# Patient Record
Sex: FEMALE | Race: WHITE | NOT HISPANIC OR LATINO | Employment: OTHER | URBAN - METROPOLITAN AREA
[De-identification: names, ages, dates, MRNs, and addresses within clinical notes are randomized per-mention and may not be internally consistent; named-entity substitution may affect disease eponyms.]

---

## 2017-01-20 ENCOUNTER — THERAPY VISIT (OUTPATIENT)
Dept: PHYSICAL THERAPY | Facility: CLINIC | Age: 77
End: 2017-01-20
Payer: COMMERCIAL

## 2017-01-20 DIAGNOSIS — M79.661 PAIN OF RIGHT LOWER LEG: Primary | ICD-10-CM

## 2017-01-20 DIAGNOSIS — M48.061 SPINAL STENOSIS OF LUMBAR REGION WITHOUT NEUROGENIC CLAUDICATION: ICD-10-CM

## 2017-01-20 PROCEDURE — 97112 NEUROMUSCULAR REEDUCATION: CPT | Mod: GP | Performed by: PHYSICAL THERAPIST

## 2017-01-20 PROCEDURE — 97110 THERAPEUTIC EXERCISES: CPT | Mod: GP | Performed by: PHYSICAL THERAPIST

## 2017-02-06 ENCOUNTER — THERAPY VISIT (OUTPATIENT)
Dept: PHYSICAL THERAPY | Facility: CLINIC | Age: 77
End: 2017-02-06
Payer: COMMERCIAL

## 2017-02-06 DIAGNOSIS — M48.061 SPINAL STENOSIS OF LUMBAR REGION WITHOUT NEUROGENIC CLAUDICATION: ICD-10-CM

## 2017-02-06 DIAGNOSIS — M79.661 PAIN OF RIGHT LOWER LEG: Primary | ICD-10-CM

## 2017-02-06 PROCEDURE — 97110 THERAPEUTIC EXERCISES: CPT | Mod: GP | Performed by: PHYSICAL THERAPIST

## 2017-02-06 PROCEDURE — 97112 NEUROMUSCULAR REEDUCATION: CPT | Mod: GP | Performed by: PHYSICAL THERAPIST

## 2017-02-06 PROCEDURE — 97140 MANUAL THERAPY 1/> REGIONS: CPT | Mod: GP | Performed by: PHYSICAL THERAPIST

## 2017-02-06 NOTE — PROGRESS NOTES
Subjective:    HPI                    Objective:    System    Physical Exam    General     ROS    Assessment/Plan:      PROGRESS  REPORT    Progress reporting period is from 12/19/16 to 2/6/17.       SUBJECTIVE  Subjective changes noted by patient:  .  Subjective: Pt states that back pain has done well since last seen.  Has found ex helpful.      Current pain level is  Current Pain level: 0/10.     Previous pain level was   Initial Pain level: 0/10.   Changes in function:  Yes (See Goal flowsheet attached for changes in current functional level)  Adverse reaction to treatment or activity: None    OBJECTIVE  Changes noted in objective findings:    Objective: Reviewed ex.  Good improvement in strength and NMC of LB/pelvis.  Poor ext of right hip noted-good response to manual therapy.  Pt to cont with HEP.  If questions develop. to check back with PT.  Other wise will DC with HEP.     ASSESSMENT/PLAN  Updated problem list and treatment plan: Diagnosis 1:  Intermittent leg pain associated with clinically diagnosed lumbar stenosis    Decreased ROM/flexibility - manual therapy and therapeutic exercise  Decreased joint mobility - manual therapy and therapeutic exercise  Decreased strength - therapeutic exercise and therapeutic activities  Decreased function - therapeutic activities  Impaired posture - neuro re-education  STG/LTGs have been met or progress has been made towards goals:  Yes (See Goal flow sheet completed today.)  Assessment of Progress: Patient is meeting short term goals and is progressing towards long term goals.  Self Management Plans:  Patient is independent in a home treatment program.  Patient is independent in self management of symptoms.    Pavithra continues to require the following intervention to meet STG and LTG's:  PT    Recommendations:  This patient would benefit from continued therapy.     Frequency:  1 X week, once daily  Duration:  for 2 weeks        Please refer to the daily flowsheet for  treatment today, total treatment time and time spent performing 1:1 timed codes.

## 2017-02-06 NOTE — Clinical Note
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       SUBJECTIVE  Subjective changes noted by patient:  .  Subjective: Pt states that back pain has done well since last seen.  Has found ex helpful.      Current pain level is  Current Pain level: 0/10.     Previous pain level was   Initial Pain level: 0/10.   Changes in function:  Yes (See Goal flowsheet attached for changes in current functional level)  Adverse reaction to treatment or activity: None    OBJECTIVE  Changes noted in objective findings:    Objective: Reviewed ex.  Good improvement in strength and NMC of LB/pelvis.  Poor ext of right hip noted-good response to manual therapy.  Pt to cont with HEP.  If questions develop. to check back with PT.  Other wise will DC with HEP.     ASSESSMENT/PLAN  Updated problem list and treatment plan: Diagnosis 1:  Intermittent leg pain associated with clinically diagnosed lumbar stenosis    Decreased ROM/flexibility - manual therapy and therapeutic exercise  Decreased joint mobility - manual therapy and therapeutic exercise  Decreased strength - therapeutic exercise and therapeutic activities  Decreased function - therapeutic activities  Impaired posture - neuro re-education  STG/LTGs have been met or progress has been made towards goals:  Yes (See Goal flow sheet completed today.)  Assessment of Progress: Patient is meeting short term goals and is progressing towards long term goals.  Self Management Plans:   Patient is independent in a home treatment program.  Patient is independent in self management of symptoms.    Re: Pavithra Laurent   :   1940      Pavithra continues to require the following intervention to meet STG and LTG's:  PT    Recommendations:  This patient would benefit from continued therapy.     Frequency:  1 X week, once daily  Duration:  for 2 weeks        Thank you for your referral.    INQUIRIES  Therapist: Ct Martinez, PT, ScD, [facility]  [facility] [place] [facility]  [address] [place] [address] [id number] [id number]  Phone: [phone]  Fax: [phone]

## 2017-03-03 ENCOUNTER — THERAPY VISIT (OUTPATIENT)
Dept: PHYSICAL THERAPY | Facility: CLINIC | Age: 77
End: 2017-03-03
Payer: COMMERCIAL

## 2017-03-03 DIAGNOSIS — M70.61 GREATER TROCHANTERIC BURSITIS OF RIGHT HIP: Primary | ICD-10-CM

## 2017-03-03 DIAGNOSIS — M79.18 GLUTEAL PAIN: ICD-10-CM

## 2017-03-03 PROCEDURE — 97110 THERAPEUTIC EXERCISES: CPT | Mod: GP | Performed by: PHYSICAL THERAPIST

## 2017-03-03 PROCEDURE — 97161 PT EVAL LOW COMPLEX 20 MIN: CPT | Mod: GP | Performed by: PHYSICAL THERAPIST

## 2017-03-03 ASSESSMENT — ACTIVITIES OF DAILY LIVING (ADL)
HOS_ADL_HIGHEST_POTENTIAL_SCORE: 40
GOING_DOWN_1_FLIGHT_OF_STAIRS: SLIGHT DIFFICULTY
HOS_ADL_ITEM_SCORE_TOTAL: 31
GETTING_INTO_AND_OUT_OF_AN_AVERAGE_CAR: NO DIFFICULTY AT ALL
STEPPING_UP_AND_DOWN_CURBS: NO DIFFICULTY AT ALL
TWISTING/PIVOTING_ON_INVOLVED_LEG: MODERATE DIFFICULTY
GOING_UP_1_FLIGHT_OF_STAIRS: SLIGHT DIFFICULTY
WALKING_INITIALLY: NO DIFFICULTY AT ALL
GETTING_INTO_AND_OUT_OF_A_BATHTUB: SLIGHT DIFFICULTY
PUTTING_ON_SOCKS_AND_SHOES: MODERATE DIFFICULTY
WALKING_15_MINUTES_OR_GREATER: MODERATE DIFFICULTY
WALKING_APPROXIMATELY_10_MINUTES: SLIGHT DIFFICULTY
HOS_ADL_COUNT: 10
STANDING_FOR_15_MINUTES: SLIGHT DIFFICULTY
SITTING_FOR_15_MINUTES: MODERATE DIFFICULTY
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
## 2017-03-06 ENCOUNTER — THERAPY VISIT (OUTPATIENT)
Dept: PHYSICAL THERAPY | Facility: CLINIC | Age: 77
End: 2017-03-06
Payer: COMMERCIAL

## 2017-03-06 DIAGNOSIS — M79.18 GLUTEAL PAIN: ICD-10-CM

## 2017-03-06 DIAGNOSIS — M70.61 GREATER TROCHANTERIC BURSITIS OF RIGHT HIP: ICD-10-CM

## 2017-03-06 PROCEDURE — 97140 MANUAL THERAPY 1/> REGIONS: CPT | Mod: GP | Performed by: PHYSICAL THERAPIST

## 2017-03-06 PROCEDURE — 97112 NEUROMUSCULAR REEDUCATION: CPT | Mod: GP | Performed by: PHYSICAL THERAPIST

## 2017-03-06 PROCEDURE — 97110 THERAPEUTIC EXERCISES: CPT | Mod: GP | Performed by: PHYSICAL THERAPIST

## 2017-03-13 ENCOUNTER — THERAPY VISIT (OUTPATIENT)
Dept: PHYSICAL THERAPY | Facility: CLINIC | Age: 77
End: 2017-03-13
Payer: COMMERCIAL

## 2017-03-13 DIAGNOSIS — M79.18 GLUTEAL PAIN: ICD-10-CM

## 2017-03-13 DIAGNOSIS — M70.61 GREATER TROCHANTERIC BURSITIS OF RIGHT HIP: ICD-10-CM

## 2017-03-13 PROCEDURE — 97110 THERAPEUTIC EXERCISES: CPT | Mod: GP | Performed by: PHYSICAL THERAPIST

## 2017-03-13 PROCEDURE — 97112 NEUROMUSCULAR REEDUCATION: CPT | Mod: GP | Performed by: PHYSICAL THERAPIST

## 2017-03-30 ENCOUNTER — DOCUMENTATION ONLY (OUTPATIENT)
Dept: FAMILY MEDICINE | Facility: CLINIC | Age: 77
End: 2017-03-30

## 2017-03-30 ENCOUNTER — TRANSFERRED RECORDS (OUTPATIENT)
Dept: HEALTH INFORMATION MANAGEMENT | Facility: CLINIC | Age: 77
End: 2017-03-30

## 2017-03-30 ENCOUNTER — OFFICE VISIT (OUTPATIENT)
Dept: FAMILY MEDICINE | Facility: CLINIC | Age: 77
End: 2017-03-30
Payer: COMMERCIAL

## 2017-03-30 VITALS
HEIGHT: 63 IN | WEIGHT: 177.5 LBS | SYSTOLIC BLOOD PRESSURE: 130 MMHG | TEMPERATURE: 98.1 F | HEART RATE: 83 BPM | BODY MASS INDEX: 31.45 KG/M2 | DIASTOLIC BLOOD PRESSURE: 62 MMHG | OXYGEN SATURATION: 97 % | RESPIRATION RATE: 16 BRPM

## 2017-03-30 DIAGNOSIS — I82.402 DEEP VEIN THROMBOSIS (DVT) OF LEFT LOWER EXTREMITY, UNSPECIFIED CHRONICITY, UNSPECIFIED VEIN (H): Primary | ICD-10-CM

## 2017-03-30 DIAGNOSIS — I80.9 PHLEBITIS: ICD-10-CM

## 2017-03-30 DIAGNOSIS — M79.89 SWELLING OF LEFT LOWER EXTREMITY: ICD-10-CM

## 2017-03-30 DIAGNOSIS — I82.812 SUPERFICIAL THROMBOSIS OF LEFT LOWER EXTREMITY: ICD-10-CM

## 2017-03-30 PROBLEM — I82.432 DEEP VEIN THROMBOSIS (DVT) OF POPLITEAL VEIN OF LEFT LOWER EXTREMITY, UNSPECIFIED CHRONICITY (H): Status: ACTIVE | Noted: 2017-03-30

## 2017-03-30 PROCEDURE — 36415 COLL VENOUS BLD VENIPUNCTURE: CPT | Performed by: PHYSICIAN ASSISTANT

## 2017-03-30 PROCEDURE — 85610 PROTHROMBIN TIME: CPT | Performed by: PHYSICIAN ASSISTANT

## 2017-03-30 PROCEDURE — 99214 OFFICE O/P EST MOD 30 MIN: CPT | Performed by: PHYSICIAN ASSISTANT

## 2017-03-30 RX ORDER — CEPHALEXIN 500 MG/1
500 CAPSULE ORAL 4 TIMES DAILY
Qty: 40 CAPSULE | Refills: 0 | Status: SHIPPED | OUTPATIENT
Start: 2017-03-30 | End: 2017-03-30

## 2017-03-30 RX ORDER — WARFARIN SODIUM 5 MG/1
10 TABLET ORAL DAILY
Qty: 30 TABLET | Refills: 0 | Status: SHIPPED | OUTPATIENT
Start: 2017-03-30 | End: 2017-04-07

## 2017-03-30 NOTE — PATIENT INSTRUCTIONS
Take enoxaparin and warfarin as instructed starting today.   Follow up with our INR nurse on Monday or Tuesday  Elevate the leg as much as possible, wear compression stalkings and avoid unnecessary movement, rest the leg as much as possible.   INR nurses will determine when you need to follow up with me.   Anticipate treatment with blood thinners for approximately 6 months, more or less depending on clinical progression.     Understanding Deep Vein Thrombosis  Deep vein thrombosis (DVT) is a condition with a blood clot or thrombus in a deep vein. A blood clot is most common in the leg, but can develop in a large vein deep inside the leg, arm, or other part of the body. Part of the clot called an embolus can separate from the vein. It may travel to the lungs and form a pulmonary embolus (PE). This can cut off the flow of blood. A PE is a medical emergency and may cause death. Health care providers use the term venous thromboembolism (VTE) to describe the 2 conditions, DVT and PE. They use the term VTE because the 2 conditions are very closely related. And, because their prevention and treatment are closely related.  Over time, a blood clot can also permanently damage veins. To protect your health, a blood clot must be treated right away.  How DVT develops  The deep veins of the legs are located in the muscles. These help carry blood from the legs to the heart. When leg muscles contract and relax, blood is squeezed through the veins back to the heart. One-way valves inside the veins help keep the blood moving in the right direction. When blood moves too slowly or not at all, it can pool in the veins. This makes a clot more likely to form.    Risk factors  Anyone can develop a blood clot. The following risk factors make a blood more likely to happen:    Being inactive for a long period, such as when you re in the hospital, or traveling by plane or car    Injury to a vein from an accident, a broken bone, or  surgery    Having blood clots in the past    Personal or family history of a blood-clotting disorder    Recent surgery    Cancer and certain cancer treatments    Smoking  Other factors can also put you at higher risk for a blood clot. They include:    Age over 60 years    Pregnancy    Taking birth control or hormone replacement    Having other vein problems    Being overweight  Common symptoms  A blood clot does not always cause obvious symptoms. If you do have symptoms, they usually occur suddenly. They may include:    Pain, especially deep in the muscle    Swelling    Aching or tenderness    Red or warm skin  Call your health care provider if you have these symptoms.  Symptoms of pulmonary embolism may include:    Trouble breathing    Fast heartbeat    Chest pain    Sweating    Coughing (may cough up blood)    Fainting  Call 911 if you have these symptoms.   If you take medicine to help prevent blood clots, you have an increased risk of bleeding. Call 911 if you have heavy or uncontrolled bleeding. Call your health care provider if you have other signs or symptoms of bleeding like blood in the urine, bleeding with bowel movements, or bleeding from the nose, gums, a cut, or vagina.  Diagnosing DVT  Diagnosis begins with thorough questions about your symptoms and medical history along with a physical exam.  Diagnostic tests include:    An imaging test called a duplex ultrasound. This test uses sound waves to create pictures of veins and blood flow.    Blood tests to check for clotting and other problems.  If your health care provider thinks you may have pulmonary embolism, additional testing will be done.  Treating DVT  Treating a blood clot may include:    Medicine to thin the blood and prevent pulmonary embolism and other complications.    Staying in the hospital. This may or may not be necessary.    Surgery for some people, like those who cannot take blood-thinning medicines.  Preventing DVT  Many people who are  in the hospital are at an increased risk of developing blood clots. So, preventing blood clots is an important part of in-hospital care. The care may include getting out of bed regularly, taking medicine, or using special therapies or devices. Other factors and conditions may increase the risk of blood clots. Review your risk with your health care provider.    0085-9039 [employer]. [address] [place] [address], [place] [address]. All rights reserved. This information is not intended as a substitute for professional medical care. Always follow your healthcare professional's instructions.         Using Blood Thinners (Anticoagulants)  Blood thinners or anticoagulants are medicines that help prevent blood clots from forming. They include warfarin, heparin, dabigatran, rivaroxaban, apixaban,and edoxaban. Your health care provider will help you decide which medicine is best for you.    Taking an anticoagulant safely  When you are taking a blood thinner, you will need to take certain steps to stay safe. Too much blood thinner puts you at risk for bleeding. Too little puts you at risk for stroke. Follow these guidelines. Also follow any others that your health care provider gives you.    You may be told you need regular lab testing while taking these medicines. Warfarin requires routine testing to blood-thinning level testing while the other medicines do not.    Tell your doctor about all medicines you take. This includes over the counter medicines, supplements, or herbal remedies. Do not take any medicines (including ones you buy over the counter) that your doctor doesn t know about. Some medicines can interact with blood thinners and cause serious problems.    Tell any health care provider that you see for care (such as doctors, dentists, chiropractors, home health nurses) that you take a blood thinner.    Carry a medical ID card or wear a medical-alert bracelet that says you take an anticoagulant.    Before  taking aspirin, check with your doctor. Aspirin can significantly increase your risk of bleeding.    This medicine makes bleeding harder to stop. To protect yourself:    Avoid activities that may cause injury. If you fall or are injured, contact your health care provider right away. Blood thinners prevent clotting, so you could be bleeding inside without realizing it.    Use a soft-bristle toothbrush and waxed dental floss. Shave with an electric razor rather than a blade.    Don t go barefoot. Don t trim corns or calluses yourself.  Warfarin: Other important information  Several precautions are especially important when you are taking warfarin. Always keep these points in mind:    Be sure to follow your health care provider's instructions for taking warfarin.    Take this medicine at the same time each day. Take it with a full glass of water, with or without food. If you miss a dose, contact your doctor to find out how much to take. Avoid takinga double dose.    Warfarin is an effective drug, but it can be dangerous if not taken properly. It makes your blood less likely to form clots. If you take too much, it can cause serious internal or external bleeding.    You will need to have regular monitoring while you are taking warfarin. This includes blood tests to check your international normalized ratio (INR) and prothrombin time (PT). These tests show how quickly your blood clots. You will also have a complete blood count (CBC) periodically. This looks at your blood and platelet levels. Both of these need to be followed while you're on warfarin. Talk with your health care provider about whether you need to visit the clinic every week, or if services are available for monitoring in your home.    Certain medicines can affect your INR and PT levels. Tell your health care provider if there are any changes in your medicines. This includes any over-the-counter medicines, supplements like vitamin K, or herbal  remedies.    Your diet can also affect your INR and PT levels. Because of this, it's important to eat a consistent diet. It is especially important to eat a consistent amount of foods that are high in vitamin K. Be sure to talk with your health care provider before making any big changes in your diet.    Remember that warfarin increases your risk of bleeding. Be careful not to injure yourself. If you have a significant injury, contact your health care provider right away. It's important to alert your doctor if you've fallen or hurt yourself, even if you don't break your skin. You could be bleeding inside your body without realizing it.     Warfarin: Watch your INR/PT blood levels  Two tests are used to find out how your blood is clotting. One is protime (PT), the other is the international normalized ratio (INR).    Go for your blood tests (INR/PT) as often as directed. Your diet and the other medicines you take can affect your INR/PT levels.    Your INR was between ___ and ___.    Ask your doctor what your goal INR is. My goal INR is between ___ and ___.    My next INR/PT blood draw is due on _____________ (date) at ___________ (time) by ___________ (name of doctor or clinic).    The name of the doctor who is monitoring my anticoagulation therapy is _____________________ and the phone number is _________________.    Follow up with your doctor or as advised by his or her staff. It usually takes a few hours for your doctor to get the results of your clotting tests. Call to get your lab results to find out if your doctor needs to make further changes to your warfarin dose.    If your blood is drawn for these tests at a location other than your doctor's office, tell your doctor as soon as you get your lab results.   Warfarin: Watch what you eat  Vitamin K helps your blood clot. So you have to watch how much you eat of foods that contain vitamin K. These foods can affect the way warfarin works. They do not affect the  other non-warfarin blood thinners.Here are some specific tips:    Try to keep your diet about the same each day. If you change your diet for any reason, such as for illness or to lose weight, be sure to tell your doctor.    Each day, eat the same amount of foods that are high in vitamin K. These include asparagus, avocado, broccoli, cabbage, kale, spinach, and some other leafy green vegetables. Oils, such as soybean, canola, and olive oils, are also high in vitamin K.    Limit fats to 2 to 4 tablespoons a day.    Ask your health care provider if you should avoid alcohol while you are taking a blood thinner.    Avoid teas that contain sweet clover, sweet brendan, or tonka beans. These can affect how your medicine works.    Talk with your health care provider and pharmacist about specific foods or special diets that can affect anticoagulant levels. These include grapefruit juice, cranberries and cranberry juice, fish oil supplements, garlic, mery, licorice, turmeric, and herbal teas and supplements.  Talk with your health care provider if you have concerns about these or other food products and their effects on warfarin.     When to call your doctor if you re taking an anticoagulant  Call your doctor right away if you have any of these:    Bleeding that doesn t stop in 10 minutes    A heavier-than-normal menstrual period or bleeding between periods    Coughing or throwing up blood    Bloody diarrhea or bleeding hemorrhoids     Dark-colored urine or black stools    Red or black-and-blue marks on the skin that get larger    Dizziness or fatigue    Chest pain or trouble breathing  Allergic reactions:    Rash    Itching    Swelling    Trouble swallowing or breathing   Medical conditions and anticoagulants  Before starting a blood thinner, be sure your doctor knows if you have any of these conditions:    Stomach ulcer now or in the past    Vomited blood or had bloody stools (black or red color)    Aneurysm,  pericarditis, or pericardial effusion    Blood disorder    Recent surgery, stroke, mini-stroke, or spinal puncture    Kidney or liver disease, uncontrolled high blood pressure, diabetes, vasculitis, heart failure, lupus, or other collagen-vascular disease, or high cholesterol    Pregnancy or breastfeeding    Younger than 18 years old    Recent or planned dental procedure  Drug interactions and anticoagulants  Many medicines interfere with the effect of blood thinners. Before starting these medicines, be sure your doctor knows about any prescription, over-the-counter, or herbal supplements you take. In particular, tell your provider about:    Antibiotics    Heart medicines    Cimetidine    Aspirin or other anti-inflammatory drugs such as ibuprofen, naproxen, ketoprofen, or other arthritis medicines    Drugs for depression, cancer HIV (protease inhibitors), diabetes, seizures, gout, high cholesterol, or thyroid replacement    Vitamins containing vitamin K or herbal products such as ginkgo, Co-Q10, garlic, or Three Points's wort     Note: This information topic may not include all directions, precautions, medical conditions, drug/food interactions, and warnings for these medicines. Check with your doctor, nurse, or pharmacist for any questions you have.      [id number] [employer]. [address] [place] [id number]. All rights reserved. This information is not intended as a substitute for professional medical care. Always follow your healthcare professional's instructions.

## 2017-03-30 NOTE — MR AVS SNAPSHOT
After Visit Summary   3/30/2017    Pavithra Laurent    MRN: [id number]           Patient Information     Date Of Birth          1940        Visit Information        Provider Department      3/30/2017 11:30 AM Siegler, Nicole Joy, PA-C [facility]        Today's Diagnoses     Deep vein thrombosis (DVT) of left lower extremity, unspecified chronicity, unspecified vein (H)    -  1    Superficial thrombosis of left lower extremity        Swelling of left lower extremity        Phlebitis          Care Instructions      Take enoxaparin and warfarin as instructed starting today.   Follow up with our INR nurse on Monday or Tuesday  Elevate the leg as much as possible, wear compression stalkings and avoid unnecessary movement, rest the leg as much as possible.   INR nurses will determine when you need to follow up with me.   Anticipate treatment with blood thinners for approximately 6 months, more or less depending on clinical progression.     Understanding Deep Vein Thrombosis  Deep vein thrombosis (DVT) is a condition with a blood clot or thrombus in a deep vein. A blood clot is most common in the leg, but can develop in a large vein deep inside the leg, arm, or other part of the body. Part of the clot called an embolus can separate from the vein. It may travel to the lungs and form a pulmonary embolus (PE). This can cut off the flow of blood. A PE is a medical emergency and may cause death. Health care providers use the term venous thromboembolism (VTE) to describe the 2 conditions, DVT and PE. They use the term VTE because the 2 conditions are very closely related. And, because their prevention and treatment are closely related.  Over time, a blood clot can also permanently damage veins. To protect your health, a blood clot must be treated right away.  How DVT develops  The deep veins of the legs are located in the muscles. These help carry blood from the legs to the heart.  When leg muscles contract and relax, blood is squeezed through the veins back to the heart. One-way valves inside the veins help keep the blood moving in the right direction. When blood moves too slowly or not at all, it can pool in the veins. This makes a clot more likely to form.    Risk factors  Anyone can develop a blood clot. The following risk factors make a blood more likely to happen:    Being inactive for a long period, such as when you re in the hospital, or traveling by plane or car    Injury to a vein from an accident, a broken bone, or surgery    Having blood clots in the past    Personal or family history of a blood-clotting disorder    Recent surgery    Cancer and certain cancer treatments    Smoking  Other factors can also put you at higher risk for a blood clot. They include:    Age over 60 years    Pregnancy    Taking birth control or hormone replacement    Having other vein problems    Being overweight  Common symptoms  A blood clot does not always cause obvious symptoms. If you do have symptoms, they usually occur suddenly. They may include:    Pain, especially deep in the muscle    Swelling    Aching or tenderness    Red or warm skin  Call your health care provider if you have these symptoms.  Symptoms of pulmonary embolism may include:    Trouble breathing    Fast heartbeat    Chest pain    Sweating    Coughing (may cough up blood)    Fainting  Call 911 if you have these symptoms.   If you take medicine to help prevent blood clots, you have an increased risk of bleeding. Call 911 if you have heavy or uncontrolled bleeding. Call your health care provider if you have other signs or symptoms of bleeding like blood in the urine, bleeding with bowel movements, or bleeding from the nose, gums, a cut, or vagina.  Diagnosing DVT  Diagnosis begins with thorough questions about your symptoms and medical history along with a physical exam.  Diagnostic tests include:    An imaging test called a duplex  ultrasound. This test uses sound waves to create pictures of veins and blood flow.    Blood tests to check for clotting and other problems.  If your health care provider thinks you may have pulmonary embolism, additional testing will be done.  Treating DVT  Treating a blood clot may include:    Medicine to thin the blood and prevent pulmonary embolism and other complications.    Staying in the hospital. This may or may not be necessary.    Surgery for some people, like those who cannot take blood-thinning medicines.  Preventing DVT  Many people who are in the hospital are at an increased risk of developing blood clots. So, preventing blood clots is an important part of in-hospital care. The care may include getting out of bed regularly, taking medicine, or using special therapies or devices. Other factors and conditions may increase the risk of blood clots. Review your risk with your health care provider.    3222-7470 [employer] [address] [place], [place] [address]. All rights reserved. This information is not intended as a substitute for professional medical care. Always follow your healthcare professional's instructions.         Using Blood Thinners (Anticoagulants)  Blood thinners or anticoagulants are medicines that help prevent blood clots from forming. They include warfarin, heparin, dabigatran, rivaroxaban, apixaban,and edoxaban. Your health care provider will help you decide which medicine is best for you.    Taking an anticoagulant safely  When you are taking a blood thinner, you will need to take certain steps to stay safe. Too much blood thinner puts you at risk for bleeding. Too little puts you at risk for stroke. Follow these guidelines. Also follow any others that your health care provider gives you.    You may be told you need regular lab testing while taking these medicines. Warfarin requires routine testing to blood-thinning level testing while the other medicines do  not.    Tell your doctor about all medicines you take. This includes over the counter medicines, supplements, or herbal remedies. Do not take any medicines (including ones you buy over the counter) that your doctor doesn t know about. Some medicines can interact with blood thinners and cause serious problems.    Tell any health care provider that you see for care (such as doctors, dentists, chiropractors, home health nurses) that you take a blood thinner.    Carry a medical ID card or wear a medical-alert bracelet that says you take an anticoagulant.    Before taking aspirin, check with your doctor. Aspirin can significantly increase your risk of bleeding.    This medicine makes bleeding harder to stop. To protect yourself:    Avoid activities that may cause injury. If you fall or are injured, contact your health care provider right away. Blood thinners prevent clotting, so you could be bleeding inside without realizing it.    Use a soft-bristle toothbrush and waxed dental floss. Shave with an electric razor rather than a blade.    Don t go barefoot. Don t trim corns or calluses yourself.  Warfarin: Other important information  Several precautions are especially important when you are taking warfarin. Always keep these points in mind:    Be sure to follow your health care provider's instructions for taking warfarin.    Take this medicine at the same time each day. Take it with a full glass of water, with or without food. If you miss a dose, contact your doctor to find out how much to take. Avoid takinga double dose.    Warfarin is an effective drug, but it can be dangerous if not taken properly. It makes your blood less likely to form clots. If you take too much, it can cause serious internal or external bleeding.    You will need to have regular monitoring while you are taking warfarin. This includes blood tests to check your international normalized ratio (INR) and prothrombin time (PT). These tests show how  quickly your blood clots. You will also have a complete blood count (CBC) periodically. This looks at your blood and platelet levels. Both of these need to be followed while you're on warfarin. Talk with your health care provider about whether you need to visit the clinic every week, or if services are available for monitoring in your home.    Certain medicines can affect your INR and PT levels. Tell your health care provider if there are any changes in your medicines. This includes any over-the-counter medicines, supplements like vitamin K, or herbal remedies.    Your diet can also affect your INR and PT levels. Because of this, it's important to eat a consistent diet. It is especially important to eat a consistent amount of foods that are high in vitamin K. Be sure to talk with your health care provider before making any big changes in your diet.    Remember that warfarin increases your risk of bleeding. Be careful not to injure yourself. If you have a significant injury, contact your health care provider right away. It's important to alert your doctor if you've fallen or hurt yourself, even if you don't break your skin. You could be bleeding inside your body without realizing it.     Warfarin: Watch your INR/PT blood levels  Two tests are used to find out how your blood is clotting. One is protime (PT), the other is the international normalized ratio (INR).    Go for your blood tests (INR/PT) as often as directed. Your diet and the other medicines you take can affect your INR/PT levels.    Your INR was between ___ and ___.    Ask your doctor what your goal INR is. My goal INR is between ___ and ___.    My next INR/PT blood draw is due on _____________ (date) at ___________ (time) by ___________ (name of doctor or clinic).    The name of the doctor who is monitoring my anticoagulation therapy is _____________________ and the phone number is _________________.    Follow up with your doctor or as advised by his or  her staff. It usually takes a few hours for your doctor to get the results of your clotting tests. Call to get your lab results to find out if your doctor needs to make further changes to your warfarin dose.    If your blood is drawn for these tests at a location other than your doctor's office, tell your doctor as soon as you get your lab results.   Warfarin: Watch what you eat  Vitamin K helps your blood clot. So you have to watch how much you eat of foods that contain vitamin K. These foods can affect the way warfarin works. They do not affect the other non-warfarin blood thinners.Here are some specific tips:    Try to keep your diet about the same each day. If you change your diet for any reason, such as for illness or to lose weight, be sure to tell your doctor.    Each day, eat the same amount of foods that are high in vitamin K. These include asparagus, avocado, broccoli, cabbage, kale, spinach, and some other leafy green vegetables. Oils, such as soybean, canola, and olive oils, are also high in vitamin K.    Limit fats to 2 to 4 tablespoons a day.    Ask your health care provider if you should avoid alcohol while you are taking a blood thinner.    Avoid teas that contain sweet clover, sweet brendan, or tonka beans. These can affect how your medicine works.    Talk with your health care provider and pharmacist about specific foods or special diets that can affect anticoagulant levels. These include grapefruit juice, cranberries and cranberry juice, fish oil supplements, garlic, mery, licorice, turmeric, and herbal teas and supplements.  Talk with your health care provider if you have concerns about these or other food products and their effects on warfarin.     When to call your doctor if you re taking an anticoagulant  Call your doctor right away if you have any of these:    Bleeding that doesn t stop in 10 minutes    A heavier-than-normal menstrual period or bleeding between periods    Coughing or  throwing up blood    Bloody diarrhea or bleeding hemorrhoids     Dark-colored urine or black stools    Red or black-and-blue marks on the skin that get larger    Dizziness or fatigue    Chest pain or trouble breathing  Allergic reactions:    Rash    Itching    Swelling    Trouble swallowing or breathing   Medical conditions and anticoagulants  Before starting a blood thinner, be sure your doctor knows if you have any of these conditions:    Stomach ulcer now or in the past    Vomited blood or had bloody stools (black or red color)    Aneurysm, pericarditis, or pericardial effusion    Blood disorder    Recent surgery, stroke, mini-stroke, or spinal puncture    Kidney or liver disease, uncontrolled high blood pressure, diabetes, vasculitis, heart failure, lupus, or other collagen-vascular disease, or high cholesterol    Pregnancy or breastfeeding    Younger than 18 years old    Recent or planned dental procedure  Drug interactions and anticoagulants  Many medicines interfere with the effect of blood thinners. Before starting these medicines, be sure your doctor knows about any prescription, over-the-counter, or herbal supplements you take. In particular, tell your provider about:    Antibiotics    Heart medicines    Cimetidine    Aspirin or other anti-inflammatory drugs such as ibuprofen, naproxen, ketoprofen, or other arthritis medicines    Drugs for depression, cancer HIV (protease inhibitors), diabetes, seizures, gout, high cholesterol, or thyroid replacement    Vitamins containing vitamin K or herbal products such as ginkgo, Co-Q10, garlic, or Ricardo's wort     Note: This information topic may not include all directions, precautions, medical conditions, drug/food interactions, and warnings for these medicines. Check with your doctor, nurse, or pharmacist for any questions you have.      3381-5231 [employer]. [address] [place] [address], [place] [address]. All rights reserved. This information is not  intended as a substitute for professional medical care. Always follow your healthcare professional's instructions.              Follow-ups after your visit        Additional Services     INR CLINIC REFERRAL       Your provider has referred you to INR Services.    Please be aware that coverage of these services is subject to the terms and limitations of your health insurance plan.  Call member services at your health plan with any benefit or coverage questions.    Indication for Anticoagulation: DVT (first time)  If nonstandard INR is desired, indicate goal range and explanation: goal is INR 2-3  Expected Duration of Therapy: 6 Months                  Your next 10 appointments already scheduled     Apr 06, 2017 10:40 AM CDT   [facility]AN[facility] Extremity with Ct Martinez PT   [facility] ([facility]DA [place]  )    [address] [place] [address] [id number]  [place] [id number] [phone]            Apr 11, 2017 11:20 AM CDT   AMANDA Extremity with Ct Martinez PT   [facility] ([facility]DA [place]  )    [address] [place] [address] [id number]  [place] [id number] [phone]              Future tests that were ordered for you today     Open Future Orders        Priority Expected Expires Ordered    US Lower Extremity Venous Duplex Left Routine  3/30/2018 3/30/2017            Who to contact     If you have questions or need follow up information about today's clinic visit or your schedule please contact [facility] directly at [phone].  Normal or non-critical lab and imaging results will be communicated to you by MyChart, letter or phone within 4 business days after the clinic has received the results. If you do not hear from us within 7 days, please contact the clinic through MyChart or phone. If you have a critical or abnormal lab result, we will notify you by phone as soon as possible.  Submit refill requests through MyChart or call your  "pharmacy and they will forward the refill request to us. Please allow 3 business days for your refill to be completed.          Additional Information About Your Visit        CodeNgohart Information     Streamline lets you send messages to your doctor, view your test results, renew your prescriptions, schedule appointments and more. To sign up, go to [email or web address] . Click on \"Log in\" on the left side of the screen, which will take you to the Welcome page. Then click on \"Sign up Now\" on the right side of the page.     You will be asked to enter the access code listed below, as well as some personal information. Please follow the directions to create your username and password.     Your access code is: [email or web address]  Expires: 2017 12:58 PM     Your access code will  in 90 days. If you need help or a new code, please call your [facility] clinic or [phone].        Care EveryWhere ID     This is your Care EveryWhere ID. This could be used by other organizations to access your [facility] medical records  [id number]        Your Vitals Were     Pulse Temperature Respirations Height Pulse Oximetry BMI (Body Mass Index)    83 98.1  F (36.7  C) (Tympanic) 16 5' 2.5\" (1.588 m) 97% 31.95 kg/m2       Blood Pressure from Last 3 Encounters:   17 130/62   16 124/66   16 102/56    Weight from Last 3 Encounters:   17 177 lb 8 oz (80.5 kg)   16 174 lb 8 oz (79.2 kg)   16 177 lb (80.3 kg)              We Performed the Following     INR CLINIC REFERRAL     INR          Today's Medication Changes          These changes are accurate as of: 3/30/17  1:40 PM.  If you have any questions, ask your nurse or doctor.               Start taking these medicines.        Dose/Directions    cephALEXin 500 MG capsule   Commonly known as:  KEFLEX   Started by:  Siegler, Nicole Joy, PA-C        Dose:  500 mg   Take 1 capsule (500 mg) by mouth 4 times daily   Quantity:  40 capsule   Refills:  0       " enoxaparin 80 MG/0.8ML injection   Commonly known as:  LOVENOX   Used for:  Deep vein thrombosis (DVT) of left lower extremity, unspecified chronicity, unspecified vein (H)   Started by:  Siegler, Nicole Joy, PA-C        Dose:  1 mg/kg   Inject 0.81 mLs (81 mg) Subcutaneous 2 times daily for 14 days   Quantity:  22.68 mL   Refills:  0       warfarin 5 MG tablet   Commonly known as:  COUMADIN   Used for:  Deep vein thrombosis (DVT) of left lower extremity, unspecified chronicity, unspecified vein (H)   Started by:  Siegler, Nicole Joy, PA-C        Dose:  10 mg   Take 2 tablets (10 mg) by mouth daily 10mg for 2 days then 5mg daily until further instruction from INR nurse   Quantity:  30 tablet   Refills:  0         These medicines have changed or have updated prescriptions.        Dose/Directions    benzonatate 100 MG capsule   Commonly known as:  TESSALON   This may have changed:  reasons to take this   Used for:  Cough        Dose:  100 mg   Take 1 capsule (100 mg) by mouth 3 times daily as needed for cough   Quantity:  30 capsule   Refills:  0            Where to get your medicines      These medications were sent to [id number] - [address] [address]     Phone:  [phone]     cephALEXin 500 MG capsule    enoxaparin 80 MG/0.8ML injection    warfarin 5 MG tablet                Primary Care Provider Office Phone # Fax #    Nicole Joy Siegler, PA-C [phone]       [facility] [address]  [facility] [address]        Thank you!     Thank you for choosing [facility]  for your care. Our goal is always to provide you with excellent care. Hearing back from our patients is one way we can continue to improve our services. Please take a few minutes to complete the written survey that you may receive in the mail after your visit with us. Thank you!             Your Updated Medication List -  Protect others around you: Learn how to safely use, store and throw away your medicines at www.disposemymeds.org.          This list is accurate as of: 3/30/17  1:40 PM.  Always use your most recent med list.                   Brand Name Dispense Instructions for use    acetaminophen 500 MG tablet    TYLENOL     Take 500-1,000 mg by mouth every 6 hours as needed for mild pain       amoxicillin 500 MG capsule    AMOXIL     Take 2,000 mg by mouth See Admin Instructions As needed for dental care       azelastine 0.1 % spray    ASTELIN     Spray 2 sprays into both nostrils 2 times daily as needed       benzonatate 100 MG capsule    TESSALON    30 capsule    Take 1 capsule (100 mg) by mouth 3 times daily as needed for cough       CALCIUM SOFT CHEWS 500-500-40 MG-UNT-MCG Chew   Generic drug:  calcium-vitamin D-vitamin K      Take 2 tablets by mouth 2 times daily       cephALEXin 500 MG capsule    KEFLEX    40 capsule    Take 1 capsule (500 mg) by mouth 4 times daily       cycloSPORINE 0.05 % ophthalmic emulsion    RESTASIS     Place 1 drop into both eyes every 12 hours.       DERMOTIC 0.01 % Oil   Generic drug:  fluocinolone acetonide      Place 1 drop in ear(s) daily as needed.       desonide 0.05 % cream    DESOWEN     Apply topically 2 times daily as needed       Dextran 70 0.1%-Hypromellose 0.3% 0.1-0.3 % Soln ophthalmic solution      Place 1 drop into both eyes as needed for dry eyes       diclofenac 1 % Gel topical gel    VOLTAREN     Place 2 g onto the skin 4 times daily       enoxaparin 80 MG/0.8ML injection    LOVENOX    22.68 mL    Inject 0.81 mLs (81 mg) Subcutaneous 2 times daily for 14 days       epinastine HCl 0.05 % Soln    ELESTAT     1 drop 2 times daily as needed       erythromycin ophthalmic ointment    ROMYCIN     1 Application nightly as needed       fluticasone 50 MCG/ACT spray    FLONASE     Spray 1 spray into both nostrils daily       gabapentin 300 MG capsule    NEURONTIN    270 capsule    TAKE 1  CAPSULE BY MOUTH 3TIMES A DAY       IBUPROFEN PO      Take 400 mg by mouth every 4 hours as needed for moderate pain       ketoconazole-hydrocortisone 2 & 1 % (CREAM) Kit      Externally apply 1 Application topically 2 times daily       levothyroxine 88 MCG tablet    SYNTHROID/LEVOTHROID    90 tablet    TAKE 1 TABLET BY MOUTH EVERY DAY       melatonin 3 MG tablet      Take 3 mg by mouth nightly as needed for sleep       metroNIDAZOLE 1 % gel    METROGEL     Apply 0.5 inches topically daily.       montelukast 10 MG tablet    SINGULAIR     Take 10 mg by mouth daily       PRILOSEC OTC PO      Take 20 mg by mouth every other day       warfarin 5 MG tablet    COUMADIN    30 tablet    Take 2 tablets (10 mg) by mouth daily 10mg for 2 days then 5mg daily until further instruction from INR nurse

## 2017-03-30 NOTE — NURSING NOTE
"Chief Complaint   Patient presents with     Cellulitis     left ankle, has been treated X4 for cellulitis       Initial /62  Pulse 83  Temp 98.1  F (36.7  C) (Tympanic)  Resp 16  Ht 5' 2.5\" (1.588 m)  Wt 177 lb 8 oz (80.5 kg)  SpO2 97%  BMI 31.95 kg/m2 Estimated body mass index is 31.95 kg/(m^2) as calculated from the following:    Height as of this encounter: 5' 2.5\" (1.588 m).    Weight as of this encounter: 177 lb 8 oz (80.5 kg).  Medication Reconciliation: complete     Vanesa Koch LPN      Patient was schedule for venous duples @ [facility] today @ 1:00. Directed to call with results. Patient was given information and directions.  "

## 2017-03-30 NOTE — PROGRESS NOTES
Went over lovenox teaching with the patient and gave her a lovenox kit, Also went over the dietary restrictions when on coumadin.  Mili Davila RN  03/30/17  4:57 PM

## 2017-03-30 NOTE — LETTER
[facility]  [address] [address]  [phone]                                                                                                           Pavithra Laurent  [address] [facility] [address]    March 31, 2017      Dear Pavithra,    Your current INR is normal. Regarding your recent diagnosis of DVT (deep vein thrombosis) you will be working with the INR Nurse at the clinic to raise this lab result value to between 2 and 3 for best disintegration of the blood clot.     Continue with our current plan for follow up Monday with the INR Nurse.     Results for orders placed or performed in visit on 03/30/17   INR   Result Value Ref Range    INR 0.94 0.86 - 1.14     Thank you for choosing [facility].  We appreciate the opportunity to serve you and look forward to supporting your healthcare needs in the future.    If you have any questions or concerns, please call me or my staff at [phone].      Sincerely,        Nicole Joy Siegler, PA-C

## 2017-03-30 NOTE — PROGRESS NOTES
SUBJECTIVE:                                                    Pavithra Laurent is a 76 year old female who presents to clinic today for the following health issues:    Rash      Duration: 2 weeks    Description  Location: left ankle  Itching: no    Intensity:  moderate    Accompanying signs and symptoms: tender, red, swelling, lump's     History (similar episodes/previous evaluation): cellulitis    Precipitating or alleviating factors:  New exposures:  None  Recent travel: no      Therapies tried and outcome: organic healing balm     She had been treated about 2 weeks ago for cellulitis with cephalexin and had improvement. She has been seen by her ortho provider and MRI of the ankle/leg was negative for bone or tendon issue or deep infection. She continues with PT for plantar fasciitis. She is concerned about blood clot as many of her family members have clotting disorder but she is not certain whether she does or not. She has no history of blood clot and has several surgical procedures without complication in her life.     Her current rash is painful on the anterior aspect of the left lower leg, redness is circular around her left lower calf. She has some swelling that she has noted compared to the other side. No discharge or bleeding. She has not had an injury she is aware of in the area or an open wound or sore.     Problem list and histories reviewed & adjusted, as indicated.  Additional history: as documented    Patient Active Problem List   Diagnosis     Nasal congestion     Environmental allergies     Acute bronchitis with coexisting condition, need prophylactic therapy     GERD (gastroesophageal reflux disease)     Hypothyroidism     Arm pain     Rosacea     Dry eye syndrome     Elbow dislocation     Lesion of ulnar nerve     CARDIOVASCULAR SCREENING; LDL GOAL LESS THAN 130     Pain of right lower leg     Spinal stenosis of lumbar region without neurogenic claudication     Greater trochanteric bursitis of  right hip     Gluteal pain     Deep vein thrombosis (DVT) of left lower extremity, unspecified chronicity, unspecified vein (H)     Past Surgical History:   Procedure Laterality Date     ARTHROTOMY ELBOW Right 1/7/2015    Procedure: ARTHROTOMY ELBOW;  Surgeon: Branden Meyer MD;  Location: [facility] RAD RESEC TONSIL/PILLARS  1948    tonsillectomy     CLOSED REDUCTION UPPER EXTREMITY  12/26/2014    Procedure: CLOSED REDUCTION UPPER EXTREMITY;  Surgeon: Louis Daniel MD;  Location:  OR     GYN SURGERY  1984    tubal ligation     IRRIGATION AND DEBRIDEMENT HAND, COMBINED  12/26/2014    Procedure: COMBINED IRRIGATION AND DEBRIDEMENT HAND;  Surgeon: Louis Daniel MD;  Location:  OR     KNEE SURGERY  2001    arthroscopic right     LAPAROSCOPIC CHOLECYSTECTOMY  8/16/2012    Procedure: LAPAROSCOPIC CHOLECYSTECTOMY;  LAPAROSCOPIC CHOLECYSTECTOMY ;  Surgeon: Preston Walters MD;  Location: [facility]     OPEN REDUCTION INTERNAL FIXATION FOREARM Right 12/26/2014    Procedure: OPEN REDUCTION INTERNAL FIXATION FOREARM;  Surgeon: Louis Daniel MD;  Location:  OR     ORTHOPEDIC SURGERY  2007,2008    bunyanectomy, hammer toe, torn meniscus, toe and knee replacement     TKA  2009    right knee     WISDOM TEETH EXTRACTION  1958       Social History   Substance Use Topics     Smoking status: Never Smoker     Smokeless tobacco: Never Used     Alcohol use 0.0 oz/week     0 Standard drinks or equivalent per week      Comment: rare- 2/month     Family History   Problem Relation Age of Onset     HEART DISEASE Mother      MI at 72     CEREBROVASCULAR DISEASE Mother      CANCER Mother      lung     Alcohol/Drug Father      Depression Father      Cardiovascular Father      HEART DISEASE Paternal Grandmother      HEART DISEASE Paternal Grandfather      CANCER Maternal Grandmother      stomach     Cancer - colorectal Other      Neurologic Disorder Son      brain injury     Clotting Disorder (Unknown) Son      Unknown/Adopted Maternal  Grandfather          Current Outpatient Prescriptions   Medication Sig Dispense Refill     enoxaparin (LOVENOX) 80 MG/0.8ML injection Inject 0.81 mLs (81 mg) Subcutaneous 2 times daily for 14 days 22.68 mL 0     warfarin (COUMADIN) 5 MG tablet Take 2 tablets (10 mg) by mouth daily 10mg for 2 days then 5mg daily until further instruction from INR nurse 30 tablet 0     levothyroxine (SYNTHROID, LEVOTHROID) 88 MCG tablet TAKE 1 TABLET BY MOUTH EVERY DAY 90 tablet 2     gabapentin (NEURONTIN) 300 MG capsule TAKE 1 CAPSULE BY MOUTH 3TIMES A  capsule 3     benzonatate (TESSALON) 100 MG capsule Take 1 capsule (100 mg) by mouth 3 times daily as needed for cough (Patient taking differently: Take 100 mg by mouth 3 times daily as needed for cough (Using this medication PRN) ) 30 capsule 0     diclofenac (VOLTAREN) 1 % GEL Place 2 g onto the skin 4 times daily       calcium-vitamin D-vitamin K (CALCIUM SOFT CHEWS) 500-500-40 MG-UNT-MCG CHEW Take 2 tablets by mouth 2 times daily       epinastine HCl (ELESTAT) 0.05 % SOLN 1 drop 2 times daily as needed       desonide (DESOWEN) 0.05 % cream Apply topically 2 times daily as needed       melatonin 3 MG tablet Take 3 mg by mouth nightly as needed for sleep       acetaminophen (TYLENOL) 500 MG tablet Take 500-1,000 mg by mouth every 6 hours as needed for mild pain       Dextran 70 0.1%-Hypromellose 0.3% (BION TEARS) 0.1-0.3 % SOLN ophthalmic solution Place 1 drop into both eyes as needed for dry eyes       ketoconazole-hydrocortisone 2 & 1 % (CREAM) KIT Externally apply 1 Application topically 2 times daily       azelastine (ASTELIN) 0.1 % nasal spray Spray 2 sprays into both nostrils 2 times daily as needed        montelukast (SINGULAIR) 10 MG tablet Take 10 mg by mouth daily        IBUPROFEN PO Take 400 mg by mouth every 4 hours as needed for moderate pain        erythromycin (ROMYCIN) ophthalmic ointment 1 Application nightly as needed        fluticasone (FLONASE) 50 MCG/ACT  "nasal spray Spray 1 spray into both nostrils daily        amoxicillin (AMOXIL) 500 MG capsule Take 2,000 mg by mouth See Admin Instructions As needed for dental care       fluocinolone acetonide (DERMOTIC) 0.01 % OIL Place 1 drop in ear(s) daily as needed.       cycloSPORINE (RESTASIS) 0.05 % ophthalmic emulsion Place 1 drop into both eyes every 12 hours.         metroNIDAZOLE (METROGEL) 1 % gel Apply 0.5 inches topically daily.         Omeprazole Magnesium (PRILOSEC OTC PO) Take 20 mg by mouth every other day          Reviewed and updated as needed this visit by clinical staff  Tobacco  Allergies  Meds  Problems  Med Hx  Surg Hx  Fam Hx  Soc Hx        Reviewed and updated as needed this visit by Provider  Allergies  Meds  Problems         ROS:  Patient denies fever, chills, nausea, vomiting, diarrhea, abdominal pain, chest pain, shortness of breath, headache, dizziness, lightheadedness. No numbness or tingling of the lower extremity    OBJECTIVE:                                                    /62  Pulse 83  Temp 98.1  F (36.7  C) (Tympanic)  Resp 16  Ht 5' 2.5\" (1.588 m)  Wt 177 lb 8 oz (80.5 kg)  SpO2 97%  BMI 31.95 kg/m2  Body mass index is 31.95 kg/(m^2).  GENERAL: healthy, alert and no distress  RESP: non labored breathing  MS: left lower extremity is slightly edematous compared to right side below the midtibia thru the foot; no pitting edema. Ankle and foot are grossly normal with normal ROM and no tenderness to palpation. She has tenderness to touch over the anterior tibial area, specifically over a few varicose veins suggestive of some phlebitis.    SKIN: redness without induration of the anterior lower leg from mid tibia and encircling the posterior lower calf and into the ankle and foot. The redness is brightest in the anterior calf over the varicosities.   NEURO: SILT grossly in the left lower extremity  PSYCH: mentation appears normal, affect normal/bright    Diagnostic Test " Results:  Ultrasound pending     ASSESSMENT/PLAN:                                                        ICD-10-CM    1. Deep vein thrombosis (DVT) of left lower extremity, unspecified chronicity, unspecified vein (H) I82.402 INR CLINIC REFERRAL     enoxaparin (LOVENOX) 80 MG/0.8ML injection     warfarin (COUMADIN) 5 MG tablet     INR   2. Superficial thrombosis of left lower extremity I82.812    3. Swelling of left lower extremity M79.89 US Lower Extremity Venous Duplex Left   4. Phlebitis I80.9      This appears like phlebitis vs cellulitis vs dvt. Suspicion for dvt is low but patient and I agree to do the ultrasound to verify. If that is negative she will treat for phlebitis and cellulitis and return if not improving as anticipated. She agrees.     ___ 1:20pm [facility] with results of ultrasound:   1) superficial thrombosis of greater saphenous vein near the ankle  2) DVT mid calf peroneal and soleal veins    Patient Instructions       Take enoxaparin and warfarin as instructed starting today.   Follow up with our INR nurse on Monday or Tuesday  Elevate the leg as much as possible, wear compression stalkings and avoid unnecessary movement, rest the leg as much as possible.   INR nurses will determine when you need to follow up with me.   Anticipate treatment with blood thinners for approximately 6 months, more or less depending on clinical progression.     Understanding Deep Vein Thrombosis  Deep vein thrombosis (DVT) is a condition with a blood clot or thrombus in a deep vein. A blood clot is most common in the leg, but can develop in a large vein deep inside the leg, arm, or other part of the body. Part of the clot called an embolus can separate from the vein. It may travel to the lungs and form a pulmonary embolus (PE). This can cut off the flow of blood. A PE is a medical emergency and may cause death. Health care providers use the term venous thromboembolism (VTE) to describe the 2 conditions,  DVT and PE. They use the term VTE because the 2 conditions are very closely related. And, because their prevention and treatment are closely related.  Over time, a blood clot can also permanently damage veins. To protect your health, a blood clot must be treated right away.  How DVT develops  The deep veins of the legs are located in the muscles. These help carry blood from the legs to the heart. When leg muscles contract and relax, blood is squeezed through the veins back to the heart. One-way valves inside the veins help keep the blood moving in the right direction. When blood moves too slowly or not at all, it can pool in the veins. This makes a clot more likely to form.    Risk factors  Anyone can develop a blood clot. The following risk factors make a blood more likely to happen:    Being inactive for a long period, such as when you re in the hospital, or traveling by plane or car    Injury to a vein from an accident, a broken bone, or surgery    Having blood clots in the past    Personal or family history of a blood-clotting disorder    Recent surgery    Cancer and certain cancer treatments    Smoking  Other factors can also put you at higher risk for a blood clot. They include:    Age over 60 years    Pregnancy    Taking birth control or hormone replacement    Having other vein problems    Being overweight  Common symptoms  A blood clot does not always cause obvious symptoms. If you do have symptoms, they usually occur suddenly. They may include:    Pain, especially deep in the muscle    Swelling    Aching or tenderness    Red or warm skin  Call your health care provider if you have these symptoms.  Symptoms of pulmonary embolism may include:    Trouble breathing    Fast heartbeat    Chest pain    Sweating    Coughing (may cough up blood)    Fainting  Call 911 if you have these symptoms.   If you take medicine to help prevent blood clots, you have an increased risk of bleeding. Call 911 if you have heavy or  uncontrolled bleeding. Call your health care provider if you have other signs or symptoms of bleeding like blood in the urine, bleeding with bowel movements, or bleeding from the nose, gums, a cut, or vagina.  Diagnosing DVT  Diagnosis begins with thorough questions about your symptoms and medical history along with a physical exam.  Diagnostic tests include:    An imaging test called a duplex ultrasound. This test uses sound waves to create pictures of veins and blood flow.    Blood tests to check for clotting and other problems.  If your health care provider thinks you may have pulmonary embolism, additional testing will be done.  Treating DVT  Treating a blood clot may include:    Medicine to thin the blood and prevent pulmonary embolism and other complications.    Staying in the hospital. This may or may not be necessary.    Surgery for some people, like those who cannot take blood-thinning medicines.  Preventing DVT  Many people who are in the hospital are at an increased risk of developing blood clots. So, preventing blood clots is an important part of in-hospital care. The care may include getting out of bed regularly, taking medicine, or using special therapies or devices. Other factors and conditions may increase the risk of blood clots. Review your risk with your health care provider.    [id number] [employer]. [address] [place], [place] [id number]. All rights reserved. This information is not intended as a substitute for professional medical care. Always follow your healthcare professional's instructions.         Using Blood Thinners (Anticoagulants)  Blood thinners or anticoagulants are medicines that help prevent blood clots from forming. They include warfarin, heparin, dabigatran, rivaroxaban, apixaban,and edoxaban. Your health care provider will help you decide which medicine is best for you.    Taking an anticoagulant safely  When you are taking a blood thinner, you will need to take  certain steps to stay safe. Too much blood thinner puts you at risk for bleeding. Too little puts you at risk for stroke. Follow these guidelines. Also follow any others that your health care provider gives you.    You may be told you need regular lab testing while taking these medicines. Warfarin requires routine testing to blood-thinning level testing while the other medicines do not.    Tell your doctor about all medicines you take. This includes over the counter medicines, supplements, or herbal remedies. Do not take any medicines (including ones you buy over the counter) that your doctor doesn t know about. Some medicines can interact with blood thinners and cause serious problems.    Tell any health care provider that you see for care (such as doctors, dentists, chiropractors, home health nurses) that you take a blood thinner.    Carry a medical ID card or wear a medical-alert bracelet that says you take an anticoagulant.    Before taking aspirin, check with your doctor. Aspirin can significantly increase your risk of bleeding.    This medicine makes bleeding harder to stop. To protect yourself:    Avoid activities that may cause injury. If you fall or are injured, contact your health care provider right away. Blood thinners prevent clotting, so you could be bleeding inside without realizing it.    Use a soft-bristle toothbrush and waxed dental floss. Shave with an electric razor rather than a blade.    Don t go barefoot. Don t trim corns or calluses yourself.  Warfarin: Other important information  Several precautions are especially important when you are taking warfarin. Always keep these points in mind:    Be sure to follow your health care provider's instructions for taking warfarin.    Take this medicine at the same time each day. Take it with a full glass of water, with or without food. If you miss a dose, contact your doctor to find out how much to take. Avoid takinga double dose.    Warfarin is an  effective drug, but it can be dangerous if not taken properly. It makes your blood less likely to form clots. If you take too much, it can cause serious internal or external bleeding.    You will need to have regular monitoring while you are taking warfarin. This includes blood tests to check your international normalized ratio (INR) and prothrombin time (PT). These tests show how quickly your blood clots. You will also have a complete blood count (CBC) periodically. This looks at your blood and platelet levels. Both of these need to be followed while you're on warfarin. Talk with your health care provider about whether you need to visit the clinic every week, or if services are available for monitoring in your home.    Certain medicines can affect your INR and PT levels. Tell your health care provider if there are any changes in your medicines. This includes any over-the-counter medicines, supplements like vitamin K, or herbal remedies.    Your diet can also affect your INR and PT levels. Because of this, it's important to eat a consistent diet. It is especially important to eat a consistent amount of foods that are high in vitamin K. Be sure to talk with your health care provider before making any big changes in your diet.    Remember that warfarin increases your risk of bleeding. Be careful not to injure yourself. If you have a significant injury, contact your health care provider right away. It's important to alert your doctor if you've fallen or hurt yourself, even if you don't break your skin. You could be bleeding inside your body without realizing it.     Warfarin: Watch your INR/PT blood levels  Two tests are used to find out how your blood is clotting. One is protime (PT), the other is the international normalized ratio (INR).    Go for your blood tests (INR/PT) as often as directed. Your diet and the other medicines you take can affect your INR/PT levels.    Your INR was between ___ and ___.    Ask your  doctor what your goal INR is. My goal INR is between ___ and ___.    My next INR/PT blood draw is due on _____________ (date) at ___________ (time) by ___________ (name of doctor or clinic).    The name of the doctor who is monitoring my anticoagulation therapy is _____________________ and the phone number is _________________.    Follow up with your doctor or as advised by his or her staff. It usually takes a few hours for your doctor to get the results of your clotting tests. Call to get your lab results to find out if your doctor needs to make further changes to your warfarin dose.    If your blood is drawn for these tests at a location other than your doctor's office, tell your doctor as soon as you get your lab results.   Warfarin: Watch what you eat  Vitamin K helps your blood clot. So you have to watch how much you eat of foods that contain vitamin K. These foods can affect the way warfarin works. They do not affect the other non-warfarin blood thinners.Here are some specific tips:    Try to keep your diet about the same each day. If you change your diet for any reason, such as for illness or to lose weight, be sure to tell your doctor.    Each day, eat the same amount of foods that are high in vitamin K. These include asparagus, avocado, broccoli, cabbage, kale, spinach, and some other leafy green vegetables. Oils, such as soybean, canola, and olive oils, are also high in vitamin K.    Limit fats to 2 to 4 tablespoons a day.    Ask your health care provider if you should avoid alcohol while you are taking a blood thinner.    Avoid teas that contain sweet clover, sweet brendan, or tonka beans. These can affect how your medicine works.    Talk with your health care provider and pharmacist about specific foods or special diets that can affect anticoagulant levels. These include grapefruit juice, cranberries and cranberry juice, fish oil supplements, garlic, mery, licorice, turmeric, and herbal teas  and supplements.  Talk with your health care provider if you have concerns about these or other food products and their effects on warfarin.     When to call your doctor if you re taking an anticoagulant  Call your doctor right away if you have any of these:    Bleeding that doesn t stop in 10 minutes    A heavier-than-normal menstrual period or bleeding between periods    Coughing or throwing up blood    Bloody diarrhea or bleeding hemorrhoids     Dark-colored urine or black stools    Red or black-and-blue marks on the skin that get larger    Dizziness or fatigue    Chest pain or trouble breathing  Allergic reactions:    Rash    Itching    Swelling    Trouble swallowing or breathing   Medical conditions and anticoagulants  Before starting a blood thinner, be sure your doctor knows if you have any of these conditions:    Stomach ulcer now or in the past    Vomited blood or had bloody stools (black or red color)    Aneurysm, pericarditis, or pericardial effusion    Blood disorder    Recent surgery, stroke, mini-stroke, or spinal puncture    Kidney or liver disease, uncontrolled high blood pressure, diabetes, vasculitis, heart failure, lupus, or other collagen-vascular disease, or high cholesterol    Pregnancy or breastfeeding    Younger than 18 years old    Recent or planned dental procedure  Drug interactions and anticoagulants  Many medicines interfere with the effect of blood thinners. Before starting these medicines, be sure your doctor knows about any prescription, over-the-counter, or herbal supplements you take. In particular, tell your provider about:    Antibiotics    Heart medicines    Cimetidine    Aspirin or other anti-inflammatory drugs such as ibuprofen, naproxen, ketoprofen, or other arthritis medicines    Drugs for depression, cancer HIV (protease inhibitors), diabetes, seizures, gout, high cholesterol, or thyroid replacement    Vitamins containing vitamin K or herbal products such as ginkgo, Co-Q10,  garlic, or [place]'s wort     Note: This information topic may not include all directions, precautions, medical conditions, drug/food interactions, and warnings for these medicines. Check with your doctor, nurse, or pharmacist for any questions you have.      [id number] [employer]. [address] [place] [address]. All rights reserved. This information is not intended as a substitute for professional medical care. Always follow your healthcare professional's instructions.              Nicole Joy Siegler, PA-C  [facility]

## 2017-03-31 LAB — INR PPP: 0.94 (ref 0.86–1.14)

## 2017-04-01 ENCOUNTER — TELEPHONE (OUTPATIENT)
Dept: FAMILY MEDICINE | Facility: CLINIC | Age: 77
End: 2017-04-01

## 2017-04-01 NOTE — TELEPHONE ENCOUNTER
Reason for call:  Patient reporting a symptom    Symptom or request: Blood clot left leg.  Pt getting bad cramping when she  wears the stockings.  She is on Coumadin and lovenox.     Duration (how long have symptoms been present): ongoing    Have you been treated for this before? Yes    Additional comments: Please call pt with further instructions.    Phone Number patient can be reached at:  Home number on file [phone] (home)    Best Time:  any    Can we leave a detailed message on this number:  YES    Call taken on 4/1/2017 at 10:16 AM by RITU SURESH

## 2017-04-03 ENCOUNTER — ANTICOAGULATION THERAPY VISIT (OUTPATIENT)
Dept: NURSING | Facility: CLINIC | Age: 77
End: 2017-04-03
Payer: COMMERCIAL

## 2017-04-03 DIAGNOSIS — I82.402 DEEP VEIN THROMBOSIS (DVT) OF LEFT LOWER EXTREMITY, UNSPECIFIED CHRONICITY, UNSPECIFIED VEIN (H): ICD-10-CM

## 2017-04-03 DIAGNOSIS — Z79.01 LONG-TERM (CURRENT) USE OF ANTICOAGULANTS: ICD-10-CM

## 2017-04-03 LAB — INR POINT OF CARE: 1.6 (ref 0.86–1.14)

## 2017-04-03 PROCEDURE — 36416 COLLJ CAPILLARY BLOOD SPEC: CPT

## 2017-04-03 PROCEDURE — 99207 ZZC NO CHARGE NURSE ONLY: CPT

## 2017-04-03 PROCEDURE — 85610 PROTHROMBIN TIME: CPT | Mod: QW

## 2017-04-03 NOTE — PROGRESS NOTES
ANTICOAGULATION INITIAL CLINIC VISIT    Patient Name:  Pavithra Laurent  Date:  4/3/2017  Referred by: Nicole Siegler  Contact Type:  Face to Face    SUBJECTIVE:  Coumadin education was completed today.  Topics covered include:  -Introduction to coumadin  -Proper Administration  -INR Testing  -Sign/Symptoms of Bleeding  -Signs/Symptoms of Clot Formation or Stroke  -Dietary Intake of Vitamin K  -Drug Interactions  -Anticoagulation Identification (bracelet, necklace or wallet card)  -Future Surgery  -Effects of Alcohol, Tobacco, and Exercise on Coumadin    Coumadin Education Booklet and Coumadin Identification Wallet Card were given to the patient.       Patient Findings     Positives Initiation of therapy          OBJECTIVE    INR Protime   Date Value Ref Range Status   04/03/2017 1.6 (A) 0.86 - 1.14 Final       ASSESSMENT / PLAN  INR assessment SUB    Recheck INR In: 4 DAYS    INR Location Clinic      Anticoagulation Summary as of 4/3/2017     INR goal 2.0-3.0   Today's INR 1.6!   Maintenance plan No maintenance plan   Full instructions 4/3: 10 mg; 4/4: 10 mg; 4/5: 10 mg; 4/6: 5 mg; Otherwise No maintenance plan   Next INR check 4/7/2017   Target end date     Indications   Deep vein thrombosis (DVT) of left lower extremity  unspecified chronicity  unspecified vein (H) [I82.402]  Long-term (current) use of anticoagulants [Z79.01] [Z79.01]         Anticoagulation Episode Summary     INR check location     Preferred lab     Send INR reminders to [facility] INR/PROTIME    Comments       Anticoagulation Care Providers     Provider Role Specialty Phone number    siegler Responsible              See the Encounter Report to view Anticoagulation Flowsheet and Dosing Calendar (Go to Encounters tab in chart review, and find the Anticoagulation Therapy Visit)        Teresita Christian RN

## 2017-04-03 NOTE — MR AVS SNAPSHOT
Pavithra CARLY Laurent   4/3/2017 2:30 PM   Anticoagulation Therapy Visit    Description:  76 year old female   Provider:   ANTICOAGULATION CLINIC   Department:  Bx Nurse           INR as of 4/3/2017     Today's INR 1.6!      Anticoagulation Summary as of 4/3/2017     INR goal 2.0-3.0    Today's INR 1.6!    Full instructions 4/3: 10 mg; 4/4: 10 mg; 4/5: 10 mg; 4/6: 5 mg; Otherwise No maintenance plan    Next INR check 4/7/2017      Your next Anticoagulation Clinic appointment(s)     Apr 07, 2017 10:00 AM CDT   Anticoagulation Visit with  ANTICOAGULATION CLINIC   [facility]    [address] [place] [id number] [phone]              Contact Numbers     [facility]  Please call  [phone] to cancel and/or reschedule your appointment   The direct line to the anticoagulant nurse is [phone] on Monday, Wednesday, and Friday. On Thursday, the anticoagulant nurse can be reached directly at [phone].         April 2017 Details    Sun Mon Tue Wed Thu Fri Sat           1                 2               3      10 mg   See details      4      10 mg         5      10 mg         6      5 mg         7            8                 9               10               11               12               13               14               15                 16               17               18               19               20               21               22                 23               24               25               26               27               28               29                 30                      Date Details   04/03 This INR check       Date of next INR:  4/7/2017         How to take your warfarin dose     To take:  5 mg Take 5 of the 1 mg tablets.    To take:  10 mg Take 10 of the 1 mg tablets.

## 2017-04-06 ENCOUNTER — THERAPY VISIT (OUTPATIENT)
Dept: PHYSICAL THERAPY | Facility: CLINIC | Age: 77
End: 2017-04-06

## 2017-04-06 DIAGNOSIS — M70.61 GREATER TROCHANTERIC BURSITIS OF RIGHT HIP: ICD-10-CM

## 2017-04-06 DIAGNOSIS — M79.18 GLUTEAL PAIN: ICD-10-CM

## 2017-04-07 ENCOUNTER — ANTICOAGULATION THERAPY VISIT (OUTPATIENT)
Dept: NURSING | Facility: CLINIC | Age: 77
End: 2017-04-07
Payer: COMMERCIAL

## 2017-04-07 DIAGNOSIS — I82.402 DEEP VEIN THROMBOSIS (DVT) OF LEFT LOWER EXTREMITY, UNSPECIFIED CHRONICITY, UNSPECIFIED VEIN (H): ICD-10-CM

## 2017-04-07 LAB — INR POINT OF CARE: 2.7 (ref 0.86–1.14)

## 2017-04-07 PROCEDURE — 36416 COLLJ CAPILLARY BLOOD SPEC: CPT

## 2017-04-07 PROCEDURE — 99207 ZZC NO CHARGE NURSE ONLY: CPT

## 2017-04-07 PROCEDURE — 85610 PROTHROMBIN TIME: CPT | Mod: QW

## 2017-04-07 RX ORDER — WARFARIN SODIUM 5 MG/1
TABLET ORAL
Qty: 90 TABLET | Refills: 2 | Status: SHIPPED | OUTPATIENT
Start: 2017-04-07 | End: 2017-10-24

## 2017-04-07 NOTE — TELEPHONE ENCOUNTER
WARFARIN 5 mg  Last Written Prescription Date: 3/30/2017  Last Fill Qty: 30, # refills: 0  Last Office Visit with [facility], [facility] or [facility] prescribing provider: 3/30/2017       Date and Result of Last PT/INR:   Lab Results   Component Value Date    INR 2.7 04/07/2017    INR 1.6 04/03/2017    INR 0.94 03/30/2017    INR 0.92 12/26/2014      Prescription approved per [facility] Refill Protocol.

## 2017-04-07 NOTE — MR AVS SNAPSHOT
Pavithra CARROLL Mamaribell   4/7/2017 10:00 AM   Anticoagulation Therapy Visit    Description:  76 year old female   Provider:  [facility]   Department:  Bx Nurse           INR as of 4/7/2017     Today's INR 2.7      Anticoagulation Summary as of 4/7/2017     INR goal 2.0-3.0   Today's INR 2.7   Full instructions 10 mg on Mon; 7.5 mg all other days   Next INR check 4/12/2017    Indications   Deep vein thrombosis (DVT) of left lower extremity  unspecified chronicity  unspecified vein (H) [I82.402]  Long-term (current) use of anticoagulants [Z79.01] [Z79.01]         Your next Anticoagulation Clinic appointment(s)     Apr 12, 2017 10:45 AM CDT   Anticoagulation [facility] with  [facility]ATION CLINIC   [facility]    [address] [place] [id number] [phone]              Contact Numbers     [facility]  Please call  [phone] to cancel and/or reschedule your appointment   The direct line to the anticoagulant nurse is [phone] on Monday, Wednesday, and Friday. On Thursday, the anticoagulant nurse can be reached directly at [phone].         April 2017 Details    Sun Mon Tue Wed Thu Fri Sat           1                 2               3               4               5               6               7      7.5 mg   See details      8      7.5 mg           9      7.5 mg         10      10 mg         11      7.5 mg         12            13               14               15                 16               17               18               19               20               21               22                 23               24               25               26               27               28               29                 30                      Date Details   04/07 This INR check       Date of next INR:  4/12/2017         How to take your warfarin dose     To take:  7.5 mg Take 1.5 of the 5  mg tablets.    To take:  10 mg Take 2 of the 5 mg tablets.

## 2017-04-07 NOTE — PROGRESS NOTES
ANTICOAGULATION FOLLOW-UP CLINIC VISIT    Patient Name:  Pavithra Laurent  Date:  4/7/2017  Contact Type:  Face to Face    SUBJECTIVE:     Patient Findings     Positives No Problem Findings           OBJECTIVE    INR Protime   Date Value Ref Range Status   04/07/2017 2.7 (A) 0.86 - 1.14 Final       ASSESSMENT / PLAN  INR assessment SUPRA    Recheck INR In: 5 DAYS    INR Location Clinic      Anticoagulation Summary as of 4/7/2017     INR goal 2.0-3.0   Today's INR 2.7   Maintenance plan 10 mg (5 mg x 2) on Mon; 7.5 mg (5 mg x 1.5) all other days   Full instructions 10 mg on Mon; 7.5 mg all other days   Weekly total 55 mg   Plan last modified Teresita Christian RN (4/7/2017)   Next INR check 4/12/2017   Target end date     Indications   Deep vein thrombosis (DVT) of left lower extremity  unspecified chronicity  unspecified vein (H) [I82.402]  Long-term (current) use of anticoagulants [Z79.01] [Z79.01]         Anticoagulation Episode Summary     INR check location     Preferred lab     Send INR reminders to [facility] INR/PROTIME    Comments       Anticoagulation Care Providers     Provider Role Specialty Phone number    siegler Responsible              See the Encounter Report to view Anticoagulation Flowsheet and Dosing Calendar (Go to Encounters tab in chart review, and find the Anticoagulation Therapy Visit)        Teresita Christian, RN

## 2017-04-11 ENCOUNTER — OFFICE VISIT (OUTPATIENT)
Dept: FAMILY MEDICINE | Facility: CLINIC | Age: 77
End: 2017-04-11
Payer: COMMERCIAL

## 2017-04-11 ENCOUNTER — THERAPY VISIT (OUTPATIENT)
Dept: PHYSICAL THERAPY | Facility: CLINIC | Age: 77
End: 2017-04-11
Payer: COMMERCIAL

## 2017-04-11 VITALS
RESPIRATION RATE: 14 BRPM | DIASTOLIC BLOOD PRESSURE: 70 MMHG | OXYGEN SATURATION: 97 % | HEIGHT: 63 IN | HEART RATE: 77 BPM | BODY MASS INDEX: 30.21 KG/M2 | TEMPERATURE: 97.8 F | WEIGHT: 170.5 LBS | SYSTOLIC BLOOD PRESSURE: 120 MMHG

## 2017-04-11 DIAGNOSIS — M70.61 GREATER TROCHANTERIC BURSITIS OF RIGHT HIP: ICD-10-CM

## 2017-04-11 DIAGNOSIS — I82.402 DEEP VEIN THROMBOSIS (DVT) OF LEFT LOWER EXTREMITY, UNSPECIFIED CHRONICITY, UNSPECIFIED VEIN (H): Primary | ICD-10-CM

## 2017-04-11 DIAGNOSIS — M79.18 GLUTEAL PAIN: ICD-10-CM

## 2017-04-11 PROCEDURE — 97112 NEUROMUSCULAR REEDUCATION: CPT | Mod: GP | Performed by: PHYSICAL THERAPIST

## 2017-04-11 PROCEDURE — 97110 THERAPEUTIC EXERCISES: CPT | Mod: GP | Performed by: PHYSICAL THERAPIST

## 2017-04-11 PROCEDURE — 99213 OFFICE O/P EST LOW 20 MIN: CPT | Performed by: PHYSICIAN ASSISTANT

## 2017-04-11 NOTE — PROGRESS NOTES
SUBJECTIVE:                                                    Pavithra Laurent is a 76 year old female who presents to clinic today for the following health issues:    Recheck DVT      Duration: Ongoing    Description (location/character/radiation): Left Leg    Intensity:  mild    Accompanying signs and symptoms: Improved    History (similar episodes/previous evaluation): None    Precipitating or alleviating factors: None    Therapies tried and outcome: On coumadin     Pavithra is doing well since therapy began for her DVT. She has had a few nosebleeds but no significant bruising associated with coumadin use. She has not been using compression stockings because the ones she has at home have been rolling down, she has not had significant swelling and the skin area that was red due to her superficial clot has become less painful and red. Her most recent INR on 4/7/2017 was 2.7.     She continues to be in physical therapy for her right trochanteric bursitis and needs to know if she can progress back to therapy or should continue to reduce activity and elevate.     Problem list and histories reviewed & adjusted, as indicated.  Additional history: as documented    Patient Active Problem List   Diagnosis     Nasal congestion     Environmental allergies     Acute bronchitis with coexisting condition, need prophylactic therapy     GERD (gastroesophageal reflux disease)     Hypothyroidism     Arm pain     Rosacea     Dry eye syndrome     Elbow dislocation     Lesion of ulnar nerve     CARDIOVASCULAR SCREENING; LDL GOAL LESS THAN 130     Pain of right lower leg     Spinal stenosis of lumbar region without neurogenic claudication     Greater trochanteric bursitis of right hip     Gluteal pain     Deep vein thrombosis (DVT) of left lower extremity, unspecified chronicity, unspecified vein (H)     Long-term (current) use of anticoagulants [Z79.01]     Past Surgical History:   Procedure Laterality Date     ARTHROTOMY ELBOW Right  1/7/2015    Procedure: ARTHROTOMY ELBOW;  Surgeon: Branden Meyer MD;  Location:  OR     C RAD RESEC TONSIL/PILLARS  1948    tonsillectomy     CLOSED REDUCTION UPPER EXTREMITY  12/26/2014    Procedure: CLOSED REDUCTION UPPER EXTREMITY;  Surgeon: Louis Daniel MD;  Location:  OR     GYN SURGERY  1984    tubal ligation     IRRIGATION AND DEBRIDEMENT HAND, COMBINED  12/26/2014    Procedure: COMBINED IRRIGATION AND DEBRIDEMENT HAND;  Surgeon: Louis Daniel MD;  Location:  OR     KNEE SURGERY  2001    arthroscopic right     LAPAROSCOPIC CHOLECYSTECTOMY  8/16/2012    Procedure: LAPAROSCOPIC CHOLECYSTECTOMY;  LAPAROSCOPIC CHOLECYSTECTOMY ;  Surgeon: Preston Walters MD;  Location: [facility]     OPEN REDUCTION INTERNAL FIXATION FOREARM Right 12/26/2014    Procedure: OPEN REDUCTION INTERNAL FIXATION FOREARM;  Surgeon: Louis Daniel MD;  Location:  OR     ORTHOPEDIC SURGERY  2007,2008    bunyanectomy, hammer toe, torn meniscus, toe and knee replacement     TKA  2009    right knee     WISDOM TEETH EXTRACTION  1958       Social History   Substance Use Topics     Smoking status: Never Smoker     Smokeless tobacco: Never Used     Alcohol use 0.0 oz/week     0 Standard drinks or equivalent per week      Comment: rare- 2/month     Family History   Problem Relation Age of Onset     HEART DISEASE Mother      MI at 72     CEREBROVASCULAR DISEASE Mother      CANCER Mother      lung     Alcohol/Drug Father      Depression Father      Cardiovascular Father      HEART DISEASE Paternal Grandmother      HEART DISEASE Paternal Grandfather      CANCER Maternal Grandmother      stomach     Cancer - colorectal Other      Neurologic Disorder Son      brain injury     Clotting Disorder (Unknown) Son      Unknown/Adopted Maternal Grandfather          Current Outpatient Prescriptions   Medication Sig Dispense Refill     order for DME Equipment being ordered: compression stockings 25-35mmHG 1 each 0     warfarin (COUMADIN) 5 MG tablet 10 mg mon  & 7.5 rest of week  Just starting coumadin 90 tablet 2     enoxaparin (LOVENOX) 80 MG/0.8ML injection Inject 0.81 mLs (81 mg) Subcutaneous 2 times daily for 14 days 22.68 mL 0     levothyroxine (SYNTHROID, LEVOTHROID) 88 MCG tablet TAKE 1 TABLET BY MOUTH EVERY DAY 90 tablet 2     gabapentin (NEURONTIN) 300 MG capsule TAKE 1 CAPSULE BY MOUTH 3TIMES A  capsule 3     benzonatate (TESSALON) 100 MG capsule Take 1 capsule (100 mg) by mouth 3 times daily as needed for cough (Patient taking differently: Take 100 mg by mouth 3 times daily as needed for cough (Using this medication PRN) ) 30 capsule 0     diclofenac (VOLTAREN) 1 % GEL Place 2 g onto the skin 4 times daily       calcium-vitamin D-vitamin K (CALCIUM SOFT CHEWS) 500-500-40 MG-UNT-MCG CHEW Take 2 tablets by mouth 2 times daily       epinastine HCl (ELESTAT) 0.05 % SOLN 1 drop 2 times daily as needed       desonide (DESOWEN) 0.05 % cream Apply topically 2 times daily as needed       melatonin 3 MG tablet Take 3 mg by mouth nightly as needed for sleep       acetaminophen (TYLENOL) 500 MG tablet Take 500-1,000 mg by mouth every 6 hours as needed for mild pain       Dextran 70 0.1%-Hypromellose 0.3% (BION TEARS) 0.1-0.3 % SOLN ophthalmic solution Place 1 drop into both eyes as needed for dry eyes       ketoconazole-hydrocortisone 2 & 1 % (CREAM) KIT Externally apply 1 Application topically 2 times daily       azelastine (ASTELIN) 0.1 % nasal spray Spray 2 sprays into both nostrils 2 times daily as needed        montelukast (SINGULAIR) 10 MG tablet Take 10 mg by mouth daily        IBUPROFEN PO Take 400 mg by mouth every 4 hours as needed for moderate pain        erythromycin (ROMYCIN) ophthalmic ointment 1 Application nightly as needed        fluticasone (FLONASE) 50 MCG/ACT nasal spray Spray 1 spray into both nostrils daily        amoxicillin (AMOXIL) 500 MG capsule Take 2,000 mg by mouth See Admin Instructions As needed for dental care       fluocinolone  "acetonide (DERMOTIC) 0.01 % OIL Place 1 drop in ear(s) daily as needed.       cycloSPORINE (RESTASIS) 0.05 % ophthalmic emulsion Place 1 drop into both eyes every 12 hours.         metroNIDAZOLE (METROGEL) 1 % gel Apply 0.5 inches topically daily.         Omeprazole Magnesium (PRILOSEC OTC PO) Take 20 mg by mouth every other day          Reviewed and updated as needed this visit by clinical staff  Tobacco  Allergies  Med Hx  Surg Hx  Fam Hx  Soc Hx      Reviewed and updated as needed this visit by Provider         ROS:  Constitutional, HEENT, cardiovascular, pulmonary, gi and gu systems are negative, except as otherwise noted.    OBJECTIVE:                                                    /70 (BP Location: Right arm, Patient Position: Chair, Cuff Size: Adult Regular)  Pulse 77  Temp 97.8  F (36.6  C) (Tympanic)  Resp 14  Ht 5' 2.5\" (1.588 m)  Wt 170 lb 8 oz (77.3 kg)  SpO2 97%  Breastfeeding? No  BMI 30.69 kg/m2  Body mass index is 30.69 kg/(m^2).  GENERAL: healthy, alert and no distress  RESP: lungs clear to auscultation - no rales, rhonchi or wheezes  CV: regular rate and rhythm, normal S1 S2, no S3 or S4, no murmur, click or rub, minimal (non pitting) peripheral edema of the LLE and peripheral pulses strong  SKIN: no redness, swelling or irritation of the left lower extremity.     Diagnostic Test Results:  none      ASSESSMENT/PLAN:                                                        ICD-10-CM    1. Deep vein thrombosis (DVT) of left lower extremity, unspecified chronicity, unspecified vein (H) I82.402 order for DME     DISCONTINUED: Elastic Bandages & Supports (MEDICAL COMPRESSION STOCKINGS) MISC     Slow return to activity including PT for her trochanteric bursitis is acceptable at this stage in her treatment.    Provided DME order for compression stockings.   Continue with INR nurse visits as directed      Nicole Joy Siegler, PA-C  [facility]  "

## 2017-04-11 NOTE — MR AVS SNAPSHOT
After Visit Summary   4/11/2017    Pavithra Laurent    MRN: [id number]           Patient Information     Date Of Birth          1940        Visit Information        Provider Department      4/11/2017 1:50 PM Siegler, Nicole Joy, PA-C [facility]         Follow-ups after your visit        Your next 10 appointments already scheduled     Apr 12, 2017 10:45 AM CDT   Anticoagulation Visit with [facility]   [facility]    [address]  [address]  [place] [address]   [phone]            Apr 24, 2017  1:50 PM CDT   AMANDA Extremity with Ct Martinez PT   [facility] ([facility]  )    [address]  [place] [id number] [phone]            May 08, 2017  1:50 PM CDT   AMANDA Extremity with Ct Martinez PT   [facility] ([facility]  )    [address]  [place] [id number] [phone]              Who to contact     If you have questions or need follow up information about today's clinic visit or your schedule please contact [facility] directly at [phone].  Normal or non-critical lab and imaging results will be communicated to you by [employer], letter or phone within 4 business days after the clinic has received the results. If you do not hear from us within 7 days, please contact the clinic through G[employer] or phone. If you have a critical or abnormal lab result, we will notify you by phone as soon as possible.  Submit refill requests through [employer] or call your pharmacy and they will forward the refill request to us. Please allow 3 business days for your refill to be completed.          Additional Information About Your Visit        [employer] Information     [employer] lets you send messages to your doctor, view your test  "results, renew your prescriptions, schedule appointments and more. To sign up, go to [email or web address] . Click on \"Log in\" on the left side of the screen, which will take you to the Welcome page. Then click on \"Sign up Now\" on the right side of the page.     You will be asked to enter the access code listed below, as well as some personal information. Please follow the directions to create your username and password.     Your access code is: [email or web address]  Expires: 2017 12:58 PM     Your access code will  in 90 days. If you need help or a new code, please call your [facility] clinic or [phone].        Care EveryWhere ID     This is your Care EveryWhere ID. This could be used by other organizations to access your [facility] medical records  [id number]        Your Vitals Were     Pulse Temperature Respirations Height Pulse Oximetry Breastfeeding?    77 97.8  F (36.6  C) (Tympanic) 14 5' 2.5\" (1.588 m) 97% No    BMI (Body Mass Index)                   30.69 kg/m2            Blood Pressure from Last 3 Encounters:   17 120/70   17 130/62   16 124/66    Weight from Last 3 Encounters:   17 170 lb 8 oz (77.3 kg)   17 177 lb 8 oz (80.5 kg)   16 174 lb 8 oz (79.2 kg)              Today, you had the following     No orders found for display         Today's Medication Changes          These changes are accurate as of: 17  3:28 PM.  If you have any questions, ask your nurse or doctor.               These medicines have changed or have updated prescriptions.        Dose/Directions    benzonatate 100 MG capsule   Commonly known as:  TESSALON   This may have changed:  reasons to take this   Used for:  Cough        Dose:  100 mg   Take 1 capsule (100 mg) by mouth 3 times daily as needed for cough   Quantity:  30 capsule   Refills:  0                Primary Care Provider Office Phone # Fax #    Nicole Joy Siegler, PA-C [phone]       [facility] [address]  [facility] [address]        Thank you!     Thank you for choosing [facility]  for your care. Our goal is always to provide you with excellent care. Hearing back from our patients is one way we can continue to improve our services. Please take a few minutes to complete the written survey that you may receive in the mail after your visit with us. Thank you!             Your Updated Medication List - Protect others around you: Learn how to safely use, store and throw away your medicines at www.disposemymeds.org.          This list is accurate as of: 4/11/17  3:28 PM.  Always use your most recent med list.                   Brand Name Dispense Instructions for use    acetaminophen 500 MG tablet    TYLENOL     Take 500-1,000 mg by mouth every 6 hours as needed for mild pain       amoxicillin 500 MG capsule    AMOXIL     Take 2,000 mg by mouth See Admin Instructions As needed for dental care       azelastine 0.1 % spray    ASTELIN     Spray 2 sprays into both nostrils 2 times daily as needed       benzonatate 100 MG capsule    TESSALON    30 capsule    Take 1 capsule (100 mg) by mouth 3 times daily as needed for cough       CALCIUM SOFT CHEWS 500-500-40 MG-UNT-MCG Chew   Generic drug:  calcium-vitamin D-vitamin K      Take 2 tablets by mouth 2 times daily       cycloSPORINE 0.05 % ophthalmic emulsion    RESTASIS     Place 1 drop into both eyes every 12 hours.       DERMOTIC 0.01 % Oil   Generic drug:  fluocinolone acetonide      Place 1 drop in ear(s) daily as needed.       desonide 0.05 % cream    DESOWEN     Apply topically 2 times daily as needed       Dextran 70 0.1%-Hypromellose 0.3% 0.1-0.3 % Soln ophthalmic solution      Place 1 drop into both eyes as needed for dry eyes       diclofenac 1 % Gel topical gel    VOLTAREN     Place 2 g onto the skin 4 times daily       enoxaparin 80 MG/0.8ML injection    LOVENOX    22.68 mL    Inject 0.81 mLs (81 mg) Subcutaneous 2  times daily for 14 days       epinastine HCl 0.05 % Soln    ELESTAT     1 drop 2 times daily as needed       erythromycin ophthalmic ointment    ROMYCIN     1 Application nightly as needed       fluticasone 50 MCG/ACT spray    FLONASE     Spray 1 spray into both nostrils daily       gabapentin 300 MG capsule    NEURONTIN    270 capsule    TAKE 1 CAPSULE BY MOUTH 3TIMES A DAY       IBUPROFEN PO      Take 400 mg by mouth every 4 hours as needed for moderate pain       ketoconazole-hydrocortisone 2 & 1 % (CREAM) Kit      Externally apply 1 Application topically 2 times daily       levothyroxine 88 MCG tablet    SYNTHROID/LEVOTHROID    90 tablet    TAKE 1 TABLET BY MOUTH EVERY DAY       melatonin 3 MG tablet      Take 3 mg by mouth nightly as needed for sleep       metroNIDAZOLE 1 % gel    METROGEL     Apply 0.5 inches topically daily.       montelukast 10 MG tablet    SINGULAIR     Take 10 mg by mouth daily       PRILOSEC OTC PO      Take 20 mg by mouth every other day       warfarin 5 MG tablet    COUMADIN    90 tablet    10 mg mon & 7.5 rest of week  Just starting coumadin

## 2017-04-11 NOTE — NURSING NOTE
"Chief Complaint   Patient presents with     RECHECK     Left leg       Initial /70 (BP Location: Right arm, Patient Position: Chair, Cuff Size: Adult Regular)  Pulse 77  Temp 97.8  F (36.6  C) (Tympanic)  Resp 14  Ht 5' 2.5\" (1.588 m)  Wt 170 lb 8 oz (77.3 kg)  SpO2 97%  Breastfeeding? No  BMI 30.69 kg/m2 Estimated body mass index is 30.69 kg/(m^2) as calculated from the following:    Height as of this encounter: 5' 2.5\" (1.588 m).    Weight as of this encounter: 170 lb 8 oz (77.3 kg).  Medication Reconciliation: complete     Vanesa Koch LPN  "

## 2017-04-12 ENCOUNTER — ANTICOAGULATION THERAPY VISIT (OUTPATIENT)
Dept: NURSING | Facility: CLINIC | Age: 77
End: 2017-04-12
Payer: COMMERCIAL

## 2017-04-12 DIAGNOSIS — I82.402 DEEP VEIN THROMBOSIS (DVT) OF LEFT LOWER EXTREMITY, UNSPECIFIED CHRONICITY, UNSPECIFIED VEIN (H): ICD-10-CM

## 2017-04-12 DIAGNOSIS — Z79.01 LONG-TERM (CURRENT) USE OF ANTICOAGULANTS: ICD-10-CM

## 2017-04-12 LAB — INR POINT OF CARE: 4.5 (ref 2–3)

## 2017-04-12 PROCEDURE — 85610 PROTHROMBIN TIME: CPT | Mod: QW

## 2017-04-12 PROCEDURE — 36416 COLLJ CAPILLARY BLOOD SPEC: CPT

## 2017-04-12 PROCEDURE — 99207 ZZC NO CHARGE NURSE ONLY: CPT

## 2017-04-12 NOTE — MR AVS SNAPSHOT
Pavithra Laurent   4/12/2017 10:45 AM   Anticoagulation Therapy Visit    Description:  76 year old female   Provider:  [facility]   Department:  Bx Nurse           INR as of 4/12/2017     Today's INR 4.5!      Anticoagulation Summary as of 4/12/2017     INR goal 2.0-3.0   Today's INR 4.5!   Full instructions 4/13: Hold; 4/14: Hold; Otherwise 10 mg on Mon; 7.5 mg all other days   Next INR check 4/14/2017    Indications   Deep vein thrombosis (DVT) of left lower extremity  unspecified chronicity  unspecified vein (H) [I82.402]  Long-term (current) use of anticoagulants [Z79.01] [Z79.01]         Description     Took her warfarin already today.      Your next Anticoagulation Clinic appointment(s)     Apr 14, 2017 11:45 AM CDT   Anticoagulation Visit with  ANTICOAGULATION CLINIC   [facility] ([facility])    [address] [place] [id number] [phone]              Contact Numbers     [facility]  Please call  [phone] to cancel and/or reschedule your appointment   The direct line to the anticoagulant nurse is [phone] on Monday, Wednesday, and Friday. On Thursday, the anticoagulant nurse can be reached directly at [phone].         April 2017 Details    Sun Mon Tue Wed Thu Fri Sat           1                 2               3               4               5               6               7               8                 9               10               11               12      7.5 mg   See details      13      Hold         14            15                 16               17               18               19               20               21               22                 23               24               25               26               27               28               29                 30                      Date Details   04/12 This INR check       Date of next INR:  4/14/2017          How to take your warfarin dose     To take:  7.5 mg Take 1.5 of the 5 mg tablets.    Hold Do not take your warfarin dose. See the Details table to the right for additional instructions.

## 2017-04-12 NOTE — PROGRESS NOTES
ANTICOAGULATION FOLLOW-UP CLINIC VISIT    Patient Name:  Pavithra Laurent  Date:  4/12/2017  Contact Type:  Face to Face    SUBJECTIVE:     Patient Findings     Positives Initiation of therapy           OBJECTIVE    INR Protime   Date Value Ref Range Status   04/12/2017 4.5 (A) 2.0 - 3.0 Final       ASSESSMENT / PLAN  INR assessment SUPRA    Recheck INR In: 2 DAYS    INR Location Clinic      Anticoagulation Summary as of 4/12/2017     INR goal 2.0-3.0   Today's INR 4.5!   Maintenance plan 10 mg (5 mg x 2) on Mon; 7.5 mg (5 mg x 1.5) all other days   Full instructions 4/13: Hold; 4/14: Hold; Otherwise 10 mg on Mon; 7.5 mg all other days   Weekly total 55 mg   Plan last modified Teresita Christian RN (4/7/2017)   Next INR check 4/14/2017   Target end date     Indications   Deep vein thrombosis (DVT) of left lower extremity  unspecified chronicity  unspecified vein (H) [I82.402]  Long-term (current) use of anticoagulants [Z79.01] [Z79.01]         Anticoagulation Episode Summary     INR check location     Preferred lab     Send INR reminders to [facility] INR/PROTIME    Comments       Anticoagulation Care Providers     Provider Role Specialty Phone number    siegler Responsible              See the Encounter Report to view Anticoagulation Flowsheet and Dosing Calendar (Go to Encounters tab in chart review, and find the Anticoagulation Therapy Visit)        Judith Drake RN

## 2017-04-14 ENCOUNTER — ANTICOAGULATION THERAPY VISIT (OUTPATIENT)
Dept: NURSING | Facility: CLINIC | Age: 77
End: 2017-04-14
Payer: COMMERCIAL

## 2017-04-14 DIAGNOSIS — I82.402 DEEP VEIN THROMBOSIS (DVT) OF LEFT LOWER EXTREMITY, UNSPECIFIED CHRONICITY, UNSPECIFIED VEIN (H): ICD-10-CM

## 2017-04-14 DIAGNOSIS — Z79.01 LONG-TERM (CURRENT) USE OF ANTICOAGULANTS: ICD-10-CM

## 2017-04-14 LAB — INR POINT OF CARE: 2.2 (ref 0.86–1.14)

## 2017-04-14 PROCEDURE — 85610 PROTHROMBIN TIME: CPT | Mod: QW

## 2017-04-14 PROCEDURE — 36416 COLLJ CAPILLARY BLOOD SPEC: CPT

## 2017-04-14 PROCEDURE — 99207 ZZC NO CHARGE NURSE ONLY: CPT

## 2017-04-14 NOTE — PROGRESS NOTES
ANTICOAGULATION FOLLOW-UP CLINIC VISIT    Patient Name:  Pavithra Laurent  Date:  4/14/2017  Contact Type:  Face to Face    SUBJECTIVE:     Patient Findings     Positives No Problem Findings           OBJECTIVE    INR Protime   Date Value Ref Range Status   04/14/2017 2.2 (A) 0.86 - 1.14 Final       ASSESSMENT / PLAN  INR assessment THER    Recheck INR In: 1 WEEK    INR Location Clinic      Anticoagulation Summary as of 4/14/2017     INR goal 2.0-3.0   Today's INR 2.2   Maintenance plan 10 mg (5 mg x 2) on Mon; 7.5 mg (5 mg x 1.5) all other days   Full instructions 4/16: 5 mg; 4/17: 7.5 mg; 4/19: 5 mg; 4/20: 5 mg; Otherwise 10 mg on Mon; 7.5 mg all other days   Weekly total 55 mg   Plan last modified Teresita Christian RN (4/7/2017)   Next INR check 4/21/2017   Priority INR   Target end date     Indications   Deep vein thrombosis (DVT) of left lower extremity  unspecified chronicity  unspecified vein (H) [I82.402]  Long-term (current) use of anticoagulants [Z79.01] [Z79.01]         Anticoagulation Episode Summary     INR check location     Preferred lab     Send INR reminders to [facility] INR/PROTIME    Comments       Anticoagulation Care Providers     Provider Role Specialty Phone number    siegler Responsible              See the Encounter Report to view Anticoagulation Flowsheet and Dosing Calendar (Go to Encounters tab in chart review, and find the Anticoagulation Therapy Visit)        Teresita Christian, RN

## 2017-04-14 NOTE — MR AVS SNAPSHOT
Pavithra CARROLL Mamaribell   4/14/2017 11:45 AM   Anticoagulation Therapy Visit    Description:  76 year old female   Provider:  [facility]   Department:  Bx Nurse           INR as of 4/14/2017     Today's INR 2.2      Anticoagulation Summary as of 4/14/2017     INR goal 2.0-3.0   Today's INR 2.2   Full instructions 4/16: 5 mg; 4/17: 7.5 mg; 4/19: 5 mg; 4/20: 5 mg; Otherwise 10 mg on Mon; 7.5 mg all other days   Next INR check 4/21/2017    Indications   Deep vein thrombosis (DVT) of left lower extremity  unspecified chronicity  unspecified vein (H) [I82.402]  Long-term (current) use of anticoagulants [Z79.01] [Z79.01]         Your next Anticoagulation Clinic appointment(s)     Apr 21, 2017  2:15 PM CDT   Anticoagulation Visit with  ANTICOAGULATION CLINIC   [facility] ([facility])    [address] [place] [id number] [phone]              Contact Numbers     [facility]  Please call  [phone] to cancel and/or reschedule your appointment   The direct line to the anticoagulant nurse is [phone] on Monday, Wednesday, and Friday. On Thursday, the anticoagulant nurse can be reached directly at [phone].         April 2017 Details    Sun Mon Tue Wed Thu Fri Sat           1                 2               3               4               5               6               7               8                 9               10               11               12               13               14      7.5 mg   See details      15      7.5 mg           16      5 mg         17      7.5 mg         18      7.5 mg         19      5 mg         20      5 mg         21            22                 23               24               25               26               27               28               29                 30                      Date Details   04/14 This INR check       Date of next INR:  4/21/2017          How to take your warfarin dose     To take:  5 mg Take 1 of the 5 mg tablets.    To take:  7.5 mg Take 1.5 of the 5 mg tablets.

## 2017-04-21 ENCOUNTER — ANTICOAGULATION THERAPY VISIT (OUTPATIENT)
Dept: NURSING | Facility: CLINIC | Age: 77
End: 2017-04-21
Payer: COMMERCIAL

## 2017-04-21 DIAGNOSIS — Z79.01 LONG-TERM (CURRENT) USE OF ANTICOAGULANTS: ICD-10-CM

## 2017-04-21 DIAGNOSIS — I82.402 DEEP VEIN THROMBOSIS (DVT) OF LEFT LOWER EXTREMITY, UNSPECIFIED CHRONICITY, UNSPECIFIED VEIN (H): ICD-10-CM

## 2017-04-21 LAB — INR POINT OF CARE: 2.3 (ref 0.86–1.14)

## 2017-04-21 PROCEDURE — 99207 ZZC NO CHARGE NURSE ONLY: CPT

## 2017-04-21 PROCEDURE — 36416 COLLJ CAPILLARY BLOOD SPEC: CPT

## 2017-04-21 PROCEDURE — 85610 PROTHROMBIN TIME: CPT | Mod: QW

## 2017-04-21 NOTE — PROGRESS NOTES
ANTICOAGULATION FOLLOW-UP CLINIC VISIT    Patient Name:  Pavithra Laurent  Date:  4/21/2017  Contact Type:  Face to Face    SUBJECTIVE:        OBJECTIVE    INR Protime   Date Value Ref Range Status   04/21/2017 2.3 (A) 0.86 - 1.14 Final       ASSESSMENT / PLAN  INR assessment THER    Recheck INR In: 1 WEEK    INR Location Clinic      Anticoagulation Summary as of 4/21/2017     INR goal 2.0-3.0   Today's INR 2.3   Maintenance plan 5 mg (5 mg x 1) on Mon, Wed, Fri; 7.5 mg (5 mg x 1.5) all other days   Full instructions 5 mg on Mon, Wed, Fri; 7.5 mg all other days   Weekly total 45 mg   Plan last modified Teresita Christian RN (4/21/2017)   Next INR check 4/28/2017   Priority INR   Target end date     Indications   Deep vein thrombosis (DVT) of left lower extremity  unspecified chronicity  unspecified vein (H) [I82.402]  Long-term (current) use of anticoagulants [Z79.01] [Z79.01]         Anticoagulation Episode Summary     INR check location     Preferred lab     Send INR reminders to [facility] INR/PROTIME    Comments       Anticoagulation Care Providers     Provider Role Specialty Phone number    siegler Responsible              See the Encounter Report to view Anticoagulation Flowsheet and Dosing Calendar (Go to Encounters tab in chart review, and find the Anticoagulation Therapy Visit)        Teresita Christian, RN

## 2017-04-21 NOTE — MR AVS SNAPSHOT
Pavithra CARROLL Mehran   4/21/2017 3:45 PM   Anticoagulation Therapy Visit    Description:  76 year old female   Provider:  [facility]   Department:  Bx Nurse           INR as of 4/21/2017     Today's INR 2.3      Anticoagulation Summary as of 4/21/2017     INR goal 2.0-3.0   Today's INR 2.3   Full instructions 5 mg on Mon, Wed, Fri; 7.5 mg all other days   Next INR check 4/28/2017    Indications   Deep vein thrombosis (DVT) of left lower extremity  unspecified chronicity  unspecified vein (H) [I82.402]  Long-term (current) use of anticoagulants [Z79.01] [Z79.01]         Your next Anticoagulation Clinic appointment(s)     Apr 28, 2017 11:30 AM CDT   Anticoagulation Visit with  ANTICOAGULATION CLINIC   [facility] ([facility])    [address] [place] [id number] [phone]              Contact Numbers     [facility]  Please call  [phone] to cancel and/or reschedule your appointment   The direct line to the anticoagulant nurse is [phone] on Monday, Wednesday, and Friday. On Thursday, the anticoagulant nurse can be reached directly at [phone].         April 2017 Details    Sun Mon Tue Wed Thu Fri Sat           1                 2               3               4               5               6               7               8                 9               10               11               12               13               14               15                 16               17               18               19               20               21      5 mg   See details      22      7.5 mg           23      7.5 mg         24      5 mg         25      7.5 mg         26      5 mg         27      7.5 mg         28            29                 30                      Date Details   04/21 This INR check       Date of next INR:  4/28/2017         How to take your warfarin dose     To take:  5 mg  Take 1 of the 5 mg tablets.    To take:  7.5 mg Take 1.5 of the 5 mg tablets.

## 2017-04-27 ENCOUNTER — OFFICE VISIT (OUTPATIENT)
Dept: FAMILY MEDICINE | Facility: CLINIC | Age: 77
End: 2017-04-27
Payer: COMMERCIAL

## 2017-04-27 VITALS
BODY MASS INDEX: 31.71 KG/M2 | SYSTOLIC BLOOD PRESSURE: 118 MMHG | OXYGEN SATURATION: 99 % | HEIGHT: 63 IN | HEART RATE: 82 BPM | DIASTOLIC BLOOD PRESSURE: 64 MMHG | WEIGHT: 179 LBS | RESPIRATION RATE: 16 BRPM | TEMPERATURE: 97.9 F

## 2017-04-27 DIAGNOSIS — Z79.01 LONG-TERM (CURRENT) USE OF ANTICOAGULANTS: ICD-10-CM

## 2017-04-27 DIAGNOSIS — K21.9 GASTROESOPHAGEAL REFLUX DISEASE WITHOUT ESOPHAGITIS: Primary | Chronic | ICD-10-CM

## 2017-04-27 PROCEDURE — 99214 OFFICE O/P EST MOD 30 MIN: CPT | Performed by: PHYSICIAN ASSISTANT

## 2017-04-27 NOTE — PROGRESS NOTES
SUBJECTIVE:                                                    Pavithra Laurent is a 76 year old female who presents to clinic today for the following health issues:    Substernal chest pain      Duration: Started after starting coumadin    Description (location/character/radiation):      Intensity:  mild    Accompanying signs and symptoms: Feels like she can't swallow food    History (similar episodes/previous evaluation): None    Precipitating or alleviating factors: None    Therapies tried and outcome: None     A few weeks of epigastric pain that radiates into her lower anterior chest and sometimes feels a strange sensation when swallowing. She is able to swallow without regurgitation. The symptoms occur after meals but also sometimes in the AM when she wakes up and while laying down, before she even eats anything. Symptoms are not accompanied by nausea, vomiting, jaw or arm pain or numbness, sweating, lightheadedness, fatigue or shortness of breath.     She denies diarrhea, constipation, bloody stools, black or tarry stools. She has normal BM daily without change.     Known hx of gerd, has been taking omeprazole. She has also been taking tums which improve the symptoms she has recently been having.     She is not a smoker and uses alcohol rarely. She does not closely adhere to a GERD friendly diet.   New medication has been coumadin, no other recent med changes.     Problem list and histories reviewed & adjusted, as indicated.  Additional history: as documented    Patient Active Problem List   Diagnosis     Nasal congestion     Environmental allergies     Acute bronchitis with coexisting condition, need prophylactic therapy     GERD (gastroesophageal reflux disease)     Hypothyroidism     Arm pain     Rosacea     Dry eye syndrome     Elbow dislocation     Lesion of ulnar nerve     CARDIOVASCULAR SCREENING; LDL GOAL LESS THAN 130     Pain of right lower leg     Spinal stenosis of lumbar region without neurogenic  claudication     Greater trochanteric bursitis of right hip     Gluteal pain     Deep vein thrombosis (DVT) of left lower extremity, unspecified chronicity, unspecified vein (H)     Long-term (current) use of anticoagulants [Z79.01]     Past Surgical History:   Procedure Laterality Date     ARTHROTOMY ELBOW Right 1/7/2015    Procedure: ARTHROTOMY ELBOW;  Surgeon: Branden Meyer MD;  Location: [facility] RESEC TONSIL/PILLARS  1948    tonsillectomy     CLOSED REDUCTION UPPER EXTREMITY  12/26/2014    Procedure: CLOSED REDUCTION UPPER EXTREMITY;  Surgeon: Louis Daniel MD;  Location:  OR     GYN SURGERY  1984    tubal ligation     IRRIGATION AND DEBRIDEMENT HAND, COMBINED  12/26/2014    Procedure: COMBINED IRRIGATION AND DEBRIDEMENT HAND;  Surgeon: Louis Daniel MD;  Location:  OR     KNEE SURGERY  2001    arthroscopic right     LAPAROSCOPIC CHOLECYSTECTOMY  8/16/2012    Procedure: LAPAROSCOPIC CHOLECYSTECTOMY;  LAPAROSCOPIC CHOLECYSTECTOMY ;  Surgeon: Preston Walters MD;  Location: [facility]     OPEN REDUCTION INTERNAL FIXATION FOREARM Right 12/26/2014    Procedure: OPEN REDUCTION INTERNAL FIXATION FOREARM;  Surgeon: Louis Daniel MD;  Location:  OR     ORTHOPEDIC SURGERY  2007,2008    bunyanectomy, hammer toe, torn meniscus, toe and knee replacement     TKA  2009    right knee     WISDOM TEETH EXTRACTION  1958       Social History   Substance Use Topics     Smoking status: Never Smoker     Smokeless tobacco: Never Used     Alcohol use 0.0 oz/week     0 Standard drinks or equivalent per week      Comment: rare- 2/month     Family History   Problem Relation Age of Onset     HEART DISEASE Mother      MI at 72     CEREBROVASCULAR DISEASE Mother      CANCER Mother      lung     Alcohol/Drug Father      Depression Father      Cardiovascular Father      HEART DISEASE Paternal Grandmother      HEART DISEASE Paternal Grandfather      CANCER Maternal Grandmother      stomach     Cancer - colorectal Other      Neurologic  Disorder Son      brain injury     Clotting Disorder (Unknown) Son      Unknown/Adopted Maternal Grandfather          Current Outpatient Prescriptions   Medication Sig Dispense Refill     esomeprazole (NEXIUM) 20 MG CR capsule Take 2 capsules (40 mg) by mouth every morning (before breakfast) Take 30-60 minutes before eating. 60 capsule 0     order for DME Equipment being ordered: compression stockings 25-35mmHG 1 each 0     warfarin (COUMADIN) 5 MG tablet 10 mg mon & 7.5 rest of week  Just starting coumadin 90 tablet 2     levothyroxine (SYNTHROID, LEVOTHROID) 88 MCG tablet TAKE 1 TABLET BY MOUTH EVERY DAY 90 tablet 2     gabapentin (NEURONTIN) 300 MG capsule TAKE 1 CAPSULE BY MOUTH 3TIMES A  capsule 3     benzonatate (TESSALON) 100 MG capsule Take 1 capsule (100 mg) by mouth 3 times daily as needed for cough (Patient taking differently: Take 100 mg by mouth 3 times daily as needed for cough (Using this medication PRN) ) 30 capsule 0     diclofenac (VOLTAREN) 1 % GEL Place 2 g onto the skin 4 times daily       calcium-vitamin D-vitamin K (CALCIUM SOFT CHEWS) 500-500-40 MG-UNT-MCG CHEW Take 2 tablets by mouth 2 times daily       epinastine HCl (ELESTAT) 0.05 % SOLN 1 drop 2 times daily as needed       desonide (DESOWEN) 0.05 % cream Apply topically 2 times daily as needed       melatonin 3 MG tablet Take 3 mg by mouth nightly as needed for sleep       acetaminophen (TYLENOL) 500 MG tablet Take 500-1,000 mg by mouth every 6 hours as needed for mild pain       Dextran 70 0.1%-Hypromellose 0.3% (BION TEARS) 0.1-0.3 % SOLN ophthalmic solution Place 1 drop into both eyes as needed for dry eyes       ketoconazole-hydrocortisone 2 & 1 % (CREAM) KIT Externally apply 1 Application topically 2 times daily       azelastine (ASTELIN) 0.1 % nasal spray Spray 2 sprays into both nostrils 2 times daily as needed        montelukast (SINGULAIR) 10 MG tablet Take 10 mg by mouth daily        IBUPROFEN PO Take 400 mg by mouth  "every 4 hours as needed for moderate pain        erythromycin (ROMYCIN) ophthalmic ointment 1 Application nightly as needed        fluticasone (FLONASE) 50 MCG/ACT nasal spray Spray 1 spray into both nostrils daily        amoxicillin (AMOXIL) 500 MG capsule Take 2,000 mg by mouth See Admin Instructions As needed for dental care       fluocinolone acetonide (DERMOTIC) 0.01 % OIL Place 1 drop in ear(s) daily as needed.       cycloSPORINE (RESTASIS) 0.05 % ophthalmic emulsion Place 1 drop into both eyes every 12 hours.         metroNIDAZOLE (METROGEL) 1 % gel Apply 0.5 inches topically daily.         Omeprazole Magnesium (PRILOSEC OTC PO) Take 20 mg by mouth every other day          Reviewed and updated as needed this visit by clinical staff  Tobacco  Allergies  Meds  Med Hx  Surg Hx  Fam Hx  Soc Hx      Reviewed and updated as needed this visit by Provider     ROS:  Patient denies fever, chills, nausea, vomiting, diarrhea, cough, shortness of breath, headache, dizziness, lightheadedness. Denies urinary symptoms including hematuria or dysuria. No back or pelvic pain.     OBJECTIVE:                                                    /64 (BP Location: Right arm, Patient Position: Chair, Cuff Size: Adult Regular)  Pulse 82  Temp 97.9  F (36.6  C) (Tympanic)  Resp 16  Ht 5' 2.5\" (1.588 m)  Wt 179 lb (81.2 kg)  SpO2 99%  Breastfeeding? No  BMI 32.22 kg/m2  Body mass index is 32.22 kg/(m^2).  GENERAL: healthy, alert and no distress  NECK: no adenopathy, no asymmetry, masses, or scars and thyroid normal to palpation  RESP: lungs clear to auscultation - no rales, rhonchi or wheezes  CV: regular rate and rhythm, normal S1 S2, no S3 or S4, no murmur, click or rub, no peripheral edema and peripheral pulses strong  ABDOMEN: soft, nontender, no hepatosplenomegaly, no masses and bowel sounds normal  SKIN: no suspicious lesions or rashes  PSYCH: mentation appears normal, affect normal/bright    Diagnostic Test " Results:  none      ASSESSMENT/PLAN:                                                        ICD-10-CM    1. Gastroesophageal reflux disease without esophagitis K21.9 esomeprazole (NEXIUM) 20 MG CR capsule     Not likely a cause/ side effect of coumadin. We reviewed common side effects together.     Limited concern for cardiac etiology as she is not of high CV risk and symptoms are suggestive of gastroesophageal origin with known GERD and current treatment.     We will change her current daily PPI to nexium and she will trial once tab daily then possibly two daily. If she is still not improved, we will send her for endoscopy for further evaluation.     Should her symptoms become more frequent or cause new symptoms of shortness of breath, worsening chest pain, radiation into the arm or jaw, diaphoresis, fatigue etc she will be seen in the ED for immediate evaluation.     She agrees to this plan.     Nicole Joy Siegler, PA-C  [facility]

## 2017-04-27 NOTE — MR AVS SNAPSHOT
After Visit Summary   4/27/2017    Pavithra Laurent    MRN: [id number]           Patient Information     Date Of Birth          1940        Visit Information        Provider Department      4/27/2017 11:30 AM Siegler, Nicole Joy, PA-C [facility]        Today's Diagnoses     Gastroesophageal reflux disease without esophagitis    -  1       Follow-ups after your visit        Your next 10 appointments already scheduled     Apr 28, 2017 11:30 AM CDT   Anticoagulation Visit with [facility]   [facility]    [address]  [facility] [address]   [phone]            May 01, 2017  1:50 PM CDT   AMANDA Extremity with Ct Martinez PT   [facility] (AMANDA Kelin  )    [address] [place] [address] [address]  [facility] [id number] [phone]            May 08, 2017  1:50 PM CDT   AMANDA Extremity with Ct Martinez PT   [facility] (AMANDA Kelin  )    [address] [place] [address] [address]  [facility] [id number] [phone]              Who to contact     If you have questions or need follow up information about today's clinic visit or your schedule please contact [facility] directly at [phone].  Normal or non-critical lab and imaging results will be communicated to you by MyChart, letter or phone within 4 business days after the clinic has received the results. If you do not hear from us within 7 days, please contact the clinic through MyChart or phone. If you have a critical or abnormal lab result, we will notify you by phone as soon as possible.  Submit refill requests through [employer] or call your pharmacy and they will forward the refill request to us. Please allow 3 business days for your refill to be completed.          Additional Information About Your Visit       "  MyChart Information     [employer] lets you send messages to your doctor, view your test results, renew your prescriptions, schedule appointments and more. To sign up, go to [email or web address] . Click on \"Log in\" on the left side of the screen, which will take you to the Welcome page. Then click on \"Sign up Now\" on the right side of the page.     You will be asked to enter the access code listed below, as well as some personal information. Please follow the directions to create your username and password.     Your access code is: [email or web address]  Expires: 2017 12:58 PM     Your access code will  in 90 days. If you need help or a new code, please call your [facility] clinic or [phone].        Care EveryWhere ID     This is your Care EveryWhere ID. This could be used by other organizations to access your [facility] medical records  [id number]        Your Vitals Were     Pulse Temperature Respirations Height Pulse Oximetry Breastfeeding?    82 97.9  F (36.6  C) (Tympanic) 16 5' 2.5\" (1.588 m) 99% No    BMI (Body Mass Index)                   32.22 kg/m2            Blood Pressure from Last 3 Encounters:   17 118/64   17 120/70   17 130/62    Weight from Last 3 Encounters:   17 179 lb (81.2 kg)   17 170 lb 8 oz (77.3 kg)   17 177 lb 8 oz (80.5 kg)              Today, you had the following     No orders found for display         Today's Medication Changes          These changes are accurate as of: 17 12:11 PM.  If you have any questions, ask your nurse or doctor.               Start taking these medicines.        Dose/Directions    esomeprazole 20 MG CR capsule   Commonly known as:  nexIUM   Used for:  Gastroesophageal reflux disease without esophagitis   Started by:  Siegler, Nicole Joy, PA-C        Dose:  40 mg   Take 2 capsules (40 mg) by mouth every morning (before breakfast) Take 30-60 minutes before eating.   Quantity:  60 capsule   Refills:  0         These " medicines have changed or have updated prescriptions.        Dose/Directions    benzonatate 100 MG capsule   Commonly known as:  TESSALON   This may have changed:  reasons to take this   Used for:  Cough        Dose:  100 mg   Take 1 capsule (100 mg) by mouth 3 times daily as needed for cough   Quantity:  30 capsule   Refills:  0            Where to get your medicines      These medications were sent to [id number] - [place], [place] - [address] [facility]  [address] [facility] [address]     Phone:  [phone]     esomeprazole 20 MG CR capsule                Primary Care Provider Office Phone # Fax #    Nicole Joy Siegler, PA-C [phone]       [facility] [address] [facility] [facility] [address]  [facility] [address]        Thank you!     Thank you for choosing [facility]  for your care. Our goal is always to provide you with excellent care. Hearing back from our patients is one way we can continue to improve our services. Please take a few minutes to complete the written survey that you may receive in the mail after your visit with us. Thank you!             Your Updated Medication List - Protect others around you: Learn how to safely use, store and throw away your medicines at www.disposemymeds.org.          This list is accurate as of: 4/27/17 12:11 PM.  Always use your most recent med list.                   Brand Name Dispense Instructions for use    acetaminophen 500 MG tablet    TYLENOL     Take 500-1,000 mg by mouth every 6 hours as needed for mild pain       amoxicillin 500 MG capsule    AMOXIL     Take 2,000 mg by mouth See Admin Instructions As needed for dental care       azelastine 0.1 % spray    ASTELIN     Spray 2 sprays into both nostrils 2 times daily as needed       benzonatate 100 MG capsule    TESSALON    30 capsule    Take 1 capsule (100 mg) by mouth 3 times daily as needed for cough       CALCIUM SOFT CHEWS 500-500-40 MG-UNT-MCG Chew    Generic drug:  calcium-vitamin D-vitamin K      Take 2 tablets by mouth 2 times daily       cycloSPORINE 0.05 % ophthalmic emulsion    RESTASIS     Place 1 drop into both eyes every 12 hours.       DERMOTIC 0.01 % Oil   Generic drug:  fluocinolone acetonide      Place 1 drop in ear(s) daily as needed.       desonide 0.05 % cream    DESOWEN     Apply topically 2 times daily as needed       Dextran 70 0.1%-Hypromellose 0.3% 0.1-0.3 % Soln ophthalmic solution      Place 1 drop into both eyes as needed for dry eyes       diclofenac 1 % Gel topical gel    VOLTAREN     Place 2 g onto the skin 4 times daily       epinastine HCl 0.05 % Soln    ELESTAT     1 drop 2 times daily as needed       erythromycin ophthalmic ointment    ROMYCIN     1 Application nightly as needed       esomeprazole 20 MG CR capsule    nexIUM    60 capsule    Take 2 capsules (40 mg) by mouth every morning (before breakfast) Take 30-60 minutes before eating.       fluticasone 50 MCG/ACT spray    FLONASE     Spray 1 spray into both nostrils daily       gabapentin 300 MG capsule    NEURONTIN    270 capsule    TAKE 1 CAPSULE BY MOUTH 3TIMES A DAY       IBUPROFEN PO      Take 400 mg by mouth every 4 hours as needed for moderate pain       ketoconazole-hydrocortisone 2 & 1 % (CREAM) Kit      Externally apply 1 Application topically 2 times daily       levothyroxine 88 MCG tablet    SYNTHROID/LEVOTHROID    90 tablet    TAKE 1 TABLET BY MOUTH EVERY DAY       melatonin 3 MG tablet      Take 3 mg by mouth nightly as needed for sleep       metroNIDAZOLE 1 % gel    METROGEL     Apply 0.5 inches topically daily.       montelukast 10 MG tablet    SINGULAIR     Take 10 mg by mouth daily       order for DME     1 each    Equipment being ordered: compression stockings 25-35mmHG       PRILOSEC OTC PO      Take 20 mg by mouth every other day       warfarin 5 MG tablet    COUMADIN    90 tablet    10 mg mon & 7.5 rest of week  Just starting coumadin

## 2017-04-27 NOTE — NURSING NOTE
"Chief Complaint   Patient presents with     Chest Pain     Sternum, intermittent episodes of feeling like she has swallowed something that will not go down.       Initial /64 (BP Location: Right arm, Patient Position: Chair, Cuff Size: Adult Regular)  Pulse 82  Temp 97.9  F (36.6  C) (Tympanic)  Resp 16  Ht 5' 2.5\" (1.588 m)  Wt 179 lb (81.2 kg)  SpO2 99%  Breastfeeding? No  BMI 32.22 kg/m2 Estimated body mass index is 32.22 kg/(m^2) as calculated from the following:    Height as of this encounter: 5' 2.5\" (1.588 m).    Weight as of this encounter: 179 lb (81.2 kg).  Medication Reconciliation: complete     Vanesa Koch LPN  "

## 2017-04-28 ENCOUNTER — ANTICOAGULATION THERAPY VISIT (OUTPATIENT)
Dept: NURSING | Facility: CLINIC | Age: 77
End: 2017-04-28
Payer: COMMERCIAL

## 2017-04-28 DIAGNOSIS — Z79.01 LONG-TERM (CURRENT) USE OF ANTICOAGULANTS: ICD-10-CM

## 2017-04-28 DIAGNOSIS — I82.402 DEEP VEIN THROMBOSIS (DVT) OF LEFT LOWER EXTREMITY, UNSPECIFIED CHRONICITY, UNSPECIFIED VEIN (H): ICD-10-CM

## 2017-04-28 LAB — INR POINT OF CARE: 3.2 (ref 0.86–1.14)

## 2017-04-28 PROCEDURE — 99207 ZZC NO CHARGE NURSE ONLY: CPT

## 2017-04-28 PROCEDURE — 85610 PROTHROMBIN TIME: CPT | Mod: QW

## 2017-04-28 PROCEDURE — 36416 COLLJ CAPILLARY BLOOD SPEC: CPT

## 2017-04-28 NOTE — PROGRESS NOTES
ANTICOAGULATION FOLLOW-UP CLINIC VISIT    Patient Name:  Pavithra Laurent  Date:  4/28/2017  Contact Type:  Face to Face    SUBJECTIVE:     Patient Findings     Positives No Problem Findings           OBJECTIVE    INR Protime   Date Value Ref Range Status   04/28/2017 3.2 (A) 0.86 - 1.14 Final       ASSESSMENT / PLAN  INR assessment THER    Recheck INR In: 2 WEEKS    INR Location Clinic      Anticoagulation Summary as of 4/28/2017     INR goal 2.0-3.0   Today's INR 3.2!   Maintenance plan 7.5 mg (5 mg x 1.5) on Mon, Wed, Fri; 5 mg (5 mg x 1) all other days   Full instructions 7.5 mg on Mon, Wed, Fri; 5 mg all other days   Weekly total 42.5 mg   Plan last modified Teresita Christian RN (4/28/2017)   Next INR check 5/10/2017   Priority INR   Target end date     Indications   Deep vein thrombosis (DVT) of left lower extremity  unspecified chronicity  unspecified vein (H) [I82.402]  Long-term (current) use of anticoagulants [Z79.01] [Z79.01]         Anticoagulation Episode Summary     INR check location     Preferred lab     Send INR reminders to [facility] INR/PROTIME    Comments       Anticoagulation Care Providers     Provider Role Specialty Phone number    siegler Responsible              See the Encounter Report to view Anticoagulation Flowsheet and Dosing Calendar (Go to Encounters tab in chart review, and find the Anticoagulation Therapy Visit)        Teresita Christian, RN

## 2017-04-28 NOTE — MR AVS SNAPSHOT
Pavithra CARROLL Mamaribell   4/28/2017 11:30 AM   Anticoagulation Therapy Visit    Description:  76 year old female   Provider:  [facility]   Department:  Bx Nurse           INR as of 4/28/2017     Today's INR 3.2!      Anticoagulation Summary as of 4/28/2017     INR goal 2.0-3.0   Today's INR 3.2!   Full instructions 7.5 mg on Mon, Wed, Fri; 5 mg all other days   Next INR check 5/10/2017    Indications   Deep vein thrombosis (DVT) of left lower extremity  unspecified chronicity  unspecified vein (H) [I82.402]  Long-term (current) use of anticoagulants [Z79.01] [Z79.01]         Your next Anticoagulation Clinic appointment(s)     May 10, 2017 11:30 AM CDT   Anticoagulation [facility] with  [facility] CLINIC   [facility]    [address] [place] [id number] [phone]              Contact Numbers     [facility]  Please call  [phone] to cancel and/or reschedule your appointment   The direct line to the anticoagulant nurse is [phone] on Monday, Wednesday, and Friday. On Thursday, the anticoagulant nurse can be reached directly at [phone].         April 2017 Details    Sun Mon Tue Wed Thu Fri Sat           1                 2               3               4               5               6               7               8                 9               10               11               12               13               14               15                 16               17               18               19               20               21               22                 23               24               25               26               27               28      7.5 mg   See details      29      5 mg           30      5 mg                Date Details   04/28 This INR check               How to take your warfarin dose     To take:  5 mg Take 1 of the 5 mg tablets.    To take:  7.5 mg  Take 1.5 of the 5 mg tablets.           May 2017 Details    Sun Mon Tue Wed Thu Fri Sat      1      7.5 mg         2      5 mg         3      7.5 mg         4      5 mg         5      7.5 mg         6      5 mg           7      5 mg         8      7.5 mg         9      5 mg         10            11               12               13                 14               15               16               17               18               19               20                 21               22               23               24               25               26               27                 28               29               30               31                   Date Details   No additional details    Date of next INR:  5/10/2017         How to take your warfarin dose     To take:  5 mg Take 1 of the 5 mg tablets.    To take:  7.5 mg Take 1.5 of the 5 mg tablets.

## 2017-05-02 ENCOUNTER — TELEPHONE (OUTPATIENT)
Dept: FAMILY MEDICINE | Facility: CLINIC | Age: 77
End: 2017-05-02

## 2017-05-02 NOTE — TELEPHONE ENCOUNTER
Reason for Call:  Other call back    Detailed comments: Patient has concerns regarding her Nexium prescription. Her prescription says to take two 20 mg tables daily for a total of 40 mg but she thinks she is taking too much. I was confused on how she was taking it and what Mansi told her. Please call her.    Phone Number Patient can be reached at: Home number on file [phone] (home)    Best Time: Before 11:00 am or after 1:30 pm.     Can we leave a detailed message on this number? YES    Call taken on 5/2/2017 at 9:29 AM by Ruchi Huerta

## 2017-05-02 NOTE — TELEPHONE ENCOUNTER
Prescription was written as: Take 2 capsules (40 mg) by mouth every morning (before breakfast) Take 30-60 minutes before eating.   Please notify her that is how she is to be taking them. That is an appropriate prescription dose of the medication. If she would like to take 1 capsule (20mg) per day she is welcome to try that. That dose would be considered an over the counter (non prescription) dose of medication. If she is more comfortable starting with that lower dose, she is welcome to but if she does not have benefit from it after a 1-2 week trial I would increase her dose to 40mg.   Nicole Joy Siegler, PA-C

## 2017-05-02 NOTE — TELEPHONE ENCOUNTER
Patient is going to buy the otc 20 mg capsules of nexium and see how that works for her.Advised that it was okay to try that for a week or two. If no benefit would increase to the 40 mg dose.

## 2017-05-03 ENCOUNTER — THERAPY VISIT (OUTPATIENT)
Dept: PHYSICAL THERAPY | Facility: CLINIC | Age: 77
End: 2017-05-03
Payer: COMMERCIAL

## 2017-05-03 DIAGNOSIS — M79.652 LEFT THIGH PAIN: ICD-10-CM

## 2017-05-03 DIAGNOSIS — M79.661 PAIN OF RIGHT LOWER LEG: ICD-10-CM

## 2017-05-03 DIAGNOSIS — M70.61 GREATER TROCHANTERIC BURSITIS OF RIGHT HIP: ICD-10-CM

## 2017-05-03 DIAGNOSIS — M48.061 SPINAL STENOSIS OF LUMBAR REGION WITHOUT NEUROGENIC CLAUDICATION: ICD-10-CM

## 2017-05-03 DIAGNOSIS — M79.18 GLUTEAL PAIN: ICD-10-CM

## 2017-05-03 PROCEDURE — 97530 THERAPEUTIC ACTIVITIES: CPT | Mod: GP | Performed by: PHYSICAL THERAPIST

## 2017-05-03 PROCEDURE — 97110 THERAPEUTIC EXERCISES: CPT | Mod: GP | Performed by: PHYSICAL THERAPIST

## 2017-05-03 ASSESSMENT — ACTIVITIES OF DAILY LIVING (ADL)
HEAVY_WORK: MODERATE DIFFICULTY
WALKING_UP_STEEP_HILLS: MODERATE DIFFICULTY
DEEP_SQUATTING: MODERATE DIFFICULTY
HOS_ADL_SCORE(%): 66.18
WALKING_DOWN_STEEP_HILLS: MODERATE DIFFICULTY
WALKING_INITIALLY: SLIGHT DIFFICULTY
RECREATIONAL_ACTIVITIES: SLIGHT DIFFICULTY
LIGHT_TO_MODERATE_WORK: NO DIFFICULTY AT ALL
STEPPING_UP_AND_DOWN_CURBS: SLIGHT DIFFICULTY
STANDING_FOR_15_MINUTES: MODERATE DIFFICULTY
TWISTING/PIVOTING_ON_INVOLVED_LEG: SLIGHT DIFFICULTY
HOS_ADL_COUNT: 17
PUTTING_ON_SOCKS_AND_SHOES: NO DIFFICULTY AT ALL
HOW_WOULD_YOU_RATE_YOUR_CURRENT_LEVEL_OF_FUNCTION_DURING_YOUR_USUAL_ACTIVITIES_OF_DAILY_LIVING_FROM_0_TO_100_WITH_100_BEING_YOUR_LEVEL_OF_FUNCTION_PRIOR_TO_YOUR_HIP_PROBLEM_AND_0_BEING_THE_INABILITY_TO_PERFORM_ANY_OF_YOUR_USUAL_DAILY_ACTIVITIES?: 70
ROLLING_OVER_IN_BED: NO DIFFICULTY AT ALL
GOING_UP_1_FLIGHT_OF_STAIRS: MODERATE DIFFICULTY
SITTING_FOR_15_MINUTES: MODERATE DIFFICULTY
GOING_DOWN_1_FLIGHT_OF_STAIRS: MODERATE DIFFICULTY
WALKING_15_MINUTES_OR_GREATER: MODERATE DIFFICULTY
WALKING_APPROXIMATELY_10_MINUTES: SLIGHT DIFFICULTY
HOS_ADL_ITEM_SCORE_TOTAL: 45
GETTING_INTO_AND_OUT_OF_AN_AVERAGE_CAR: SLIGHT DIFFICULTY
GETTING_INTO_AND_OUT_OF_A_BATHTUB: SLIGHT DIFFICULTY
HOS_ADL_HIGHEST_POTENTIAL_SCORE: 68

## 2017-05-03 NOTE — LETTER
[facility] PHYSICAL [facility]  [address]  [facility] [address]  [phone]    May 3, 2017    Re: Pavithra Laurent   :   1940  MRN:  [id number]   REFERRING PHYSICIAN:   Olesya Mcclendon MD    [facility] PH[facility]ICAL [facility]  Date of Initial Evaluation:  2017  Visits: 5  Rxs Used: 5  Reason for Referral:     Greater trochanteric bursitis of right hip  Gluteal pain  Pain of right lower leg  Spinal stenosis of lumbar region without neurogenic claudication  DISCHARGE REPORT  Progress reporting period is from 3/3/17 to 17.     SUBJECTIVE  Subjective changes noted by patient:  .  Subjective: Pt states that she has no ain in her right leg, good resolution of left pain post DVT.  Feels she is getting stronger.  Has questions re:  HEP and progression to pool program.    Current pain level is  Current Pain level: 0/10.     Previous pain level was   Initial Pain level: 2/10.   Changes in function:  Yes (See Goal flowsheet attached for changes in current functional level)  Adverse reaction to treatment or activity: None  OBJECTIVE  Changes noted in objective findings:    Objective: Gains in core stringth.  SLR (-).  Better awareness of posture and able to corect.  Discussed return to pool prgram.  Modified ex.  Pt appears to be ready for DC to HEP.    ASSESSMENT/PLAN  Updated problem list and treatment plan: Diagnosis 1:  Right hip trochanteric bursitis/pelvic dysf/core weakness/glteal pain   Decreased ROM/flexibility - manual therapy and therapeutic exercise  Decreased joint mobility - manual therapy and therapeutic exercise  Decreased strength - therapeutic exercise and therapeutic activities  Decreased function - therapeutic activities  Impaired posture - neuro re-education  STG/LTGs have been met or progress has been made towards goals:  Yes (See Goal flow sheet completed today.)  Assessment of Progress: The patient's condition is  improving.  Self Management Plans:  Patient is independent in a home treatment program.  Patient is independent in self management of symptoms.  Pavithra continues to require the following intervention to meet STG and LTG's:  PT      Re: Pavithra Laurent   :   1940    Recommendations:  This patient is ready to be discharged from therapy and continue their home treatment program.    Thank you for your referral.    INQUIRIES  Therapist: Ct Martinez PT, ScD, [facility]  [address] [place], [place] [address] [address]  Phone: [phone]  Fax: [phone]

## 2017-05-03 NOTE — PROGRESS NOTES
Subjective:    HPI                    Objective:    System    Physical Exam    General     ROS    Assessment/Plan:      DISCHARGE REPORT    Progress reporting period is from 3/3/17 to 5/2/17.       SUBJECTIVE  Subjective changes noted by patient:  .  Subjective: Pt states that she has no ain in her right leg, good resolution of left pain post DVT.  Feels she is getting stronger.  Has questions re:  HEP and progression to pool program.    Current pain level is  Current Pain level: 0/10.     Previous pain level was   Initial Pain level: 2/10.   Changes in function:  Yes (See Goal flowsheet attached for changes in current functional level)  Adverse reaction to treatment or activity: None    OBJECTIVE  Changes noted in objective findings:    Objective: Gains in core stringth.  SLR (-).  Better awareness of posture and able to corect.  Discussed return to pool prgram.  Modified ex.  Pt appears to be ready for DC to HEP.      ASSESSMENT/PLAN  Updated problem list and treatment plan: Diagnosis 1:  Right hip trochanteric bursitis/pelvic dysf/core weakness/glteal pain   Decreased ROM/flexibility - manual therapy and therapeutic exercise  Decreased joint mobility - manual therapy and therapeutic exercise  Decreased strength - therapeutic exercise and therapeutic activities  Decreased function - therapeutic activities  Impaired posture - neuro re-education  STG/LTGs have been met or progress has been made towards goals:  Yes (See Goal flow sheet completed today.)  Assessment of Progress: The patient's condition is improving.  Self Management Plans:  Patient is independent in a home treatment program.  Patient is independent in self management of symptoms.    Pavithra continues to require the following intervention to meet STG and LTG's:  PT    Recommendations:  This patient is ready to be discharged from therapy and continue their home treatment program.    Please refer to the daily flowsheet for treatment today, total treatment  time and time spent performing 1:1 timed codes.

## 2017-05-04 NOTE — TELEPHONE ENCOUNTER
Gabapentin 300 mg      Last Written Prescription Date:  8/23/16  Last Fill Quantity: 270,   # refills: 3  Last Office Visit with [facility] prescribing provider: 4/27/17  Future Office visit:       Routing refill request to provider for review/approval because:  Drug not on the [facility] refill protocol or controlled substance

## 2017-05-05 RX ORDER — GABAPENTIN 300 MG/1
CAPSULE ORAL
Qty: 270 CAPSULE | Refills: 1 | Status: SHIPPED | OUTPATIENT
Start: 2017-05-05 | End: 2017-07-09

## 2017-05-05 NOTE — TELEPHONE ENCOUNTER
Controlled Substance Refill Request for gabapentin (NEURONTIN) 300 MG capsule  Problem List Complete:  No     PROVIDER TO CONSIDER COMPLETION OF PROBLEM LIST AND OVERVIEW/CONTROLLED SUBSTANCE AGREEMENT        Controlled substance agreement on file: No.     Processing:  Fax Rx to [facility] pharmacy   checked in past 6 months?  Yes 05/05/2017   [place] reviewed:  reviewed- no concerns    [place]  [email or web address]

## 2017-05-12 ENCOUNTER — ANTICOAGULATION THERAPY VISIT (OUTPATIENT)
Dept: NURSING | Facility: CLINIC | Age: 77
End: 2017-05-12
Payer: COMMERCIAL

## 2017-05-12 DIAGNOSIS — I82.402 DEEP VEIN THROMBOSIS (DVT) OF LEFT LOWER EXTREMITY, UNSPECIFIED CHRONICITY, UNSPECIFIED VEIN (H): ICD-10-CM

## 2017-05-12 DIAGNOSIS — Z79.01 LONG-TERM (CURRENT) USE OF ANTICOAGULANTS: ICD-10-CM

## 2017-05-12 LAB — INR POINT OF CARE: 2.3 (ref 0.86–1.14)

## 2017-05-12 PROCEDURE — 36416 COLLJ CAPILLARY BLOOD SPEC: CPT

## 2017-05-12 PROCEDURE — 99207 ZZC NO CHARGE NURSE ONLY: CPT

## 2017-05-12 PROCEDURE — 85610 PROTHROMBIN TIME: CPT | Mod: QW

## 2017-05-12 NOTE — MR AVS SNAPSHOT
Pavithra CARROLL Mamaribell   5/12/2017 11:45 AM   Anticoagulation Therapy Visit    Description:  76 year old female   Provider:  [facility]   Department:  Bx Nurse           INR as of 5/12/2017     Today's INR 2.3      Anticoagulation Summary as of 5/12/2017     INR goal 2.0-3.0   Today's INR 2.3   Full instructions 7.5 mg on Mon, Wed, Fri; 5 mg all other days   Next INR check 6/2/2017    Indications   Deep vein thrombosis (DVT) of left lower extremity  unspecified chronicity  unspecified vein (H) [I82.402]  Long-term (current) use of anticoagulants [Z79.01] [Z79.01]         Your next Anticoagulation Clinic appointment(s)     Jun 02, 2017 11:45 AM CDT   Anticoagulation Visit with  ANTICOAGULATION CLINIC   [facility]    [address] [place] [id number] [phone]              Contact Numbers     [facility]  Please call  [phone] to cancel and/or reschedule your appointment   The direct line to the anticoagulant nurse is [phone] on Monday, Wednesday, and Friday. On Thursday, the anticoagulant nurse can be reached directly at [phone].         May 2017 Details    Sun Mon Tue Wed Thu Fri Sat      1               2               3               4               5               6                 7               8               9               10               11               12      7.5 mg   See details      13      5 mg           14      5 mg         15      7.5 mg         16      5 mg         17      7.5 mg         18      5 mg         19      7.5 mg         20      5 mg           21      5 mg         22      7.5 mg         23      5 mg         24      7.5 mg         25      5 mg         26      7.5 mg         27      5 mg           28      5 mg         29      7.5 mg         30      5 mg         31      7.5 mg             Date Details   05/12 This INR check               How to take  your warfarin dose     To take:  5 mg Take 1 of the 5 mg tablets.    To take:  7.5 mg Take 1.5 of the 5 mg tablets.           June 2017 Details    Sun Mon Tue Wed Thu Fri Sat         1      5 mg         2            3                 4               5               6               7               8               9               10                 11               12               13               14               15               16               17                 18               19               20               21               22               23               24                 25               26               27               28               29               30                 Date Details   No additional details    Date of next INR:  6/2/2017         How to take your warfarin dose     To take:  5 mg Take 1 of the 5 mg tablets.    To take:  7.5 mg Take 1.5 of the 5 mg tablets.

## 2017-05-12 NOTE — PROGRESS NOTES
ANTICOAGULATION FOLLOW-UP CLINIC VISIT    Patient Name:  Pavithra Laurent  Date:  5/12/2017  Contact Type:  Face to Face    SUBJECTIVE:     Patient Findings     Positives No Problem Findings           OBJECTIVE    INR Protime   Date Value Ref Range Status   05/12/2017 2.3 (A) 0.86 - 1.14 Final       ASSESSMENT / PLAN  INR assessment THER    Recheck INR In: 3 WEEKS    INR Location Clinic      Anticoagulation Summary as of 5/12/2017     INR goal 2.0-3.0   Today's INR 2.3   Maintenance plan 7.5 mg (5 mg x 1.5) on Mon, Wed, Fri; 5 mg (5 mg x 1) all other days   Full instructions 7.5 mg on Mon, Wed, Fri; 5 mg all other days   Weekly total 42.5 mg   Plan last modified Teresita Christian RN (4/28/2017)   Next INR check    Priority INR   Target end date     Indications   Deep vein thrombosis (DVT) of left lower extremity  unspecified chronicity  unspecified vein (H) [I82.402]  Long-term (current) use of anticoagulants [Z79.01] [Z79.01]         Anticoagulation Episode Summary     INR check location     Preferred lab     Send INR reminders to [facility] INR/PROTIME    Comments       Anticoagulation Care Providers     Provider Role Specialty Phone number    siegler Responsible              See the Encounter Report to view Anticoagulation Flowsheet and Dosing Calendar (Go to Encounters tab in chart review, and find the Anticoagulation Therapy Visit)        Teresita Christian, RN

## 2017-05-15 ENCOUNTER — TELEPHONE (OUTPATIENT)
Dept: FAMILY MEDICINE | Facility: CLINIC | Age: 77
End: 2017-05-15

## 2017-05-15 NOTE — TELEPHONE ENCOUNTER
According to [employer] the following is true:  Concurrent use of ESOMEPRAZOLE and WARFARIN may result in elevations in INR values and potentiation of anticoagulation effects.  Forwarding to provider for review and advice  Mili Davila RN  05/15/17  3:48 PM

## 2017-05-15 NOTE — TELEPHONE ENCOUNTER
Reason for Call:  Medication or medication refill:    Do you use a [facility]?  Name of the pharmacy and phone number for the current request:      Name of the medication requested: question re Nexium    Other request: call pt has questions if she should keep taking med what dose and if ok to take with coumadin    Can we leave a detailed message on this number? YES    Phone number patient can be reached at: Home number on file [phone] (home)    Best Time:     Call taken on 5/15/2017 at 3:39 PM by LEN PANDEY

## 2017-05-16 NOTE — TELEPHONE ENCOUNTER
Yes there is a moderate (not high) risk with any Proton Pump Inhibitor such as omeprazole or esomeprazole that the INR will be altered. She may change her medication to use to H2 antagonists such as ranitidine (zantac) or cimetidine, famotidine which is also moderate risk. Moderate risk is generally considered acceptable for most clinical situations where the patient would have symptoms if she were not using the medication. Those are the two recommended classes of therapy for gastroesophageal reflux/ gastritis. If she is able to reduce her use of inciting substances (coffee, alcohol, spicy foods, greasy foods) she may try going without use of any medication. She is certainly encouraged to trial a medication free time and if her symptoms return can determine if she would like to restart or do further testing. It may be a good idea to do an endoscopy if she requires restart of medications. Please notify her. Thanks! Nicole Joy Siegler, PA-C

## 2017-05-16 NOTE — TELEPHONE ENCOUNTER
Pavithra called.  She is going to keep taking the nexium but try doing 20 mg.  Eris gave her the 40 mg so she will call and ask for the 20 mg.  KARMENI to Nicole

## 2017-06-02 ENCOUNTER — ANTICOAGULATION THERAPY VISIT (OUTPATIENT)
Dept: NURSING | Facility: CLINIC | Age: 77
End: 2017-06-02
Payer: COMMERCIAL

## 2017-06-02 DIAGNOSIS — I82.402 DEEP VEIN THROMBOSIS (DVT) OF LEFT LOWER EXTREMITY, UNSPECIFIED CHRONICITY, UNSPECIFIED VEIN (H): ICD-10-CM

## 2017-06-02 DIAGNOSIS — Z79.01 LONG-TERM (CURRENT) USE OF ANTICOAGULANTS: ICD-10-CM

## 2017-06-02 LAB — INR POINT OF CARE: 1.5 (ref 0.86–1.14)

## 2017-06-02 PROCEDURE — 99207 ZZC NO CHARGE NURSE ONLY: CPT

## 2017-06-02 PROCEDURE — 36416 COLLJ CAPILLARY BLOOD SPEC: CPT

## 2017-06-02 PROCEDURE — 85610 PROTHROMBIN TIME: CPT | Mod: QW

## 2017-06-02 NOTE — MR AVS SNAPSHOT
Pavithra Laurent   6/2/2017 11:45 AM   Anticoagulation Therapy Visit    Description:  77 year old female   Provider:  [facility]   Department:  Bx Nurse           INR as of 6/2/2017     Today's INR 1.5!      Anticoagulation Summary as of 6/2/2017     INR goal 2.0-3.0   Today's INR 1.5!   Full instructions 6/2: 7.5 mg; Otherwise 5 mg on Mon, Wed, Fri; 7.5 mg all other days   Next INR check 6/16/2017    Indications   Deep vein thrombosis (DVT) of left lower extremity  unspecified chronicity  unspecified vein (H) [I82.402]  Long-term (current) use of anticoagulants [Z79.01] [Z79.01]         Your next Anticoagulation Clinic appointment(s)     Jun 16, 2017 11:30 AM CDT   An[facility] with  [facility] CLINIC   [facility]    [address] [place] [id number] [phone]              Contact Numbers     [facility]  Please call  [phone] to cancel and/or reschedule your appointment   The direct line to the anticoagulant nurse is [phone] on Monday, Wednesday, and Friday. On Thursday, the anticoagulant nurse can be reached directly at [phone].         June 2017 Details    Sun Mon Tue Wed Thu Fri Sat         1               2      7.5 mg   See details      3      7.5 mg           4      7.5 mg         5      5 mg         6      7.5 mg         7      5 mg         8      7.5 mg         9      5 mg         10      7.5 mg           11      7.5 mg         12      5 mg         13      7.5 mg         14      5 mg         15      7.5 mg         16            17                 18               19               20               21               22               23               24                 25               26               27               28               29               30                 Date Details   06/02 This INR check       Date of next INR:  6/16/2017          How to take your warfarin dose     To take:  5 mg Take 1 of the 5 mg tablets.    To take:  7.5 mg Take 1.5 of the 5 mg tablets.

## 2017-06-02 NOTE — PROGRESS NOTES
ANTICOAGULATION FOLLOW-UP CLINIC VISIT    Patient Name:  Pavithra Laurent  Date:  6/2/2017  Contact Type:  Face to Face    SUBJECTIVE:     Patient Findings     Positives No Problem Findings           OBJECTIVE    INR Protime   Date Value Ref Range Status   06/02/2017 1.5 (A) 0.86 - 1.14 Final       ASSESSMENT / PLAN  INR assessment THER    Recheck INR In: 2 WEEKS    INR Location Clinic      Anticoagulation Summary as of 6/2/2017     INR goal 2.0-3.0   Today's INR 1.5!   Maintenance plan 5 mg (5 mg x 1) on Mon, Wed, Fri; 7.5 mg (5 mg x 1.5) all other days   Full instructions 6/2: 7.5 mg; Otherwise 5 mg on Mon, Wed, Fri; 7.5 mg all other days   Weekly total 45 mg   Plan last modified Teresita Christian RN (6/2/2017)   Next INR check 6/16/2017   Priority INR   Target end date     Indications   Deep vein thrombosis (DVT) of left lower extremity  unspecified chronicity  unspecified vein (H) [I82.402]  Long-term (current) use of anticoagulants [Z79.01] [Z79.01]         Anticoagulation Episode Summary     INR check location     Preferred lab     Send INR reminders to [facility] INR/PROTIME    Comments       Anticoagulation Care Providers     Provider Role Specialty Phone number    siegler Responsible              See the Encounter Report to view Anticoagulation Flowsheet and Dosing Calendar (Go to Encounters tab in chart review, and find the Anticoagulation Therapy Visit)        Teresita Christian RN

## 2017-06-16 ENCOUNTER — ANTICOAGULATION THERAPY VISIT (OUTPATIENT)
Dept: NURSING | Facility: CLINIC | Age: 77
End: 2017-06-16
Payer: COMMERCIAL

## 2017-06-16 DIAGNOSIS — I82.402 DEEP VEIN THROMBOSIS (DVT) OF LEFT LOWER EXTREMITY, UNSPECIFIED CHRONICITY, UNSPECIFIED VEIN (H): ICD-10-CM

## 2017-06-16 DIAGNOSIS — Z79.01 LONG-TERM (CURRENT) USE OF ANTICOAGULANTS: ICD-10-CM

## 2017-06-16 LAB — INR POINT OF CARE: 2.2 (ref 0.86–1.14)

## 2017-06-16 PROCEDURE — 85610 PROTHROMBIN TIME: CPT | Mod: QW

## 2017-06-16 PROCEDURE — 36416 COLLJ CAPILLARY BLOOD SPEC: CPT

## 2017-06-16 PROCEDURE — 99207 ZZC NO CHARGE NURSE ONLY: CPT

## 2017-06-16 NOTE — MR AVS SNAPSHOT
Pavithra Laurent   6/16/2017 11:30 AM   Anticoagulation Therapy Visit    Description:  77 year old female   Provider:  [facility]   Department:  Bx Nurse           INR as of 6/16/2017     Today's INR 2.2      Anticoagulation Summary as of 6/16/2017     INR goal 2.0-3.0   Today's INR 2.2   Full instructions 5 mg on Mon, Wed, Fri; 7.5 mg all other days   Next INR check 7/7/2017    Indications   Deep vein thrombosis (DVT) of left lower extremity  unspecified chronicity  unspecified vein (H) [I82.402]  Long-term (current) use of anticoagulants [Z79.01] [Z79.01]         Your next Anticoagulation Clinic appointment(s)     Jul 07, 2017 11:45 AM CDT   Anticoagulation Visit with  ANTICOAGULATION CLINIC   [facility] ([facility]    [address] [place] [address] [id number] [phone]              Contact Numbers     [facility]  Please call  [phone] to cancel and/or reschedule your appointment   The direct line to the anticoagulant nurse is [phone] on Monday, Wednesday, and Friday. On Thursday, the anticoagulant nurse can be reached directly at [phone].         June 2017 Details    Sun Mon Tue Wed Thu Fri Sat         1               2               3                 4               5               6               7               8               9               10                 11               12               13               14               15               16      5 mg   See details      17      7.5 mg           18      7.5 mg         19      5 mg         20      7.5 mg         21      5 mg         22      7.5 mg         23      5 mg         24      7.5 mg           25      7.5 mg         26      5 mg         27      7.5 mg         28      5 mg         29      7.5 mg         30      5 mg           Date Details   06/16 This INR check               How to take your warfarin dose     To take:  5  mg Take 1 of the 5 mg tablets.    To take:  7.5 mg Take 1.5 of the 5 mg tablets.           July 2017 Details    Sun Mon Tue Wed Thu Fri Sat           1      7.5 mg           2      7.5 mg         3      5 mg         4      7.5 mg         5      5 mg         6      7.5 mg         7            8                 9               10               11               12               13               14               15                 16               17               18               19               20               21               22                 23               24               25               26               27               28               29                 30               31                     Date Details   No additional details    Date of next INR:  7/7/2017         How to take your warfarin dose     To take:  5 mg Take 1 of the 5 mg tablets.    To take:  7.5 mg Take 1.5 of the 5 mg tablets.

## 2017-06-16 NOTE — PROGRESS NOTES
ANTICOAGULATION FOLLOW-UP CLINIC VISIT    Patient Name:  Pavithra Laurent  Date:  6/16/2017  Contact Type:  Face to Face    SUBJECTIVE:     Patient Findings     Positives No Problem Findings           OBJECTIVE    INR Protime   Date Value Ref Range Status   06/16/2017 2.2 (A) 0.86 - 1.14 Final       ASSESSMENT / PLAN  INR assessment THER    Recheck INR In: 3 WEEKS    INR Location Clinic      Anticoagulation Summary as of 6/16/2017     INR goal 2.0-3.0   Today's INR 2.2   Maintenance plan 5 mg (5 mg x 1) on Mon, Wed, Fri; 7.5 mg (5 mg x 1.5) all other days   Full instructions 5 mg on Mon, Wed, Fri; 7.5 mg all other days   Weekly total 45 mg   Plan last modified Teresita Christian RN (6/2/2017)   Next INR check 7/7/2017   Priority INR   Target end date     Indications   Deep vein thrombosis (DVT) of left lower extremity  unspecified chronicity  unspecified vein (H) [I82.402]  Long-term (current) use of anticoagulants [Z79.01] [Z79.01]         Anticoagulation Episode Summary     INR check location     Preferred lab     Send INR reminders to [facility] INR/PROTIME    Comments       Anticoagulation Care Providers     Provider Role Specialty Phone number    siegler Responsible              See the Encounter Report to view Anticoagulation Flowsheet and Dosing Calendar (Go to Encounters tab in chart review, and find the Anticoagulation Therapy Visit)        Teresita Christian, RN

## 2017-07-07 ENCOUNTER — ANTICOAGULATION THERAPY VISIT (OUTPATIENT)
Dept: NURSING | Facility: CLINIC | Age: 77
End: 2017-07-07
Payer: COMMERCIAL

## 2017-07-07 DIAGNOSIS — Z79.01 LONG-TERM (CURRENT) USE OF ANTICOAGULANTS: ICD-10-CM

## 2017-07-07 DIAGNOSIS — I82.402 DEEP VEIN THROMBOSIS (DVT) OF LEFT LOWER EXTREMITY, UNSPECIFIED CHRONICITY, UNSPECIFIED VEIN (H): ICD-10-CM

## 2017-07-07 LAB — INR POINT OF CARE: 2.3 (ref 0.86–1.14)

## 2017-07-07 PROCEDURE — 85610 PROTHROMBIN TIME: CPT | Mod: QW

## 2017-07-07 PROCEDURE — 36416 COLLJ CAPILLARY BLOOD SPEC: CPT

## 2017-07-07 PROCEDURE — 99207 ZZC NO CHARGE NURSE ONLY: CPT

## 2017-07-07 NOTE — MR AVS SNAPSHOT
Pavithra Laurent   7/7/2017 11:45 AM   Anticoagulation Therapy Visit    Description:  77 year old female   Provider:  [facility]   Department:  Bx Nurse           INR as of 7/7/2017     Today's INR 2.3      Anticoagulation Summary as of 7/7/2017     INR goal 2.0-3.0   Today's INR 2.3   Full instructions 5 mg on Mon, Wed, Fri; 7.5 mg all other days   Next INR check 8/4/2017    Indications   Deep vein thrombosis (DVT) of left lower extremity  unspecified chronicity  unspecified vein (H) [I82.402]  Long-term (current) use of anticoagulants [Z79.01] [Z79.01]         Your next Anticoagulation Clinic appointment(s)     Aug 04, 2017 11:45 AM CDT   Anticoagulation Visit with  ANTICOAGULATION CLINIC   [facility] ([facility])    [address] [place] [address] [phone]              Contact Numbers     [facility]  Please call  [phone] to cancel and/or reschedule your appointment   The direct line to the anticoagulant nurse is [phone] on Monday, Wednesday, and Friday. On Thursday, the anticoagulant nurse can be reached directly at [phone].         July 2017 Details    Sun Mon Tue Wed Thu Fri Sat           1                 2               3               4               5               6               7      5 mg   See details      8      7.5 mg           9      7.5 mg         10      5 mg         11      7.5 mg         12      5 mg         13      7.5 mg         14      5 mg         15      7.5 mg           16      7.5 mg         17      5 mg         18      7.5 mg         19      5 mg         20      7.5 mg         21      5 mg         22      7.5 mg           23      7.5 mg         24      5 mg         25      7.5 mg         26      5 mg         27      7.5 mg         28      5 mg         29      7.5 mg           30      7.5 mg         31      5 mg               Date Details   07/07  This INR check               How to take your warfarin dose     To take:  5 mg Take 1 of the 5 mg tablets.    To take:  7.5 mg Take 1.5 of the 5 mg tablets.           August 2017 Details    Sun Mon Tue Wed Thu Fri Sat       1      7.5 mg         2      5 mg         3      7.5 mg         4            5                 6               7               8               9               10               11               12                 13               14               15               16               17               18               19                 20               21               22               23               24               25               26                 27               28               29               30               31                  Date Details   No additional details    Date of next INR:  8/4/2017         How to take your warfarin dose     To take:  5 mg Take 1 of the 5 mg tablets.    To take:  7.5 mg Take 1.5 of the 5 mg tablets.

## 2017-07-07 NOTE — PROGRESS NOTES
ANTICOAGULATION FOLLOW-UP CLINIC VISIT    Patient Name:  Pavithra Laurent  Date:  7/7/2017  Contact Type:  Face to Face    SUBJECTIVE:     Patient Findings     Positives No Problem Findings           OBJECTIVE    INR Protime   Date Value Ref Range Status   07/07/2017 2.3 (A) 0.86 - 1.14 Final       ASSESSMENT / PLAN  INR assessment THER    Recheck INR In: 4 WEEKS    INR Location Clinic      Anticoagulation Summary as of 7/7/2017     INR goal 2.0-3.0   Today's INR 2.3   Maintenance plan 5 mg (5 mg x 1) on Mon, Wed, Fri; 7.5 mg (5 mg x 1.5) all other days   Full instructions 5 mg on Mon, Wed, Fri; 7.5 mg all other days   Weekly total 45 mg   Plan last modified Teresita Christian RN (6/2/2017)   Next INR check 8/4/2017   Priority INR   Target end date     Indications   Deep vein thrombosis (DVT) of left lower extremity  unspecified chronicity  unspecified vein (H) [I82.402]  Long-term (current) use of anticoagulants [Z79.01] [Z79.01]         Anticoagulation Episode Summary     INR check location     Preferred lab     Send INR reminders to [facility] INR/PROTIME    Comments       Anticoagulation Care Providers     Provider Role Specialty Phone number    siegler Responsible              See the Encounter Report to view Anticoagulation Flowsheet and Dosing Calendar (Go to Encounters tab in chart review, and find the Anticoagulation Therapy Visit)        Teresita Christian, RN

## 2017-07-09 ENCOUNTER — OFFICE VISIT (OUTPATIENT)
Dept: URGENT CARE | Facility: URGENT CARE | Age: 77
End: 2017-07-09
Payer: COMMERCIAL

## 2017-07-09 VITALS
TEMPERATURE: 97.1 F | DIASTOLIC BLOOD PRESSURE: 75 MMHG | BODY MASS INDEX: 31.68 KG/M2 | WEIGHT: 176 LBS | HEART RATE: 72 BPM | SYSTOLIC BLOOD PRESSURE: 134 MMHG

## 2017-07-09 DIAGNOSIS — I83.893 VARICOSE VEINS OF LEG WITH SWELLING, BILATERAL: Primary | ICD-10-CM

## 2017-07-09 PROCEDURE — 99214 OFFICE O/P EST MOD 30 MIN: CPT | Performed by: FAMILY MEDICINE

## 2017-07-09 NOTE — PATIENT INSTRUCTIONS
Self-Care for Spider and Varicose Veins  Your healthcare provider may suggest that you try self-care. Exercising and maintaining a healthy weight may keep problem veins from getting worse. Wearing elastic stockings and elevating your legs can help improve blood flow. Taking breaks when you sit or stand helps, too.     The top of the elastic stocking should be below the bend in your knee for a proper fit.   Exercising  Exercising is good for your veins because it improves blood flow. Walking, cycling, or swimming are great exercises for vein health. But be sure to check with your healthcare provider before starting any exercise program. Also, keep these hints in mind:    When exercising, start out slowly and try to build up to 30 minutes on most days.    Elevate your legs above heart level after exercise to keep blood from pooling in veins.  Maintaining a healthy weight  Being overweight puts extra pressure on your veins. To maintain a healthy weight, try these tips:    Choose lean meats, fish, and skinless chicken.    Use low-fat dairy products.    Eat foods high in fiber, such as whole grains, fruits, and vegetables.    Cut down on sugar, salt, and saturated and trans fats.    Exercise regularly.  Wearing elastic stockings  Elastic stockings gently squeeze veins so blood flows upward. If you need elastic stockings, your healthcare provider can prescribe them for you. Follow your healthcare provider s advice about how and when to wear them. Elastic stockings come in several different levels of pressure. Ask your healthcare provider which level of pressure would benefit you the most.   Elevating your legs  Raising your legs above heart level will help relieve swelling and keep blood from pooling in veins. Try to elevate your legs for 15 to 20 minutes at the end of the day, and whenever you re relaxing. To make sure your legs are raised above heart level, prop them up on cushions or large pillows.  When sitting and  standing  To keep blood moving when you have to sit or stand for long periods, try these tips:    At work, take walking breaks instead of coffee breaks. Walk during your lunch hour. Or try flexing your feet up and down 10 times each hour.    When standing, raise yourself up and down on your toes, or rock back and forth on your heels.  Date Last Reviewed: 5/1/2016 2000-2017 [employer]. [address] [place] [address], [place] [id number]. All rights reserved. This information is not intended as a substitute for professional medical care. Always follow your healthcare professional's instructions.

## 2017-07-09 NOTE — PROGRESS NOTES
SUBJECTIVE:  Chief Complaint   Patient presents with     Leg Swelling     left leg/foot swelling with pain for two days - reports hx of blood clot in leg leg - currently taking Warfrin.     Pavithra Laurent is a 77 year old female presents with a chief complaint of left leg pain and swelling for the past 2 days. She history of DVT in lower leg in April 2017. She is currently on warfarin and last IN one week ago was 2.2  She called the nursing line and was told to go urgent care right away.  Past Medical History:   Diagnosis Date     Allergic rhinitis      Calculus of gallbladder without mention of cholecystitis or obstruction      Cataract of both eyes      Dry eye syndrome      Environmental allergies      Gastro-oesophageal reflux disease      Hypothyroid      Pain in joint, shoulder region      Rosacea      Thoracic outlet syndrome      Current Outpatient Prescriptions   Medication Sig Dispense Refill     warfarin (COUMADIN) 5 MG tablet 10 mg mon & 7.5 rest of week  Just starting coumadin (Patient taking differently: 5 mg mon, wed, fri & 7.5 rest of week) 90 tablet 2     levothyroxine (SYNTHROID, LEVOTHROID) 88 MCG tablet TAKE 1 TABLET BY MOUTH EVERY DAY 90 tablet 2     gabapentin (NEURONTIN) 300 MG capsule TAKE 1 CAPSULE BY MOUTH 3TIMES A  capsule 3     diclofenac (VOLTAREN) 1 % GEL Place 2 g onto the skin 4 times daily       calcium-vitamin D-vitamin K (CALCIUM SOFT CHEWS) 500-500-40 MG-UNT-MCG CHEW Take 2 tablets by mouth 2 times daily       desonide (DESOWEN) 0.05 % cream Apply topically 2 times daily as needed       melatonin 3 MG tablet Take 3 mg by mouth nightly as needed for sleep       acetaminophen (TYLENOL) 500 MG tablet Take 500-1,000 mg by mouth every 6 hours as needed for mild pain       Dextran 70 0.1%-Hypromellose 0.3% (BION TEARS) 0.1-0.3 % SOLN ophthalmic solution Place 1 drop into both eyes as needed for dry eyes       ketoconazole-hydrocortisone 2 & 1 % (CREAM) KIT Externally apply 1  Application topically 2 times daily       montelukast (SINGULAIR) 10 MG tablet Take 10 mg by mouth daily        fluticasone (FLONASE) 50 MCG/ACT nasal spray Spray 1 spray into both nostrils daily        cycloSPORINE (RESTASIS) 0.05 % ophthalmic emulsion Place 1 drop into both eyes every 12 hours.         metroNIDAZOLE (METROGEL) 1 % gel Apply 0.5 inches topically daily.         [DISCONTINUED] gabapentin (NEURONTIN) 300 MG capsule TAKE 1 CAPSULE BY MOUTH 3TIMES A DAY (Patient not taking: Reported on 7/9/2017) 270 capsule 1     esomeprazole (NEXIUM) 20 MG CR capsule Take 2 capsules (40 mg) by mouth every morning (before breakfast) Take 30-60 minutes before eating. (Patient not taking: Reported on 7/9/2017) 60 capsule 0     order for DME Equipment being ordered: compression stockings 25-35mmHG (Patient not taking: Reported on 7/9/2017) 1 each 0     benzonatate (TESSALON) 100 MG capsule Take 1 capsule (100 mg) by mouth 3 times daily as needed for cough (Patient not taking: Reported on 7/9/2017) 30 capsule 0     epinastine HCl (ELESTAT) 0.05 % SOLN 1 drop 2 times daily as needed       azelastine (ASTELIN) 0.1 % nasal spray Spray 2 sprays into both nostrils 2 times daily as needed        IBUPROFEN PO Take 400 mg by mouth every 4 hours as needed for moderate pain        erythromycin (ROMYCIN) ophthalmic ointment 1 Application nightly as needed        amoxicillin (AMOXIL) 500 MG capsule Take 2,000 mg by mouth See Admin Instructions As needed for dental care       fluocinolone acetonide (DERMOTIC) 0.01 % OIL Place 1 drop in ear(s) daily as needed.       Omeprazole Magnesium (PRILOSEC OTC PO) Take 20 mg by mouth every other day        Social History   Substance Use Topics     Smoking status: Never Smoker     Smokeless tobacco: Never Used     Alcohol use 0.0 oz/week     0 Standard drinks or equivalent per week      Comment: rare- 2/month       ROS:  CONSTITUTIONAL:NEGATIVE for fever, chills, change in weight  RESP:NEGATIVE for  significant cough or SOB  CV: NEGATIVE for chest pain, palpitations or peripheral edema  MUSCULOSKELETAL: POSITIVE  for left leg swelling    EXAM:   /75  Pulse 72  Temp 97.1  F (36.2  C) (Oral)  Wt 176 lb (79.8 kg)  BMI 31.68 kg/m2  Gen: healthy, alert, no distress and healthy,alert,no distress  CHEST: clear to auscultation  CV: regular rate and rhythm  EXTREMITIES: chronic stasis changes, +1 edema on lower extremity. peripheral pulses normal. Negative Coby's sign        ASSESSMENT:   1. Varicose veins of leg with swelling, bilateral: No low suspicious for DVT. She is on anticoagulation and last INR check was one week ago.   -Patient education and rssurance provided.   -Encouraged to elevate her leg up and wear compression stocking if possible.   -Follow up with primary care provider if not improving.       Jorge Lozano MD  [facility]

## 2017-07-09 NOTE — MR AVS SNAPSHOT
After Visit Summary   7/9/2017    Pavithra Laurent    MRN: [id number]           Patient Information     Date Of Birth          1940        Visit Information        Provider Department      7/9/2017 12:35 PM Jorge oLzano MD [facility]        Today's Diagnoses     Varicose veins of leg with swelling, bilateral    -  1      Care Instructions      Self-Care for Spider and Varicose Veins  Your healthcare provider may suggest that you try self-care. Exercising and maintaining a healthy weight may keep problem veins from getting worse. Wearing elastic stockings and elevating your legs can help improve blood flow. Taking breaks when you sit or stand helps, too.     The top of the elastic stocking should be below the bend in your knee for a proper fit.   Exercising  Exercising is good for your veins because it improves blood flow. Walking, cycling, or swimming are great exercises for vein health. But be sure to check with your healthcare provider before starting any exercise program. Also, keep these hints in mind:    When exercising, start out slowly and try to build up to 30 minutes on most days.    Elevate your legs above heart level after exercise to keep blood from pooling in veins.  Maintaining a healthy weight  Being overweight puts extra pressure on your veins. To maintain a healthy weight, try these tips:    Choose lean meats, fish, and skinless chicken.    Use low-fat dairy products.    Eat foods high in fiber, such as whole grains, fruits, and vegetables.    Cut down on sugar, salt, and saturated and trans fats.    Exercise regularly.  Wearing elastic stockings  Elastic stockings gently squeeze veins so blood flows upward. If you need elastic stockings, your healthcare provider can prescribe them for you. Follow your healthcare provider s advice about how and when to wear them. Elastic stockings come in several different levels of pressure. Ask your healthcare  provider which level of pressure would benefit you the most.   Elevating your legs  Raising your legs above heart level will help relieve swelling and keep blood from pooling in veins. Try to elevate your legs for 15 to 20 minutes at the end of the day, and whenever you re relaxing. To make sure your legs are raised above heart level, prop them up on cushions or large pillows.  When sitting and standing  To keep blood moving when you have to sit or stand for long periods, try these tips:    At work, take walking breaks instead of coffee breaks. Walk during your lunch hour. Or try flexing your feet up and down 10 times each hour.    When standing, raise yourself up and down on your toes, or rock back and forth on your heels.  Date Last Reviewed: 5/1/2016 2000-2017 [employer]. [address] [place] [address]. All rights reserved. This information is not intended as a substitute for professional medical care. Always follow your healthcare professional's instructions.                Follow-ups after your visit        Your next 10 appointments already scheduled     Aug 04, 2017 11:45 AM CDT   Anticoagulation Visit with [facility]   [facility]    [address] [place] [id number] [phone]              Who to contact     If you have questions or need follow up information about today's clinic visit or your schedule please contact [facility] directly at [phone].  Normal or non-critical lab and imaging results will be communicated to you by MyChart, letter or phone within 4 business days after the clinic has received the results. If you do not hear from us within 7 days, please contact the clinic through MyChart or phone. If you have a critical or abnormal lab result, we will notify you by phone as soon as possible.  Submit refill requests  "through [employer] or call your pharmacy and they will forward the refill request to us. Please allow 3 business days for your refill to be completed.          Additional Information About Your Visit        [employer] Information     [employer] lets you send messages to your doctor, view your test results, renew your prescriptions, schedule appointments and more. To sign up, go to [email or web address] . Click on \"Log in\" on the left side of the screen, which will take you to the Welcome page. Then click on \"Sign up Now\" on the right side of the page.     You will be asked to enter the access code listed below, as well as some personal information. Please follow the directions to create your username and password.     Your access code is: [email or web address]  Expires: 10/7/2017  1:35 PM     Your access code will  in 90 days. If you need help or a new code, please call your [facility] clinic or [phone].        Care EveryWhere ID     This is your Care EveryWhere ID. This could be used by other organizations to access your [facility] medical records  [id number]        Your Vitals Were     Pulse Temperature BMI (Body Mass Index)             72 97.1  F (36.2  C) (Oral) 31.68 kg/m2          Blood Pressure from Last 3 Encounters:   17 134/75   17 118/64   17 120/70    Weight from Last 3 Encounters:   17 176 lb (79.8 kg)   17 179 lb (81.2 kg)   17 170 lb 8 oz (77.3 kg)              Today, you had the following     No orders found for display         Today's Medication Changes          These changes are accurate as of: 17  1:35 PM.  If you have any questions, ask your nurse or doctor.               These medicines have changed or have updated prescriptions.        Dose/Directions    warfarin 5 MG tablet   Commonly known as:  COUMADIN   This may have changed:  additional instructions   Used for:  Deep vein thrombosis (DVT) of left lower extremity, unspecified chronicity, unspecified vein (H) "        10 mg mon & 7.5 rest of week  Just starting coumadin   Quantity:  90 tablet   Refills:  2                Primary Care Provider Office Phone # Fax #    Nicole Joy Siegler, PA-C [phone]       [facility] [address]  [facility] [address]        Equal Access to Services     BETTINA PRAKASH : Hadii aad ku hadasho Soomaali, waaxda luqadaha, qaybta kaalmada adeegyada, waxay idiin hayaan adeeg kharash la'aan ah. So [facility] [phone].    ATENCIÓN: Si habla español, tiene a nicole disposición servicios gratuitos de asistencia lingüística. Llame al [phone].    We comply with applicable federal civil rights laws and [place] laws. We do not discriminate on the basis of race, color, national origin, age, disability sex, sexual orientation or gender identity.            Thank you!     Thank you for choosing [facility]  for your care. Our goal is always to provide you with excellent care. Hearing back from our patients is one way we can continue to improve our services. Please take a few minutes to complete the written survey that you may receive in the mail after your visit with us. Thank you!             Your Updated Medication List - Protect others around you: Learn how to safely use, store and throw away your medicines at www.disposemymeds.org.          This list is accurate as of: 7/9/17  1:35 PM.  Always use your most recent med list.                   Brand Name Dispense Instructions for use Diagnosis    acetaminophen 500 MG tablet    TYLENOL     Take 500-1,000 mg by mouth every 6 hours as needed for mild pain        amoxicillin 500 MG capsule    AMOXIL     Take 2,000 mg by mouth See Admin Instructions As needed for dental care        azelastine 0.1 % spray    ASTELIN     Spray 2 sprays into both nostrils 2 times daily as needed        benzonatate 100 MG capsule    TESSALON    30 capsule    Take 1 capsule (100 mg) by mouth 3 times daily as needed for  cough    Cough       CALCIUM SOFT CHEWS 500-500-40 MG-UNT-MCG Chew   Generic drug:  calcium-vitamin D-vitamin K      Take 2 tablets by mouth 2 times daily        cycloSPORINE 0.05 % ophthalmic emulsion    RESTASIS     Place 1 drop into both eyes every 12 hours.        DERMOTIC 0.01 % Oil   Generic drug:  fluocinolone acetonide      Place 1 drop in ear(s) daily as needed.        desonide 0.05 % cream    DESOWEN     Apply topically 2 times daily as needed        Dextran 70 0.1%-Hypromellose 0.3% 0.1-0.3 % Soln ophthalmic solution      Place 1 drop into both eyes as needed for dry eyes        diclofenac 1 % Gel topical gel    VOLTAREN     Place 2 g onto the skin 4 times daily        epinastine HCl 0.05 % Soln    ELESTAT     1 drop 2 times daily as needed        erythromycin ophthalmic ointment    ROMYCIN     1 Application nightly as needed        esomeprazole 20 MG CR capsule    nexIUM    60 capsule    Take 2 capsules (40 mg) by mouth every morning (before breakfast) Take 30-60 minutes before eating.    Gastroesophageal reflux disease without esophagitis       fluticasone 50 MCG/ACT spray    FLONASE     Spray 1 spray into both nostrils daily        gabapentin 300 MG capsule    NEURONTIN    270 capsule    TAKE 1 CAPSULE BY MOUTH 3TIMES A DAY    Left thigh pain       IBUPROFEN PO      Take 400 mg by mouth every 4 hours as needed for moderate pain        ketoconazole-hydrocortisone 2 & 1 % (CREAM) Kit      Externally apply 1 Application topically 2 times daily        levothyroxine 88 MCG tablet    SYNTHROID/LEVOTHROID    90 tablet    TAKE 1 TABLET BY MOUTH EVERY DAY    Hypothyroidism due to acquired atrophy of thyroid       melatonin 3 MG tablet      Take 3 mg by mouth nightly as needed for sleep        metroNIDAZOLE 1 % gel    METROGEL     Apply 0.5 inches topically daily.        montelukast 10 MG tablet    SINGULAIR     Take 10 mg by mouth daily        order for DME     1 each    Equipment being ordered: compression  stockings 25-35mmHG    Deep vein thrombosis (DVT) of left lower extremity, unspecified chronicity, unspecified vein (H)       PRILOSEC OTC PO      Take 20 mg by mouth every other day        warfarin 5 MG tablet    COUMADIN    90 tablet    10 mg mon & 7.5 rest of week  Just starting coumadin    Deep vein thrombosis (DVT) of left lower extremity, unspecified chronicity, unspecified vein (H)

## 2017-07-10 ENCOUNTER — TELEPHONE (OUTPATIENT)
Dept: FAMILY MEDICINE | Facility: CLINIC | Age: 77
End: 2017-07-10

## 2017-07-10 NOTE — TELEPHONE ENCOUNTER
Patient has an appointment with Mansi in the morning, wondering if it is okay to go to swim class before seeing her.   Writer told her that if she woke up and her legs were swollen and painful to not go to class and while in class if it becomes painful for her or if her legs become swollen to stop the class and wait until she sees Mansi.  Mili Davila RN  07/10/17  2:13 PM

## 2017-07-11 ENCOUNTER — OFFICE VISIT (OUTPATIENT)
Dept: FAMILY MEDICINE | Facility: CLINIC | Age: 77
End: 2017-07-11
Payer: COMMERCIAL

## 2017-07-11 VITALS
WEIGHT: 177 LBS | OXYGEN SATURATION: 97 % | RESPIRATION RATE: 14 BRPM | TEMPERATURE: 98.2 F | DIASTOLIC BLOOD PRESSURE: 58 MMHG | HEIGHT: 63 IN | SYSTOLIC BLOOD PRESSURE: 112 MMHG | HEART RATE: 79 BPM | BODY MASS INDEX: 31.36 KG/M2

## 2017-07-11 DIAGNOSIS — R60.0 PERIPHERAL EDEMA: Primary | ICD-10-CM

## 2017-07-11 PROCEDURE — 99213 OFFICE O/P EST LOW 20 MIN: CPT | Performed by: PHYSICIAN ASSISTANT

## 2017-07-11 NOTE — NURSING NOTE
"Chief Complaint   Patient presents with     ER F/U     [facility] on 07/09/2016 for edema left lower leg.       Initial /58 (BP Location: Left arm, Patient Position: Sitting, Cuff Size: Adult Regular)  Pulse 79  Temp 98.2  F (36.8  C) (Tympanic)  Resp 14  Ht 5' 2.5\" (1.588 m)  Wt 177 lb (80.3 kg)  SpO2 97%  Breastfeeding? No  BMI 31.86 kg/m2 Estimated body mass index is 31.86 kg/(m^2) as calculated from the following:    Height as of this encounter: 5' 2.5\" (1.588 m).    Weight as of this encounter: 177 lb (80.3 kg).  Medication Reconciliation: complete     Vanesa Koch LPN  "

## 2017-07-11 NOTE — PROGRESS NOTES
"  SUBJECTIVE:                                                    Pavithra Laurent is a 77 year old female who presents to clinic today for the following health issues:    ED/UC Followup:    Facility:  [facility]  Date of visit: 07/09/2017  Reason for visit: Swollen left lower leg, denies pain  Current Status: Swelling decreased     Symptoms are improving.   Pavithra also has questions about whether she should make an appointment for a physical.  She also updates me on her current gerd symptoms- they have been improved for several weeks after 2 weeks of nexium therapy. She will continue to monitor her symptoms and notify us if she has persisting symptoms or requires continuous therapy.      Problem list and histories reviewed & adjusted, as indicated.  Additional history: as documented      Reviewed and updated as needed this visit by clinical staff  Tobacco  Allergies  Meds  Med Hx  Surg Hx  Fam Hx  Soc Hx      Reviewed and updated as needed this visit by Provider         ROS:  Patient denies fever, chills, nausea, vomiting, diarrhea, abdominal pain, chest pain, shortness of breath, headache, dizziness, lightheadedness.      OBJECTIVE:     /58 (BP Location: Left arm, Patient Position: Sitting, Cuff Size: Adult Regular)  Pulse 79  Temp 98.2  F (36.8  C) (Tympanic)  Resp 14  Ht 5' 2.5\" (1.588 m)  Wt 177 lb (80.3 kg)  SpO2 97%  Breastfeeding? No  BMI 31.86 kg/m2  Body mass index is 31.86 kg/(m^2).  GENERAL: healthy, alert and no distress  SKIN: left leg with varicosity and chronic stasis dermatitis without significant edema today    Diagnostic Test Results:  none     ASSESSMENT/PLAN:       ICD-10-CM    1. Peripheral edema R60.9      She will schedule a physical for Nov 2017 and we will manage her thyroid meds at that time as well. We will consider recheck of her u/s at that time also; she may be clinically ready to stop warfarin for her DVT?   Improving peripheral edema; continue to monitor, " elevate when necessary.     Nicole Joy Siegler, PA-C  [facility]

## 2017-07-11 NOTE — MR AVS SNAPSHOT
"              After Visit Summary   7/11/2017    Pavithra Laurent    MRN: [id number]           Patient Information     Date Of Birth          1940        Visit Information        Provider Department      7/11/2017 11:30 AM Siegler, Nicole Joy, PA-C [facility]         Follow-ups after your visit        Your next 10 appointments already scheduled     Aug 04, 2017 11:45 AM CDT   Anticoagulation Visit with [facility] ANTICOAGULATION CLINIC   [facility] ([facility])    [address] [place] [id number] [phone]              Who to contact     If you have questions or need follow up information about today's clinic visit or your schedule please contact [facility] directly at [phone].  Normal or non-critical lab and imaging results will be communicated to you by MyChart, letter or phone within 4 business days after the clinic has received the results. If you do not hear from us within 7 days, please contact the clinic through MyChart or phone. If you have a critical or abnormal lab result, we will notify you by phone as soon as possible.  Submit refill requests through [employer] or call your pharmacy and they will forward the refill request to us. Please allow 3 business days for your refill to be completed.          Additional Information About Your Visit        MyChart Information     [employer] lets you send messages to your doctor, view your test results, renew your prescriptions, schedule appointments and more. To sign up, go to [email or web address] . Click on \"Log in\" on the left side of the screen, which will take you to the Welcome page. Then click on \"Sign up Now\" on the right side of the page.     You will be asked to enter the access code listed below, as well as some personal information. Please follow the directions to create your username and password.   " "  Your access code is: [email or web address]  Expires: 10/7/2017  1:35 PM     Your access code will  in 90 days. If you need help or a new code, please call your [facility] clinic or [phone].        Care EveryWhere ID     This is your Care EveryWhere ID. This could be used by other organizations to access your [facility] medical records  [id number]        Your Vitals Were     Pulse Temperature Respirations Height Pulse Oximetry Breastfeeding?    79 98.2  F (36.8  C) (Tympanic) 14 5' 2.5\" (1.588 m) 97% No    BMI (Body Mass Index)                   31.86 kg/m2            Blood Pressure from Last 3 Encounters:   17 112/58   17 134/75   17 118/64    Weight from Last 3 Encounters:   17 177 lb (80.3 kg)   17 176 lb (79.8 kg)   17 179 lb (81.2 kg)              Today, you had the following     No orders found for display         Today's Medication Changes          These changes are accurate as of: 17 12:12 PM.  If you have any questions, ask your nurse or doctor.               These medicines have changed or have updated prescriptions.        Dose/Directions    warfarin 5 MG tablet   Commonly known as:  COUMADIN   This may have changed:  additional instructions   Used for:  Deep vein thrombosis (DVT) of left lower extremity, unspecified chronicity, unspecified vein (H)        10 mg mon & 7.5 rest of week  Just starting coumadin   Quantity:  90 tablet   Refills:  2                Primary Care Provider Office Phone # Fax #    Nicole Joy Siegler, PA-C [phone]       [facility] [address]  [facility] [address]        Equal Access to Services     JOHN PRAKASH : Hadmartha Hernandez, waaxda luqadaha, qaybta kaalmada chakayada, yves newby. So [facility] [phone].    ATENCIÓN: Si habla español, tiene a nicole disposición servicios gratuitos de asistencia lingüística. Llame al [phone].    We comply with applicable " federal civil rights laws and [place] laws. We do not discriminate on the basis of race, color, national origin, age, disability sex, sexual orientation or gender identity.            Thank you!     Thank you for choosing [facility]  for your care. Our goal is always to provide you with excellent care. Hearing back from our patients is one way we can continue to improve our services. Please take a few minutes to complete the written survey that you may receive in the mail after your visit with us. Thank you!             Your Updated Medication List - Protect others around you: Learn how to safely use, store and throw away your medicines at www.disposemymeds.org.          This list is accurate as of: 7/11/17 12:12 PM.  Always use your most recent med list.                   Brand Name Dispense Instructions for use Diagnosis    acetaminophen 500 MG tablet    TYLENOL     Take 500-1,000 mg by mouth every 6 hours as needed for mild pain        amoxicillin 500 MG capsule    AMOXIL     Take 2,000 mg by mouth See Admin Instructions As needed for dental care        azelastine 0.1 % spray    ASTELIN     Spray 2 sprays into both nostrils 2 times daily as needed        benzonatate 100 MG capsule    TESSALON    30 capsule    Take 1 capsule (100 mg) by mouth 3 times daily as needed for cough    Cough       CALCIUM SOFT CHEWS 500-500-40 MG-UNT-MCG Chew   Generic drug:  calcium-vitamin D-vitamin K      Take 2 tablets by mouth 2 times daily        cycloSPORINE 0.05 % ophthalmic emulsion    RESTASIS     Place 1 drop into both eyes every 12 hours.        DERMOTIC 0.01 % Oil   Generic drug:  fluocinolone acetonide      Place 1 drop in ear(s) daily as needed.        desonide 0.05 % cream    DESOWEN     Apply topically 2 times daily as needed        Dextran 70 0.1%-Hypromellose 0.3% 0.1-0.3 % Soln ophthalmic solution      Place 1 drop into both eyes as needed for dry eyes        diclofenac 1 % Gel topical  gel    VOLTAREN     Place 2 g onto the skin 4 times daily        epinastine HCl 0.05 % Soln    ELESTAT     1 drop 2 times daily as needed        erythromycin ophthalmic ointment    ROMYCIN     1 Application nightly as needed        esomeprazole 20 MG CR capsule    nexIUM    60 capsule    Take 2 capsules (40 mg) by mouth every morning (before breakfast) Take 30-60 minutes before eating.    Gastroesophageal reflux disease without esophagitis       fluticasone 50 MCG/ACT spray    FLONASE     Spray 1 spray into both nostrils daily        gabapentin 300 MG capsule    NEURONTIN    270 capsule    TAKE 1 CAPSULE BY MOUTH 3TIMES A DAY    Left thigh pain       IBUPROFEN PO      Take 400 mg by mouth every 4 hours as needed for moderate pain        ketoconazole-hydrocortisone 2 & 1 % (CREAM) Kit      Externally apply 1 Application topically 2 times daily        levothyroxine 88 MCG tablet    SYNTHROID/LEVOTHROID    90 tablet    TAKE 1 TABLET BY MOUTH EVERY DAY    Hypothyroidism due to acquired atrophy of thyroid       melatonin 3 MG tablet      Take 3 mg by mouth nightly as needed for sleep        metroNIDAZOLE 1 % gel    METROGEL     Apply 0.5 inches topically daily.        montelukast 10 MG tablet    SINGULAIR     Take 10 mg by mouth daily        order for DME     1 each    Equipment being ordered: compression stockings 25-35mmHG    Deep vein thrombosis (DVT) of left lower extremity, unspecified chronicity, unspecified vein (H)       PRILOSEC OTC PO      Take 20 mg by mouth every other day        warfarin 5 MG tablet    COUMADIN    90 tablet    10 mg mon & 7.5 rest of week  Just starting coumadin    Deep vein thrombosis (DVT) of left lower extremity, unspecified chronicity, unspecified vein (H)

## 2017-07-26 ENCOUNTER — ANTICOAGULATION THERAPY VISIT (OUTPATIENT)
Dept: NURSING | Facility: CLINIC | Age: 77
End: 2017-07-26
Payer: COMMERCIAL

## 2017-07-26 DIAGNOSIS — Z79.01 LONG-TERM (CURRENT) USE OF ANTICOAGULANTS: ICD-10-CM

## 2017-07-26 DIAGNOSIS — I82.402 DEEP VEIN THROMBOSIS (DVT) OF LEFT LOWER EXTREMITY, UNSPECIFIED CHRONICITY, UNSPECIFIED VEIN (H): ICD-10-CM

## 2017-07-26 LAB — INR POINT OF CARE: 3.5 (ref 2–3)

## 2017-07-26 PROCEDURE — 85610 PROTHROMBIN TIME: CPT | Mod: QW

## 2017-07-26 PROCEDURE — 99207 ZZC NO CHARGE NURSE ONLY: CPT

## 2017-07-26 PROCEDURE — 36416 COLLJ CAPILLARY BLOOD SPEC: CPT

## 2017-07-26 NOTE — PROGRESS NOTES
ANTICOAGULATION FOLLOW-UP CLINIC VISIT    Patient Name:  Pavithra Laurent  Date:  7/26/2017  Contact Type:  Face to Face    SUBJECTIVE:     Patient Findings     Positives No Problem Findings    Comments Having injection in left foot on 7/27/17           OBJECTIVE    INR Protime   Date Value Ref Range Status   07/26/2017 3.5 (A) 2.0 - 3.0 Final       ASSESSMENT / PLAN  INR assessment SUPRA    Recheck INR In: 2 WEEKS    INR Location Clinic      Anticoagulation Summary as of 7/26/2017     INR goal 2.0-3.0   Today's INR 3.5!   Maintenance plan 5 mg (5 mg x 1) on Mon, Wed, Fri; 7.5 mg (5 mg x 1.5) all other days   Full instructions 7/27: Hold; Otherwise 5 mg on Mon, Wed, Fri; 7.5 mg all other days   Weekly total 45 mg   Plan last modified Teresita Christian RN (6/2/2017)   Next INR check 8/4/2017   Priority INR   Target end date     Indications   Deep vein thrombosis (DVT) of left lower extremity  unspecified chronicity  unspecified vein (H) [I82.402]  Long-term (current) use of anticoagulants [Z79.01] [Z79.01]         Anticoagulation Episode Summary     INR check location     Preferred lab     Send INR reminders to [facility] INR/PROTIME    Comments       Anticoagulation Care Providers     Provider Role Specialty Phone number    siegler Responsible              See the Encounter Report to view Anticoagulation Flowsheet and Dosing Calendar (Go to Encounters tab in chart review, and find the Anticoagulation Therapy Visit)        Judith Drake RN

## 2017-07-26 NOTE — MR AVS SNAPSHOT
Pavithra Laurent   7/26/2017 3:00 PM   Anticoagulation Therapy Visit    Description:  77 year old female   Provider:   ANTICOAGULATION CLINIC   Department:  Bx Nurse           INR as of 7/26/2017     Today's INR 3.5!      Anticoagulation Summary as of 7/26/2017     INR goal 2.0-3.0   Today's INR 3.5!   Full instructions 7/27: Hold; Otherwise 5 mg on Mon, Wed, Fri; 7.5 mg all other days   Next INR check 8/4/2017    Indications   Deep vein thrombosis (DVT) of left lower extremity  unspecified chronicity  unspecified vein (H) [I82.402]  Long-term (current) use of anticoagulants [Z79.01] [Z79.01]         Your next Anticoagulation Clinic appointment(s)     Jul 26, 2017  3:00 PM CDT   Anticoagulation Visit with  [facility] CLINIC   [facility]    [address] [place] [id number]   [phone]            Aug 04, 2017 11:45 AM CDT   Anticoagulation Visit with  ANTICOAGULATION CLINIC   [facility]    [address] [place] [id number] [phone]              Contact Numbers     [facility]  Please call  [phone] to cancel and/or reschedule your appointment   The direct line to the anticoagulant nurse is [phone] on Monday, Wednesday, and Friday. On Thursday, the anticoagulant nurse can be reached directly at [phone].         July 2017 Details    Sun Mon Tue Wed Thu Fri Sat           1                 2               3               4               5               6               7               8                 9               10               11               12               13               14               15                 16               17               18               19               20               21               22                 23               24               25                26      5 mg   See details      27      Hold         28      5 mg         29      7.5 mg           30      7.5 mg         31      5 mg               Date Details   07/26 This INR check               How to take your warfarin dose     To take:  5 mg Take 1 of the 5 mg tablets.    To take:  7.5 mg Take 1.5 of the 5 mg tablets.    Hold Do not take your warfarin dose. See the Details table to the right for additional instructions.                August 2017 Details    Sun Mon Tue Wed Thu Fri Sat       1      7.5 mg         2      5 mg         3      7.5 mg         4            5                 6               7               8               9               10               11               12                 13               14               15               16               17               18               19                 20               21               22               23               24               25               26                 27               28               29               30               31                  Date Details   No additional details    Date of next INR:  8/4/2017         How to take your warfarin dose     To take:  5 mg Take 1 of the 5 mg tablets.    To take:  7.5 mg Take 1.5 of the 5 mg tablets.

## 2017-08-02 ENCOUNTER — ANTICOAGULATION THERAPY VISIT (OUTPATIENT)
Dept: NURSING | Facility: CLINIC | Age: 77
End: 2017-08-02
Payer: COMMERCIAL

## 2017-08-02 DIAGNOSIS — I82.402 DEEP VEIN THROMBOSIS (DVT) OF LEFT LOWER EXTREMITY, UNSPECIFIED CHRONICITY, UNSPECIFIED VEIN (H): ICD-10-CM

## 2017-08-02 DIAGNOSIS — Z79.01 LONG-TERM (CURRENT) USE OF ANTICOAGULANTS: ICD-10-CM

## 2017-08-02 LAB — INR POINT OF CARE: 2.8 (ref 2–3)

## 2017-08-02 PROCEDURE — 36416 COLLJ CAPILLARY BLOOD SPEC: CPT

## 2017-08-02 PROCEDURE — 99207 ZZC NO CHARGE NURSE ONLY: CPT

## 2017-08-02 PROCEDURE — 85610 PROTHROMBIN TIME: CPT | Mod: QW

## 2017-08-02 NOTE — PROGRESS NOTES
ANTICOAGULATION FOLLOW-UP CLINIC VISIT    Patient Name:  Pavithra Laurent  Date:  8/2/2017  Contact Type:  Face to Face    SUBJECTIVE:     Patient Findings     Positives No Problem Findings    Comments Having an injection in L foot for her arthritis on 8/3/4.           OBJECTIVE    INR Protime   Date Value Ref Range Status   08/02/2017 2.8 2.0 - 3.0 Final       ASSESSMENT / PLAN  INR assessment THER    Recheck INR In: 2 WEEKS    INR Location Clinic      Anticoagulation Summary as of 8/2/2017     INR goal 2.0-3.0   Today's INR 2.8   Maintenance plan 5 mg (5 mg x 1) on Mon, Wed, Fri; 7.5 mg (5 mg x 1.5) all other days   Full instructions 5 mg on Mon, Wed, Fri; 7.5 mg all other days   Weekly total 45 mg   No change documented Judith Drake RN   Plan last modified Teresita Christian RN (6/2/2017)   Next INR check 8/4/2017   Priority INR   Target end date     Indications   Deep vein thrombosis (DVT) of left lower extremity  unspecified chronicity  unspecified vein (H) [I82.402]  Long-term (current) use of anticoagulants [Z79.01] [Z79.01]         Anticoagulation Episode Summary     INR check location     Preferred lab     Send INR reminders to [facility] INR/PROTIME    Comments       Anticoagulation Care Providers     Provider Role Specialty Phone number    siegler Responsible              See the Encounter Report to view Anticoagulation Flowsheet and Dosing Calendar (Go to Encounters tab in chart review, and find the Anticoagulation Therapy Visit)        Judith Drake RN

## 2017-08-02 NOTE — MR AVS SNAPSHOT
Pavithra CARLY Laurent   8/2/2017 11:45 AM   Anticoagulation Therapy Visit    Description:  77 year old female   Provider:  [facility]   Department:  Bx Nurse           INR as of 8/2/2017     Today's INR 2.8      Anticoagulation Summary as of 8/2/2017     INR goal 2.0-3.0   Today's INR 2.8   Full instructions 5 mg on Mon, Wed, Fri; 7.5 mg all other days   Next INR check 8/4/2017    Indications   Deep vein thrombosis (DVT) of left lower extremity  unspecified chronicity  unspecified vein (H) [I82.402]  Long-term (current) use of anticoagulants [Z79.01] [Z79.01]         Your next Anticoagulation Clinic appointment(s)     Aug 18, 2017 11:45 AM CDT   Anticoagulation Visit with  ANTICOAGULATION CLINIC   [facility]    [address] [place] [id number] [phone]              Contact Numbers     [facility]  Please call  [phone] to cancel and/or reschedule your appointment   The direct line to the anticoagulant nurse is [phone] on Monday, Wednesday, and Friday. On Thursday, the anticoagulant nurse can be reached directly at [phone].         August 2017 Details    Sun Mon Tue Wed Thu Fri Sat       1               2      5 mg   See details      3      7.5 mg         4            5                 6               7               8               9               10               11               12                 13               14               15               16               17               18               19                 20               21               22               23               24               25               26                 27               28               29               30               31                  Date Details   08/02 This INR check       Date of next INR:  8/4/2017         How to take your warfarin dose     To take:  5 mg Take 1 of the 5 mg  tablets.    To take:  7.5 mg Take 1.5 of the 5 mg tablets.

## 2017-08-18 ENCOUNTER — ANTICOAGULATION THERAPY VISIT (OUTPATIENT)
Dept: NURSING | Facility: CLINIC | Age: 77
End: 2017-08-18
Payer: COMMERCIAL

## 2017-08-18 DIAGNOSIS — I82.402 DEEP VEIN THROMBOSIS (DVT) OF LEFT LOWER EXTREMITY, UNSPECIFIED CHRONICITY, UNSPECIFIED VEIN (H): ICD-10-CM

## 2017-08-18 DIAGNOSIS — Z79.01 LONG-TERM (CURRENT) USE OF ANTICOAGULANTS: ICD-10-CM

## 2017-08-18 LAB — INR POINT OF CARE: 3.5 (ref 0.86–1.14)

## 2017-08-18 PROCEDURE — 99207 ZZC NO CHARGE NURSE ONLY: CPT

## 2017-08-18 PROCEDURE — 85610 PROTHROMBIN TIME: CPT | Mod: QW

## 2017-08-18 PROCEDURE — 36416 COLLJ CAPILLARY BLOOD SPEC: CPT

## 2017-08-18 NOTE — MR AVS SNAPSHOT
Pavithra CARROLL Mamaribell   8/18/2017 11:45 AM   Anticoagulation Therapy Visit    Description:  77 year old female   Provider:  [facility]   Department:  Bx Nurse           INR as of 8/18/2017     Today's INR 3.5!      Anticoagulation Summary as of 8/18/2017     INR goal 2.0-3.0   Today's INR 3.5!   Full instructions 8/18: 2.5 mg; Otherwise 5 mg on Mon, Wed, Fri; 7.5 mg all other days   Next INR check 9/1/2017    Indications   Deep vein thrombosis (DVT) of left lower extremity  unspecified chronicity  unspecified vein (H) [I82.402]  Long-term (current) use of anticoagulants [Z79.01] [Z79.01]         Your next Anticoagulation Clinic appointment(s)     Sep 01, 2017 11:45 AM CDT   Anticoagulation Visit with  ANTICOAGULATION CLINIC   [facility]    [address] [place] [id number] [phone]              Contact Numbers     [facility]  Please call  [phone] to cancel and/or reschedule your appointment   The direct line to the anticoagulant nurse is [phone] on Monday, Wednesday, and Friday. On Thursday, the anticoagulant nurse can be reached directly at [phone].         August 2017 Details    Sun Mon Tue Wed Thu Fri Sat       1               2               3               4               5                 6               7               8               9               10               11               12                 13               14               15               16               17               18      2.5 mg   See details      19      7.5 mg           20      7.5 mg         21      5 mg         22      7.5 mg         23      5 mg         24      7.5 mg         25      5 mg         26      7.5 mg           27      7.5 mg         28      5 mg         29      7.5 mg         30      5 mg         31      7.5 mg            Date Details   08/18 This INR check               How to  take your warfarin dose     To take:  2.5 mg Take 0.5 of a 5 mg tablet.    To take:  5 mg Take 1 of the 5 mg tablets.    To take:  7.5 mg Take 1.5 of the 5 mg tablets.           September 2017 Details    Sun Mon Tue Wed Thu Fri Sat          1            2                 3               4               5               6               7               8               9                 10               11               12               13               14               15               16                 17               18               19               20               21               22               23                 24               25               26               27               28               29               30                Date Details   No additional details    Date of next INR:  9/1/2017         How to take your warfarin dose     To take:  5 mg Take 1 of the 5 mg tablets.

## 2017-08-18 NOTE — PROGRESS NOTES
ANTICOAGULATION FOLLOW-UP CLINIC VISIT    Patient Name:  Pavithra Laurent  Date:  8/18/2017  Contact Type:  Face to Face    SUBJECTIVE:     Patient Findings     Positives Other complaints    Comments Clot (clinic visit) - inflammation - brusitis             OBJECTIVE    INR Protime   Date Value Ref Range Status   08/18/2017 3.5 (A) 0.86 - 1.14 Final       ASSESSMENT / PLAN  INR assessment SUPRA    Recheck INR In: 2 WEEKS    INR Location Clinic      Anticoagulation Summary as of 8/18/2017     INR goal 2.0-3.0   Today's INR 3.5!   Maintenance plan 5 mg (5 mg x 1) on Mon, Wed, Fri; 7.5 mg (5 mg x 1.5) all other days   Full instructions 8/18: 2.5 mg; Otherwise 5 mg on Mon, Wed, Fri; 7.5 mg all other days   Weekly total 45 mg   Plan last modified Teresita Christian RN (6/2/2017)   Next INR check 9/1/2017   Priority INR   Target end date     Indications   Deep vein thrombosis (DVT) of left lower extremity  unspecified chronicity  unspecified vein (H) [I82.402]  Long-term (current) use of anticoagulants [Z79.01] [Z79.01]         Anticoagulation Episode Summary     INR check location     Preferred lab     Send INR reminders to [facility] INR/PROTIME    Comments       Anticoagulation Care Providers     Provider Role Specialty Phone number    siegler Responsible              See the Encounter Report to view Anticoagulation Flowsheet and Dosing Calendar (Go to Encounters tab in chart review, and find the Anticoagulation Therapy Visit)        Teresita Christian, RN

## 2017-09-01 ENCOUNTER — ANTICOAGULATION THERAPY VISIT (OUTPATIENT)
Dept: NURSING | Facility: CLINIC | Age: 77
End: 2017-09-01
Payer: COMMERCIAL

## 2017-09-01 DIAGNOSIS — I82.402 DEEP VEIN THROMBOSIS (DVT) OF LEFT LOWER EXTREMITY, UNSPECIFIED CHRONICITY, UNSPECIFIED VEIN (H): ICD-10-CM

## 2017-09-01 DIAGNOSIS — Z79.01 LONG-TERM (CURRENT) USE OF ANTICOAGULANTS: ICD-10-CM

## 2017-09-01 LAB — INR POINT OF CARE: 2.4 (ref 0.86–1.14)

## 2017-09-01 PROCEDURE — 99207 ZZC NO CHARGE NURSE ONLY: CPT

## 2017-09-01 PROCEDURE — 85610 PROTHROMBIN TIME: CPT | Mod: QW

## 2017-09-01 PROCEDURE — 36416 COLLJ CAPILLARY BLOOD SPEC: CPT

## 2017-09-01 NOTE — PROGRESS NOTES
ANTICOAGULATION FOLLOW-UP CLINIC VISIT    Patient Name:  Pavithra Laurent  Date:  9/1/2017  Contact Type:  Face to Face    SUBJECTIVE:     Patient Findings     Positives No Problem Findings           OBJECTIVE    INR Protime   Date Value Ref Range Status   09/01/2017 2.4 (A) 0.86 - 1.14 Final       ASSESSMENT / PLAN  INR assessment THER    Recheck INR In: 4 WEEKS    INR Location Clinic      Anticoagulation Summary as of 9/1/2017     INR goal 2.0-3.0   Today's INR 2.4   Maintenance plan 5 mg (5 mg x 1) on Mon, Wed, Fri; 7.5 mg (5 mg x 1.5) all other days   Full instructions 5 mg on Mon, Wed, Fri; 7.5 mg all other days   Weekly total 45 mg   Plan last modified Teresita Christian RN (6/2/2017)   Next INR check 9/29/2017   Priority INR   Target end date     Indications   Deep vein thrombosis (DVT) of left lower extremity  unspecified chronicity  unspecified vein (H) [I82.402]  Long-term (current) use of anticoagulants [Z79.01] [Z79.01]         Anticoagulation Episode Summary     INR check location     Preferred lab     Send INR reminders to [facility] INR/PROTIME    Comments       Anticoagulation Care Providers     Provider Role Specialty Phone number    siegler Responsible              See the Encounter Report to view Anticoagulation Flowsheet and Dosing Calendar (Go to Encounters tab in chart review, and find the Anticoagulation Therapy Visit)        Teresita Christian, RN

## 2017-09-01 NOTE — PROGRESS NOTES
ANTICOAGULATION FOLLOW-UP CLINIC VISIT    Patient Name:  Pavithra Laurent  Date:  9/1/2017  Contact Type:  Face to Face    SUBJECTIVE:     Patient Findings     Positives No Problem Findings           OBJECTIVE    INR Protime   Date Value Ref Range Status   09/01/2017 2.4 (A) 0.86 - 1.14 Final       ASSESSMENT / PLAN  INR assessment THER    Recheck INR In: 4 WEEKS    INR Location Clinic      Anticoagulation Summary as of 9/1/2017     INR goal 2.0-3.0   Today's INR 2.4   Maintenance plan 5 mg (5 mg x 1) on Mon, Wed, Fri; 7.5 mg (5 mg x 1.5) all other days   Full instructions 5 mg on Mon, Wed, Fri; 7.5 mg all other days   Weekly total 45 mg   No change documented Teresita Christian RN   Plan last modified Teresita Christian RN (6/2/2017)   Next INR check 9/29/2017   Priority INR   Target end date     Indications   Deep vein thrombosis (DVT) of left lower extremity  unspecified chronicity  unspecified vein (H) [I82.402]  Long-term (current) use of anticoagulants [Z79.01] [Z79.01]         Anticoagulation Episode Summary     INR check location     Preferred lab     Send INR reminders to [facility] INR/PROTIME    Comments       Anticoagulation Care Providers     Provider Role Specialty Phone number    siegler Responsible              See the Encounter Report to view Anticoagulation Flowsheet and Dosing Calendar (Go to Encounters tab in chart review, and find the Anticoagulation Therapy Visit)        Teresita Christian RN

## 2017-09-01 NOTE — MR AVS SNAPSHOT
Pavithra Laurent   9/1/2017 11:45 AM   Anticoagulation Therapy Visit    Description:  77 year old female   Provider:  [facility]   Department:  Bx Nurse           INR as of 9/1/2017     Today's INR 2.4      Anticoagulation Summary as of 9/1/2017     INR goal 2.0-3.0   Today's INR 2.4   Full instructions 5 mg on Mon, Wed, Fri; 7.5 mg all other days   Next INR check 9/29/2017    Indications   Deep vein thrombosis (DVT) of left lower extremity  unspecified chronicity  unspecified vein (H) [I82.402]  Long-term (current) use of anticoagulants [Z79.01] [Z79.01]         Contact Numbers     [facility]  Please call  [phone] to cancel and/or reschedule your appointment   The direct line to the anticoagulant nurse is [phone] on Monday, Wednesday, and Friday. On Thursday, the anticoagulant nurse can be reached directly at [phone].         September 2017 Details    Sun Mon Tue Wed Thu Fri Sat          1      5 mg   See details      2      7.5 mg           3      7.5 mg         4      5 mg         5      7.5 mg         6      5 mg         7      7.5 mg         8      5 mg         9      7.5 mg           10      7.5 mg         11      5 mg         12      7.5 mg         13      5 mg         14      7.5 mg         15      5 mg         16      7.5 mg           17      7.5 mg         18      5 mg         19      7.5 mg         20      5 mg         21      7.5 mg         22      5 mg         23      7.5 mg           24      7.5 mg         25      5 mg         26      7.5 mg         27      5 mg         28      7.5 mg         29            30                Date Details   09/01 This INR check       Date of next INR:  9/29/2017         How to take your warfarin dose     To take:  5 mg Take 1 of the 5 mg tablets.    To take:  7.5 mg Take 1.5 of the 5 mg tablets.

## 2017-09-05 ENCOUNTER — THERAPY VISIT (OUTPATIENT)
Dept: PHYSICAL THERAPY | Facility: CLINIC | Age: 77
End: 2017-09-05
Payer: COMMERCIAL

## 2017-09-05 DIAGNOSIS — M70.62 TROCHANTERIC BURSITIS OF LEFT HIP: Primary | ICD-10-CM

## 2017-09-05 DIAGNOSIS — G57.02 PIRIFORMIS SYNDROME OF LEFT SIDE: ICD-10-CM

## 2017-09-05 PROCEDURE — 97161 PT EVAL LOW COMPLEX 20 MIN: CPT | Mod: GP | Performed by: PHYSICAL THERAPIST

## 2017-09-05 PROCEDURE — G8981 BODY POS CURRENT STATUS: HCPCS | Mod: GP | Performed by: PHYSICAL THERAPIST

## 2017-09-05 PROCEDURE — G8982 BODY POS GOAL STATUS: HCPCS | Mod: GP | Performed by: PHYSICAL THERAPIST

## 2017-09-05 PROCEDURE — 97140 MANUAL THERAPY 1/> REGIONS: CPT | Mod: GP | Performed by: PHYSICAL THERAPIST
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
## 2017-09-05 NOTE — PROGRESS NOTES
Subjective:    Patient is a 77 year old female presenting with rehab left ankle/foot hpi.                                      Pertinent medical history includes:  Osteoarthritis, overweight and thyroid problems.  Medical allergies: yes (oral codeine, Erythromycin).  Other surgeries include:  Orthopedic surgery (TKA, wrist, elbow).  Current medications:  Thyroid medication and heparin/coumadin.  Current occupation is retired.    Primary job tasks include:  Lifting, repetitive tasks and other (computer work).                                Objective:    System    Physical Exam    General     ROS    Assessment/Plan:
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
## 2017-09-15 ENCOUNTER — THERAPY VISIT (OUTPATIENT)
Dept: PHYSICAL THERAPY | Facility: CLINIC | Age: 77
End: 2017-09-15
Payer: COMMERCIAL

## 2017-09-15 DIAGNOSIS — G57.02 PIRIFORMIS SYNDROME OF LEFT SIDE: ICD-10-CM

## 2017-09-15 DIAGNOSIS — M70.62 TROCHANTERIC BURSITIS OF LEFT HIP: ICD-10-CM

## 2017-09-15 PROCEDURE — 97112 NEUROMUSCULAR REEDUCATION: CPT | Mod: GP | Performed by: PHYSICAL THERAPIST

## 2017-09-15 PROCEDURE — 97110 THERAPEUTIC EXERCISES: CPT | Mod: GP | Performed by: PHYSICAL THERAPIST

## 2017-09-28 ENCOUNTER — OFFICE VISIT (OUTPATIENT)
Dept: FAMILY MEDICINE | Facility: CLINIC | Age: 77
End: 2017-09-28
Payer: COMMERCIAL

## 2017-09-28 ENCOUNTER — ANTICOAGULATION THERAPY VISIT (OUTPATIENT)
Dept: NURSING | Facility: CLINIC | Age: 77
End: 2017-09-28
Payer: COMMERCIAL

## 2017-09-28 VITALS
RESPIRATION RATE: 16 BRPM | TEMPERATURE: 99.4 F | BODY MASS INDEX: 31.5 KG/M2 | WEIGHT: 175 LBS | HEART RATE: 79 BPM | SYSTOLIC BLOOD PRESSURE: 104 MMHG | DIASTOLIC BLOOD PRESSURE: 76 MMHG | OXYGEN SATURATION: 95 %

## 2017-09-28 DIAGNOSIS — R07.9 ACUTE CHEST PAIN: Primary | ICD-10-CM

## 2017-09-28 DIAGNOSIS — I82.402 DEEP VEIN THROMBOSIS (DVT) OF LEFT LOWER EXTREMITY, UNSPECIFIED CHRONICITY, UNSPECIFIED VEIN (H): ICD-10-CM

## 2017-09-28 DIAGNOSIS — Z79.01 LONG-TERM (CURRENT) USE OF ANTICOAGULANTS: ICD-10-CM

## 2017-09-28 DIAGNOSIS — K21.9 GASTROESOPHAGEAL REFLUX DISEASE WITHOUT ESOPHAGITIS: Chronic | ICD-10-CM

## 2017-09-28 LAB — INR POINT OF CARE: 1.9 (ref 2–3)

## 2017-09-28 PROCEDURE — 36416 COLLJ CAPILLARY BLOOD SPEC: CPT

## 2017-09-28 PROCEDURE — 99214 OFFICE O/P EST MOD 30 MIN: CPT | Mod: 25 | Performed by: PHYSICIAN ASSISTANT

## 2017-09-28 PROCEDURE — 93000 ELECTROCARDIOGRAM COMPLETE: CPT | Performed by: PHYSICIAN ASSISTANT

## 2017-09-28 PROCEDURE — 85610 PROTHROMBIN TIME: CPT | Mod: QW

## 2017-09-28 PROCEDURE — 99207 ZZC NO CHARGE NURSE ONLY: CPT

## 2017-09-28 ASSESSMENT — ANXIETY QUESTIONNAIRES
3. WORRYING TOO MUCH ABOUT DIFFERENT THINGS: MORE THAN HALF THE DAYS
1. FEELING NERVOUS, ANXIOUS, OR ON EDGE: SEVERAL DAYS
IF YOU CHECKED OFF ANY PROBLEMS ON THIS QUESTIONNAIRE, HOW DIFFICULT HAVE THESE PROBLEMS MADE IT FOR YOU TO DO YOUR WORK, TAKE CARE OF THINGS AT HOME, OR GET ALONG WITH OTHER PEOPLE: SOMEWHAT DIFFICULT
5. BEING SO RESTLESS THAT IT IS HARD TO SIT STILL: NOT AT ALL
GAD7 TOTAL SCORE: 7
7. FEELING AFRAID AS IF SOMETHING AWFUL MIGHT HAPPEN: SEVERAL DAYS
2. NOT BEING ABLE TO STOP OR CONTROL WORRYING: SEVERAL DAYS
6. BECOMING EASILY ANNOYED OR IRRITABLE: SEVERAL DAYS

## 2017-09-28 ASSESSMENT — PATIENT HEALTH QUESTIONNAIRE - PHQ9: 5. POOR APPETITE OR OVEREATING: SEVERAL DAYS

## 2017-09-28 NOTE — NURSING NOTE
"Chief Complaint   Patient presents with     Gastrophageal Reflux     Ongoing     Chest Pain     Substernal chest pain       Initial /76 (BP Location: Right arm, Patient Position: Sitting, Cuff Size: Adult Regular)  Pulse 79  Temp 99.4  F (37.4  C) (Tympanic)  Resp 16  Wt 175 lb (79.4 kg)  SpO2 95%  Breastfeeding? No  BMI 31.5 kg/m2 Estimated body mass index is 31.5 kg/(m^2) as calculated from the following:    Height as of 7/11/17: 5' 2.5\" (1.588 m).    Weight as of this encounter: 175 lb (79.4 kg).  Medication Reconciliation: complete     Vanesa Koch LPN  "

## 2017-09-28 NOTE — MR AVS SNAPSHOT
After Visit Summary   9/28/2017    Pavithra Laurent    MRN: [id number]           Patient Information     Date Of Birth          1940        Visit Information        Provider Department      9/28/2017 2:50 PM Siegler, Nicole Joy, PA-C [facility]        Today's Diagnoses     Acute chest pain    -  1    Gastroesophageal reflux disease without esophagitis           Follow-ups after your visit        Your next 10 appointments already scheduled     Oct 03, 2017  2:30 PM CDT   AMANDA Extremity with Ct Martinez PT   [facility] (AMANDA Kelin  )    [address] [place] [address] [address]  [facility] [address]   [phone]            Oct 12, 2017  1:50 PM CDT   AMANDA Extremity with tC Martinez PT   [facility] ([facility]  )    [address] [place], [place] [id number]  [place] [address]   [phone]            Oct 20, 2017  2:30 PM CDT   AMANDA Extremity with Ct Martinez PT   [facility] (AMANDA Kelin  )    [address] [place] [address] [address]  [facility] [address]   [phone]              Who to contact     If you have questions or need follow up information about today's clinic visit or your schedule please contact [facility] directly at [phone].  Normal or non-critical lab and imaging results will be communicated to you by MyChart, letter or phone within 4 business days after the clinic has received the results. If you do not hear from us within 7 days, please contact the clinic through MyChart or phone. If you have a critical or abnormal lab result, we will notify you by phone as soon as possible.  Submit refill requests through [employer] or call your pharmacy and they will forward the refill request to us. Please allow 3 business days for your refill to be completed.          Additional Information About Your Visit        [facility]  "Information     [employer] lets you send messages to your doctor, view your test results, renew your prescriptions, schedule appointments and more. To sign up, go to [email or web address] . Click on \"Log in\" on the left side of the screen, which will take you to the Welcome page. Then click on \"Sign up Now\" on the right side of the page.     You will be asked to enter the access code listed below, as well as some personal information. Please follow the directions to create your username and password.     Your access code is: [email or web address]  Expires: 10/7/2017  1:35 PM     Your access code will  in 90 days. If you need help or a new code, please call your [place] clinic or [phone].        Care EveryWhere ID     This is your [facility] EveryWhere ID. This could be used by other organizations to access your [place] medical records  [id number]        Your Vitals Were     Pulse Temperature Respirations Pulse Oximetry Breastfeeding? BMI (Body Mass Index)    79 99.4  F (37.4  C) (Tympanic) 16 95% No 31.5 kg/m2       Blood Pressure from Last 3 Encounters:   17 104/76   17 112/58   17 134/75    Weight from Last 3 Encounters:   17 175 lb (79.4 kg)   17 177 lb (80.3 kg)   17 176 lb (79.8 kg)              We Performed the Following     EKG 12-lead complete w/read - Clinics          Today's Medication Changes          These changes are accurate as of: 17 11:59 PM.  If you have any questions, ask your nurse or doctor.               These medicines have changed or have updated prescriptions.        Dose/Directions    warfarin 5 MG tablet   Commonly known as:  COUMADIN   This may have changed:  additional instructions   Used for:  Deep vein thrombosis (DVT) of left lower extremity, unspecified chronicity, unspecified vein (H)        10 mg mon & 7.5 rest of week  Just starting coumadin   Quantity:  90 tablet   Refills:  2         Stop taking these medicines if you haven't already. " Please contact your care team if you have questions.     benzonatate 100 MG capsule   Commonly known as:  TESSALON   Stopped by:  Siegler, Nicole Joy, PA-C                Where to get your medicines      These medications were sent to [id number] - [place], [place] - [address] [facility]  [address] [facility] [address]     Phone:  [phone]     esomeprazole 20 MG CR capsule                Primary Care Provider Office Phone # Fax #    Nicole Joy Siegler, PA-C [phone]       [facility] [address]  [place] [address]        Equal Access to Services     BETTINA PRAKASH : Hadii eddie ku hadasho Soomaali, waaxda luqadaha, qaybta kaalmada adeegyada, waxmeghana beltran . So [facility] [phone].    ATENCIÓN: Si habla español, tiene a nicole disposición servicios gratuitos de asistencia lingüística. Llame al [phone].    We comply with applicable federal civil rights laws and [place] laws. We do not discriminate on the basis of race, color, national origin, age, disability sex, sexual orientation or gender identity.            Thank you!     Thank you for choosing [facility]  for your care. Our goal is always to provide you with excellent care. Hearing back from our patients is one way we can continue to improve our services. Please take a few minutes to complete the written survey that you may receive in the mail after your visit with us. Thank you!             Your Updated Medication List - Protect others around you: Learn how to safely use, store and throw away your medicines at www.disposemymeds.org.          This list is accurate as of: 9/28/17 11:59 PM.  Always use your most recent med list.                   Brand Name Dispense Instructions for use Diagnosis    acetaminophen 500 MG tablet    TYLENOL     Take 500-1,000 mg by mouth every 6 hours as needed for mild pain        azelastine 0.1 % spray    ASTELIN      Spray 2 sprays into both nostrils 2 times daily as needed        CALCIUM SOFT CHEWS 500-500-40 MG-UNT-MCG Chew   Generic drug:  calcium-vitamin D-vitamin K      Take 2 tablets by mouth 2 times daily        cycloSPORINE 0.05 % ophthalmic emulsion    RESTASIS     Place 1 drop into both eyes every 12 hours.        DERMOTIC 0.01 % Oil   Generic drug:  fluocinolone acetonide      Place 1 drop in ear(s) daily as needed.        desonide 0.05 % cream    DESOWEN     Apply topically 2 times daily as needed        Dextran 70 0.1%-Hypromellose 0.3% 0.1-0.3 % Soln ophthalmic solution      Place 1 drop into both eyes as needed for dry eyes        diclofenac 1 % Gel topical gel    VOLTAREN     Place 2 g onto the skin 4 times daily        epinastine HCl 0.05 % Soln    ELESTAT     1 drop 2 times daily as needed        erythromycin ophthalmic ointment    ROMYCIN     1 Application nightly as needed        esomeprazole 20 MG CR capsule    nexIUM    60 capsule    Take 2 capsules (40 mg) by mouth every morning (before breakfast) Take 30-60 minutes before eating.    Gastroesophageal reflux disease without esophagitis       fluticasone 50 MCG/ACT spray    FLONASE     Spray 1 spray into both nostrils daily        gabapentin 300 MG capsule    NEURONTIN    270 capsule    TAKE 1 CAPSULE BY MOUTH 3TIMES A DAY    Left thigh pain       IBUPROFEN PO      Take 400 mg by mouth every 4 hours as needed for moderate pain        ketoconazole-hydrocortisone 2 & 1 % (CREAM) Kit      Externally apply 1 Application topically 2 times daily        levothyroxine 88 MCG tablet    SYNTHROID/LEVOTHROID    90 tablet    Take 1 tablet (88 mcg) by mouth daily    Hypothyroidism due to acquired atrophy of thyroid       melatonin 3 MG tablet      Take 3 mg by mouth nightly as needed for sleep        metroNIDAZOLE 1 % gel    METROGEL     Apply 0.5 inches topically daily.        montelukast 10 MG tablet    SINGULAIR     Take 10 mg by mouth daily        order for DME     1  each    Equipment being ordered: compression stockings 25-35mmHG    Deep vein thrombosis (DVT) of left lower extremity, unspecified chronicity, unspecified vein (H)       PRILOSEC OTC PO      Take 20 mg by mouth every other day        warfarin 5 MG tablet    COUMADIN    90 tablet    10 mg mon & 7.5 rest of week  Just starting coumadin    Deep vein thrombosis (DVT) of left lower extremity, unspecified chronicity, unspecified vein (H)

## 2017-09-28 NOTE — PROGRESS NOTES
ANTICOAGULATION FOLLOW-UP CLINIC VISIT    Patient Name:  Pavithra Laurent  Date:  9/28/2017  Contact Type:  Face to Face    SUBJECTIVE:     Patient Findings     Positives No Problem Findings    Comments Patient is going off of coumadin today per provider.           OBJECTIVE    INR Protime   Date Value Ref Range Status   09/28/2017 1.9 (A) 2.0 - 3.0 Final       ASSESSMENT / PLAN  INR assessment THER    Recheck INR In: 1 DAY    INR Location Clinic      Anticoagulation Summary as of 9/28/2017     INR goal 2.0-3.0   Today's INR 1.9!   Maintenance plan 5 mg (5 mg x 1) on Mon, Wed, Fri; 7.5 mg (5 mg x 1.5) all other days   Full instructions 5 mg on Mon, Wed, Fri; 7.5 mg all other days   Weekly total 45 mg   Plan last modified Teresita Christian RN (6/2/2017)   Next INR check    Target end date     Indications   Deep vein thrombosis (DVT) of left lower extremity  unspecified chronicity  unspecified vein (H) [I82.402]  Long-term (current) use of anticoagulants [Z79.01] [Z79.01]         Anticoagulation Episode Summary     INR check location     Preferred lab     Resolved date 9/28/2017    Resolved reason Therapy  Complete    Send INR reminders to [facility] INR/PROTIME    Comments       Anticoagulation Care Providers     Provider Role Specialty Phone number    siegler Responsible              See the Encounter Report to view Anticoagulation Flowsheet and Dosing Calendar (Go to Encounters tab in chart review, and find the Anticoagulation Therapy Visit)        Judith Drake RN

## 2017-09-28 NOTE — MR AVS SNAPSHOT
Pavithra CARLY Laurent   9/28/2017 3:15 PM   Anticoagulation Therapy Visit    Description:  77 year old female   Provider:  [facility]   Department:  Bx Nurse           INR as of 9/28/2017     Today's INR 1.9!      Anticoagulation Summary as of 9/28/2017     INR goal 2.0-3.0   Today's INR 1.9!   Full instructions 5 mg on Mon, Wed, Fri; 7.5 mg all other days   Next INR check     Indications   Deep vein thrombosis (DVT) of left lower extremity  unspecified chronicity  unspecified vein (H) [I82.402]  Long-term (current) use of anticoagulants [Z79.01] [Z79.01]         Anticoagulation Episode Summary     Resolved date 9/28/2017    Resolved reason Therapy  Complete      Contact Numbers     [facility]  Please call  [phone] to cancel and/or reschedule your appointment   The direct line to the anticoagulant nurse is [phone] on Monday, Wednesday, and Friday. On Thursday, the anticoagulant nurse can be reached directly at [phone].

## 2017-09-28 NOTE — PROGRESS NOTES
"  SUBJECTIVE:   Pavithra Laurent is a 77 year old female who presents to clinic today for the following health issues:    GERD/Heartburn      Duration: Intermittent and ongoing    Description (location/character/radiation): Substernal chest pain    Intensity:  moderate    Accompanying signs and symptoms:  food getting stuck: YES  nausea/vomiting/blood: no   abdominal pain: no   black/tarry or bloody stools: no :    History (similar episodes/previous evaluation): None    Precipitating or alleviating factors:  worse with tomatoe soup, onions  current NSAID/Aspirin use: no     Therapies tried and outcome: Omeprazole (Prilosec) and Nexium    - complains of chest pain today that is tight, anterior central chest that a dull aching, sort of like a \"weight\" on her chest. She has had pain like this before a few days after she had her blood clot. At that time it went away with tums. Currently pain is at a 3-4/10. Pain has been ongoing since about 11am today and worse since noon. She denies pain in the jaw/face, upper extremities, nausea, fatigue, diaphoresis today. She did eat oatmeal with raisins and apples at 1030 AM. No food since then. She has had water to drink today, some coffee and orange juice this AM with her breakfast. She took tums today about noon.     - she has been using prilosec and nexium and is having symptoms for several months and has not been doing anything additional to treat her GERD due to taking care of her son.     - Son was in the hospital over labor day weekend and has a blood clotting disorder and hematoma of the lower leg, causing pain with walking. Created a contracture. IVC filer was placed and he spent 9 days in hospital, went back to group home and was seen in ED again and has blood clots in both legs. This is causing her a lot of stress and she is feeling very anxious about it. She does not have hx of panic attack but of some anxiety associated with care of her son.     Problem list and " histories reviewed & adjusted, as indicated.  Additional history: as documented    Patient Active Problem List   Diagnosis     Nasal congestion     Environmental allergies     Acute bronchitis with coexisting condition, need prophylactic therapy     GERD (gastroesophageal reflux disease)     Hypothyroidism     Arm pain     Rosacea     Dry eye syndrome     Elbow dislocation     Lesion of ulnar nerve     CARDIOVASCULAR SCREENING; LDL GOAL LESS THAN 130     Deep vein thrombosis (DVT) of left lower extremity, unspecified chronicity, unspecified vein (H)     Long-term (current) use of anticoagulants [Z79.01]     Trochanteric bursitis of left hip     Piriformis syndrome of left side     Past Surgical History:   Procedure Laterality Date     ARTHROTOMY ELBOW Right 1/7/2015    Procedure: ARTHROTOMY ELBOW;  Surgeon: Branden Meyer MD;  Location: [facility] TONSIL/PILLARS  1948    tonsillectomy     CLOSED REDUCTION UPPER EXTREMITY  12/26/2014    Procedure: CLOSED REDUCTION UPPER EXTREMITY;  Surgeon: Louis Daniel MD;  Location:  OR     GYN SURGERY  1984    tubal ligation     IRRIGATION AND DEBRIDEMENT HAND, COMBINED  12/26/2014    Procedure: COMBINED IRRIGATION AND DEBRIDEMENT HAND;  Surgeon: Louis Daniel MD;  Location:  OR     KNEE SURGERY  2001    arthroscopic right     LAPAROSCOPIC CHOLECYSTECTOMY  8/16/2012    Procedure: LAPAROSCOPIC CHOLECYSTECTOMY;  LAPAROSCOPIC CHOLECYSTECTOMY ;  Surgeon: Preston Walters MD;  Location: [facility]     OPEN REDUCTION INTERNAL FIXATION FOREARM Right 12/26/2014    Procedure: OPEN REDUCTION INTERNAL FIXATION FOREARM;  Surgeon: Louis Daniel MD;  Location:  OR     ORTHOPEDIC SURGERY  2007,2008    bunyanectomy, hammer toe, torn meniscus, toe and knee replacement     TKA  2009    right knee     WISDOM TEETH EXTRACTION  1958       Social History   Substance Use Topics     Smoking status: Never Smoker     Smokeless tobacco: Never Used     Alcohol use 0.0 oz/week     0 Standard  drinks or equivalent per week      Comment: rare- 2/month     Family History   Problem Relation Age of Onset     HEART DISEASE Mother      MI at 72     CEREBROVASCULAR DISEASE Mother      CANCER Mother      lung     Alcohol/Drug Father      Depression Father      Cardiovascular Father      HEART DISEASE Paternal Grandmother      HEART DISEASE Paternal Grandfather      CANCER Maternal Grandmother      stomach     Cancer - colorectal Other      Neurologic Disorder Son      brain injury     Clotting Disorder (Unknown) Son      Unknown/Adopted Maternal Grandfather          Current Outpatient Prescriptions   Medication Sig Dispense Refill     levothyroxine (SYNTHROID/LEVOTHROID) 88 MCG tablet Take 1 tablet (88 mcg) by mouth daily 90 tablet 2     order for DME Equipment being ordered: compression stockings 25-35mmHG 1 each 0     warfarin (COUMADIN) 5 MG tablet 10 mg mon & 7.5 rest of week  Just starting coumadin (Patient taking differently: 5 mg mon, wed, fri & 7.5 rest of week) 90 tablet 2     gabapentin (NEURONTIN) 300 MG capsule TAKE 1 CAPSULE BY MOUTH 3TIMES A  capsule 3     diclofenac (VOLTAREN) 1 % GEL Place 2 g onto the skin 4 times daily       calcium-vitamin D-vitamin K (CALCIUM SOFT CHEWS) 500-500-40 MG-UNT-MCG CHEW Take 2 tablets by mouth 2 times daily       epinastine HCl (ELESTAT) 0.05 % SOLN 1 drop 2 times daily as needed       desonide (DESOWEN) 0.05 % cream Apply topically 2 times daily as needed       melatonin 3 MG tablet Take 3 mg by mouth nightly as needed for sleep       acetaminophen (TYLENOL) 500 MG tablet Take 500-1,000 mg by mouth every 6 hours as needed for mild pain       Dextran 70 0.1%-Hypromellose 0.3% (BION TEARS) 0.1-0.3 % SOLN ophthalmic solution Place 1 drop into both eyes as needed for dry eyes       montelukast (SINGULAIR) 10 MG tablet Take 10 mg by mouth daily        IBUPROFEN PO Take 400 mg by mouth every 4 hours as needed for moderate pain        erythromycin (ROMYCIN)  ophthalmic ointment 1 Application nightly as needed        fluticasone (FLONASE) 50 MCG/ACT nasal spray Spray 1 spray into both nostrils daily        fluocinolone acetonide (DERMOTIC) 0.01 % OIL Place 1 drop in ear(s) daily as needed.       cycloSPORINE (RESTASIS) 0.05 % ophthalmic emulsion Place 1 drop into both eyes every 12 hours.         metroNIDAZOLE (METROGEL) 1 % gel Apply 0.5 inches topically daily.         esomeprazole (NEXIUM) 20 MG CR capsule Take 2 capsules (40 mg) by mouth every morning (before breakfast) Take 30-60 minutes before eating. (Patient not taking: Reported on 9/28/2017) 60 capsule 0     benzonatate (TESSALON) 100 MG capsule Take 1 capsule (100 mg) by mouth 3 times daily as needed for cough (Patient not taking: Reported on 9/28/2017) 30 capsule 0     ketoconazole-hydrocortisone 2 & 1 % (CREAM) KIT Externally apply 1 Application topically 2 times daily       azelastine (ASTELIN) 0.1 % nasal spray Spray 2 sprays into both nostrils 2 times daily as needed        Omeprazole Magnesium (PRILOSEC OTC PO) Take 20 mg by mouth every other day        Reviewed and updated as needed this visit by clinical staffTobacco  Allergies  Meds  Med Hx  Surg Hx  Fam Hx  Soc Hx      Reviewed and updated as needed this visit by Provider         ROS:  Constitutional, HEENT, cardiovascular, pulmonary, gi and gu systems are negative, except as otherwise noted.      OBJECTIVE:   /76 (BP Location: Right arm, Patient Position: Sitting, Cuff Size: Adult Regular)  Pulse 79  Temp 99.4  F (37.4  C) (Tympanic)  Resp 16  Wt 175 lb (79.4 kg)  SpO2 95%  Breastfeeding? No  BMI 31.5 kg/m2  Body mass index is 31.5 kg/(m^2).  GENERAL: healthy, alert and no distress  RESP: lungs clear to auscultation - no rales, rhonchi or wheezes  CV: regular rate and rhythm, normal S1 S2, no S3 or S4, no murmur, click or rub, no peripheral edema and peripheral pulses strong  ABDOMEN: soft, nontender, no hepatosplenomegaly, no  masses and bowel sounds normal  SKIN: no suspicious lesions or rashes  PSYCH: mentation appears normal, affect normal/bright    Diagnostic Test Results:  EKG - unremarkable    ASSESSMENT/PLAN:       ICD-10-CM    1. Acute chest pain R07.9      Pavithra and I had a long discussion about chest pain. By the end of the conversation she stated she was doing better. Her vitals were stable today and I agreed to allow her to go home and rest. If her symptoms were not improving she would call an ambulance for ED evaluation. As she has had these symptoms off and on, we discussed potential etiologies including cardiac, pulmonary, gastro, anxiety, musculoskeletal. I reiterated that although she has a normal EKG, I cannot definitely say she does not have a heart problem. She does have symptoms of gerd and anxiety as well. She is currently anticoagulated on warfarin so PE or other pulmonary cause of chest pain with a clear lung sound is unlikely. She will monitor symptoms as above.     We should also consider cardiology consultation vs stress echo for further evaluation if her symptoms continue to recur.      Nicole Joy Siegler, PA-C  [facility]

## 2017-09-29 ASSESSMENT — ANXIETY QUESTIONNAIRES: GAD7 TOTAL SCORE: 7

## 2017-10-02 ENCOUNTER — TELEPHONE (OUTPATIENT)
Dept: FAMILY MEDICINE | Facility: CLINIC | Age: 77
End: 2017-10-02

## 2017-10-02 NOTE — TELEPHONE ENCOUNTER
Prior Authorization Request    1. Prior Authorization for the medication esomeprazole (NEXIUM) 20 MG CR capsule        Requesting Provider: Siegler, Nicole Joy          Pt name: Pavithra Laurent        Pt : 1940        Pt MRN: [id number]        Last Office Visit: 2017           Insurance: Payor: MEDICARE / Plan: MEDICARE FOR HB SUPPLEMENT / Product Type: Medicare /         Insurance ID Number: [unfilled]         Prior Auth Contact Phone number:     BIN#:   PCN#:     CMM KEY: [id number]       To be completed by provider:     2.   Refuse or Start Prior Auth:  Please start Prior Auth.      If requesting prior auth initiation:       Diagnosis (with code): GERD (K21.9)      Patient has been on this medication since: this is a new trial of this medication.       Prior Medications, dates used, reason for failure:     Omeprazole 20mg and 40mg trials,   [id number],  Became Ineffective in the last few months      Reasons why other medications are not adequate substitutions:    See above

## 2017-10-03 ENCOUNTER — THERAPY VISIT (OUTPATIENT)
Dept: PHYSICAL THERAPY | Facility: CLINIC | Age: 77
End: 2017-10-03
Payer: COMMERCIAL

## 2017-10-03 DIAGNOSIS — M70.62 TROCHANTERIC BURSITIS OF LEFT HIP: ICD-10-CM

## 2017-10-03 DIAGNOSIS — G57.02 PIRIFORMIS SYNDROME OF LEFT SIDE: ICD-10-CM

## 2017-10-03 PROCEDURE — 97112 NEUROMUSCULAR REEDUCATION: CPT | Mod: GP | Performed by: PHYSICAL THERAPIST

## 2017-10-03 PROCEDURE — 97110 THERAPEUTIC EXERCISES: CPT | Mod: GP | Performed by: PHYSICAL THERAPIST

## 2017-10-12 ENCOUNTER — THERAPY VISIT (OUTPATIENT)
Dept: PHYSICAL THERAPY | Facility: CLINIC | Age: 77
End: 2017-10-12
Payer: COMMERCIAL

## 2017-10-12 DIAGNOSIS — M70.62 TROCHANTERIC BURSITIS OF LEFT HIP: ICD-10-CM

## 2017-10-12 DIAGNOSIS — G57.02 PIRIFORMIS SYNDROME OF LEFT SIDE: ICD-10-CM

## 2017-10-12 PROCEDURE — 97110 THERAPEUTIC EXERCISES: CPT | Mod: GP | Performed by: PHYSICAL THERAPIST

## 2017-10-12 PROCEDURE — 97112 NEUROMUSCULAR REEDUCATION: CPT | Mod: GP | Performed by: PHYSICAL THERAPIST

## 2017-10-20 ENCOUNTER — THERAPY VISIT (OUTPATIENT)
Dept: PHYSICAL THERAPY | Facility: CLINIC | Age: 77
End: 2017-10-20
Payer: COMMERCIAL

## 2017-10-20 DIAGNOSIS — G57.02 PIRIFORMIS SYNDROME OF LEFT SIDE: ICD-10-CM

## 2017-10-20 DIAGNOSIS — M70.62 TROCHANTERIC BURSITIS OF LEFT HIP: ICD-10-CM

## 2017-10-20 PROCEDURE — 97112 NEUROMUSCULAR REEDUCATION: CPT | Mod: GP | Performed by: PHYSICAL THERAPIST

## 2017-10-20 PROCEDURE — 97110 THERAPEUTIC EXERCISES: CPT | Mod: GP | Performed by: PHYSICAL THERAPIST

## 2017-10-20 PROCEDURE — 97530 THERAPEUTIC ACTIVITIES: CPT | Mod: GP | Performed by: PHYSICAL THERAPIST

## 2017-10-24 ENCOUNTER — TELEPHONE (OUTPATIENT)
Dept: FAMILY MEDICINE | Facility: CLINIC | Age: 77
End: 2017-10-24

## 2017-10-24 NOTE — TELEPHONE ENCOUNTER
She is no longer taking warfarin. That is the only medication for which there is a side effect. She does have GERD which can be worsened by taking aspirin, however, in this case I think prevention of blood clot outweighs the risk of potential worsening GERD for this short term. To avoid bleeding in the stomach she should take with food and try to find the ENTERIC COATED aspirin which can help reduce the acid in the stomach and negative side effects to the stomach while taking aspirin. Nicole Joy Siegler, PA-C

## 2017-10-24 NOTE — TELEPHONE ENCOUNTER
Reason for Call:  Other     Detailed comments: had a blood clot in March of 2017.  Was on coumadin for 6 months, going on a road trip and going to be in a car for 9 hours.  Does she need to worry.  Should she go back on coumadin.     Phone Number Patient can be reached at: Home number on file [phone] (home)    Best Time: any    Can we leave a detailed message on this number? YES    Call taken on 10/24/2017 at 9:37 AM by HAWK MCKENNA

## 2017-10-24 NOTE — TELEPHONE ENCOUNTER
Patient was called. She is glad for the advice but she has memory of being told to not take ASA with one of her meds.  Please advise if ok to take the ASA short term like this.    Call patient back

## 2017-10-24 NOTE — TELEPHONE ENCOUNTER
To prevent blood clot she should take aspirin in 81mg dose every 6-8 hours (start that a week before the trip and continue for a week after the trip), be sure to complete range of motion of her legs every 15-20 minutes, get up out of the vehicle and walk around with further ROM of the legs every 1-2 hours or as frequently as possible. She may also wear support hose to keep pressure on the areas and keep the blood flowing as well.   Please notify her. Nicole Joy Siegler, PA-C

## 2017-11-02 DIAGNOSIS — M79.652 LEFT THIGH PAIN: ICD-10-CM

## 2017-11-03 NOTE — TELEPHONE ENCOUNTER
Controlled Substance Refill Request for gabapentin (NEURONTIN) 300 MG capsule  Problem List Complete:  No     PROVIDER TO CONSIDER COMPLETION OF PROBLEM LIST AND OVERVIEW/CONTROLLED SUBSTANCE AGREEMENT    Last Written Prescription Date:  08/23/2016  Last Fill Quantity: 270,   # refills: 3    Last Office Visit with [facility] primary care provider: 09/28/2017    Future Office visit:     Controlled substance agreement on file: No.     Processing:  Fax Rx to [facility] pharmacy     checked in past 6 months?  Yes 11/03/2017- no concerns

## 2017-11-07 RX ORDER — GABAPENTIN 300 MG/1
CAPSULE ORAL
Qty: 270 CAPSULE | Refills: 3 | Status: SHIPPED | OUTPATIENT
Start: 2017-11-07 | End: 2018-08-04

## 2017-11-07 NOTE — TELEPHONE ENCOUNTER
Pt notified rx was sent to pharmacy.  Asked pt how her legs are doing after road trip and she says they are great.

## 2017-11-13 ENCOUNTER — THERAPY VISIT (OUTPATIENT)
Dept: PHYSICAL THERAPY | Facility: CLINIC | Age: 77
End: 2017-11-13
Payer: COMMERCIAL

## 2017-11-13 DIAGNOSIS — M70.62 TROCHANTERIC BURSITIS OF LEFT HIP: ICD-10-CM

## 2017-11-13 DIAGNOSIS — G57.02 PIRIFORMIS SYNDROME OF LEFT SIDE: ICD-10-CM

## 2017-11-13 PROCEDURE — 97110 THERAPEUTIC EXERCISES: CPT | Mod: GP | Performed by: PHYSICAL THERAPIST

## 2017-11-13 PROCEDURE — 97112 NEUROMUSCULAR REEDUCATION: CPT | Mod: GP | Performed by: PHYSICAL THERAPIST

## 2017-11-13 PROCEDURE — 97140 MANUAL THERAPY 1/> REGIONS: CPT | Mod: GP | Performed by: PHYSICAL THERAPIST

## 2017-12-01 ENCOUNTER — OFFICE VISIT (OUTPATIENT)
Dept: FAMILY MEDICINE | Facility: CLINIC | Age: 77
End: 2017-12-01
Payer: COMMERCIAL

## 2017-12-01 VITALS
SYSTOLIC BLOOD PRESSURE: 118 MMHG | TEMPERATURE: 98.4 F | OXYGEN SATURATION: 98 % | WEIGHT: 179 LBS | RESPIRATION RATE: 16 BRPM | HEART RATE: 93 BPM | BODY MASS INDEX: 31.71 KG/M2 | HEIGHT: 63 IN | DIASTOLIC BLOOD PRESSURE: 58 MMHG

## 2017-12-01 DIAGNOSIS — J98.8 RESPIRATORY INFECTION: Primary | ICD-10-CM

## 2017-12-01 PROCEDURE — 99213 OFFICE O/P EST LOW 20 MIN: CPT | Performed by: PHYSICIAN ASSISTANT

## 2017-12-01 RX ORDER — AZITHROMYCIN 250 MG/1
TABLET, FILM COATED ORAL
Qty: 6 TABLET | Refills: 0 | Status: SHIPPED | OUTPATIENT
Start: 2017-12-01 | End: 2017-12-15

## 2017-12-01 RX ORDER — BENZONATATE 100 MG/1
100 CAPSULE ORAL 3 TIMES DAILY PRN
Qty: 42 CAPSULE | Refills: 0 | Status: SHIPPED | OUTPATIENT
Start: 2017-12-01 | End: 2017-12-15

## 2017-12-01 RX ORDER — CODEINE PHOSPHATE AND GUAIFENESIN 10; 100 MG/5ML; MG/5ML
1 SOLUTION ORAL EVERY 4 HOURS PRN
Qty: 120 ML | Refills: 0 | Status: SHIPPED | OUTPATIENT
Start: 2017-12-01 | End: 2017-12-15

## 2017-12-01 NOTE — PROGRESS NOTES
SUBJECTIVE:   Pavithra Laurent is a 77 year old female who presents to clinic today for the following health issues:    RESPIRATORY SYMPTOMS      Duration: X5 days    Description  nasal congestion, cough, fatigue/malaise and chest discomfort    Severity: moderate    Accompanying signs and symptoms: None    History (predisposing factors):  none    Precipitating or alleviating factors: None    Therapies tried and outcome:  Nyquil         ROS:  C: POSITIVE for fever and chills.  Skin: Negative for worrisome rashes or lesions  ENT/MOUTH:POSITIVE for congestion and post-nasal drainage.  Negative for ear pain, sore throat and sinus pressure.  Resp: Positive for non-productive cough without wheezing   CV: Negative for chest pain or peripheral edema  GI: Negative for nausea, abdominal pain, heartburn, or change in bowel habits  MS: Negative for significant arthralgias or myalgias      PFSH: she is not a smoker, no hx asthma though has had bronchitis in the past  No known ill contacts     Patient Active Problem List   Diagnosis     Nasal congestion     Environmental allergies     Acute bronchitis with coexisting condition, need prophylactic therapy     GERD (gastroesophageal reflux disease)     Hypothyroidism     Arm pain     Rosacea     Dry eye syndrome     Elbow dislocation     Lesion of ulnar nerve     CARDIOVASCULAR SCREENING; LDL GOAL LESS THAN 130     Deep vein thrombosis (DVT) of left lower extremity, unspecified chronicity, unspecified vein (H)     Long-term (current) use of anticoagulants [Z79.01]     Trochanteric bursitis of left hip     Piriformis syndrome of left side     Current Outpatient Prescriptions   Medication     azithromycin (ZITHROMAX) 250 MG tablet     guaiFENesin-codeine (ROBITUSSIN AC) 100-10 MG/5ML SOLN solution     benzonatate (TESSALON) 100 MG capsule     gabapentin (NEURONTIN) 300 MG capsule     levothyroxine (SYNTHROID/LEVOTHROID) 88 MCG tablet     order for DME     diclofenac (VOLTAREN) 1 % GEL  "    calcium-vitamin D-vitamin K (CALCIUM SOFT CHEWS) 500-500-40 MG-UNT-MCG CHEW     epinastine HCl (ELESTAT) 0.05 % SOLN     desonide (DESOWEN) 0.05 % cream     melatonin 3 MG tablet     acetaminophen (TYLENOL) 500 MG tablet     Dextran 70 0.1%-Hypromellose 0.3% (BION TEARS) 0.1-0.3 % SOLN ophthalmic solution     ketoconazole-hydrocortisone 2 & 1 % (CREAM) KIT     azelastine (ASTELIN) 0.1 % nasal spray     montelukast (SINGULAIR) 10 MG tablet     IBUPROFEN PO     erythromycin (ROMYCIN) ophthalmic ointment     fluticasone (FLONASE) 50 MCG/ACT nasal spray     fluocinolone acetonide (DERMOTIC) 0.01 % OIL     cycloSPORINE (RESTASIS) 0.05 % ophthalmic emulsion     metroNIDAZOLE (METROGEL) 1 % gel     No current facility-administered medications for this visit.        OBJECTIVE:                                                    /58 (BP Location: Left arm, Patient Position: Sitting, Cuff Size: Adult Regular)  Pulse 93  Temp 98.4  F (36.9  C) (Tympanic)  Resp 16  Ht 5' 2.5\" (1.588 m)  Wt 179 lb (81.2 kg)  SpO2 98%  Breastfeeding? No  BMI 32.22 kg/m2  Body mass index is 32.22 kg/(m^2).  GENERAL: healthy, alert, in no acute distress  EYES: Grossly normal to inspection, EOMI, PERRL  HENT: Ear canals normal bilateral. TM pearly gray without effusion bilaterally.  Nasal mucosa mildly edematous with clear rhinorrhea.  Mouth- mucous membranes moist, no lesions or ulcerations. Pharynx pink. and No tonsillary hypertrophy. Uvula midline, clear post-nasal drainage.  Maxillary and frontal sinuses nontender to palpation bilateral  NECK: Non-tender, no adenopathy.  RESP: lungs clear to auscultation - no rales, no rhonchi, no wheezes  CV: regular rate and rhythm, normal S1 S2.  No peripheral edema.  SKIN: no suspicious lesions, no rashes  PSYCH: Alert and oriented times 3;  Able to articulate logical thoughts. Affect is normal.    Diagnostic test results: none      ASSESSMENT/PLAN:                                            "             ICD-10-CM    1. Respiratory infection J98.8 azithromycin (ZITHROMAX) 250 MG tablet     guaiFENesin-codeine (ROBITUSSIN AC) 100-10 MG/5ML SOLN solution     benzonatate (TESSALON) 100 MG capsule     Treatments for cough above; antibiotic provided on paper and she will hold that for another few days and fill if symptoms are not improving due to her hx of bronchitis in the past. Her exam is grossly normal today and I am not concerned with acute pulmonary issues at this time.     Upper Respiratory Infection:    Your infection is most likely a virus at this point. Viruses cannot be treated by an antibiotic. These can take up to ten days until you start feeling better.     Mucinex 1200 mg twice daily is great to loosen up secretions in the sinuses, nasal passages, and upper airways.    Tylenol or Ibuprofen can be used for aches and pains or mild fevers.    Try saline sinus washes using a neti pot (they come with packets to make a salt/baking soda/water solution) or saline nasal spray to help with nasal congestion. Long hot steamy showers, or head over steamy water can help with congestion. Decongestants such as Sudafed can also be used if you don't have a history of high blood pressure.     Drink at least 8 glasses of water daily - this helps keep mucus thin and keeps you hydrated to help with fever control.     Salt water gargles, chloraseptic spray, or lozenges (with menthol) help with a sore throat. Honey can also be soothing for the throat.    Maintain a healthy diet to help your immune system combat this. Get lots of rest to help your body get over the illness. If you continue to have symptoms longer than 7-10 days or if they become worse, please return to clinic for further evaluation.        Nicole Joy Siegler, PA-C  [facility]

## 2017-12-01 NOTE — MR AVS SNAPSHOT
After Visit Summary   12/1/2017    Pavithra Laurent    MRN: [id number]           Patient Information     Date Of Birth          1940        Visit Information        Provider Department      12/1/2017 11:10 AM Siegler, Nicole Joy, PA-C [facility]        Today's Diagnoses     Respiratory infection    -  1       Follow-ups after your visit        Your next 10 appointments already scheduled     Dec 05, 2017  1:10 PM CST   PHYSICAL with Nicole Joy Siegler, PA-C   [facility]    [address]  [facility] [address]   [phone]            Dec 12, 2017  1:10 PM CST   AMANDA Extremity with Ct Martinez PT   [facility] ([facility]  )    [address] [place] [address] [address]  [facility] [id number] [phone]            Dec 28, 2017  1:10 PM CST   AMANDA Extremity with Ct Martinez PT   [facility] ([facility]  )    [address] [place] [address] [address]  [facility] [id number] [phone]              Who to contact     If you have questions or need follow up information about today's clinic visit or your schedule please contact [facility] directly at [phone].  Normal or non-critical lab and imaging results will be communicated to you by MyChart, letter or phone within 4 business days after the clinic has received the results. If you do not hear from us within 7 days, please contact the clinic through MyChart or phone. If you have a critical or abnormal lab result, we will notify you by phone as soon as possible.  Submit refill requests through [employer] or call your pharmacy and they will forward the refill request to us. Please allow 3 business days for your refill to be completed.          Additional Information About Your Visit        MyChart Information     [employer] lets you  "send messages to your doctor, view your test results, renew your prescriptions, schedule appointments and more. To sign up, go to [email or web address] . Click on \"Log in\" on the left side of the screen, which will take you to the Welcome page. Then click on \"Sign up Now\" on the right side of the page.     You will be asked to enter the access code listed below, as well as some personal information. Please follow the directions to create your username and password.     Your access code is: [email or web address]  Expires: 3/1/2018  1:22 PM     Your access code will  in 90 days. If you need help or a new code, please call your [facility] clinic or [phone].        Care EveryWhere ID     This is your Care EveryWhere ID. This could be used by other organizations to access your [facility] medical records  [id number]        Your Vitals Were     Pulse Temperature Respirations Height Pulse Oximetry Breastfeeding?    93 98.4  F (36.9  C) (Tympanic) 16 5' 2.5\" (1.588 m) 98% No    BMI (Body Mass Index)                   32.22 kg/m2            Blood Pressure from Last 3 Encounters:   17 118/58   17 104/76   17 112/58    Weight from Last 3 Encounters:   17 179 lb (81.2 kg)   17 175 lb (79.4 kg)   17 177 lb (80.3 kg)              Today, you had the following     No orders found for display         Today's Medication Changes          These changes are accurate as of: 17  1:22 PM.  If you have any questions, ask your nurse or doctor.               Start taking these medicines.        Dose/Directions    azithromycin 250 MG tablet   Commonly known as:  ZITHROMAX   Used for:  Respiratory infection   Started by:  Siegler, Nicole Joy, PA-C        Two tablets first day, then one tablet daily for four days.   Quantity:  6 tablet   Refills:  0       benzonatate 100 MG capsule   Commonly known as:  TESSALON   Used for:  Respiratory infection   Started by:  Siegler, Nicole Joy, PA-C        Dose:  100 " mg   Take 1 capsule (100 mg) by mouth 3 times daily as needed   Quantity:  42 capsule   Refills:  0       guaiFENesin-codeine 100-10 MG/5ML Soln solution   Commonly known as:  ROBITUSSIN AC   Used for:  Respiratory infection   Started by:  Siegler, Nicole Joy, PA-C        Dose:  1 tsp.   Take 5 mLs by mouth every 4 hours as needed for cough   Quantity:  120 mL   Refills:  0            Where to get your medicines      These medications were sent to [id number] - [facility] [address] [facility] [[email or web address]]  [address] [facility] [address]     Phone:  [phone]     benzonatate 100 MG capsule         Some of these will need a paper prescription and others can be bought over the counter.  Ask your nurse if you have questions.     Bring a paper prescription for each of these medications     azithromycin 250 MG tablet    guaiFENesin-codeine 100-10 MG/5ML Soln solution                Primary Care Provider Office Phone # Fax #    Nicole Joy Siegler, PA-C [phone]       [facility] [address] [place] [address] [address]  [facility] [address]        Equal Access to Services     BETTINA PRAKASH AH: Hadii aad ku hadasho Soomaali, waaxda luqadaha, qaybta kaalmada adeegyada, waxay idiin hayaan adeeg kharash la'mitch ah. So [facility] [phone].    ATENCIÓN: Si habla español, tiene a nicole disposición servicios gratuitos de asistencia lingüística. Llame al [phone].    We comply with applicable federal civil rights laws and [place] laws. We do not discriminate on the basis of race, color, national origin, age, disability, sex, sexual orientation, or gender identity.            Thank you!     Thank you for choosing [facility]  for your care. Our goal is always to provide you with excellent care. Hearing back from our patients is one way we can continue to improve our services. Please take a few minutes to complete the written survey that you may receive in the mail after your visit with  us. Thank you!             Your Updated Medication List - Protect others around you: Learn how to safely use, store and throw away your medicines at www.disposemymeds.org.          This list is accurate as of: 12/1/17  1:22 PM.  Always use your most recent med list.                   Brand Name Dispense Instructions for use Diagnosis    acetaminophen 500 MG tablet    TYLENOL     Take 500-1,000 mg by mouth every 6 hours as needed for mild pain        azelastine 0.1 % spray    ASTELIN     Spray 2 sprays into both nostrils 2 times daily as needed        azithromycin 250 MG tablet    ZITHROMAX    6 tablet    Two tablets first day, then one tablet daily for four days.    Respiratory infection       benzonatate 100 MG capsule    TESSALON    42 capsule    Take 1 capsule (100 mg) by mouth 3 times daily as needed    Respiratory infection       CALCIUM SOFT CHEWS 500-500-40 MG-UNT-MCG Chew   Generic drug:  calcium-vitamin D-vitamin K      Take 2 tablets by mouth 2 times daily        cycloSPORINE 0.05 % ophthalmic emulsion    RESTASIS     Place 1 drop into both eyes every 12 hours.        DERMOTIC 0.01 % Oil   Generic drug:  fluocinolone acetonide      Place 1 drop in ear(s) daily as needed.        desonide 0.05 % cream    DESOWEN     Apply topically 2 times daily as needed        Dextran 70 0.1%-Hypromellose 0.3% 0.1-0.3 % Soln ophthalmic solution      Place 1 drop into both eyes as needed for dry eyes        diclofenac 1 % Gel topical gel    VOLTAREN     Place 2 g onto the skin 4 times daily        epinastine HCl 0.05 % Soln    ELESTAT     1 drop 2 times daily as needed        erythromycin ophthalmic ointment    ROMYCIN     1 Application nightly as needed        fluticasone 50 MCG/ACT spray    FLONASE     Spray 1 spray into both nostrils daily        gabapentin 300 MG capsule    NEURONTIN    270 capsule    TAKE 1 CAPSULE BY MOUTH 3TIMES A DAY    Left thigh pain       guaiFENesin-codeine 100-10 MG/5ML Soln solution     ROBITUSSIN AC    120 mL    Take 5 mLs by mouth every 4 hours as needed for cough    Respiratory infection       IBUPROFEN PO      Take 400 mg by mouth every 4 hours as needed for moderate pain        ketoconazole-hydrocortisone 2 & 1 % (CREAM) Kit      Externally apply 1 Application topically 2 times daily        levothyroxine 88 MCG tablet    SYNTHROID/LEVOTHROID    90 tablet    Take 1 tablet (88 mcg) by mouth daily    Hypothyroidism due to acquired atrophy of thyroid       melatonin 3 MG tablet      Take 3 mg by mouth nightly as needed for sleep        metroNIDAZOLE 1 % gel    METROGEL     Apply 0.5 inches topically daily.        montelukast 10 MG tablet    SINGULAIR     Take 10 mg by mouth daily        order for DME     1 each    Equipment being ordered: compression stockings 25-35mmHG    Deep vein thrombosis (DVT) of left lower extremity, unspecified chronicity, unspecified vein (H)

## 2017-12-01 NOTE — NURSING NOTE
"Chief Complaint   Patient presents with     URI     Productive green cough, chest discomfort, sinus congestion. X5 days.       Initial /58 (BP Location: Left arm, Patient Position: Sitting, Cuff Size: Adult Regular)  Pulse 93  Temp 98.4  F (36.9  C) (Tympanic)  Resp 16  Ht 5' 2.5\" (1.588 m)  Wt 179 lb (81.2 kg)  SpO2 98%  Breastfeeding? No  BMI 32.22 kg/m2 Estimated body mass index is 32.22 kg/(m^2) as calculated from the following:    Height as of this encounter: 5' 2.5\" (1.588 m).    Weight as of this encounter: 179 lb (81.2 kg).  Medication Reconciliation: complete     Vanesa Koch LPN  "

## 2017-12-15 ENCOUNTER — OFFICE VISIT (OUTPATIENT)
Dept: FAMILY MEDICINE | Facility: CLINIC | Age: 77
End: 2017-12-15
Payer: COMMERCIAL

## 2017-12-15 VITALS
BODY MASS INDEX: 32.2 KG/M2 | HEIGHT: 62 IN | WEIGHT: 175 LBS | HEART RATE: 77 BPM | DIASTOLIC BLOOD PRESSURE: 60 MMHG | SYSTOLIC BLOOD PRESSURE: 124 MMHG | RESPIRATION RATE: 16 BRPM | TEMPERATURE: 98 F

## 2017-12-15 DIAGNOSIS — Z00.00 ROUTINE GENERAL MEDICAL EXAMINATION AT A HEALTH CARE FACILITY: Primary | ICD-10-CM

## 2017-12-15 DIAGNOSIS — Z12.39 SCREENING FOR MALIGNANT NEOPLASM OF BREAST: ICD-10-CM

## 2017-12-15 DIAGNOSIS — Z23 NEED FOR PROPHYLACTIC VACCINATION AND INOCULATION AGAINST INFLUENZA: ICD-10-CM

## 2017-12-15 DIAGNOSIS — Z13.6 SCREENING FOR CARDIOVASCULAR CONDITION: ICD-10-CM

## 2017-12-15 DIAGNOSIS — E03.9 HYPOTHYROIDISM, UNSPECIFIED TYPE: Chronic | ICD-10-CM

## 2017-12-15 PROCEDURE — 90662 IIV NO PRSV INCREASED AG IM: CPT | Performed by: PHYSICIAN ASSISTANT

## 2017-12-15 PROCEDURE — 80061 LIPID PANEL: CPT | Performed by: PHYSICIAN ASSISTANT

## 2017-12-15 PROCEDURE — 36415 COLL VENOUS BLD VENIPUNCTURE: CPT | Performed by: PHYSICIAN ASSISTANT

## 2017-12-15 PROCEDURE — 84443 ASSAY THYROID STIM HORMONE: CPT | Performed by: PHYSICIAN ASSISTANT

## 2017-12-15 PROCEDURE — 80048 BASIC METABOLIC PNL TOTAL CA: CPT | Performed by: PHYSICIAN ASSISTANT

## 2017-12-15 PROCEDURE — 99397 PER PM REEVAL EST PAT 65+ YR: CPT | Mod: 25 | Performed by: PHYSICIAN ASSISTANT

## 2017-12-15 PROCEDURE — G0008 ADMIN INFLUENZA VIRUS VAC: HCPCS | Performed by: PHYSICIAN ASSISTANT

## 2017-12-15 NOTE — NURSING NOTE
"Chief Complaint   Patient presents with     Physical       Initial /60  Pulse 77  Temp 98  F (36.7  C) (Tympanic)  Resp 16  Ht 5' 2\" (1.575 m)  Wt 175 lb (79.4 kg)  BMI 32.01 kg/m2 Estimated body mass index is 32.01 kg/(m^2) as calculated from the following:    Height as of this encounter: 5' 2\" (1.575 m).    Weight as of this encounter: 175 lb (79.4 kg).  Medication Reconciliation: complete     Steffanie Nice CMA      "

## 2017-12-15 NOTE — MR AVS SNAPSHOT
After Visit Summary   12/15/2017    Pavithra Laurent    MRN: [id number]           Patient Information     Date Of Birth          1940        Visit Information        Provider Department      12/15/2017 1:30 PM Siegler, Nicole Joy, PA-C [facility]        Today's Diagnoses     Routine general medical examination at a health care facility    -  1    Hypothyroidism, unspecified type        Screening for malignant neoplasm of breast        Screening for cardiovascular condition          Care Instructions    [facility]:  [address]  [place] [address]  Call [phone] to schedule a mammogram or call the appointment line at [phone].  Walk in appointments for screening mammograms welcome.    [facility]  [address] [place] [address]  Call [phone] to schedule a mammogram or call the appointment line at [phone].  Walk in appointments for screening mammograms welcome.    Same day Mammogram:  [facility]  [address]  [place], [place] [address] [phone]    [facility]  Mammograms available:  2nd Friday AM of each month  3rd Saturday AM of each month  4th Tuesday PM of each month        Preventive Health Recommendations  Female Ages 65 +    Yearly exam:     See your health care provider every year in order to  o Review health changes.   o Discuss preventive care.    o Review your medicines if your doctor has prescribed any.      You no longer need a yearly Pap test unless you've had an abnormal Pap test in the past 10 years. If you have vaginal symptoms, such as bleeding or discharge, be sure to talk with your provider about a Pap test.      Every 1 to 2 years, have a mammogram.  If you are over 69, talk with your health care provider about whether or not you want to continue having screening mammograms.      Every 10 years, have a colonoscopy. Or,  have a yearly FIT test (stool test). These exams will check for colon cancer.       Have a cholesterol test every 5 years, or more often if your doctor advises it.       Have a diabetes test (fasting glucose) every three years. If you are at risk for diabetes, you should have this test more often.       At age 65, have a bone density scan (DEXA) to check for osteoporosis (brittle bone disease).    Shots:    Get a flu shot each year.    Get a tetanus shot every 10 years.    Talk to your doctor about your pneumonia vaccines. There are now two you should receive - Pneumovax (PPSV 23) and Prevnar (PCV 13).    Talk to your doctor about the shingles vaccine.    Talk to your doctor about the hepatitis B vaccine.    Nutrition:     Eat at least 5 servings of fruits and vegetables each day.      Eat whole-grain bread, whole-wheat pasta and brown rice instead of white grains and rice.      Talk to your provider about Calcium and Vitamin D.     Lifestyle    Exercise at least 150 minutes a week (30 minutes a day, 5 days a week). This will help you control your weight and prevent disease.      Limit alcohol to one drink per day.      No smoking.       Wear sunscreen to prevent skin cancer.       See your dentist twice a year for an exam and cleaning.      See your eye doctor every 1 to 2 years to screen for conditions such as glaucoma, macular degeneration, cataracts, etc   Preventive Health Recommendations  Female Ages 65 +    Yearly exam:   See your health care provider every year in order to  Review health changes.   Discuss preventive care.    Review your medicines if your doctor has prescribed any.    You no longer need a yearly Pap test unless you've had an abnormal Pap test in the past 10 years. If you have vaginal symptoms, such as bleeding or discharge, be sure to talk with your provider about a Pap test.    Every 1 to 2 years, have a mammogram.  If you are over 69, talk with your health care provider about whether or  not you want to continue having screening mammograms.    Every 10 years, have a colonoscopy. Or, have a yearly FIT test (stool test). These exams will check for colon cancer.     Have a cholesterol test every 5 years, or more often if your doctor advises it.     Have a diabetes test (fasting glucose) every three years. If you are at risk for diabetes, you should have this test more often.     At age 65, have a bone density scan (DEXA) to check for osteoporosis (brittle bone disease).    Shots:  Get a flu shot each year.  Get a tetanus shot every 10 years.  Talk to your doctor about your pneumonia vaccines. There are now two you should receive - Pneumovax (PPSV 23) and Prevnar (PCV 13).  Talk to your doctor about the shingles vaccine.  Talk to your doctor about the hepatitis B vaccine.    Nutrition:   Eat at least 5 servings of fruits and vegetables each day.    Eat whole-grain bread, whole-wheat pasta and brown rice instead of white grains and rice.    Talk to your provider about Calcium and Vitamin D.     Lifestyle  Exercise at least 150 minutes a week (30 minutes a day, 5 days a week). This will help you control your weight and prevent disease.    Limit alcohol to one drink per day.    No smoking.     Wear sunscreen to prevent skin cancer.     See your dentist twice a year for an exam and cleaning.    See your eye doctor every 1 to 2 years to screen for conditions such as glaucoma, macular degeneration, cataracts, etc           Follow-ups after your visit        Your next 10 appointments already scheduled     Dec 28, 2017  1:10 PM CST   AMANDA Extremity with Ct Martinez PT   [facility] ([facility] [place]  )    [address]  [facility] [address] [phone]              Future tests that were ordered for you today     Open Future Orders        Priority Expected Expires Ordered    MA Screening Digital Bilateral Routine  12/15/2018 12/15/2017            Who to  "contact     If you have questions or need follow up information about today's clinic visit or your schedule please contact [facility] directly at [phone].  Normal or non-critical lab and imaging results will be communicated to you by MyChart, letter or phone within 4 business days after the clinic has received the results. If you do not hear from us within 7 days, please contact the clinic through MyChart or phone. If you have a critical or abnormal lab result, we will notify you by phone as soon as possible.  Submit refill requests through [employer] or call your pharmacy and they will forward the refill request to us. Please allow 3 business days for your refill to be completed.          Additional Information About Your Visit        [facility] [facility] Information     [employer] lets you send messages to your doctor, view your test results, renew your prescriptions, schedule appointments and more. To sign up, go to [email or web address] . Click on \"Log in\" on the left side of the screen, which will take you to the Welcome page. Then click on \"Sign up Now\" on the right side of the page.     You will be asked to enter the access code listed below, as well as some personal information. Please follow the directions to create your username and password.     Your access code is: [email or web address]  Expires: 3/1/2018  1:22 PM     Your access code will  in 90 days. If you need help or a new code, please call your [facility] clinic or [phone].        Care EveryWhere ID     This is your Care EveryWhere ID. This could be used by other organizations to access your [facility] medical records  [id number]        Your Vitals Were     Pulse Temperature Respirations Height BMI (Body Mass Index)       77 98  F (36.7  C) (Tympanic) 16 5' 2\" (1.575 m) 32.01 kg/m2        Blood Pressure from Last 3 Encounters:   12/15/17 124/60   17 118/58   17 104/76    Weight from Last 3 Encounters:   12/15/17 175 lb " (79.4 kg)   12/01/17 179 lb (81.2 kg)   09/28/17 175 lb (79.4 kg)              We Performed the Following     Basic metabolic panel  (Ca, Cl, CO2, Creat, Gluc, K, Na, BUN)     Lipid panel reflex to direct LDL Non-fasting     TSH with free T4 reflex          Today's Medication Changes          These changes are accurate as of: 12/15/17  2:22 PM.  If you have any questions, ask your nurse or doctor.               Stop taking these medicines if you haven't already. Please contact your care team if you have questions.     benzonatate 100 MG capsule   Commonly known as:  TESSALON   Stopped by:  Siegler, Nicole Joy, PA-C           guaiFENesin-codeine 100-10 MG/5ML Soln solution   Commonly known as:  ROBITUSSIN AC   Stopped by:  Siegler, Nicole Joy, PA-C                    Primary Care Provider Office Phone # Fax #    Nicole Joy Siegler, PA-C [phone]       [facility] [address] [place] [address] [address]  [facility] [address]        Equal Access to Services     [facility] Hospital[facility]OC[facility]O : Hadii aad ku hadasho Soomaali, waaxda luqadaha, qaybta kaalmada adeegyada, waxay idiin hayaan chaka beltran . So [facility] [phone].    ATENCIÓN: Si habla español, tiene a nicole disposición servicios gratuitos de asistencia lingüística. Lester al [phone].    We comply with applicable federal civil rights laws and [place] laws. We do not discriminate on the basis of race, color, national origin, age, disability, sex, sexual orientation, or gender identity.            Thank you!     Thank you for choosing [facility]  for your care. Our goal is always to provide you with excellent care. Hearing back from our patients is one way we can continue to improve our services. Please take a few minutes to complete the written survey that you may receive in the mail after your visit with us. Thank you!             Your Updated Medication List - Protect others around you: Learn how to safely use, store and throw  away your medicines at www.disposemymeds.org.          This list is accurate as of: 12/15/17  2:22 PM.  Always use your most recent med list.                   Brand Name Dispense Instructions for use Diagnosis    acetaminophen 500 MG tablet    TYLENOL     Take 500-1,000 mg by mouth every 6 hours as needed for mild pain        azelastine 0.1 % spray    ASTELIN     Spray 2 sprays into both nostrils 2 times daily as needed        CALCIUM SOFT CHEWS 500-500-40 MG-UNT-MCG Chew   Generic drug:  calcium-vitamin D-vitamin K      Take 2 tablets by mouth 2 times daily        cycloSPORINE 0.05 % ophthalmic emulsion    RESTASIS     Place 1 drop into both eyes every 12 hours.        DERMOTIC 0.01 % Oil   Generic drug:  fluocinolone acetonide      Place 1 drop in ear(s) daily as needed.        desonide 0.05 % cream    DESOWEN     Apply topically 2 times daily as needed        Dextran 70 0.1%-Hypromellose 0.3% 0.1-0.3 % Soln ophthalmic solution      Place 1 drop into both eyes as needed for dry eyes        diclofenac 1 % Gel topical gel    VOLTAREN     Place 2 g onto the skin 4 times daily        epinastine HCl 0.05 % Soln    ELESTAT     1 drop 2 times daily as needed        erythromycin ophthalmic ointment    ROMYCIN     1 Application nightly as needed        fluticasone 50 MCG/ACT spray    FLONASE     Spray 1 spray into both nostrils daily        gabapentin 300 MG capsule    NEURONTIN    270 capsule    TAKE 1 CAPSULE BY MOUTH 3TIMES A DAY    Left thigh pain       IBUPROFEN PO      Take 400 mg by mouth every 4 hours as needed for moderate pain        ketoconazole-hydrocortisone 2 & 1 % (CREAM) Kit      Externally apply 1 Application topically 2 times daily        levothyroxine 88 MCG tablet    SYNTHROID/LEVOTHROID    90 tablet    Take 1 tablet (88 mcg) by mouth daily    Hypothyroidism due to acquired atrophy of thyroid       melatonin 3 MG tablet      Take 3 mg by mouth nightly as needed for sleep        metroNIDAZOLE 1 % gel     METROGEL     Apply 0.5 inches topically daily.        montelukast 10 MG tablet    SINGULAIR     Take 10 mg by mouth daily        order for DME     1 each    Equipment being ordered: compression stockings 25-35mmHG    Deep vein thrombosis (DVT) of left lower extremity, unspecified chronicity, unspecified vein (H)

## 2017-12-15 NOTE — PROGRESS NOTES

## 2017-12-15 NOTE — PATIENT INSTRUCTIONS
[facility]:  [address]  [place] [address]  Call [phone] to schedule a mammogram or call the appointment line at [phone].  Walk in appointments for screening mammograms welcome.    [facility]  [address] [place] [address]  Call [phone] to schedule a mammogram or call the appointment line at [phone].  Walk in appointments for screening mammograms welcome.    Same day Mammogram:  [facility]  [address] [place] [address]  [phone]    [facility]  Mammograms available:  2nd Friday AM of each month  3rd Saturday AM of each month  4th Tuesday PM of each month        Preventive Health Recommendations  Female Ages 65 +    Yearly exam:     See your health care provider every year in order to  o Review health changes.   o Discuss preventive care.    o Review your medicines if your doctor has prescribed any.      You no longer need a yearly Pap test unless you've had an abnormal Pap test in the past 10 years. If you have vaginal symptoms, such as bleeding or discharge, be sure to talk with your provider about a Pap test.      Every 1 to 2 years, have a mammogram.  If you are over 69, talk with your health care provider about whether or not you want to continue having screening mammograms.      Every 10 years, have a colonoscopy. Or, have a yearly FIT test (stool test). These exams will check for colon cancer.       Have a cholesterol test every 5 years, or more often if your doctor advises it.       Have a diabetes test (fasting glucose) every three years. If you are at risk for diabetes, you should have this test more often.       At age 65, have a bone density scan (DEXA) to check for osteoporosis (brittle bone disease).    Shots:    Get a flu shot each year.    Get a tetanus shot every 10 years.    Talk to your doctor about your pneumonia vaccines. There are now two you  should receive - Pneumovax (PPSV 23) and Prevnar (PCV 13).    Talk to your doctor about the shingles vaccine.    Talk to your doctor about the hepatitis B vaccine.    Nutrition:     Eat at least 5 servings of fruits and vegetables each day.      Eat whole-grain bread, whole-wheat pasta and brown rice instead of white grains and rice.      Talk to your provider about Calcium and Vitamin D.     Lifestyle    Exercise at least 150 minutes a week (30 minutes a day, 5 days a week). This will help you control your weight and prevent disease.      Limit alcohol to one drink per day.      No smoking.       Wear sunscreen to prevent skin cancer.       See your dentist twice a year for an exam and cleaning.      See your eye doctor every 1 to 2 years to screen for conditions such as glaucoma, macular degeneration, cataracts, etc   Preventive Health Recommendations  Female Ages 65 +    Yearly exam:   See your health care provider every year in order to  Review health changes.   Discuss preventive care.    Review your medicines if your doctor has prescribed any.    You no longer need a yearly Pap test unless you've had an abnormal Pap test in the past 10 years. If you have vaginal symptoms, such as bleeding or discharge, be sure to talk with your provider about a Pap test.    Every 1 to 2 years, have a mammogram.  If you are over 69, talk with your health care provider about whether or not you want to continue having screening mammograms.    Every 10 years, have a colonoscopy. Or, have a yearly FIT test (stool test). These exams will check for colon cancer.     Have a cholesterol test every 5 years, or more often if your doctor advises it.     Have a diabetes test (fasting glucose) every three years. If you are at risk for diabetes, you should have this test more often.     At age 65, have a bone density scan (DEXA) to check for osteoporosis (brittle bone disease).    Shots:  Get a flu shot each year.  Get a tetanus shot every  10 years.  Talk to your doctor about your pneumonia vaccines. There are now two you should receive - Pneumovax (PPSV 23) and Prevnar (PCV 13).  Talk to your doctor about the shingles vaccine.  Talk to your doctor about the hepatitis B vaccine.    Nutrition:   Eat at least 5 servings of fruits and vegetables each day.    Eat whole-grain bread, whole-wheat pasta and brown rice instead of white grains and rice.    Talk to your provider about Calcium and Vitamin D.     Lifestyle  Exercise at least 150 minutes a week (30 minutes a day, 5 days a week). This will help you control your weight and prevent disease.    Limit alcohol to one drink per day.    No smoking.     Wear sunscreen to prevent skin cancer.     See your dentist twice a year for an exam and cleaning.    See your eye doctor every 1 to 2 years to screen for conditions such as glaucoma, macular degeneration, cataracts, etc

## 2017-12-15 NOTE — PROGRESS NOTES
SUBJECTIVE:   Pavithra Laurent is a 77 year old female who presents for Preventive Visit.  Are you in the first 12 months of your Medicare Part B coverage?  No    Healthy Habits:    Do you get at least three servings of calcium containing foods daily (dairy, green leafy vegetables, etc.)? yes    Amount of exercise or daily activities, outside of work: 0-3 day(s) per week    Problems taking medications regularly No    Medication side effects: No    Have you had an eye exam in the past two years? yes    Do you see a dentist twice per year? yes    Do you have sleep apnea, excessive snoring or daytime drowsiness?no      Ability to successfully perform activities of daily living: Yes, no assistance needed    Home safety:  none identified     Hearing impairment: No    Fall risk:  Fallen 2 or more times in the past year?: No  Any fall with injury in the past year?: No    COGNITIVE SCREEN  1) Repeat 3 items (Banana, Sunrise, Chair)    2) Clock draw: NORMAL  3) 3 item recall: Recalls 3 objects  Results: 3 items recalled: COGNITIVE IMPAIRMENT LESS LIKELY    Mini-CogTM Copyright RI[employer] Linda. Licensed by the author for use in [facility]; reprinted with permission (cecy@[facility]). All rights reserved.      Reviewed and updated as needed this visit by clinical staffTobacco  Allergies  Meds  Med Hx  Surg Hx  Fam Hx  Soc Hx        Reviewed and updated as needed this visit by Provider        Social History   Substance Use Topics     Smoking status: Never Smoker     Smokeless tobacco: Never Used     Alcohol use 0.0 oz/week     0 Standard drinks or equivalent per week      Comment: rare- 2/month     If you drink alcohol do you typically have >3 drinks per day or >7 drinks per week? No                        Today's PHQ-2 Score:   PHQ-2 ( 1999 [employer]) 12/15/2017 9/28/2017   Q1: Little interest or pleasure in doing things 0 1   Q2: Feeling down, depressed or hopeless 0 1   PHQ-2 Score 0 2     Do you feel safe in your  "environment - Yes    Do you have a Health Care Directive?: Yes: Advance Directive has been received and scanned.    Current providers sharing in care for this patient include: Patient Care Team:  Siegler, Nicole Joy, PA-C as PCP - General (Physician Assistant)    The following health maintenance items are reviewed in Epic and correct as of today:  Health Maintenance   Topic Date Due     ADVANCE DIRECTIVE PLANNING Q5 YRS  05/22/1995     INFLUENZA VACCINE (SYSTEM ASSIGNED)  09/01/2017     FALL RISK ASSESSMENT  12/01/2018     LIPID SCREEN Q5 YR FEMALE (SYSTEM ASSIGNED)  09/15/2020     TETANUS IMMUNIZATION (SYSTEM ASSIGNED)  05/24/2023     DEXA SCAN SCREENING (SYSTEM ASSIGNED)  Completed     PNEUMOCOCCAL  Completed     Pneumonia Vaccine:complete  Mammogram Screening: Patient over age 75, has elected to continue with mammography screening.  History of abnormal Pap smear: NO - age 65 - see link Cervical Cytology Screening Guidelines    ROS:  C: NEGATIVE for fever, chills, change in weight  I: NEGATIVE for worrisome rashes, moles or lesions  E: NEGATIVE for vision changes or irritation  E/M: NEGATIVE for ear, mouth and throat problems  R: NEGATIVE for significant cough or SOB  B: NEGATIVE for masses, tenderness or discharge  CV: NEGATIVE for chest pain, palpitations or peripheral edema  GI: NEGATIVE for nausea, abdominal pain, heartburn, or change in bowel habits  : NEGATIVE for frequency, dysuria, or hematuria  M: NEGATIVE for significant arthralgias or myalgia  N: NEGATIVE for weakness, dizziness or paresthesias  E: NEGATIVE for temperature intolerance, skin/hair changes  H: NEGATIVE for bleeding problems  P: NEGATIVE for changes in mood or affect    OBJECTIVE:   /60  Pulse 77  Temp 98  F (36.7  C) (Tympanic)  Resp 16  Ht 5' 2\" (1.575 m)  Wt 175 lb (79.4 kg)  BMI 32.01 kg/m2 Estimated body mass index is 32.01 kg/(m^2) as calculated from the following:    Height as of this encounter: 5' 2\" (1.575 m).    " Weight as of this encounter: 175 lb (79.4 kg).  EXAM:   GENERAL: healthy, alert and no distress  EYES: Eyes grossly normal to inspection, PERRL and conjunctivae and sclerae normal  HENT: ear canals and TM's normal, nose and mouth without ulcers or lesions  NECK: no adenopathy, no asymmetry, masses, or scars and thyroid normal to palpation  RESP: lungs clear to auscultation - no rales, rhonchi or wheezes  BREAST: normal without masses, tenderness or nipple discharge and no palpable axillary masses or adenopathy  CV: regular rate and rhythm, normal S1 S2, no S3 or S4, no murmur, click or rub, no peripheral edema and peripheral pulses strong  ABDOMEN: soft, nontender, no hepatosplenomegaly, no masses and bowel sounds normal  MS: no gross musculoskeletal defects noted, no edema  SKIN: no suspicious lesions or rashes  NEURO: Normal strength and tone, mentation intact and speech normal  PSYCH: mentation appears normal, affect normal/bright    ASSESSMENT / PLAN:       ICD-10-CM    1. Routine general medical examination at a health care facility Z00.00 Basic metabolic panel  (Ca, Cl, CO2, Creat, Gluc, K, Na, BUN)   2. Hypothyroidism, unspecified type E03.9 TSH with free T4 reflex     Basic metabolic panel  (Ca, Cl, CO2, Creat, Gluc, K, Na, BUN)   3. Screening for malignant neoplasm of breast Z12.31 MA Screening Digital Bilateral   4. Screening for cardiovascular condition Z13.6 Lipid panel reflex to direct LDL Non-fasting     End of Life Planning:  Patient currently has an advanced directive: Yes.  Practitioner is supportive of decision.    COUNSELING:  Reviewed preventive health counseling, as reflected in patient instructions    BP Screening:   Last 3 BP Readings:    BP Readings from Last 3 Encounters:   12/15/17 124/60   12/01/17 118/58   09/28/17 104/76       The following was recommended to the patient:  Re-screen BP within a year and recommended lifestyle modifications    Estimated body mass index is 32.01 kg/(m^2) as  "calculated from the following:    Height as of this encounter: 5' 2\" (1.575 m).    Weight as of this encounter: 175 lb (79.4 kg).  Weight management plan: Discussed healthy diet and exercise guidelines and patient will follow up in 12 months in clinic to re-evaluate.     reports that she has never smoked. She has never used smokeless tobacco.      Appropriate preventive services were discussed with this patient, including applicable screening as appropriate for cardiovascular disease, diabetes, osteopenia/osteoporosis, and glaucoma.  As appropriate for age/gender, discussed screening for colorectal cancer, prostate cancer, breast cancer, and cervical cancer. Checklist reviewing preventive services available has been given to the patient.    Reviewed patients plan of care and provided an AVS. The Basic Care Plan (routine screening as documented in Health Maintenance) for Pavithra meets the Care Plan requirement. This Care Plan has been established and reviewed with the Patient.      Nicole Joy Siegler, PA-C  [facility]  "

## 2017-12-16 LAB
ANION GAP SERPL CALCULATED.3IONS-SCNC: 7 MMOL/L (ref 3–14)
BUN SERPL-MCNC: 10 MG/DL (ref 7–30)
CALCIUM SERPL-MCNC: 9.1 MG/DL (ref 8.5–10.1)
CHLORIDE SERPL-SCNC: 103 MMOL/L (ref 94–109)
CHOLEST SERPL-MCNC: 177 MG/DL
CO2 SERPL-SCNC: 28 MMOL/L (ref 20–32)
CREAT SERPL-MCNC: 0.62 MG/DL (ref 0.52–1.04)
GFR SERPL CREATININE-BSD FRML MDRD: >90 ML/MIN/1.7M2
GLUCOSE SERPL-MCNC: 89 MG/DL (ref 70–99)
HDLC SERPL-MCNC: 92 MG/DL
LDLC SERPL CALC-MCNC: 72 MG/DL
NONHDLC SERPL-MCNC: 85 MG/DL
POTASSIUM SERPL-SCNC: 3.9 MMOL/L (ref 3.4–5.3)
SODIUM SERPL-SCNC: 138 MMOL/L (ref 133–144)
TRIGL SERPL-MCNC: 63 MG/DL
TSH SERPL DL<=0.005 MIU/L-ACNC: 0.92 MU/L (ref 0.4–4)

## 2017-12-28 ENCOUNTER — THERAPY VISIT (OUTPATIENT)
Dept: PHYSICAL THERAPY | Facility: CLINIC | Age: 77
End: 2017-12-28
Payer: COMMERCIAL

## 2017-12-28 DIAGNOSIS — G57.02 PIRIFORMIS SYNDROME OF LEFT SIDE: ICD-10-CM

## 2017-12-28 DIAGNOSIS — M70.62 TROCHANTERIC BURSITIS OF LEFT HIP: ICD-10-CM

## 2017-12-28 PROCEDURE — G8981 BODY POS CURRENT STATUS: HCPCS | Mod: GP | Performed by: PHYSICAL THERAPIST

## 2017-12-28 PROCEDURE — 97140 MANUAL THERAPY 1/> REGIONS: CPT | Mod: GP | Performed by: PHYSICAL THERAPIST

## 2017-12-28 PROCEDURE — 97110 THERAPEUTIC EXERCISES: CPT | Mod: GP | Performed by: PHYSICAL THERAPIST

## 2017-12-28 PROCEDURE — G8982 BODY POS GOAL STATUS: HCPCS | Mod: GP | Performed by: PHYSICAL THERAPIST

## 2018-02-05 ENCOUNTER — THERAPY VISIT (OUTPATIENT)
Dept: PHYSICAL THERAPY | Facility: CLINIC | Age: 78
End: 2018-02-05
Payer: COMMERCIAL

## 2018-02-05 DIAGNOSIS — M70.62 TROCHANTERIC BURSITIS OF LEFT HIP: ICD-10-CM

## 2018-02-05 DIAGNOSIS — G57.02 PIRIFORMIS SYNDROME OF LEFT SIDE: ICD-10-CM

## 2018-02-05 PROCEDURE — 97110 THERAPEUTIC EXERCISES: CPT | Mod: GP | Performed by: PHYSICAL THERAPIST

## 2018-02-05 PROCEDURE — 97112 NEUROMUSCULAR REEDUCATION: CPT | Mod: GP | Performed by: PHYSICAL THERAPIST

## 2018-02-05 PROCEDURE — 97140 MANUAL THERAPY 1/> REGIONS: CPT | Mod: GP | Performed by: PHYSICAL THERAPIST

## 2018-02-05 NOTE — PROGRESS NOTES
"Subjective:  HPI                    Objective:  System    Physical Exam    General     ROS    Assessment/Plan:    PROGRESS  REPORT    Progress reporting period is to 12/28/17       SUBJECTIVE  Subjective changes noted by patient:  .  Subjective: Pt states that she has been ill with bronchitis and therefore hasn't been able to ex.  Has not been back in the pool for weeks.  Is noting some LBP and intermittent left lateral hip pain.  Some radiation down posterior thigh.  Is noting swelling in left ankle-plans to follow up with MD re: swelling next wk.    Current pain level is   .     Previous pain level was   Initial Pain level: 8/10.   Changes in function:  Yes (See Goal flowsheet attached for changes in current functional level)  Adverse reaction to treatment or activity: None    OBJECTIVE  Changes noted in objective findings:    Objective: Able to significantly lessen LBP with repeated flexion.  Tender at glut min tendons at GT.  SLR (-).  Established \"go to\" ex to help manage GT and LBP.  Pt to work on getting back in the pool.  Reviewed HEP.     ASSESSMENT/PLAN  Updated problem list and treatment plan: Diagnosis 1:  Left trochanteric bursitis/pirifromis    Pain -  manual therapy, self management, education and home program  Decreased ROM/flexibility - manual therapy and therapeutic exercise  Decreased joint mobility - manual therapy and therapeutic exercise  Decreased strength - therapeutic exercise and therapeutic activities  Impaired muscle performance - neuro re-education  Decreased function - therapeutic activities  Impaired posture - neuro re-education  STG/LTGs have been met or progress has been made towards goals:  Yes (See Goal flow sheet completed today.)  Assessment of Progress: The patient's condition is improving.  Self Management Plans:  Patient has been instructed in a home treatment program.  Patient  has been instructed in self management of symptoms.    Pavithra continues to require the following " intervention to meet STG and LTG's:  PT    Recommendations:  This patient would benefit from continued therapy.     Frequency:  1 X week, once daily  Duration:  for 4 weeks        Please refer to the daily flowsheet for treatment today, total treatment time and time spent performing 1:1 timed codes.

## 2018-02-16 ENCOUNTER — TELEPHONE (OUTPATIENT)
Dept: FAMILY MEDICINE | Facility: CLINIC | Age: 78
End: 2018-02-16

## 2018-02-16 NOTE — TELEPHONE ENCOUNTER
Patient called reporting left ankle swelling. She was seen at [facility] and advise to monitor BP readings due to possible HF.  BP readings are 145/80,146/70-80. States she typically has low BP. Denies any hypertensive symptoms, breathing problems or chest pain. Pt was scheduled with doctor Owens as requested. Advised she continue to monitor BP reading. She is elevating leg and using support stockings. Pt was advised to seek medical care before appt if symptoms worsened. She verbalized agreement with plan.

## 2018-02-20 ENCOUNTER — OFFICE VISIT (OUTPATIENT)
Dept: FAMILY MEDICINE | Facility: CLINIC | Age: 78
End: 2018-02-20
Payer: COMMERCIAL

## 2018-02-20 VITALS
HEART RATE: 80 BPM | HEIGHT: 62 IN | TEMPERATURE: 97.2 F | SYSTOLIC BLOOD PRESSURE: 142 MMHG | DIASTOLIC BLOOD PRESSURE: 80 MMHG | OXYGEN SATURATION: 98 % | WEIGHT: 176 LBS | BODY MASS INDEX: 32.39 KG/M2

## 2018-02-20 DIAGNOSIS — M79.662 PAIN OF LEFT LOWER LEG: Primary | ICD-10-CM

## 2018-02-20 PROCEDURE — 99213 OFFICE O/P EST LOW 20 MIN: CPT | Performed by: NURSE PRACTITIONER
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
## 2018-02-20 NOTE — MR AVS SNAPSHOT
"              After Visit Summary   2/20/2018    Pavithra Laurent    MRN: [id number]           Patient Information     Date Of Birth          1940        Visit Information        Provider Department      2/20/2018 10:00 AM Pranav Carrasquillo APRN CNP [facility]        Today's Diagnoses     Pain of left lower leg    -  1       Follow-ups after your visit        Your next 10 appointments already scheduled     Feb 28, 2018 11:40 AM CST   [facility]    [address] [place] [id number] [phone]           Bring a current list of meds and any records pertaining to this visit. For Physicals, please bring immunization records and any forms needing to be filled out. Please arrive 10 minutes early to complete paperwork.              Who to contact     If you have questions or need follow up information about today's clinic visit or your schedule please contact [facility] directly at [phone].  Normal or non-critical lab and imaging results will be communicated to you by Patient Engagement Systemshart, letter or phone within 4 business days after the clinic has received the results. If you do not hear from us within 7 days, please contact the clinic through TunePatrolt or phone. If you have a critical or abnormal lab result, we will notify you by phone as soon as possible.  Submit refill requests through uma information technology or call your pharmacy and they will forward the refill request to us. Please allow 3 business days for your refill to be completed.          Additional Information About Your Visit        Patient Engagement [facility] Information     uma information technology lets you send messages to your doctor, view your test results, renew your prescriptions, schedule appointments and more. To sign up, go to [email or web address] . Click on \"Log in\" on the left side of the screen, which will take you to the " "Welcome page. Then click on \"Sign up Now\" on the right side of the page.     You will be asked to enter the access code listed below, as well as some personal information. Please follow the directions to create your username and password.     Your access code is: [email or web address]  Expires: 3/1/2018  1:22 PM     Your access code will  in 90 days. If you need help or a new code, please call your [facility] clinic or [phone].        Care EveryWhere ID     This is your Care EveryWhere ID. This could be used by other organizations to access your [facility] medical records  [id number]        Your Vitals Were     Pulse Temperature Height Pulse Oximetry Breastfeeding? BMI (Body Mass Index)    80 97.2  F (36.2  C) (Oral) 5' 2\" (1.575 m) 98% No 32.19 kg/m2       Blood Pressure from Last 3 Encounters:   18 142/80   12/15/17 124/60   17 118/58    Weight from Last 3 Encounters:   18 176 lb (79.8 kg)   12/15/17 175 lb (79.4 kg)   17 179 lb (81.2 kg)              Today, you had the following     No orders found for display       Primary Care Provider Fax #    Physician No Ref-Primary [phone]       No address on file        Equal Access to Services     JOHN PRAKASH : Hadii eddie griffitho Soscott, waaxda luqadaha, qaybta kaalmada adesherita, yves beltran . So [facility] [phone].    ATENCIÓN: Si habla español, tiene a nicole disposición servicios gratuitos de asistencia lingüística. Llame al [phone].    We comply with applicable federal civil rights laws and [place] laws. We do not discriminate on the basis of race, color, national origin, age, disability, sex, sexual orientation, or gender identity.            Thank you!     Thank you for choosing [facility]  for your care. Our goal is always to provide you with excellent care. Hearing back from our patients is one way we can continue to improve our services. Please take a few minutes to complete the written " survey that you may receive in the mail after your visit with us. Thank you!             Your Updated Medication List - Protect others around you: Learn how to safely use, store and throw away your medicines at www.disposemymeds.org.          This list is accurate as of 2/20/18 12:47 PM.  Always use your most recent med list.                   Brand Name Dispense Instructions for use Diagnosis    acetaminophen 500 MG tablet    TYLENOL     Take 500-1,000 mg by mouth every 6 hours as needed for mild pain        azelastine 0.1 % spray    ASTELIN     Spray 2 sprays into both nostrils 2 times daily as needed        CALCIUM SOFT CHEWS 500-500-40 MG-UNT-MCG Chew   Generic drug:  calcium-vitamin D-vitamin K      Take 2 tablets by mouth 2 times daily        cycloSPORINE 0.05 % ophthalmic emulsion    RESTASIS     Place 1 drop into both eyes every 12 hours.        DERMOTIC 0.01 % Oil   Generic drug:  fluocinolone acetonide      Place 1 drop in ear(s) daily as needed.        desonide 0.05 % cream    DESOWEN     Apply topically 2 times daily as needed        Dextran 70 0.1%-Hypromellose 0.3% 0.1-0.3 % Soln ophthalmic solution      Place 1 drop into both eyes as needed for dry eyes        diclofenac 1 % Gel topical gel    VOLTAREN     Place 2 g onto the skin 4 times daily        epinastine HCl 0.05 % Soln    ELESTAT     1 drop 2 times daily as needed        erythromycin ophthalmic ointment    ROMYCIN     1 Application nightly as needed        fluticasone 50 MCG/ACT spray    FLONASE     Spray 1 spray into both nostrils daily        gabapentin 300 MG capsule    NEURONTIN    270 capsule    TAKE 1 CAPSULE BY MOUTH 3TIMES A DAY    Left thigh pain       IBUPROFEN PO      Take 400 mg by mouth every 4 hours as needed for moderate pain        ketoconazole-hydrocortisone 2 & 1 % (CREAM) Kit      Externally apply 1 Application topically 2 times daily        levothyroxine 88 MCG tablet    SYNTHROID/LEVOTHROID    90 tablet    Take 1 tablet  (88 mcg) by mouth daily    Hypothyroidism due to acquired atrophy of thyroid       melatonin 3 MG tablet      Take 3 mg by mouth nightly as needed for sleep        metroNIDAZOLE 1 % gel    METROGEL     Apply 0.5 inches topically daily.        montelukast 10 MG tablet    SINGULAIR     Take 10 mg by mouth daily        order for DME     1 each    Equipment being ordered: compression stockings 25-35mmHG    Deep vein thrombosis (DVT) of left lower extremity, unspecified chronicity, unspecified vein (H)

## 2018-02-20 NOTE — NURSING NOTE
"Chief Complaint   Patient presents with     Leg Pain     left lower leg  extremely sensative to touch when pulled compression socks on yesterday but no redness, no swelling. Lump today mid shin.        Initial /80 (BP Location: Right arm, Cuff Size: Adult Regular)  Pulse 80  Temp 97.2  F (36.2  C) (Oral)  Ht 5' 2\" (1.575 m)  Wt 176 lb (79.8 kg)  SpO2 98%  Breastfeeding? No  BMI 32.19 kg/m2 Estimated body mass index is 32.19 kg/(m^2) as calculated from the following:    Height as of this encounter: 5' 2\" (1.575 m).    Weight as of this encounter: 176 lb (79.8 kg).  Medication Reconciliation: complete    "

## 2018-02-21 ENCOUNTER — ALLIED HEALTH/NURSE VISIT (OUTPATIENT)
Dept: NURSING | Facility: CLINIC | Age: 78
End: 2018-02-21
Payer: COMMERCIAL

## 2018-02-21 VITALS — SYSTOLIC BLOOD PRESSURE: 136 MMHG | DIASTOLIC BLOOD PRESSURE: 88 MMHG

## 2018-02-21 DIAGNOSIS — Z01.30 BP CHECK: Primary | ICD-10-CM

## 2018-02-21 PROCEDURE — 99207 ZZC NO CHARGE NURSE ONLY: CPT

## 2018-02-21 NOTE — PROGRESS NOTES
In for blood pressure check.    Today's reading: /88     History   Smoking Status     Never Smoker   Smokeless Tobacco     Never Used       Current Outpatient Prescriptions   Medication Sig     gabapentin (NEURONTIN) 300 MG capsule TAKE 1 CAPSULE BY MOUTH 3TIMES A DAY     levothyroxine (SYNTHROID/LEVOTHROID) 88 MCG tablet Take 1 tablet (88 mcg) by mouth daily     order for DME Equipment being ordered: compression stockings 25-35mmHG     diclofenac (VOLTAREN) 1 % GEL Place 2 g onto the skin 4 times daily     calcium-vitamin D-vitamin K (CALCIUM SOFT CHEWS) 500-500-40 MG-UNT-MCG CHEW Take 2 tablets by mouth 2 times daily     epinastine HCl (ELESTAT) 0.05 % SOLN 1 drop 2 times daily as needed     desonide (DESOWEN) 0.05 % cream Apply topically 2 times daily as needed     melatonin 3 MG tablet Take 3 mg by mouth nightly as needed for sleep     acetaminophen (TYLENOL) 500 MG tablet Take 500-1,000 mg by mouth every 6 hours as needed for mild pain     Dextran 70 0.1%-Hypromellose 0.3% (BION TEARS) 0.1-0.3 % SOLN ophthalmic solution Place 1 drop into both eyes as needed for dry eyes     ketoconazole-hydrocortisone 2 & 1 % (CREAM) KIT Externally apply 1 Application topically 2 times daily     azelastine (ASTELIN) 0.1 % nasal spray Spray 2 sprays into both nostrils 2 times daily as needed      montelukast (SINGULAIR) 10 MG tablet Take 10 mg by mouth daily      IBUPROFEN PO Take 400 mg by mouth every 4 hours as needed for moderate pain      erythromycin (ROMYCIN) ophthalmic ointment 1 Application nightly as needed      fluticasone (FLONASE) 50 MCG/ACT nasal spray Spray 1 spray into both nostrils daily      fluocinolone acetonide (DERMOTIC) 0.01 % OIL Place 1 drop in ear(s) daily as needed.     cycloSPORINE (RESTASIS) 0.05 % ophthalmic emulsion Place 1 drop into both eyes every 12 hours.       metroNIDAZOLE (METROGEL) 1 % gel Apply 0.5 inches topically daily.       No current facility-administered medications for this  visit.         She is having her blood pressure monitored because it has been mildly elevated and has been on medication.    Pavithra has had the following symptoms: none

## 2018-02-21 NOTE — MR AVS SNAPSHOT
After Visit Summary   2/21/2018    Pavithra Laurent    MRN: [id number]           Patient Information     Date Of Birth          1940        Visit Information        Provider Department      2/21/2018 2:30 PM BM NURSE [facility]        Today's Diagnoses     BP check    -  1       Follow-ups after your visit        Your next 10 appointments already scheduled     Feb 21, 2018  2:30 PM CST   Nurse Only with BM NURSE   [facility]    [address]  [address]  [facility] [address] [phone]            Feb 28, 2018 11:40 AM CST   Office Visit with Kitty Owens MD   [facility]    [address]  [address]  [facility] [address] [phone]           Bring a current list of meds and any records pertaining to this visit. For Physicals, please bring immunization records and any forms needing to be filled out. Please arrive 10 minutes early to complete paperwork.              Who to contact     If you have questions or need follow up information about today's clinic visit or your schedule please contact [facility] directly at [phone].  Normal or non-critical lab and imaging results will be communicated to you by MyChart, letter or phone within 4 business days after the clinic has received the results. If you do not hear from us within 7 days, please contact the clinic through MyChart or phone. If you have a critical or abnormal lab result, we will notify you by phone as soon as possible.  Submit refill requests through DocDep or call your pharmacy and they will forward the refill request to us. Please allow 3 business days for your refill to be completed.          Additional Information About Your Visit        MyChart Information     Tag[employer] Solution[employer]  "lets you send messages to your doctor, view your test results, renew your prescriptions, schedule appointments and more. To sign up, go to [email or web address] . Click on \"Log in\" on the left side of the screen, which will take you to the Welcome page. Then click on \"Sign up Now\" on the right side of the page.     You will be asked to enter the access code listed below, as well as some personal information. Please follow the directions to create your username and password.     Your access code is: [email or web address]  Expires: 3/1/2018  1:22 PM     Your access code will  in 90 days. If you need help or a new code, please call your [facility] clinic or [phone].        Care EveryWhere ID     This is your Care EveryWhere ID. This could be used by other organizations to access your [facility] medical records  [id number]         Blood Pressure from Last 3 Encounters:   18 136/88   18 142/80   12/15/17 124/60    Weight from Last 3 Encounters:   18 176 lb (79.8 kg)   12/15/17 175 lb (79.4 kg)   17 179 lb (81.2 kg)              Today, you had the following     No orders found for display       Primary Care Provider Fax #    Physician No Ref-Primary [phone]       No address on file        Equal Access to Services     [facility] : Hadii eddie ku hadasho Sofarzanehali, waaxda luqadaha, qaybta kaalmada adeegyada, yves beltran . So [facility] [phone].    ATENCIÓN: Si habla español, tiene a nicole disposición servicios gratuitos de asistencia lingüística. Llame al [phone].    We comply with applicable federal civil rights laws and [place] laws. We do not discriminate on the basis of race, color, national origin, age, disability, sex, sexual orientation, or gender identity.            Thank you!     Thank you for choosing [facility]  for your care. Our goal is always to provide you with excellent care. Hearing back from our patients is " one way we can continue to improve our services. Please take a few minutes to complete the written survey that you may receive in the mail after your visit with us. Thank you!             Your Updated Medication List - Protect others around you: Learn how to safely use, store and throw away your medicines at www.disposemymeds.org.          This list is accurate as of 2/21/18  2:13 PM.  Always use your most recent med list.                   Brand Name Dispense Instructions for use Diagnosis    acetaminophen 500 MG tablet    TYLENOL     Take 500-1,000 mg by mouth every 6 hours as needed for mild pain        azelastine 0.1 % spray    ASTELIN     Spray 2 sprays into both nostrils 2 times daily as needed        CALCIUM SOFT CHEWS 500-500-40 MG-UNT-MCG Chew   Generic drug:  calcium-vitamin D-vitamin K      Take 2 tablets by mouth 2 times daily        cycloSPORINE 0.05 % ophthalmic emulsion    RESTASIS     Place 1 drop into both eyes every 12 hours.        DERMOTIC 0.01 % Oil   Generic drug:  fluocinolone acetonide      Place 1 drop in ear(s) daily as needed.        desonide 0.05 % cream    DESOWEN     Apply topically 2 times daily as needed        Dextran 70 0.1%-Hypromellose 0.3% 0.1-0.3 % Soln ophthalmic solution      Place 1 drop into both eyes as needed for dry eyes        diclofenac 1 % Gel topical gel    VOLTAREN     Place 2 g onto the skin 4 times daily        epinastine HCl 0.05 % Soln    ELESTAT     1 drop 2 times daily as needed        erythromycin ophthalmic ointment    ROMYCIN     1 Application nightly as needed        fluticasone 50 MCG/ACT spray    FLONASE     Spray 1 spray into both nostrils daily        gabapentin 300 MG capsule    NEURONTIN    270 capsule    TAKE 1 CAPSULE BY MOUTH 3TIMES A DAY    Left thigh pain       IBUPROFEN PO      Take 400 mg by mouth every 4 hours as needed for moderate pain        ketoconazole-hydrocortisone 2 & 1 % (CREAM) Kit      Externally apply 1 Application topically 2  times daily        levothyroxine 88 MCG tablet    SYNTHROID/LEVOTHROID    90 tablet    Take 1 tablet (88 mcg) by mouth daily    Hypothyroidism due to acquired atrophy of thyroid       melatonin 3 MG tablet      Take 3 mg by mouth nightly as needed for sleep        metroNIDAZOLE 1 % gel    METROGEL     Apply 0.5 inches topically daily.        montelukast 10 MG tablet    SINGULAIR     Take 10 mg by mouth daily        order for DME     1 each    Equipment being ordered: compression stockings 25-35mmHG    Deep vein thrombosis (DVT) of left lower extremity, unspecified chronicity, unspecified vein (H)

## 2018-02-28 ENCOUNTER — OFFICE VISIT (OUTPATIENT)
Dept: FAMILY MEDICINE | Facility: CLINIC | Age: 78
End: 2018-02-28
Payer: COMMERCIAL

## 2018-02-28 VITALS
HEART RATE: 76 BPM | WEIGHT: 176.5 LBS | OXYGEN SATURATION: 99 % | TEMPERATURE: 98.1 F | BODY MASS INDEX: 32.28 KG/M2 | SYSTOLIC BLOOD PRESSURE: 116 MMHG | DIASTOLIC BLOOD PRESSURE: 64 MMHG

## 2018-02-28 DIAGNOSIS — Z86.718 HX OF BLOOD CLOTS: Primary | ICD-10-CM

## 2018-02-28 DIAGNOSIS — G57.02 PIRIFORMIS SYNDROME OF LEFT SIDE: ICD-10-CM

## 2018-02-28 DIAGNOSIS — I82.402 DEEP VEIN THROMBOSIS (DVT) OF LEFT LOWER EXTREMITY, UNSPECIFIED CHRONICITY, UNSPECIFIED VEIN (H): ICD-10-CM

## 2018-02-28 PROCEDURE — 99214 OFFICE O/P EST MOD 30 MIN: CPT | Performed by: FAMILY MEDICINE

## 2018-02-28 NOTE — MR AVS SNAPSHOT
After Visit Summary   2/28/2018    Pavithra Laurent    MRN: [id number]           Patient Information     Date Of Birth          1940        Visit Information        Provider Department      2/28/2018 11:40 AM Kitty Owens MD [facility]        Today's Diagnoses     Hx of blood clots    -  1      Care Instructions    Your swelling is gone, and you just had a normal ultrasound so today you are good!    If you have UNILATERAL (one-sided) swelling or redness that lasts for more than a day or two, go ahead and get an ultrasound to rule out a blood clot!  I've ordered this, you can call to schedule.    To Schedule at [facility] Rad[facility] call [phone].    Your blood pressure looks great, this doesn't look like heart failure.                    Follow-ups after your visit        Future tests that were ordered for you today     Open Future Orders        Priority Expected Expires Ordered    US Lower Extremity Venous Duplex Bilateral Routine  2/28/2019 2/28/2018            Who to contact     If you have questions or need follow up information about today's clinic visit or your schedule please contact [facility] directly at [phone].  Normal or non-critical lab and imaging results will be communicated to you by MyChart, letter or phone within 4 business days after the clinic has received the results. If you do not hear from us within 7 days, please contact the clinic through [email or web address] or phone. If you have a critical or abnormal lab result, we will notify you by phone as soon as possible.  Submit refill requests through Ampulse or call your pharmacy and they will forward the refill request to us. Please allow 3 business days for your refill to be completed.          Additional Information About Your Visit        [email or web address] Information     Ampulse lets you send messages to your doctor, view your test results, renew your prescriptions,  "schedule appointments and more. To sign up, go to [email or web address] . Click on \"Log in\" on the left side of the screen, which will take you to the Welcome page. Then click on \"Sign up Now\" on the right side of the page.     You will be asked to enter the access code listed below, as well as some personal information. Please follow the directions to create your username and password.     Your access code is: [email or web address]  Expires: 3/1/2018  1:22 PM     Your access code will  in 90 days. If you need help or a new code, please call your [facility] clinic or [phone].        Care EveryWhere ID     This is your Care EveryWhere ID. This could be used by other organizations to access your [facility] medical records  [id number]        Your Vitals Were     Pulse Temperature Pulse Oximetry BMI (Body Mass Index)          76 98.1  F (36.7  C) (Tympanic) 99% 32.28 kg/m2         Blood Pressure from Last 3 Encounters:   18 116/64   18 136/88   18 142/80    Weight from Last 3 Encounters:   18 176 lb 8 oz (80.1 kg)   18 176 lb (79.8 kg)   12/15/17 175 lb (79.4 kg)               Primary Care Provider Fax #    Physician No Ref-Primary [phone]       No address on file        Equal Access to Services     BETTINA PRAKASH : Hadii eddie ku hadasho Sofarzanehali, waaxda luqadaha, qaybta kaalmada adeegyada, waxay jessica newby. So [facility] [phone].    ATENCIÓN: Si habla español, tiene a nicole disposición servicios gratuitos de asistencia lingüística. Llame al [phone].    We comply with applicable federal civil rights laws and [place] laws. We do not discriminate on the basis of race, color, national origin, age, disability, sex, sexual orientation, or gender identity.            Thank you!     Thank you for choosing [facility]  for your care. Our goal is always to provide you with excellent care. Hearing back from our patients is one way we " can continue to improve our services. Please take a few minutes to complete the written survey that you may receive in the mail after your visit with us. Thank you!             Your Updated Medication List - Protect others around you: Learn how to safely use, store and throw away your medicines at www.disposemymeds.org.          This list is accurate as of 2/28/18 12:31 PM.  Always use your most recent med list.                   Brand Name Dispense Instructions for use Diagnosis    acetaminophen 500 MG tablet    TYLENOL     Take 500-1,000 mg by mouth every 6 hours as needed for mild pain        azelastine 0.1 % spray    ASTELIN     Spray 2 sprays into both nostrils 2 times daily as needed        CALCIUM SOFT CHEWS 500-500-40 MG-UNT-MCG Chew   Generic drug:  calcium-vitamin D-vitamin K      Take 2 tablets by mouth 2 times daily        cycloSPORINE 0.05 % ophthalmic emulsion    RESTASIS     Place 1 drop into both eyes every 12 hours.        DERMOTIC 0.01 % Oil   Generic drug:  fluocinolone acetonide      Place 1 drop in ear(s) daily as needed.        desonide 0.05 % cream    DESOWEN     Apply topically 2 times daily as needed        Dextran 70 0.1%-Hypromellose 0.3% 0.1-0.3 % Soln ophthalmic solution      Place 1 drop into both eyes as needed for dry eyes        diclofenac 1 % Gel topical gel    VOLTAREN     Place 2 g onto the skin 4 times daily        epinastine HCl 0.05 % Soln    ELESTAT     1 drop 2 times daily as needed        erythromycin ophthalmic ointment    ROMYCIN     1 Application nightly as needed        fluticasone 50 MCG/ACT spray    FLONASE     Spray 1 spray into both nostrils daily        gabapentin 300 MG capsule    NEURONTIN    270 capsule    TAKE 1 CAPSULE BY MOUTH 3TIMES A DAY    Left thigh pain       IBUPROFEN PO      Take 400 mg by mouth every 4 hours as needed for moderate pain        ketoconazole-hydrocortisone 2 & 1 % (CREAM) Kit      Externally apply 1 Application topically 2 times daily         levothyroxine 88 MCG tablet    SYNTHROID/LEVOTHROID    90 tablet    Take 1 tablet (88 mcg) by mouth daily    Hypothyroidism due to acquired atrophy of thyroid       melatonin 3 MG tablet      Take 3 mg by mouth nightly as needed for sleep        metroNIDAZOLE 1 % gel    METROGEL     Apply 0.5 inches topically daily.        montelukast 10 MG tablet    SINGULAIR     Take 10 mg by mouth daily        order for DME     1 each    Equipment being ordered: compression stockings 25-35mmHG    Deep vein thrombosis (DVT) of left lower extremity, unspecified chronicity, unspecified vein (H)

## 2018-02-28 NOTE — PATIENT INSTRUCTIONS
Your swelling is gone, and you just had a normal ultrasound so today you are good!    If you have UNILATERAL (one-sided) swelling or redness that lasts for more than a day or two, go ahead and get an ultrasound to rule out a blood clot!  I've ordered this, you can call to schedule.    To Schedule at [facility] Radiology call [phone].    Your blood pressure looks great, this doesn't look like heart failure.
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
## 2018-02-28 NOTE — NURSING NOTE
"Chief Complaint   Patient presents with     Hypertension       Initial /64  Pulse 76  Temp 98.1  F (36.7  C) (Tympanic)  Wt 176 lb 8 oz (80.1 kg)  SpO2 99%  BMI 32.28 kg/m2 Estimated body mass index is 32.28 kg/(m^2) as calculated from the following:    Height as of 2/20/18: 5' 2\" (1.575 m).    Weight as of this encounter: 176 lb 8 oz (80.1 kg).  Medication Reconciliation: complete   .Mario VASQUEZ      "

## 2018-04-03 ENCOUNTER — THERAPY VISIT (OUTPATIENT)
Dept: PHYSICAL THERAPY | Facility: CLINIC | Age: 78
End: 2018-04-03
Payer: COMMERCIAL

## 2018-04-03 DIAGNOSIS — M54.2 NECK PAIN: Primary | ICD-10-CM

## 2018-04-03 PROCEDURE — G8981 BODY POS CURRENT STATUS: HCPCS | Mod: GP | Performed by: PHYSICAL THERAPIST

## 2018-04-03 PROCEDURE — 97140 MANUAL THERAPY 1/> REGIONS: CPT | Mod: GP | Performed by: PHYSICAL THERAPIST

## 2018-04-03 PROCEDURE — 97161 PT EVAL LOW COMPLEX 20 MIN: CPT | Mod: GP | Performed by: PHYSICAL THERAPIST

## 2018-04-03 PROCEDURE — G8982 BODY POS GOAL STATUS: HCPCS | Mod: GP | Performed by: PHYSICAL THERAPIST
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
## 2018-04-03 NOTE — PROGRESS NOTES
[place] [facility] Initial Evaluation  Subjective:  Patient is a 77 year old female presenting with rehab left ankle/foot hpi.                                      Pertinent medical history includes:  History of fractures and overweight.  Medical allergies: yes.  Other surgeries include:  Orthopedic surgery and other.  Current medications:  Thyroid medication and muscle relaxants.  Current occupation is Retired .        Barriers include:  None as reported by patient.    Red flags:  Calf pain, swelling, warmth.                        Objective:  System    Physical Exam    General     ROS    Assessment/Plan:
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
## 2018-04-06 ENCOUNTER — TRANSFERRED RECORDS (OUTPATIENT)
Dept: HEALTH INFORMATION MANAGEMENT | Facility: CLINIC | Age: 78
End: 2018-04-06

## 2018-04-10 ENCOUNTER — THERAPY VISIT (OUTPATIENT)
Dept: PHYSICAL THERAPY | Facility: CLINIC | Age: 78
End: 2018-04-10
Payer: COMMERCIAL

## 2018-04-10 DIAGNOSIS — M54.2 NECK PAIN: ICD-10-CM

## 2018-04-10 PROCEDURE — 97110 THERAPEUTIC EXERCISES: CPT | Mod: GP | Performed by: PHYSICAL THERAPIST

## 2018-04-10 PROCEDURE — 97140 MANUAL THERAPY 1/> REGIONS: CPT | Mod: GP | Performed by: PHYSICAL THERAPIST

## 2018-04-16 ENCOUNTER — APPOINTMENT (OUTPATIENT)
Dept: CT IMAGING | Facility: CLINIC | Age: 78
End: 2018-04-16
Attending: EMERGENCY MEDICINE
Payer: MEDICARE

## 2018-04-16 ENCOUNTER — HOSPITAL ENCOUNTER (EMERGENCY)
Facility: CLINIC | Age: 78
Discharge: HOME | End: 2018-04-16
Attending: EMERGENCY MEDICINE | Admitting: EMERGENCY MEDICINE
Payer: MEDICARE

## 2018-04-16 VITALS
WEIGHT: 176 LBS | SYSTOLIC BLOOD PRESSURE: 149 MMHG | RESPIRATION RATE: 16 BRPM | TEMPERATURE: 97.3 F | OXYGEN SATURATION: 99 % | DIASTOLIC BLOOD PRESSURE: 78 MMHG | HEIGHT: 62 IN | HEART RATE: 72 BPM | BODY MASS INDEX: 32.39 KG/M2

## 2018-04-16 DIAGNOSIS — G45.9 TRANSIENT CEREBRAL ISCHEMIA, UNSPECIFIED TYPE: ICD-10-CM

## 2018-04-16 LAB
ANION GAP SERPL CALCULATED.3IONS-SCNC: 7 MMOL/L (ref 3–14)
BUN SERPL-MCNC: 10 MG/DL (ref 7–30)
CALCIUM SERPL-MCNC: 8.9 MG/DL (ref 8.5–10.1)
CHLORIDE SERPL-SCNC: 105 MMOL/L (ref 94–109)
CO2 SERPL-SCNC: 29 MMOL/L (ref 20–32)
CREAT SERPL-MCNC: 0.68 MG/DL (ref 0.52–1.04)
ERYTHROCYTE [DISTWIDTH] IN BLOOD BY AUTOMATED COUNT: 13.7 % (ref 10–15)
GFR SERPL CREATININE-BSD FRML MDRD: 84 ML/MIN/1.7M2
GLUCOSE SERPL-MCNC: 107 MG/DL (ref 70–99)
HCT VFR BLD AUTO: 40.2 % (ref 35–47)
HGB BLD-MCNC: 13.1 G/DL (ref 11.7–15.7)
INTERPRETATION ECG - MUSE: NORMAL
MCH RBC QN AUTO: 29.4 PG (ref 26.5–33)
MCHC RBC AUTO-ENTMCNC: 32.6 G/DL (ref 31.5–36.5)
MCV RBC AUTO: 90 FL (ref 78–100)
PLATELET # BLD AUTO: 224 10E9/L (ref 150–450)
POTASSIUM SERPL-SCNC: 3.7 MMOL/L (ref 3.4–5.3)
RBC # BLD AUTO: 4.46 10E12/L (ref 3.8–5.2)
SODIUM SERPL-SCNC: 141 MMOL/L (ref 133–144)
WBC # BLD AUTO: 8.5 10E9/L (ref 4–11)

## 2018-04-16 PROCEDURE — 70450 CT HEAD/BRAIN W/O DYE: CPT

## 2018-04-16 PROCEDURE — 80048 BASIC METABOLIC PNL TOTAL CA: CPT | Performed by: EMERGENCY MEDICINE

## 2018-04-16 PROCEDURE — 85027 COMPLETE CBC AUTOMATED: CPT | Performed by: EMERGENCY MEDICINE

## 2018-04-16 PROCEDURE — 99285 EMERGENCY DEPT VISIT HI MDM: CPT | Mod: 25

## 2018-04-16 PROCEDURE — 93005 ELECTROCARDIOGRAM TRACING: CPT

## 2018-04-16 RX ORDER — IOPAMIDOL 755 MG/ML
70 INJECTION, SOLUTION INTRAVASCULAR ONCE
Status: DISCONTINUED | OUTPATIENT
Start: 2018-04-16 | End: 2018-04-16 | Stop reason: HOSPADM

## 2018-04-16 ASSESSMENT — ENCOUNTER SYMPTOMS: NUMBNESS: 1

## 2018-04-16 NOTE — ED AVS SNAPSHOT
Emergency Department    [address] [facility] [address]    Phone:  [phone]    Fax:  [phone]                                       Pavithra Laurent   MRN: [id number]    Department:   Emergency Department   Date of Visit:  4/16/2018           Patient Information     Date Of Birth          1940        Your diagnoses for this visit were:     Transient cerebral ischemia, unspecified type        You were seen by Guerda Matias MD.      Follow-up Information     Schedule an appointment as soon as possible for a visit with Neurology, [facility].    Contact information:    [address]  [address]  [facility] [id number] [phone]          Follow up with  Emergency Department.    Specialty:  EMERGENCY MEDICINE    Why:  If symptoms worsen    Contact information:    [address] [facility] [address] [phone]        Discharge Instructions       Follow up with neurology - call to make an appointment  Continue aspirin  What is a TIA?  A TIA (Transient Ischemic Attack) is an early warning that a stroke (also called a brain attack) is coming. A TIA is a temporary stroke. It causes no lasting damage. But the effects of a stroke, if it happens, can be very serious and lasting. If you think you are having symptoms of a TIA or stroke--even if they don t last--get medical help right away.     If you have any symptoms of a TIA or stroke, call 911 and your doctor as soon as possible.     Symptoms of TIA and stroke  Symptoms may come on suddenly and last for a few seconds or a few hours. You may have symptoms only once. Or they may come and go for days. If you notice any of the following symptoms, don t wait. Call 911 or emergency services right away.    Weakness, numbness, tingling, or loss of feeling in your face, arm, or leg.    Trouble seeing in one or both eyes; double vision.    Slurred speech, trouble talking, or problems understanding others when  they speak    Sudden, severe headache    Dizziness or a feeling of spinning    Loss of balance or falling    Blackouts  F.A.S.T. is an easy way to remember the signs of a stroke. When you see the signs, you will know what you need to call 911 fast.   F.A.S.T. stands for:     F is for face drooping. One side of the face is drooping or numb. When the person smiles, the smile is uneven.    A is for arm weakness. One arm is weak or numb. When the person lifts both arms and the same time, one arm may drift downward.    S is for speech difficulty. You may notice slurred speech or trouble speaking. The person can't repeat a simple sentence correctly when asked.    T is for time to call 911. If someone shows any of these symptoms, even if they go away, call 911 right away. Make note of the time the symptoms first appeared.  Date Last Reviewed: 7/1/2017 2000-2017 [employer]. [address] [place] [address]. All rights reserved. This information is not intended as a substitute for professional medical care. Always follow your healthcare professional's instructions.          Your next 10 appointments already scheduled     Apr 23, 2018 10:00 AM CDT   AMANDA Spine with Ct Martinez PT   [facility] ([facility]  )    [address] [place], [place] [address]  [facility] [address]   [phone]            Apr 27, 2018  1:10 PM CDT   AMANDA Spine with Ct Martinez PT   [facility] ([facility]  )    [address] [facility]  [facility] [address]   [phone]            May 01, 2018 12:30 PM CDT   AMANDA Spine with Ct Martinez PT   [facility] ([facility]  )    [address] [place], [place] [address]  [facility] [address]   [phone]            May 03, 2018  9:20 AM CDT   AMANDA Spine with Ct Martinez PT   [facility] ([facility]  )    [address] [place] [address]  [facility] [address]  [facility] [id number] [phone]              24 Hour Appointment Hotline       To make an appointment at any [facility], call [phone] ([phone]). If you don't have a family doctor or clinic, we will help you find one. [facility] are conveniently located to serve the needs of you and your family.             Review of your medicines      Our records show that you are taking the medicines listed below. If these are incorrect, please call your family doctor or clinic.        Dose / Directions Last dose taken    acetaminophen 500 MG tablet   Commonly known as:  TYLENOL   Dose:  500-1000 mg        Take 500-1,000 mg by mouth every 6 hours as needed for mild pain   Refills:  0        azelastine 0.1 % spray   Commonly known as:  ASTELIN   Dose:  2 spray        Spray 2 sprays into both nostrils 2 times daily as needed   Refills:  0        CALCIUM SOFT CHEWS 500-500-40 MG-UNT-MCG Chew   Dose:  2 tablet   Generic drug:  calcium-vitamin D-vitamin K        Take 2 tablets by mouth 2 times daily   Refills:  0        cycloSPORINE 0.05 % ophthalmic emulsion   Commonly known as:  RESTASIS   Dose:  1 drop        Place 1 drop into both eyes every 12 hours.   Refills:  0        DERMOTIC 0.01 % Oil   Dose:  1 drop   Generic drug:  fluocinolone acetonide        Place 1 drop in ear(s) daily as needed.   Refills:  0        desonide 0.05 % cream   Commonly known as:  DESOWEN        Apply topically 2 times daily as needed   Refills:  0        Dextran 70 0.1%-Hypromellose 0.3% 0.1-0.3 % Soln ophthalmic solution   Dose:  1 drop        Place 1 drop into both eyes as needed for dry eyes   Refills:  0        diclofenac 1 % Gel topical gel   Commonly known as:  VOLTAREN   Dose:  2 g        Place 2 g onto the skin 4 times daily   Refills:  0        epinastine HCl 0.05 % Soln   Commonly known as:  ELESTAT   Dose:  1 drop        1 drop 2 times daily as needed   Refills:  0        erythromycin ophthalmic ointment    Commonly known as:  ROMYCIN   Dose:  1 Application        1 Application nightly as needed   Refills:  0        fluticasone 50 MCG/ACT spray   Commonly known as:  FLONASE   Dose:  1 spray        Spray 1 spray into both nostrils daily   Refills:  0        gabapentin 300 MG capsule   Commonly known as:  NEURONTIN   Quantity:  270 capsule        TAKE 1 CAPSULE BY MOUTH 3TIMES A DAY   Refills:  3        IBUPROFEN PO   Dose:  400 mg        Take 400 mg by mouth every 4 hours as needed for moderate pain   Refills:  0        ketoconazole-hydrocortisone 2 & 1 % (Cream) Kit   Dose:  1 Application        Externally apply 1 Application topically 2 times daily   Refills:  0        levothyroxine 88 MCG tablet   Commonly known as:  SYNTHROID/LEVOTHROID   Dose:  88 mcg   Quantity:  90 tablet        Take 1 tablet (88 mcg) by mouth daily   Refills:  2        melatonin 3 MG tablet   Dose:  3 mg        Take 3 mg by mouth nightly as needed for sleep   Refills:  0        metroNIDAZOLE 1 % gel   Commonly known as:  METROGEL   Dose:  0.5 inch        Apply 0.5 inches topically daily.   Refills:  0        montelukast 10 MG tablet   Commonly known as:  SINGULAIR   Dose:  10 mg        Take 10 mg by mouth daily   Refills:  0        order for DME   Quantity:  1 each        Equipment being ordered: compression stockings 25-35mmHG   Refills:  0                Procedures and tests performed during your visit     Basic metabolic panel    CBC (+ platelets, no diff)    EKG 12 lead    Head CT w/o contrast      Orders Needing Specimen Collection     None      Pending Results     Date and Time Order Name Status Description    4/16/2018 1330 Head CT w/o contrast Preliminary             Pending Culture Results     No orders found from 4/14/2018 to 4/17/2018.            Pending Results Instructions     If you had any lab results that were not finalized at the time of your Discharge, you can call the ED Lab Result RN at [phone]. You will be contacted  by this team for any positive Lab results or changes in treatment. The nurses are available 7 days a week from 10A to 6:30P.  You can leave a message 24 hours per day and they will return your call.        Test Results From Your Hospital Stay        4/16/2018  2:51 PM      Component Results     Component Value Ref Range & Units Status    Sodium 141 133 - 144 mmol/L Final    Potassium 3.7 3.4 - 5.3 mmol/L Final    Chloride 105 94 - 109 mmol/L Final    Carbon Dioxide 29 20 - 32 mmol/L Final    Anion Gap 7 3 - 14 mmol/L Final    Glucose 107 (H) 70 - 99 mg/dL Final    Urea Nitrogen 10 7 - 30 mg/dL Final    Creatinine 0.68 0.52 - 1.04 mg/dL Final    GFR Estimate 84 >60 mL/min/1.7m2 Final    Non  GFR Calc    GFR Estimate If Black >90 >60 mL/min/1.7m2 Final    African American GFR Calc    Calcium 8.9 8.5 - 10.1 mg/dL Final         4/16/2018  2:32 PM      Component Results     Component Value Ref Range & Units Status    WBC 8.5 4.0 - 11.0 10e9/L Final    RBC Count 4.46 3.8 - 5.2 10e12/L Final    Hemoglobin 13.1 11.7 - 15.7 g/dL Final    Hematocrit 40.2 35.0 - 47.0 % Final    MCV 90 78 - 100 fl Final    MCH 29.4 26.5 - 33.0 pg Final    MCHC 32.6 31.5 - 36.5 g/dL Final    RDW 13.7 10.0 - 15.0 % Final    Platelet Count 224 150 - 450 10e9/L Final         4/16/2018  3:56 PM      Narrative     CT HEAD WITHOUT CONTRAST  4/16/2018 3:48 PM    HISTORY: Right-sided facial numbness, now resolved.    TECHNIQUE: Scans were obtained through the head without IV contrast.  Radiation dose for this scan was reduced using automated exposure  control, adjustment of the mA and/or kV according to patient size, or  iterative reconstruction technique.    COMPARISON: None.    FINDINGS: No hemorrhage, mass lesion, or focal area of acute  infarction identified. Paranasal sinuses are normal. No bony  abnormality.        Impression     IMPRESSION: Negative CT scan of the head.                      Clinical Quality Measure: Blood Pressure  Screening     Your blood pressure was checked while you were in the emergency department today. The last reading we obtained was  BP: 176/84 . Please read the guidelines below about what these numbers mean and what you should do about them.  If your systolic blood pressure (the top number) is less than 120 and your diastolic blood pressure (the bottom number) is less than 80, then your blood pressure is normal. There is nothing more that you need to do about it.  If your systolic blood pressure (the top number) is 120-139 or your diastolic blood pressure (the bottom number) is 80-89, your blood pressure may be higher than it should be. You should have your blood pressure rechecked within a year by a primary care provider.  If your systolic blood pressure (the top number) is 140 or greater or your diastolic blood pressure (the bottom number) is 90 or greater, you may have high blood pressure. High blood pressure is treatable, but if left untreated over time it can put you at risk for heart attack, stroke, or kidney failure. You should have your blood pressure rechecked by a primary care provider within the next 4 weeks.  If your provider in the emergency department today gave you specific instructions to follow-up with your doctor or provider even sooner than that, you should follow that instruction and not wait for up to 4 weeks for your follow-up visit.        Thank you for choosing [facility]       Thank you for choosing [facility] for your care. Our goal is always to provide you with excellent care. Hearing back from our patients is one way we can continue to improve our services. Please take a few minutes to complete the written survey that you may receive in the mail after you visit with us. Thank you!        [employer] Information     [employer] lets you send messages to your doctor, view your test results, renew your prescriptions, schedule appointments and more. To sign up, go to [email or web address] . Click on  "\"Log in\" on the left side of the screen, which will take you to the Welcome page. Then click on \"Sign up Now\" on the right side of the page.     You will be asked to enter the access code listed below, as well as some personal information. Please follow the directions to create your username and password.     Your access code is: [email or web address]  Expires: 7/15/2018  1:58 PM     Your access code will  in 90 days. If you need help or a new code, please call your [facility] clinic or [phone].        Care EveryWhere ID     This is your Care EveryWhere ID. This could be used by other organizations to access your [facility] medical records  [id number]        Equal Access to Services     BETTINA PRAKASH : Faizan Hernandez, wakvng durand, shaun pelaez, yves newby. So [facility] [place] [phone].    ATENCIÓN: Si habla español, tiene a nicole disposición servicios gratuitos de asistencia lingüística. Llame al [phone].    We comply with applicable federal civil rights laws and [place] laws. We do not discriminate on the basis of race, color, national origin, age, disability, sex, sexual orientation, or gender identity.            After Visit Summary       This is your record. Keep this with you and show to your community pharmacist(s) and doctor(s) at your next visit.                  "

## 2018-04-16 NOTE — DISCHARGE INSTRUCTIONS
Follow up with neurology - call to make an appointment  Continue aspirin  What is a TIA?  A TIA (Transient Ischemic Attack) is an early warning that a stroke (also called a brain attack) is coming. A TIA is a temporary stroke. It causes no lasting damage. But the effects of a stroke, if it happens, can be very serious and lasting. If you think you are having symptoms of a TIA or stroke--even if they don t last--get medical help right away.     If you have any symptoms of a TIA or stroke, call 911 and your doctor as soon as possible.     Symptoms of TIA and stroke  Symptoms may come on suddenly and last for a few seconds or a few hours. You may have symptoms only once. Or they may come and go for days. If you notice any of the following symptoms, don t wait. Call 911 or emergency services right away.    Weakness, numbness, tingling, or loss of feeling in your face, arm, or leg.    Trouble seeing in one or both eyes; double vision.    Slurred speech, trouble talking, or problems understanding others when they speak    Sudden, severe headache    Dizziness or a feeling of spinning    Loss of balance or falling    Blackouts  F.A.S.T. is an easy way to remember the signs of a stroke. When you see the signs, you will know what you need to call 911 fast.   F.A.S.T. stands for:     F is for face drooping. One side of the face is drooping or numb. When the person smiles, the smile is uneven.    A is for arm weakness. One arm is weak or numb. When the person lifts both arms and the same time, one arm may drift downward.    S is for speech difficulty. You may notice slurred speech or trouble speaking. The person can't repeat a simple sentence correctly when asked.    T is for time to call 911. If someone shows any of these symptoms, even if they go away, call 911 right away. Make note of the time the symptoms first appeared.  Date Last Reviewed: 7/1/2017 2000-2017 [employer]. [address] [facility]  [place] [address]. All rights reserved. This information is not intended as a substitute for professional medical care. Always follow your healthcare professional's instructions.

## 2018-04-16 NOTE — ED PROVIDER NOTES
History     Chief Complaint:  Numbness     HPI   Pavithra Laurent is a 77 year old female who presents with numbness.  The patient says that starting at 1100 today she had numbness on right side of lip and face.  She says that the episode of numbness last around 10 minutes.  Therefore, patient presents the ED for further evaluation.  Here in the ED, the patient says that she currently does not have numbness and she has not had anything like this happen to her before.  She also says that while she was at home having the episodes of numbness, she did not notice a face droop when looking in the mirror.  Denies weakness, slurred speech, unsteady gait. No headache.    CARDIAC RISK FACTORS:  Sex:    Female  Tobacco:   No  Hypertension:   No  Hyperlipidemia:  No  Diabetes:   No  Family History:  No    PE/DVT RISK FACTORS:  Sex:    Female  Hormones:   No  Tobacco:   No  Cancer:   No  Travel:   No  Surgery:   No  Other immobilization: No  Personal history:  No  Family history:  No    Allergies:  Birch Trees  Codeine Sulfate  Dust Mites  Erythromycin  Hydromorphone     Medications:    gabapentin (NEURONTIN)  levothyroxine (SYNTHROID/LEVOTHROID)   diclofenac (VOLTAREN)  epinastine HCl (ELESTAT)   desonide (DESOWEN)  acetaminophen (TYLENOL)   Dextran 70 0.1%-Hypromellose 0.3% (BION TEARS)   ketoconazole-hydrocortisone 2 & 1 % (CREAM)   azelastine (ASTELIN)   montelukast (SINGULAIR)  IBUPROFEN   erythromycin (ROMYCIN)   fluticasone (FLONASE)   fluocinolone acetonide (DERMOTIC)   cycloSPORINE (RESTASIS)   metroNIDAZOLE (METROGEL)    Past Medical History:    Allergic rhinitis   Calculus of gallbladder without mention of cholecystitis or obstruction   Cataract of both eyes   Dry eye syndrome   Environmental allergies   Gastro-oesophageal reflux disease   Hypothyroid   Pain in joint, shoulder region   Rosacea   Thoracic outlet syndrome  Hypothyroidism  GERD  Lesion of Ulnar nerve  DVT  Trochanteric bursitis of left hip    Past  "Surgical History:    Arthrotomy elbow  Closed reduction upper extremity  Gyn surgery  Irrigation and debridement hand  Knee surgery  TKA-right knee  [place] teeth surgery    Family History:    Heart disease  Cerebrovascular disease  Cancer  Alcohol/Drug  Clotting disorder    Social History:  Smoking Status: Never Smoker  Alcohol Use: No  Patient presents alone  Marital Status:   [5]       Review of Systems   Neurological: Positive for numbness.   All other systems reviewed and are negative.    Physical Exam     Patient Vitals for the past 24 hrs:   BP Temp Temp src Pulse Heart Rate Resp SpO2 Height Weight   04/16/18 1203 176/84 97.3  F (36.3  C) Temporal 88 88 16 99 % 1.575 m (5' 2\") 79.8 kg (176 lb)       Physical Exam  General: Resting comfortably on the gurney  Eyes:  The pupils are equal and round  ENT:    Moist mucous membranes  Neck:  Normal range of motion  CV:  Regular rate and rhythm    Skin warm and well perfused   Resp:  Lungs are clear    Non-labored    No rales    No wheezing   GI:  Abdomen is soft, there is no rigidity    No distension    No rebound tenderness     No abdominal tenderness  MS:  Normal muscular tone  Skin:  No rash or acute skin lesions noted  NEURO: Awake, alert    Speech is normal and fluent    No facial droop    Face is symmetric    Moves all extremities equally, no arm or leg drift    Normal finger-nose-finger     strength equal bilaterally    Equal sensation bilaterally on arms, legs and face    No arm or leg drift    Gait stable   Psych: Normal affect.  Appropriate interactions.    Emergency Department Course   ECG:  ECG (13:54 :33):  Rate 68 bpm. NM interval 206. QRS duration 82. QT/QTc 424/450. P-R-T axes 37 -26 44.  Normal sinus rhythm, inferior infarct, age undetermined, anteriolateral infarct, age undetermined, abnormal ECG.  Interpreted at 1359 by Guerda Matias MD.    Laboratory:  BMP: Glucose 107(H), o/w WNL (Creatinine: 0.68)  CBC: WBC: 8.5, HGB: 13.1, " PLT: 224    Emergency Department Course:  Nursing notes and vitals reviewed. 1327 I performed an exam of the patient as documented above.     IV inserted. Blood drawn. This was sent to the lab for further testing, results above.    EKG performed, results above.    340 pm: IV issue in CT so unable to do CT angio studies. Patient declining the studies now and does not want a new IV placed. Discussed with patient that can skip the angio studies but would still recommend CT head. She agrees with this    405 pm: Updated patient on her results    Findings and plan explained to the Patient. Patient discharged home with instructions regarding supportive care, medications, and reasons to return. The importance of close follow-up was reviewed.     I personally reviewed the laboratory results with the Patient and answered all related questions prior to discharge.     Impression & Plan      Medical Decision Making:  Patient is a 77-year-old female who presents the ED with numbness.  Patient's symptoms are resolved in the ED.  She is in sinus rhythm.  She has a normal neurologic exam.  I suspect TIA as cause of symptoms and given ABCD 2 score, she is low risk.  She declines MRI brain given her severe claustrophobia and says she cannot obtain this study. Given this, ordered CT head/angio studies. Patient had IV issue and they could not perform angio studies w/o new IV. Patient then declining all imaging studies. I recommended that CT head still be obtained but could avoid the angio studies. Patient agreed with this plan. CT head obtained and negative. Given low risk ABCD 2 score, will discharge her home with follow-up with neurology.  She will continue her aspirin. If can't get into neurology quickly, recommended f/u with PCP. Patient feels comfortable with discharge home and will return to ED if symptoms return.    Diagnosis:    ICD-10-CM    1. Transient cerebral ischemia, unspecified type G45.9 Basic metabolic panel        Disposition:  discharged to home  Usman Luis A  4/16/2018    EMERGENCY DEPARTMENT  I, Usman Gunn, am serving as a scribe at 1:27 PM on 4/16/2018 to document services personally performed by Guerda Matias MD based on my observations and the provider's statements to me.        Guerda Matias MD  04/16/18 2483

## 2018-04-16 NOTE — ED AVS SNAPSHOT
Emergency Department    [address] [facility] [address]    Phone:  [phone]    Fax:  [phone]                                       Pavithra Laurent   MRN: [id number]    Department:   Emergency Department   Date of Visit:  4/16/2018           After Visit Summary Signature Page     I have received my discharge instructions, and my questions have been answered. I have discussed any challenges I see with this plan with the nurse or doctor.    ..........................................................................................................................................  Patient/Patient Representative Signature      ..........................................................................................................................................  Patient Representative Print Name and Relationship to Patient    ..................................................               ................................................  Date                                            Time    ..........................................................................................................................................  Reviewed by Signature/Title    ...................................................              ..............................................  Date                                                            Time

## 2018-04-18 ENCOUNTER — TRANSFERRED RECORDS (OUTPATIENT)
Dept: HEALTH INFORMATION MANAGEMENT | Facility: CLINIC | Age: 78
End: 2018-04-18

## 2018-04-19 DIAGNOSIS — G45.9 TIA (TRANSIENT ISCHEMIC ATTACK): Primary | ICD-10-CM

## 2018-04-23 ENCOUNTER — THERAPY VISIT (OUTPATIENT)
Dept: PHYSICAL THERAPY | Facility: CLINIC | Age: 78
End: 2018-04-23
Payer: COMMERCIAL

## 2018-04-23 DIAGNOSIS — M54.2 NECK PAIN: ICD-10-CM

## 2018-04-23 PROCEDURE — 97110 THERAPEUTIC EXERCISES: CPT | Mod: GP | Performed by: PHYSICAL THERAPIST

## 2018-04-23 PROCEDURE — 97530 THERAPEUTIC ACTIVITIES: CPT | Mod: GP | Performed by: PHYSICAL THERAPIST

## 2018-04-23 PROCEDURE — 97140 MANUAL THERAPY 1/> REGIONS: CPT | Mod: GP | Performed by: PHYSICAL THERAPIST

## 2018-04-27 ENCOUNTER — HOSPITAL ENCOUNTER (OUTPATIENT)
Dept: CARDIOLOGY | Facility: CLINIC | Age: 78
Discharge: HOME | End: 2018-04-27
Attending: PSYCHIATRY & NEUROLOGY | Admitting: PSYCHIATRY & NEUROLOGY
Payer: MEDICARE

## 2018-04-27 ENCOUNTER — THERAPY VISIT (OUTPATIENT)
Dept: PHYSICAL THERAPY | Facility: CLINIC | Age: 78
End: 2018-04-27
Payer: COMMERCIAL

## 2018-04-27 DIAGNOSIS — R20.1 HYPOESTHESIA: ICD-10-CM

## 2018-04-27 DIAGNOSIS — R29.90 EPISODE OF TRANSIENT NEUROLOGIC SYMPTOMS: ICD-10-CM

## 2018-04-27 DIAGNOSIS — M54.2 NECK PAIN: ICD-10-CM

## 2018-04-27 PROCEDURE — 97110 THERAPEUTIC EXERCISES: CPT | Mod: GP | Performed by: PHYSICAL THERAPIST

## 2018-04-27 PROCEDURE — 97112 NEUROMUSCULAR REEDUCATION: CPT | Mod: GP | Performed by: PHYSICAL THERAPIST

## 2018-04-27 PROCEDURE — 40000264 ECHO COMPLETE BUBBLE STUDY WITH OPTISON

## 2018-04-27 PROCEDURE — 25500064 ZZH RX 255 OP 636: Performed by: PSYCHIATRY & NEUROLOGY

## 2018-04-27 PROCEDURE — 97140 MANUAL THERAPY 1/> REGIONS: CPT | Mod: GP | Performed by: PHYSICAL THERAPIST

## 2018-04-27 PROCEDURE — 93306 TTE W/DOPPLER COMPLETE: CPT | Mod: 26 | Performed by: INTERNAL MEDICINE

## 2018-04-27 RX ADMIN — HUMAN ALBUMIN MICROSPHERES AND PERFLUTREN 7 ML: 10; .22 INJECTION, SOLUTION INTRAVENOUS at 11:56

## 2018-05-03 ENCOUNTER — THERAPY VISIT (OUTPATIENT)
Dept: PHYSICAL THERAPY | Facility: CLINIC | Age: 78
End: 2018-05-03
Payer: COMMERCIAL

## 2018-05-03 DIAGNOSIS — M54.2 NECK PAIN: ICD-10-CM

## 2018-05-03 PROCEDURE — 97112 NEUROMUSCULAR REEDUCATION: CPT | Mod: GP | Performed by: PHYSICAL THERAPIST

## 2018-05-03 PROCEDURE — 97110 THERAPEUTIC EXERCISES: CPT | Mod: GP | Performed by: PHYSICAL THERAPIST

## 2018-05-11 ENCOUNTER — THERAPY VISIT (OUTPATIENT)
Dept: PHYSICAL THERAPY | Facility: CLINIC | Age: 78
End: 2018-05-11
Payer: COMMERCIAL

## 2018-05-11 DIAGNOSIS — M75.81 RIGHT ROTATOR CUFF TENDINITIS: Primary | ICD-10-CM

## 2018-05-11 PROCEDURE — G8982 BODY POS GOAL STATUS: HCPCS | Mod: GP | Performed by: PHYSICAL THERAPIST

## 2018-05-11 PROCEDURE — G8981 BODY POS CURRENT STATUS: HCPCS | Mod: GP | Performed by: PHYSICAL THERAPIST

## 2018-05-11 PROCEDURE — 97161 PT EVAL LOW COMPLEX 20 MIN: CPT | Mod: GP | Performed by: PHYSICAL THERAPIST

## 2018-05-11 PROCEDURE — 97110 THERAPEUTIC EXERCISES: CPT | Mod: GP | Performed by: PHYSICAL THERAPIST
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
## 2018-06-01 ENCOUNTER — TRANSFERRED RECORDS (OUTPATIENT)
Dept: HEALTH INFORMATION MANAGEMENT | Facility: CLINIC | Age: 78
End: 2018-06-01

## 2018-06-01 DIAGNOSIS — E03.4 HYPOTHYROIDISM DUE TO ACQUIRED ATROPHY OF THYROID: ICD-10-CM

## 2018-06-01 RX ORDER — LEVOTHYROXINE SODIUM 88 UG/1
TABLET ORAL
Qty: 90 TABLET | Refills: 1 | OUTPATIENT
Start: 2018-06-01

## 2018-06-01 RX ORDER — LEVOTHYROXINE SODIUM 88 UG/1
TABLET ORAL
Qty: 90 TABLET | Refills: 1 | Status: SHIPPED | OUTPATIENT
Start: 2018-06-01 | End: 2018-12-01

## 2018-06-01 NOTE — TELEPHONE ENCOUNTER
"Requested Prescriptions   Pending Prescriptions Disp Refills     levothyroxine (SYNTHROID/LEVOTHROID) 88 MCG tablet [Pharmacy Med Name: Levothyroxine Sodium Oral Tablet 88 MCG] 90 tablet 1      Last Written Prescription Date:  6/1/18  Last Fill Quantity: 90,  # refills: 1   Last office visit: 2/28/2018 with prescribing provider:     Future Office Visit:     Sig: TAKE 1 TABLET (88 MCG) BY MOUTH DAILY    Thyroid Protocol Passed    6/1/2018 10:24 AM       Passed - Patient is 12 years or older       Passed - Recent (12 mo) or future (30 days) visit within the authorizing provider's specialty    Patient had office visit in the last 12 months or has a visit in the next 30 days with authorizing provider or within the authorizing provider's specialty.  See \"Patient Info\" tab in inbasket, or \"Choose Columns\" in Meds & Orders section of the refill encounter.           Passed - Normal TSH on file in past 12 months    Recent Labs   Lab Test  12/15/17   1443   TSH  0.92             Passed - No active pregnancy on record    If patient is pregnant or has had a positive pregnancy test, please check TSH.         Passed - No positive pregnancy test in past 12 months    If patient is pregnant or has had a positive pregnancy test, please check TSH.            "

## 2018-06-01 NOTE — TELEPHONE ENCOUNTER
"Requested Prescriptions   Pending Prescriptions Disp Refills     levothyroxine (SYNTHROID/LEVOTHROID) 88 MCG tablet [Pharmacy Med Name: Levothyroxine Sodium Oral Tablet 88 MCG] 90 tablet 1     Sig: TAKE 1 TABLET (88 MCG) BY MOUTH DAILY    Thyroid Protocol Passed    6/1/2018  7:59 AM       Passed - Patient is 12 years or older       Passed - Recent (12 mo) or future (30 days) visit within the authorizing provider's specialty    Patient had office visit in the last 12 months or has a visit in the next 30 days with authorizing provider or within the authorizing provider's specialty.  See \"Patient Info\" tab in inbasket, or \"Choose Columns\" in Meds & Orders section of the refill encounter.           Passed - Normal TSH on file in past 12 months    Recent Labs   Lab Test  12/15/17   1443   TSH  0.92             Passed - No active pregnancy on record    If patient is pregnant or has had a positive pregnancy test, please check TSH.         Passed - No positive pregnancy test in past 12 months    If patient is pregnant or has had a positive pregnancy test, please check TSH.          Last OV 02/28/2018.  Prescription approved per [facility] Refill Protocol.    "

## 2018-06-12 ENCOUNTER — THERAPY VISIT (OUTPATIENT)
Dept: PHYSICAL THERAPY | Facility: CLINIC | Age: 78
End: 2018-06-12
Payer: COMMERCIAL

## 2018-06-12 DIAGNOSIS — M75.81 RIGHT ROTATOR CUFF TENDINITIS: ICD-10-CM

## 2018-06-12 PROCEDURE — 97110 THERAPEUTIC EXERCISES: CPT | Mod: GP | Performed by: PHYSICAL THERAPIST

## 2018-06-12 PROCEDURE — 97112 NEUROMUSCULAR REEDUCATION: CPT | Mod: GP | Performed by: PHYSICAL THERAPIST

## 2018-06-19 ENCOUNTER — THERAPY VISIT (OUTPATIENT)
Dept: PHYSICAL THERAPY | Facility: CLINIC | Age: 78
End: 2018-06-19
Payer: COMMERCIAL

## 2018-06-19 DIAGNOSIS — M75.81 RIGHT ROTATOR CUFF TENDINITIS: ICD-10-CM

## 2018-06-19 PROCEDURE — 97140 MANUAL THERAPY 1/> REGIONS: CPT | Mod: GP | Performed by: PHYSICAL THERAPIST

## 2018-06-19 PROCEDURE — 97112 NEUROMUSCULAR REEDUCATION: CPT | Mod: GP | Performed by: PHYSICAL THERAPIST

## 2018-06-28 ENCOUNTER — TELEPHONE (OUTPATIENT)
Dept: FAMILY MEDICINE | Facility: CLINIC | Age: 78
End: 2018-06-28

## 2018-06-28 NOTE — TELEPHONE ENCOUNTER
Called patient back and scheduled an appointment.    Patient called reporting rash under your breasts in groin    Rash  Onset: 6 weeks    Description:   Location: under breasts in in groin  Character: round, raised, red  Itching (Pruritis): YES    Progression of Symptoms:  waxing and waning    Accompanying Signs & Symptoms:  Fever: no   Body aches or joint pain: no   Sore throat symptoms: no   Recent cold symptoms: no     History:   Previous similar rash: YES, milder forms    Precipitating factors:   Exposure to similar rash: no   New exposures: None identified  Recent travel: no     Alleviating factors:  The creams seem to help in the groin, but not under the breasts on a continuous basis. She is asking for advice for help with the rash under her breasts and on her back.     Therapies Tried and outcome: ketoconazole & HC 2.5 % creams, tinactin powder ([facility] miconazole powder) to both groin and breasts.      Recent Medication changes: none    Home Care information given: wash and dry area under breasts thoroughly. Apply HC only for itching and allow to dry before placing breasts back against skin. Once the HC is dry, apply powder. Go without a bra, and if wanting to wear bra wear a clean one each time, use clean towels and washcloths each time.     Advised: Appointment scheduled    References used: Telephone Triage Protocols for Nurses fifth edition       Mili Davila RN  Triage RN  [facility]

## 2018-06-28 NOTE — TELEPHONE ENCOUNTER
"Reason for Call:  Other call back    Detailed comments: Patient called in regarding a \"rash/fungal\" issue under her breasts and top of legs by groin, ongoing for about 6 weeks. She is wondering if she could be seen by Dr. Owens first or if she is able to give her a referral to a dermatologist. Please call back to discuss with patient.     Phone Number Patient can be reached at: Home number on file [phone] (home)    Best Time: any    Can we leave a detailed message on this number? YES    Call taken on 6/28/2018 at 9:29 AM by Chiara Maynard    "

## 2018-07-03 ENCOUNTER — OFFICE VISIT (OUTPATIENT)
Dept: FAMILY MEDICINE | Facility: CLINIC | Age: 78
End: 2018-07-03
Payer: COMMERCIAL

## 2018-07-03 VITALS
WEIGHT: 175.5 LBS | BODY MASS INDEX: 32.1 KG/M2 | HEART RATE: 76 BPM | TEMPERATURE: 97.7 F | DIASTOLIC BLOOD PRESSURE: 62 MMHG | OXYGEN SATURATION: 97 % | SYSTOLIC BLOOD PRESSURE: 110 MMHG

## 2018-07-03 DIAGNOSIS — B35.4 TINEA CORPORIS: ICD-10-CM

## 2018-07-03 DIAGNOSIS — B35.6 TINEA CRURIS: Primary | ICD-10-CM

## 2018-07-03 PROCEDURE — 99213 OFFICE O/P EST LOW 20 MIN: CPT | Performed by: FAMILY MEDICINE

## 2018-07-03 RX ORDER — NYSTATIN 100000 [USP'U]/G
POWDER TOPICAL 3 TIMES DAILY PRN
Qty: 30 G | Refills: 1 | Status: SHIPPED | OUTPATIENT
Start: 2018-07-03 | End: 2018-10-12

## 2018-07-03 RX ORDER — ITRACONAZOLE 100 MG/1
200 CAPSULE ORAL DAILY
Qty: 14 CAPSULE | Refills: 0 | Status: SHIPPED | OUTPATIENT
Start: 2018-07-03 | End: 2018-07-10

## 2018-07-03 NOTE — PATIENT INSTRUCTIONS
1. Use itraconazole pill 200 mg once a day for 1 week.  2. Use topical powder three times a day as needed.  3. Stop hydrocortisone.    Understanding Tinea Cruris  Tinea cruris is a type of fungal infection. The fungus infects the skin in the groin. It is more commonly called jock itch. Men are more prone to it than women.  How to say it  TIN-ee-a CROO-ris   What causes tinea cruris?  Tinea cruris occurs when certain types of fungus grow in the groin area. The infection often spreads from the feet to the groin. The fungal infection on the foot is called athlete s foot (tinea pedis). The infection can also be passed from person to person. You can get it if you touch an infected surface, such as a towel or bench in a locker room. It is more common if you have unusual heat, sweating, or friction in the groin, or are overweight.  What are the symptoms of tinea cruris?  Symptoms include:    Red patches along the upper thigh, butt, and groin. The patches have a well-marked border, and sometimes a clear central area.    Itchiness    Scaling skin along the border of the rash    Red bumps (pustules and papules)    Burning or stinging sensation  How is tinea cruris treated?  With proper treatment, tinea cruris will go away in 2 to 3 weeks. Treatments include:    Oral or skin medicines. These may help reduce itching.    Over-the-counter or prescription antifungal medicines for the skin. Thesedestroy the fungus and ease symptoms. You may need to take other fungal medicine by mouth if the infection does not go away.    Good hygiene.  Keep the groin area clean and dry. Take a bath or shower as soon as possible after physical activity. Don t wear sweaty clothes for an extended time. Wear loose cotton underwear. Drying powders may be helpful if you sweat a lot.  What are the possible complications of tinea cruris?  Possible complications include:    Repeated fungal infections    Bacterial infection of the affected skin  When should  I call my healthcare provider?  Call your healthcare provider right away if you have any of these:    Pain that gets worse    Symptoms that don t get better, or get worse    New symptoms   Date Last Reviewed: 3/30/2016    7789-8955 [employer]. [address] [place] [address], [place] [address]. All rights reserved. This information is not intended as a substitute for professional medical care. Always follow your healthcare professional's instructions.

## 2018-07-03 NOTE — MR AVS SNAPSHOT
After Visit Summary   7/3/2018    Pavithra Laurent    MRN: [id number]           Patient Information     Date Of Birth          1940        Visit Information        Provider Department      7/3/2018 1:20 PM Kitty Owens MD [facility]        Today's Diagnoses     Tinea cruris    -  1    Tinea corporis          Care Instructions    1. Use itraconazole pill 200 mg once a day for 1 week.  2. Use topical powder three times a day as needed.  3. Stop hydrocortisone.    Understanding Tinea Cruris  Tinea cruris is a type of fungal infection. The fungus infects the skin in the groin. It is more commonly called jock itch. Men are more prone to it than women.  How to say it  TIN-ee-a CROO-ris   What causes tinea cruris?  Tinea cruris occurs when certain types of fungus grow in the groin area. The infection often spreads from the feet to the groin. The fungal infection on the foot is called athlete s foot (tinea pedis). The infection can also be passed from person to person. You can get it if you touch an infected surface, such as a towel or bench in a locker room. It is more common if you have unusual heat, sweating, or friction in the groin, or are overweight.  What are the symptoms of tinea cruris?  Symptoms include:    Red patches along the upper thigh, butt, and groin. The patches have a well-marked border, and sometimes a clear central area.    Itchiness    Scaling skin along the border of the rash    Red bumps (pustules and papules)    Burning or stinging sensation  How is tinea cruris treated?  With proper treatment, tinea cruris will go away in 2 to 3 weeks. Treatments include:    Oral or skin medicines. These may help reduce itching.    Over-the-counter or prescription antifungal medicines for the skin. Thesedestroy the fungus and ease symptoms. You may need to take other fungal medicine by mouth if the infection does not go away.    Good hygiene.  Keep the  groin area clean and dry. Take a bath or shower as soon as possible after physical activity. Don t wear sweaty clothes for an extended time. Wear loose cotton underwear. Drying powders may be helpful if you sweat a lot.  What are the possible complications of tinea cruris?  Possible complications include:    Repeated fungal infections    Bacterial infection of the affected skin  When should I call my healthcare provider?  Call your healthcare provider right away if you have any of these:    Pain that gets worse    Symptoms that don t get better, or get worse    New symptoms   Date Last Reviewed: 3/30/2016    8355-8407 [employer]. [address] [place] [address]. All rights reserved. This information is not intended as a substitute for professional medical care. Always follow your healthcare professional's instructions.                Follow-ups after your visit        Your next 10 appointments already scheduled     Jul 10, 2018 11:20 AM CDT   AMANDA Extremity with Ct Martinez PT   [facility] ([facility]  )    [address] [place] [address] [address]  [facility] [id number] [phone]            Jul 17, 2018 11:20 AM CDT   AMANDA Extremity with Ct Martinez PT   [facility] ([facility]  )    [address] [place] [address] [address]  [facility] [id number] [phone]            Jul 23, 2018 11:20 AM CDT   AMANDA Extremity with Ct Martinez PT   [facility] ([facility]  )    [address] [place] [address] [address]  [facility] [id number] [phone]              Who to contact     If you have questions or need follow up information about today's clinic visit or your schedule please contact [facility] directly at [phone].  Normal or non-critical lab and imaging results will be communicated to you by MyChart, letter or phone within 4 business days after the  "clinic has received the results. If you do not hear from us within 7 days, please contact the clinic through [employer] or phone. If you have a critical or abnormal lab result, we will notify you by phone as soon as possible.  Submit refill requests through [employer] or call your pharmacy and they will forward the refill request to us. Please allow 3 business days for your refill to be completed.          Additional Information About Your Visit        [employer] Information     [employer] lets you send messages to your doctor, view your test results, renew your prescriptions, schedule appointments and more. To sign up, go to [email or web address] . Click on \"Log in\" on the left side of the screen, which will take you to the Welcome page. Then click on \"Sign up Now\" on the right side of the page.     You will be asked to enter the access code listed below, as well as some personal information. Please follow the directions to create your username and password.     Your access code is: [email or web address]  Expires: 10/1/2018  1:15 PM     Your access code will  in 90 days. If you need help or a new code, please call your [facility] clinic or [phone].        Care EveryWhere ID     This is your Care EveryWhere ID. This could be used by other organizations to access your [facility] medical records  [id number]        Your Vitals Were     Pulse Temperature Pulse Oximetry BMI (Body Mass Index)          76 97.7  F (36.5  C) (Tympanic) 97% 32.1 kg/m2         Blood Pressure from Last 3 Encounters:   18 110/62   18 149/78   18 116/64    Weight from Last 3 Encounters:   18 175 lb 8 oz (79.6 kg)   18 176 lb (79.8 kg)   18 176 lb 8 oz (80.1 kg)              Today, you had the following     No orders found for display         Today's Medication Changes          These changes are accurate as of 7/3/18  1:47 PM.  If you have any questions, ask your nurse or doctor.               Start taking these " medicines.        Dose/Directions    itraconazole 100 MG capsule   Commonly known as:  SPORANOX   Used for:  Tinea cruris, Tinea corporis   Started by:  Kitty Owens MD        Dose:  200 mg   Take 2 capsules (200 mg) by mouth daily for 7 days   Quantity:  14 capsule   Refills:  0       nystatin 028370 UNIT/GM Powd   Commonly known as:  MYCOSTATIN   Used for:  Tinea cruris, Tinea corporis   Started by:  Kitty Owens MD        Apply topically 3 times daily as needed   Quantity:  30 g   Refills:  1         Stop taking these medicines if you haven't already. Please contact your care team if you have questions.     ketoconazole-hydrocortisone 2 & 1 % (Cream) Kit   Stopped by:  Kitty Owens MD                Where to get your medicines      These medications were sent to [id number] - [address]  [address] [place] [address]     Phone:  [phone]     itraconazole 100 MG capsule    nystatin 105508 UNIT/GM Powd                Primary Care Provider Office Phone # Fax #    Kitty Owens [phone]       [address] [place] [address]        Equal Access to Services     JOHN PRAKASH AH: Hadii eddie ho hadasho Sofarzanehali, waaxda luqadaha, qaybta kaalmada adeegyada, yves newby. So [facility] [phone].    ATENCIÓN: Si habla español, tiene a nicole disposición servicios gratuitos de asistencia lingüística. Llame al [phone].    We comply with applicable federal civil rights laws and [place] laws. We do not discriminate on the basis of race, color, national origin, age, disability, sex, sexual orientation, or gender identity.            Thank you!     Thank you for choosing [facility]  for your care. Our goal is always to provide you with excellent care. Hearing back from our patients is one way we can continue to improve our services. Please take a few minutes to complete  the written survey that you may receive in the mail after your visit with us. Thank you!             Your Updated Medication List - Protect others around you: Learn how to safely use, store and throw away your medicines at www.disposemymeds.org.          This list is accurate as of 7/3/18  1:47 PM.  Always use your most recent med list.                   Brand Name Dispense Instructions for use Diagnosis    acetaminophen 500 MG tablet    TYLENOL     Take 500-1,000 mg by mouth every 6 hours as needed for mild pain        azelastine 0.1 % spray    ASTELIN     Spray 2 sprays into both nostrils 2 times daily as needed        CALCIUM SOFT CHEWS 500-500-40 MG-UNT-MCG Chew   Generic drug:  calcium-vitamin D-vitamin K      Take 2 tablets by mouth 2 times daily        cycloSPORINE 0.05 % ophthalmic emulsion    RESTASIS     Place 1 drop into both eyes every 12 hours.        DERMOTIC 0.01 % Oil   Generic drug:  fluocinolone acetonide      Place 1 drop in ear(s) daily as needed.        desonide 0.05 % cream    DESOWEN     Apply topically 2 times daily as needed        Dextran 70 0.1%-Hypromellose 0.3% 0.1-0.3 % Soln ophthalmic solution      Place 1 drop into both eyes as needed for dry eyes        diclofenac 1 % Gel topical gel    VOLTAREN     Place 2 g onto the skin 4 times daily        epinastine HCl 0.05 % Soln    ELESTAT     1 drop 2 times daily as needed        erythromycin ophthalmic ointment    ROMYCIN     1 Application nightly as needed        fluticasone 50 MCG/ACT spray    FLONASE     Spray 1 spray into both nostrils daily        gabapentin 300 MG capsule    NEURONTIN    270 capsule    TAKE 1 CAPSULE BY MOUTH 3TIMES A DAY    Left thigh pain       IBUPROFEN PO      Take 400 mg by mouth every 4 hours as needed for moderate pain        itraconazole 100 MG capsule    SPORANOX    14 capsule    Take 2 capsules (200 mg) by mouth daily for 7 days    Tinea cruris, Tinea corporis       levothyroxine 88 MCG tablet     SYNTHROID/LEVOTHROID    90 tablet    TAKE 1 TABLET (88 MCG) BY MOUTH DAILY    Hypothyroidism due to acquired atrophy of thyroid       melatonin 3 MG tablet      Take 3 mg by mouth nightly as needed for sleep        metroNIDAZOLE 1 % gel    METROGEL     Apply 0.5 inches topically daily.        montelukast 10 MG tablet    SINGULAIR     Take 10 mg by mouth daily        nystatin 226761 UNIT/GM Powd    MYCOSTATIN    30 g    Apply topically 3 times daily as needed    Tinea cruris, Tinea corporis       order for DME     1 each    Equipment being ordered: compression stockings 25-35mmHG    Deep vein thrombosis (DVT) of left lower extremity, unspecified chronicity, unspecified vein (H)

## 2018-07-03 NOTE — PROGRESS NOTES
SUBJECTIVE:   Pavithra Laurent is a 78 year old female who presents to clinic today for the following health issues:      Rash      Duration: may 2018    Description  Location: low abdominal, under breast  Itching: severe    Intensity:  moderate    Accompanying signs and symptoms: itch, red, raised    History (similar episodes/previous evaluation): None    Precipitating or alleviating factors:  New exposures:  None  Recent travel: no      Therapies tried and outcome: hydrocortisone cream -  usually effective and tinactin       Problem list and histories reviewed & adjusted, as indicated.  Additional history: as documented    Reviewed and updated as needed this visit by clinical staff  Tobacco  Allergies  Meds  Med Hx  Surg Hx  Fam Hx  Soc Hx      Reviewed and updated as needed this visit by Provider         ROS:  Constitutional, HEENT, cardiovascular, pulmonary, gi and gu systems are negative, except as otherwise noted.    OBJECTIVE:     /62 (Cuff Size: Adult Large)  Pulse 76  Temp 97.7  F (36.5  C) (Tympanic)  Wt 175 lb 8 oz (79.6 kg)  SpO2 97%  BMI 32.1 kg/m2  Body mass index is 32.1 kg/(m^2).  GENERAL: healthy, alert and no distress  SKIN: no suspicious lesions or rashes and Examination of the rash reveals: underneath pannus and bilateral breasts dry, slightly raised, red patches with mild flaking.    ASSESSMENT/PLAN:     Pavithra was seen today for derm problem.    Diagnoses and all orders for this visit:    Tinea cruris  -     itraconazole (SPORANOX) 100 MG capsule; Take 2 capsules (200 mg) by mouth daily for 7 days  -     nystatin (MYCOSTATIN) 423817 UNIT/GM POWD; Apply topically 3 times daily as needed    Tinea corporis  -     itraconazole (SPORANOX) 100 MG capsule; Take 2 capsules (200 mg) by mouth daily for 7 days  -     nystatin (MYCOSTATIN) 359950 UNIT/GM POWD; Apply topically 3 times daily as needed        For diffuse tinea cruris and tinea corporis that has not resolved with topical  treatments will switch to oral medication.  Discussed risks benefits and side effects of this.  Will use topical nystatin as preventative and symptom relief as well.  Follow-up if symptoms worsen or do not improve.      Patient Instructions     1. Use itraconazole pill 200 mg once a day for 1 week.  2. Use topical powder three times a day as needed.  3. Stop hydrocortisone.    Understanding Tinea Cruris  Tinea cruris is a type of fungal infection. The fungus infects the skin in the groin. It is more commonly called jock itch. Men are more prone to it than women.  How to say it  TIN-ee-a CROO-ris   What causes tinea cruris?  Tinea cruris occurs when certain types of fungus grow in the groin area. The infection often spreads from the feet to the groin. The fungal infection on the foot is called athlete s foot (tinea pedis). The infection can also be passed from person to person. You can get it if you touch an infected surface, such as a towel or bench in a locker room. It is more common if you have unusual heat, sweating, or friction in the groin, or are overweight.  What are the symptoms of tinea cruris?  Symptoms include:    Red patches along the upper thigh, butt, and groin. The patches have a well-marked border, and sometimes a clear central area.    Itchiness    Scaling skin along the border of the rash    Red bumps (pustules and papules)    Burning or stinging sensation  How is tinea cruris treated?  With proper treatment, tinea cruris will go away in 2 to 3 weeks. Treatments include:    Oral or skin medicines. These may help reduce itching.    Over-the-counter or prescription antifungal medicines for the skin. Thesedestroy the fungus and ease symptoms. You may need to take other fungal medicine by mouth if the infection does not go away.    Good hygiene.  Keep the groin area clean and dry. Take a bath or shower as soon as possible after physical activity. Don t wear sweaty clothes for an extended time. Wear  loose cotton underwear. Drying powders may be helpful if you sweat a lot.  What are the possible complications of tinea cruris?  Possible complications include:    Repeated fungal infections    Bacterial infection of the affected skin  When should I call my healthcare provider?  Call your healthcare provider right away if you have any of these:    Pain that gets worse    Symptoms that don t get better, or get worse    New symptoms   Date Last Reviewed: 3/30/2016    0578-4614 [employer]. [address] [place] [address], [place] [address]. All rights reserved. This information is not intended as a substitute for professional medical care. Always follow your healthcare professional's instructions.            Kitty Owens MD  [facility]

## 2018-07-05 ENCOUNTER — TELEPHONE (OUTPATIENT)
Dept: FAMILY MEDICINE | Facility: CLINIC | Age: 78
End: 2018-07-05

## 2018-07-05 NOTE — TELEPHONE ENCOUNTER
Prior Authorization Retail Medication Request    Medication/Dose: Itraconazole 100MG capsules  ICD code (if different than what is on RX):    Previously Tried and Failed:    Rationale:      Insurance Name:    Insurance ID:        Pharmacy Information (if different than what is on RX)  Name:    Phone:      PA started on CMM. Key: [id number]

## 2018-07-05 NOTE — TELEPHONE ENCOUNTER
Central Prior Authorization Team   Phone: [phone]    PA Initiation    Medication: Itraconazole 100MG capsules  Insurance Company: Other (see comments)Comment:  [employer] [phone]  Pharmacy Filling the Rx: [id number] - [place], [place] - [address] [place] [address]  Filling Pharmacy Phone: [phone]  Filling Pharmacy Fax:    Start Date: 7/5/2018

## 2018-07-06 NOTE — TELEPHONE ENCOUNTER
Prior Authorization Approval    Authorization Effective Date: 4/6/2018  Authorization Expiration Date: 1/1/2019  Medication: Itraconazole 100MG capsules-APPROVED  Approved Dose/Quantity:   Reference #:     Insurance Company: Other (see comments)Comment:  [employer] [phone]  Expected CoPay:       CoPay Card Available:      Foundation Assistance Needed:    Which Pharmacy is filling the prescription (Not needed for infusion/clinic administered): [id number] - [place], [place] - [address] [place] [address]  Pharmacy Notified: Yes  Patient Notified: No    Pharmacy will notify patient when medication is ready.

## 2018-07-10 ENCOUNTER — THERAPY VISIT (OUTPATIENT)
Dept: PHYSICAL THERAPY | Facility: CLINIC | Age: 78
End: 2018-07-10
Payer: COMMERCIAL

## 2018-07-10 DIAGNOSIS — M75.81 RIGHT ROTATOR CUFF TENDINITIS: ICD-10-CM

## 2018-07-10 PROCEDURE — 97140 MANUAL THERAPY 1/> REGIONS: CPT | Mod: GP | Performed by: PHYSICAL THERAPIST

## 2018-07-10 PROCEDURE — 97110 THERAPEUTIC EXERCISES: CPT | Mod: GP | Performed by: PHYSICAL THERAPIST

## 2018-07-10 PROCEDURE — 97112 NEUROMUSCULAR REEDUCATION: CPT | Mod: GP | Performed by: PHYSICAL THERAPIST

## 2018-07-10 NOTE — LETTER
[facility]  [address]  [facility] [address]  [phone]    2018    Re: Pavithra Laurent   :   1940  MRN:  [id number]   REFERRING PHYSICIAN:   Branden Meyer    [facility]    Date of Initial Evaluation: 2018  Visits:  Rxs Used: 4  Reason for Referral:  Right rotator cuff tendinitis    EVALUATION SUMMARY    Assessment/Plan:    PROGRESS  REPORT    Progress reporting period is from 18 to 18.       SUBJECTIVE  Subjective changes noted by patient:  .  Subjective: Right shoulder feeling significantly better.  Will note intermittent right deltoid pain.  Tlerating all ex well.    Current pain level is   .     Previous pain level was   Initial Pain level: 8/10 (At worst).   Changes in function:  Yes (See Goal flowsheet attached for changes in current functional level)  Adverse reaction to treatment or activity: None    OBJECTIVE  Changes noted in objective findings:    Objective: A and P right shoulder elevation:  Loss of5-7 degrees-full chain.  ER, pure GH abd approaching NL. Pt cont to note intermittent right hand and forearm sx.  Has seen a neurologist.  Check of neural tension in right arm revealed reproduction of right deltoid pain with median and ulnar ULTT. Hypo right 1st rib.     ASSESSMENT/PLAN  Updated problem list and treatment plan: Diagnosis 1:  Right shoulder RC tendinits  Pain -  manual therapy, self management, education and home program  Decreased ROM/flexibility - manual therapy and therapeutic exercise  Decreased joint mobility - manual therapy and therapeutic exercise  Decreased strength - therapeutic exercise and therapeutic activities  Impaired muscle performance - neuro re-education  Decreased function - therapeutic activities  Impaired posture - neuro re-education  STG/LTGs have been met or progress has been made towards goals:  Yes (See Goal flow sheet  completed today.)  Assessment of Progress: The patient's condition is improving.  Self Management Plans:  Patient has been instructed in a home treatment program.  Patient  has been instructed in self management of symptoms.  Pavithra continues to require the following intervention to meet STG and LTG's:  PT        Re: Pavithra CARLY Mehran  Recommendations:  This patient would benefit from continued therapy.     Frequency:  1 X week, once daily  Duration:  for 2 weeks  Please refer to the daily flowsheet for treatment today, total treatment time and time spent performing 1:1 timed codes.    Thank you for your referral.    INQUIRIES  Therapist: Ct Martinez, PT, ScD, [facility]  [facility]  [address] [place] [address] [address] [place] [id number]  Phone: [phone]  Fax: [phone]

## 2018-07-11 NOTE — PROGRESS NOTES
Subjective:  HPI                    Objective:  System    Physical Exam    General     ROS    Assessment/Plan:    PROGRESS  REPORT    Progress reporting period is from 5/11/18 to 7/11/18.       SUBJECTIVE  Subjective changes noted by patient:  .  Subjective: Right shoulder feeling significantly better.  Will note intermittent right deltoid pain.  Tlerating all ex well.    Current pain level is   .     Previous pain level was   Initial Pain level: 8/10 (At worst).   Changes in function:  Yes (See Goal flowsheet attached for changes in current functional level)  Adverse reaction to treatment or activity: None    OBJECTIVE  Changes noted in objective findings:    Objective: A and P right shoulder elevation:  Loss of5-7 degrees-full chain.  ER, pure GH abd approaching NL. Pt cont to note intermittent right hand and forearm sx.  Has seen a neurologist.  Check of neural tension in right arm revealed reproduction of right deltoid pain with median and ulnar ULTT. Hypo right 1st rib.     ASSESSMENT/PLAN  Updated problem list and treatment plan: Diagnosis 1:  Right shoulder RC tendinits  Pain -  manual therapy, self management, education and home program  Decreased ROM/flexibility - manual therapy and therapeutic exercise  Decreased joint mobility - manual therapy and therapeutic exercise  Decreased strength - therapeutic exercise and therapeutic activities  Impaired muscle performance - neuro re-education  Decreased function - therapeutic activities  Impaired posture - neuro re-education  STG/LTGs have been met or progress has been made towards goals:  Yes (See Goal flow sheet completed today.)  Assessment of Progress: The patient's condition is improving.  Self Management Plans:  Patient has been instructed in a home treatment program.  Patient  has been instructed in self management of symptoms.  Pavithra continues to require the following intervention to meet STG and LTG's:  PT    Recommendations:  This patient would benefit  from continued therapy.     Frequency:  1 X week, once daily  Duration:  for 2 weeks        Please refer to the daily flowsheet for treatment today, total treatment time and time spent performing 1:1 timed codes.

## 2018-07-17 ENCOUNTER — THERAPY VISIT (OUTPATIENT)
Dept: PHYSICAL THERAPY | Facility: CLINIC | Age: 78
End: 2018-07-17
Payer: COMMERCIAL

## 2018-07-17 DIAGNOSIS — M75.81 RIGHT ROTATOR CUFF TENDINITIS: ICD-10-CM

## 2018-07-17 PROCEDURE — 97140 MANUAL THERAPY 1/> REGIONS: CPT | Mod: GP | Performed by: PHYSICAL THERAPIST

## 2018-07-17 PROCEDURE — 97110 THERAPEUTIC EXERCISES: CPT | Mod: GP | Performed by: PHYSICAL THERAPIST

## 2018-07-18 ENCOUNTER — TELEPHONE (OUTPATIENT)
Dept: FAMILY MEDICINE | Facility: CLINIC | Age: 78
End: 2018-07-18

## 2018-07-18 NOTE — TELEPHONE ENCOUNTER
Reason for Call:  Other call back    Detailed comments:    Thank you for the medication prescribed for her fungal infection    It has worked for her and helped a great deal     She still has half a container of the nystatin (MYCOSTATIN) 656058 UNIT/GM POWD left    Should she continue to use it until gone and/or should she continue to use it as a preventative?    Please call the patient to discuss    Phone Number Patient can be reached at: Home number on file [phone] (home)    Best Time:   anytime    Can we leave a detailed message on this number? YES    Call taken on 7/18/2018 at 11:54 AM by MIKKI PARISH

## 2018-07-23 ENCOUNTER — THERAPY VISIT (OUTPATIENT)
Dept: PHYSICAL THERAPY | Facility: CLINIC | Age: 78
End: 2018-07-23
Payer: COMMERCIAL

## 2018-07-23 DIAGNOSIS — M75.81 RIGHT ROTATOR CUFF TENDINITIS: ICD-10-CM

## 2018-07-23 PROCEDURE — 97110 THERAPEUTIC EXERCISES: CPT | Mod: GP | Performed by: PHYSICAL THERAPIST

## 2018-07-23 PROCEDURE — 97112 NEUROMUSCULAR REEDUCATION: CPT | Mod: GP | Performed by: PHYSICAL THERAPIST

## 2018-07-23 NOTE — PROGRESS NOTES
Subjective:  HPI                    Objective:  System    Physical Exam    General     ROS    Assessment/Plan:    DISCHARGE REPORT    Progress reporting period is from 7/11/18 to 7/23/18.       SUBJECTIVE  Subjective changes noted by patient:  .  Subjective: Pt states that she noted more hand cramping after driving to and helping her son clean.  Some increased soreness from post shoulder to hand today.  Has not noted sharp pain for some time.  Will be following up with neurologist re:  cramping.  Feels comfortable with HEP.    Current pain level is   .     Previous pain level was   Initial Pain level: 8/10 (At worst).   Changes in function:  Yes (See Goal flowsheet attached for changes in current functional level)  Adverse reaction to treatment or activity: None    OBJECTIVE  Changes noted in objective findings:    Objective:  stremgth=50 ft lbs B.  A/PROM of right shoulder:Flex-140-145, ER-50-able to nmormalize with repositioning of GHJ, pure GHabd-90.  MMT to right shoulder-gr 4+/5 infraspinatus, weak SA.  Pt given ex to address weakness.  Pt has completed ordered course of treatment and is DCed from [facility].     ASSESSMENT/PLAN  Updated problem list and treatment plan: Diagnosis 1:  Right RC pain  Pain -  manual therapy, self management, education and home program  Decreased ROM/flexibility - manual therapy and therapeutic exercise  Decreased strength - therapeutic exercise and therapeutic activities  Decreased function - therapeutic activities  Impaired posture - neuro re-education  STG/LTGs have been met or progress has been made towards goals:  Yes (See Goal flow sheet completed today.)  Assessment of Progress: The patient's condition is improving.  Self Management Plans:  Patient is independent in a home treatment program.  Patient is independent in self management of symptoms.    Pavithra continues to require the following intervention to meet STG and LTG's:  PT    Recommendations:  This patient would benefit  from further evaluation.    Please refer to the daily flowsheet for treatment today, total treatment time and time spent performing 1:1 timed codes.

## 2018-08-04 DIAGNOSIS — M79.652 LEFT THIGH PAIN: ICD-10-CM

## 2018-08-06 NOTE — TELEPHONE ENCOUNTER
Requested Prescriptions   Pending Prescriptions Disp Refills     gabapentin (NEURONTIN) 300 MG capsule      Last Written Prescription Date:  11/7/17  Last Fill Quantity: 270,   # refills: 3  Last Office Visit: 7/3/18 Roman  Future Office visit:       Routing refill request to provider for review/approval because:  Drug not on the [facility], [facility] or [facility] refill protocol or controlled substance   270 capsule 3     Sig: TAKE 1 CAPSULE BY MOUTH 3TIMES A DAY    There is no refill protocol information for this order

## 2018-08-08 RX ORDER — GABAPENTIN 300 MG/1
CAPSULE ORAL
Qty: 270 CAPSULE | Refills: 3 | Status: SHIPPED | OUTPATIENT
Start: 2018-08-08 | End: 2018-10-30

## 2018-10-12 ENCOUNTER — OFFICE VISIT (OUTPATIENT)
Dept: FAMILY MEDICINE | Facility: CLINIC | Age: 78
End: 2018-10-12
Payer: COMMERCIAL

## 2018-10-12 ENCOUNTER — TELEPHONE (OUTPATIENT)
Dept: FAMILY MEDICINE | Facility: CLINIC | Age: 78
End: 2018-10-12

## 2018-10-12 VITALS
BODY MASS INDEX: 32.37 KG/M2 | OXYGEN SATURATION: 97 % | WEIGHT: 177 LBS | DIASTOLIC BLOOD PRESSURE: 68 MMHG | HEART RATE: 91 BPM | RESPIRATION RATE: 16 BRPM | SYSTOLIC BLOOD PRESSURE: 112 MMHG

## 2018-10-12 DIAGNOSIS — Z23 NEED FOR PROPHYLACTIC VACCINATION AND INOCULATION AGAINST INFLUENZA: ICD-10-CM

## 2018-10-12 DIAGNOSIS — I82.402 DEEP VEIN THROMBOSIS (DVT) OF LEFT LOWER EXTREMITY, UNSPECIFIED CHRONICITY, UNSPECIFIED VEIN (H): ICD-10-CM

## 2018-10-12 DIAGNOSIS — B35.4 TINEA CORPORIS: Primary | ICD-10-CM

## 2018-10-12 PROCEDURE — 99214 OFFICE O/P EST MOD 30 MIN: CPT | Mod: 25 | Performed by: PHYSICIAN ASSISTANT

## 2018-10-12 PROCEDURE — 90662 IIV NO PRSV INCREASED AG IM: CPT | Performed by: PHYSICIAN ASSISTANT

## 2018-10-12 PROCEDURE — G0008 ADMIN INFLUENZA VIRUS VAC: HCPCS | Performed by: PHYSICIAN ASSISTANT

## 2018-10-12 RX ORDER — NYSTATIN 100000 [USP'U]/G
POWDER TOPICAL 3 TIMES DAILY PRN
Qty: 30 G | Refills: 1 | Status: SHIPPED | OUTPATIENT
Start: 2018-10-12 | End: 2020-02-07

## 2018-10-12 NOTE — TELEPHONE ENCOUNTER
Reason for Call:  Other    Detailed comments: patient has appointment with Dr. Owens on Tuesday, but has rash.  Could she fill medication for her before appt.  flocinolone cream .025 and topical solution flocinonide.  Or is there  Something over the counter.      Phone Number Patient can be reached at: Home number on file [phone] (home)    Best Time: today asap     Can we leave a detailed message on this number? YES    Call taken on 10/12/2018 at 9:11 AM by HAWK MCKENNA

## 2018-10-12 NOTE — PROGRESS NOTES

## 2018-10-12 NOTE — TELEPHONE ENCOUNTER
Pavithra Laurent is a 78 year old female who calls with a rash. She has seen dermatology on the past but stopped going with them since they were not able to help her with symptoms in the past. PCP prescribed medication that she found helpful. Advised OV. This was scheduled today.    NURSING ASSESSMENT:   The rash began  2 week ago.   Patient's rash is located on upper legs both and hip.  Rash is described as pimples and raised, with a color of red with No drainage.  Rash is itching and spreading.  Associated symptoms: Body aches or joint pain: YES Hx of arthritis   Patient denies exposure to new products.  Patient is not on any new medications.  Patient has tried new bar soaps for sens, detergents, perfumes or lotions.  Patients is allergic to unknown. Seasonal.  Other members of the household have not had symptoms.

## 2018-10-12 NOTE — MR AVS SNAPSHOT
After Visit Summary   10/12/2018    Pavithra Laurent    MRN: [id number]           Patient Information     Date Of Birth          1940        Visit Information        Provider Department      10/12/2018 1:20 PM Whit Cox PA-C [facility]        Today's Diagnoses     Need for prophylactic vaccination and inoculation against influenza    -  1    Tinea cruris        Tinea corporis           Follow-ups after your visit        Follow-up notes from your care team     Return in about 2 weeks (around 10/26/2018).      Your next 10 appointments already scheduled     Oct 16, 2018 11:20 AM CDT   Office Visit with Kitty Owens MD   [facility] ([facility])    [address] [address] [phone]           Bring a current list of meds and any records pertaining to this visit. For Physicals, please bring immunization records and any forms needing to be filled out. Please arrive 10 minutes early to complete paperwork.              Who to contact     If you have questions or need follow up information about today's clinic visit or your schedule please contact [facility] directly at [phone].  Normal or non-critical lab and imaging results will be communicated to you by MyChart, letter or phone within 4 business days after the clinic has received the results. If you do not hear from us within 7 days, please contact the clinic through [employer] or phone. If you have a critical or abnormal lab result, we will notify you by phone as soon as possible.  Submit refill requests through [employer] or call your pharmacy and they will forward the refill request to us. Please allow 3 business days for your refill to be completed.          Additional Information About Your Visit        [employer] Information     [employer] lets you send messages to  "your doctor, view your test results, renew your prescriptions, schedule appointments and more. To sign up, go to [email or web address] . Click on \"Log in\" on the left side of the screen, which will take you to the Welcome page. Then click on \"Sign up Now\" on the right side of the page.     You will be asked to enter the access code listed below, as well as some personal information. Please follow the directions to create your username and password.     Your access code is: [email or web address]  Expires: 1/10/2019  1:59 PM     Your access code will  in 90 days. If you need help or a new code, please call your [place] clinic or [phone].        Care EveryWhere ID     This is your Care EveryWhere ID. This could be used by other organizations to access your [place] medical records  [id number]        Your Vitals Were     Pulse Respirations Pulse Oximetry BMI (Body Mass Index)          91 16 97% 32.37 kg/m2         Blood Pressure from Last 3 Encounters:   10/12/18 112/68   18 110/62   18 149/78    Weight from Last 3 Encounters:   10/12/18 177 lb (80.3 kg)   18 175 lb 8 oz (79.6 kg)   18 176 lb (79.8 kg)              We Performed the Following     ADMIN INFLUENZA (For MEDICARE Patients ONLY) []     FLU VACCINE, INCREASED ANTIGEN, PRESV FREE, AGE 65+ [90546]          Today's Medication Changes          These changes are accurate as of 10/12/18  1:59 PM.  If you have any questions, ask your nurse or doctor.               These medicines have changed or have updated prescriptions.        Dose/Directions    gabapentin 300 MG capsule   Commonly known as:  NEURONTIN   This may have changed:    - how much to take  - additional instructions   Used for:  Left thigh pain        TAKE 1 CAPSULE BY MOUTH 3TIMES A DAY   Quantity:  270 capsule   Refills:  3            Where to get your medicines      These medications were sent to [id number] - [place], [place] - [address] [facility] [address]     Phone:  [phone]     nystatin [id number] UNIT/GM Powd                Primary Care Provider Office Phone # Fax #    Kitty Lorena Owens [phone] [address] [facility] [address]        Equal Access to Services     BETTINA PRAKASH : Hadii aad ku hadasho Soomaali, waaxda luqadaha, qaybta kaalmada adeegyada, waxay johnnyin haypeggyn ademundo riversjosesaman newby. So [facility] [phone].    ATENCIÓN: Si habla español, tiene a nicole disposición servicios gratuitos de asistencia lingüística. [facility] [phone].    We comply with applicable federal civil rights laws and [place] laws. We do not discriminate on the basis of race, color, national origin, age, disability, sex, sexual orientation, or gender identity.            Thank you!     Thank you for choosing [facility]  for your care. Our goal is always to provide you with excellent care. Hearing back from our patients is one way we can continue to improve our services. Please take a few minutes to complete the written survey that you may receive in the mail after your visit with us. Thank you!             Your Updated Medication List - Protect others around you: Learn how to safely use, store and throw away your medicines at www.disposemymeds.org.          This list is accurate as of 10/12/18  2:00 PM.  Always use your most recent med list.                   Brand Name Dispense Instructions for use Diagnosis    acetaminophen 500 MG tablet    TYLENOL     Take 500-1,000 mg by mouth every 6 hours as needed for mild pain        azelastine 0.1 % nasal spray    ASTELIN     Spray 2 sprays into both nostrils 2 times daily as needed        CALCIUM SOFT CHEWS 500-500-40 MG-UNT-MCG Chew   Generic drug:  calcium-vitamin D-vitamin K      Take 2 tablets by mouth 2 times daily        cycloSPORINE 0.05 % ophthalmic emulsion    RESTASIS     Place 1 drop into both eyes every 12 hours.        DERMOTIC 0.01 %  Oil   Generic drug:  fluocinolone acetonide      Place 1 drop in ear(s) daily as needed.        desonide 0.05 % cream    DESOWEN     Apply topically 2 times daily as needed        Dextran 70 0.1%-Hypromellose 0.3% 0.1-0.3 % Soln ophthalmic solution      Place 1 drop into both eyes as needed for dry eyes        diclofenac 1 % Gel topical gel    VOLTAREN     Place 2 g onto the skin 4 times daily        epinastine HCl 0.05 % Soln    ELESTAT     1 drop 2 times daily as needed        erythromycin ophthalmic ointment    ROMYCIN     1 Application nightly as needed        fluticasone 50 MCG/ACT spray    FLONASE     Spray 1 spray into both nostrils daily        gabapentin 300 MG capsule    NEURONTIN    270 capsule    TAKE 1 CAPSULE BY MOUTH 3TIMES A DAY    Left thigh pain       IBUPROFEN PO      Take 400 mg by mouth every 4 hours as needed for moderate pain        levothyroxine 88 MCG tablet    SYNTHROID/LEVOTHROID    90 tablet    TAKE 1 TABLET (88 MCG) BY MOUTH DAILY    Hypothyroidism due to acquired atrophy of thyroid       melatonin 3 MG tablet      Take 3 mg by mouth nightly as needed for sleep        metroNIDAZOLE 1 % gel    METROGEL     Apply 0.5 inches topically daily.        montelukast 10 MG tablet    SINGULAIR     Take 10 mg by mouth daily        nystatin [id number] UNIT/GM Powd    MYCOSTATIN    30 g    Apply topically 3 times daily as needed    Tinea cruris, Tinea corporis       order for DME     1 each    Equipment being ordered: compression stockings 25-35mmHG    Deep vein thrombosis (DVT) of left lower extremity, unspecified chronicity, unspecified vein (H)

## 2018-10-12 NOTE — PROGRESS NOTES
"  SUBJECTIVE:   Pavithra Laurent is a 78 year old female who presents to clinic today for the following health issues:      Rash      Duration: ongoing    Description  Location: left and right hip area  Itching: moderate    Intensity:  moderate    Accompanying signs and symptoms: None    History (similar episodes/previous evaluation): None    Precipitating or alleviating factors:  New exposures:  None  Recent travel: no      Therapies tried and outcome: none      HPI additional notes:    Chief Complaint   Patient presents with     Derm Problem     Pavithra presents today with rash. Patient had similar rash about a year ago after bout of \"jock itch\" but cannot recall what helped. Had topical steroid at home, so has been applying that, but not helping. Rash on Lt hip; somewhat scaly, non-painful, non-itchy. Rash now also present on Rt hip. Seems to be worse after using hot tub at gym.    Saw on TV program that people older than 70 shouldn't be on aspirin due to risk of spontaneous brain bleed. Patient takes daily baby aspirin; can she discontinue?     ROS:    A Comprehensive greater than 10 system review of systems was carried out.    Pertinent positives and negatives are noted above.  Otherwise negative for contributory information.      Chart Review:  History   Smoking Status     Never Smoker   Smokeless Tobacco     Never Used       Patient Active Problem List   Diagnosis     Nasal congestion     Environmental allergies     Acute bronchitis with coexisting condition, need prophylactic therapy     GERD (gastroesophageal reflux disease)     Hypothyroidism     Arm pain     Rosacea     Dry eye syndrome     Elbow dislocation     Lesion of ulnar nerve     CARDIOVASCULAR SCREENING; LDL GOAL LESS THAN 130     Deep vein thrombosis (DVT) of left lower extremity, unspecified chronicity, unspecified vein (H)     Long-term (current) use of anticoagulants [Z79.01]     Trochanteric bursitis of left hip     Piriformis syndrome of left " side     Hx of blood clots     Neck pain     TIA (transient ischemic attack)     Past Surgical History:   Procedure Laterality Date     ARTHROTOMY ELBOW Right 1/7/2015    Procedure: ARTHROTOMY ELBOW;  Surgeon: Branden Meyer MD;  Location:  OR     C RAD RESEC TONSIL/PILLARS  1948    tonsillectomy     CLOSED REDUCTION UPPER EXTREMITY  12/26/2014    Procedure: CLOSED REDUCTION UPPER EXTREMITY;  Surgeon: Louis Daniel MD;  Location:  OR     GYN SURGERY  1984    tubal ligation     IRRIGATION AND DEBRIDEMENT HAND, COMBINED  12/26/2014    Procedure: COMBINED IRRIGATION AND DEBRIDEMENT HAND;  Surgeon: Louis Daniel MD;  Location:  OR     KNEE SURGERY  2001    arthroscopic right     LAPAROSCOPIC CHOLECYSTECTOMY  8/16/2012    Procedure: LAPAROSCOPIC CHOLECYSTECTOMY;  LAPAROSCOPIC CHOLECYSTECTOMY ;  Surgeon: Preston Walters MD;  Location: [facility]     OPEN REDUCTION INTERNAL FIXATION FOREARM Right 12/26/2014    Procedure: OPEN REDUCTION INTERNAL FIXATION FOREARM;  Surgeon: Louis Daniel MD;  Location:  OR     ORTHOPEDIC SURGERY  2007,2008    bunyanectomy, hammer toe, torn meniscus, toe and knee replacement     TKA  2009    right knee     WISDOM TEETH EXTRACTION  1958     Problem list, Medication list, Allergies, Medical/Social/Surg hx reviewed in [facility], updated as appropriate.   OBJECTIVE:                                                    /68  Pulse 91  Resp 16  Wt 177 lb (80.3 kg)  SpO2 97%  BMI 32.37 kg/m2  Body mass index is 32.37 kg/(m^2).  GENERAL: WDWN, no acute distress  PSYCH: pleasant, cooperative  EYES: no discharge, no injection  HENT:  Normocephalic. Moist mucus membranes.  NECK:  Supple, symmetric  EXTREMITIES:  No gross deformities, moves all 4 limbs spontaneously  SKIN: Large, well-circumscribed, scaly, circular rash on Lt hip. Erythematous, well-demarcated patches with satellite lesions along bilat fold of abdominal pannus.  NEUROLOGIC: alert, sensation grossly intact.    Diagnostic test  results: none     ASSESSMENT/PLAN:                                                          ICD-10-CM    1. Tinea corporis B35.4 nystatin (MYCOSTATIN) 975741 UNIT/GM POWD   2. Deep vein thrombosis (DVT) of left lower extremity, unspecified chronicity, unspecified vein (H) I82.402    3. Need for prophylactic vaccination and inoculation against influenza Z23 FLU VACCINE, INCREASED ANTIGEN, PRESV FREE, AGE 65+ [07677]     ADMIN INFLUENZA (For MEDICARE Patients ONLY) []     Rash consistent with tinea and intertrigo candida. Apply topical ketoconazole BID to tinea lesion (patient has plenty at home, no rx given today) and nystatin powder to abdominal pannus until rashes resolve. Keep areas clean and dry. Discontinue use of topical steroids as this can make tinea worse.    Discussed generally ASA not recommend for daily use past age 70 due to lack of CV benefits and higher fall risk. However, given patient's history of DVT and TIA, recommend continued use of daily ASA as at higher risk for clot, rather than bleed, at this time. Discussed use of anticoagulants is revisited frequently if anything in her health changes that would put her at increase risk of catastrophic bleed.    Please see patient instructions for treatment details.  25 minutes total spent during this visit, >50% spent in counseling and coordination of patient care.    Follow up in 7-10 days if not improving as anticipated, sooner PRN.    Whit Cox PA-C  [facility]

## 2018-10-29 ENCOUNTER — TELEPHONE (OUTPATIENT)
Dept: FAMILY MEDICINE | Facility: CLINIC | Age: 78
End: 2018-10-29

## 2018-10-29 NOTE — TELEPHONE ENCOUNTER
Her rash was improving but late last week she noted they were oozing yellow and now scabs. It is painful. She can only come this afternoon and we don't have opening.  I took same day appt for her tomorrow with Dr Owens.     Charlene Alvarez RN- Triage [employer]

## 2018-10-30 ENCOUNTER — PATIENT OUTREACH (OUTPATIENT)
Dept: CARE COORDINATION | Facility: CLINIC | Age: 78
End: 2018-10-30

## 2018-10-30 ENCOUNTER — OFFICE VISIT (OUTPATIENT)
Dept: FAMILY MEDICINE | Facility: CLINIC | Age: 78
End: 2018-10-30
Payer: COMMERCIAL

## 2018-10-30 VITALS
HEART RATE: 89 BPM | TEMPERATURE: 98.1 F | DIASTOLIC BLOOD PRESSURE: 62 MMHG | BODY MASS INDEX: 32.65 KG/M2 | OXYGEN SATURATION: 97 % | SYSTOLIC BLOOD PRESSURE: 120 MMHG | WEIGHT: 178.5 LBS

## 2018-10-30 DIAGNOSIS — D72.829 LEUKOCYTOSIS, UNSPECIFIED TYPE: ICD-10-CM

## 2018-10-30 DIAGNOSIS — R23.3 EASY BRUISING: ICD-10-CM

## 2018-10-30 DIAGNOSIS — R21 RASH AND NONSPECIFIC SKIN ERUPTION: Primary | ICD-10-CM

## 2018-10-30 LAB
APTT PPP: 26 SEC (ref 22–37)
INR PPP: 1.02 (ref 0.86–1.14)
KOH PREP SPEC: NORMAL
SPECIMEN SOURCE: NORMAL

## 2018-10-30 PROCEDURE — 85025 COMPLETE CBC W/AUTO DIFF WBC: CPT | Performed by: FAMILY MEDICINE

## 2018-10-30 PROCEDURE — 85730 THROMBOPLASTIN TIME PARTIAL: CPT | Performed by: FAMILY MEDICINE

## 2018-10-30 PROCEDURE — 99214 OFFICE O/P EST MOD 30 MIN: CPT | Performed by: FAMILY MEDICINE

## 2018-10-30 PROCEDURE — 87220 TISSUE EXAM FOR FUNGI: CPT | Performed by: FAMILY MEDICINE

## 2018-10-30 PROCEDURE — 80076 HEPATIC FUNCTION PANEL: CPT | Performed by: FAMILY MEDICINE

## 2018-10-30 PROCEDURE — 85610 PROTHROMBIN TIME: CPT | Performed by: FAMILY MEDICINE

## 2018-10-30 PROCEDURE — 36415 COLL VENOUS BLD VENIPUNCTURE: CPT | Performed by: FAMILY MEDICINE

## 2018-10-30 RX ORDER — TRIAMCINOLONE ACETONIDE 1 MG/G
CREAM TOPICAL
Qty: 80 G | Refills: 1 | Status: ON HOLD | OUTPATIENT
Start: 2018-10-30 | End: 2019-06-18

## 2018-10-30 ASSESSMENT — ACTIVITIES OF DAILY LIVING (ADL): DEPENDENT_IADLS:: INDEPENDENT

## 2018-10-30 NOTE — LETTER
November 6, 2018      Pavithra Laurent  [address] [facility] [address]        Dear ,    We are writing to inform you of your test results.    Your test results fall within the expected range(s) or remain unchanged from previous results.  Please continue with current treatment plan.    Resulted Orders   KOH prep (skin, hair or nails only)   Result Value Ref Range    Specimen Description Skin     KOH Skin Hair Nails Test No fungal elements seen    CBC with platelets and differential   Result Value Ref Range    WBC 15.3 (H) 4.0 - 11.0 10e9/L    RBC Count 4.64 3.8 - 5.2 10e12/L    Hemoglobin 14.3 11.7 - 15.7 g/dL    Hematocrit 44.9 35.0 - 47.0 %    MCV 97 78 - 100 fl    MCH 30.8 26.5 - 33.0 pg    MCHC 31.8 31.5 - 36.5 g/dL    RDW 13.0 10.0 - 15.0 %    Platelet Count 306 150 - 450 10e9/L    Diff Method Automated Method     % Neutrophils 78.1 %    % Lymphocytes 15.0 %    % Monocytes 6.8 %    % Eosinophils 0.0 %    % Basophils 0.1 %    Absolute Neutrophil 11.9 (H) 1.6 - 8.3 10e9/L    Absolute Lymphocytes 2.3 0.8 - 5.3 10e9/L    Absolute Monocytes 1.0 0.0 - 1.3 10e9/L    Absolute Eosinophils 0.0 0.0 - 0.7 10e9/L    Absolute Basophils 0.0 0.0 - 0.2 10e9/L   INR   Result Value Ref Range    INR 1.02 0.86 - 1.14   Hepatic panel (Albumin, ALT, AST, Bili, Alk Phos, TP)   Result Value Ref Range    Bilirubin Direct 0.2 0.0 - 0.2 mg/dL    Bilirubin Total 0.7 0.2 - 1.3 mg/dL    Albumin 4.1 3.4 - 5.0 g/dL    Protein Total 7.7 6.8 - 8.8 g/dL    Alkaline Phosphatase 77 40 - 150 U/L    ALT 24 0 - 50 U/L    AST 22 0 - 45 U/L   Partial thromboplastin time   Result Value Ref Range    PTT 26 22 - 37 sec       If you have any questions or concerns, please call the clinic at the number listed above.       Sincerely,        Kitty Owens MD

## 2018-10-30 NOTE — MR AVS SNAPSHOT
"              After Visit Summary   10/30/2018    Pavithra Laurent    MRN: [id number]           Patient Information     Date Of Birth          1940        Visit Information        Provider Department      10/30/2018 11:00 AM Kitty Owens MD [facility]        Today's Diagnoses     Rash and nonspecific skin eruption    -  1    Easy bruising           Follow-ups after your visit        Who to contact     If you have questions or need follow up information about today's clinic visit or your schedule please contact [facility] directly at [phone].  Normal or non-critical lab and imaging results will be communicated to you by Semantifyhart, letter or phone within 4 business days after the clinic has received the results. If you do not hear from us within 7 days, please contact the clinic through Semantifyhart or phone. If you have a critical or abnormal lab result, we will notify you by phone as soon as possible.  Submit refill requests through [employer] inTouch or call your pharmacy and they will forward the refill request to us. Please allow 3 business days for your refill to be completed.          Additional Information About Your Visit        MyChart Information     [employer] inTouch lets you send messages to your doctor, view your test results, renew your prescriptions, schedule appointments and more. To sign up, go to [email or web address] . Click on \"Log in\" on the left side of the screen, which will take you to the Welcome page. Then click on \"Sign up Now\" on the right side of the page.     You will be asked to enter the access code listed below, as well as some personal information. Please follow the directions to create your username and password.     Your access code is: [email or web address]  Expires: 1/10/2019  1:59 PM     Your access code will  in 90 days. If you need help or a new code, please call your [facility] or [phone].        Care " EveryWhere ID     This is your Care EveryWhere ID. This could be used by other organizations to access your [place] medical records  [id number]        Your Vitals Were     Pulse Temperature Pulse Oximetry BMI (Body Mass Index)          89 98.1  F (36.7  C) (Tympanic) 97% 32.65 kg/m2         Blood Pressure from Last 3 Encounters:   10/30/18 120/62   10/12/18 112/68   07/03/18 110/62    Weight from Last 3 Encounters:   10/30/18 178 lb 8 oz (81 kg)   10/12/18 177 lb (80.3 kg)   07/03/18 175 lb 8 oz (79.6 kg)              We Performed the Following     CBC with platelets and differential     Hepatic panel (Albumin, ALT, AST, Bili, Alk Phos, TP)     INR     KOH prep (skin, hair or nails only)     Partial thromboplastin time          Today's Medication Changes          These changes are accurate as of 10/30/18  1:06 PM.  If you have any questions, ask your nurse or doctor.               Start taking these medicines.        Dose/Directions    triamcinolone 0.1 % cream   Commonly known as:  KENALOG   Used for:  Rash and nonspecific skin eruption   Started by:  Kitty Owens MD        Apply sparingly to affected area three times daily for 14 days.   Quantity:  80 g   Refills:  1         Stop taking these medicines if you haven't already. Please contact your care team if you have questions.     gabapentin 300 MG capsule   Commonly known as:  NEURONTIN   Stopped by:  Kitty Owens MD                Where to get your medicines      These medications were sent to [id number] - [address] [address]     Phone:  [phone]     triamcinolone 0.1 % cream                Primary Care Provider Office Phone # Fax #    Kitty Owens [phone]       [address] [place] [address]        Equal Access to Services     BETTINA PRAKASH AH: Faizan Hernandez, jairon durand, yves serrano  estefaniamundo silvestrekelly laGalenaagurdeep ah. [facility] [phone].    ATENCIÓN: Si manju dietrich, tiene a nicole disposición servicios gratuitos de asistencia lingüística. Lester ventura [phone].    We comply with applicable federal civil rights laws and [place] laws. We do not discriminate on the basis of race, color, national origin, age, disability, sex, sexual orientation, or gender identity.            Thank you!     Thank you for choosing [facility]  for your care. Our goal is always to provide you with excellent care. Hearing back from our patients is one way we can continue to improve our services. Please take a few minutes to complete the written survey that you may receive in the mail after your visit with us. Thank you!             Your Updated Medication List - Protect others around you: Learn how to safely use, store and throw away your medicines at www.disposemymeds.org.          This list is accurate as of 10/30/18  1:06 PM.  Always use your most recent med list.                   Brand Name Dispense Instructions for use Diagnosis    acetaminophen 500 MG tablet    TYLENOL     Take 500-1,000 mg by mouth every 6 hours as needed for mild pain        ASPIRIN PO      Take 81 mg by mouth    Easy bruising       azelastine 0.1 % nasal spray    ASTELIN     Spray 2 sprays into both nostrils 2 times daily as needed        CALCIUM SOFT CHEWS 500-500-40 MG-UNT-MCG Chew   Generic drug:  calcium-vitamin D-vitamin K      Take 2 tablets by mouth 2 times daily        cycloSPORINE 0.05 % ophthalmic emulsion    RESTASIS     Place 1 drop into both eyes every 12 hours.        DERMOTIC 0.01 % Oil   Generic drug:  fluocinolone acetonide      Place 1 drop in ear(s) daily as needed.        desonide 0.05 % cream    DESOWEN     Apply topically 2 times daily as needed        Dextran 70 0.1%-Hypromellose 0.3% 0.1-0.3 % Soln ophthalmic solution      Place 1 drop into both eyes as needed for dry eyes        diclofenac 1 %  Gel topical gel    VOLTAREN     Place 2 g onto the skin 4 times daily        epinastine HCl 0.05 % Soln    ELESTAT     1 drop 2 times daily as needed        erythromycin ophthalmic ointment    ROMYCIN     1 Application nightly as needed        fluticasone 50 MCG/ACT spray    FLONASE     Spray 1 spray into both nostrils daily        IBUPROFEN PO      Take 400 mg by mouth every 4 hours as needed for moderate pain        levothyroxine 88 MCG tablet    SYNTHROID/LEVOTHROID    90 tablet    TAKE 1 TABLET (88 MCG) BY MOUTH DAILY    Hypothyroidism due to acquired atrophy of thyroid       melatonin 3 MG tablet      Take 3 mg by mouth nightly as needed for sleep        metroNIDAZOLE 1 % gel    METROGEL     Apply 0.5 inches topically daily.        montelukast 10 MG tablet    SINGULAIR     Take 10 mg by mouth daily        nystatin 821518 UNIT/GM Powd    MYCOSTATIN    30 g    Apply topically 3 times daily as needed    Tinea corporis       order for DME     1 each    Equipment being ordered: compression stockings 25-35mmHG    Deep vein thrombosis (DVT) of left lower extremity, unspecified chronicity, unspecified vein (H)       triamcinolone 0.1 % cream    KENALOG    80 g    Apply sparingly to affected area three times daily for 14 days.    Rash and nonspecific skin eruption

## 2018-10-30 NOTE — PROGRESS NOTES
Clinic Care Coordination Contact    Clinic Care Coordination Contact  OUTREACH    Referral Information:  Referral Source: PCP    Chief Complaint   Patient presents with     Clinic Care Coordination - Initial     Medicare Concerns        Universal Utilization: Pt sees multiple specialty providers, some out of network.      Financial/Insurance:   Pt is currently on a cost plan through Medicare. PCP asked HANNA VELIZ to provide information to Pt who was confused about her options for plans starting in 2019. HANNA VELIZ provided Pt with printed resources as well as Senior Linkage Line information. HANNA VELIZ discussed changes with Pt briefly and Pt had a good deal of knowledge on her Medicare coverage. Pt stated she had attended a meeting where she was provided information by [employer] (her current Medicare provider) on upcoming changes and options. Pt felt confident she would be able to make a decision independently.     Patient/Caregiver understanding: Pt reports understanding and denies any additional questions or concerns at this times. HANNA VELIZ engaged in AIDET communication during encounter.      Plan: No further outreaches will be made at this time unless a new referral is made or a change in the pt's status occurs. Patient was provided with this writer's contact information and encouraged to call with any questions or concerns.    SERGEY Jarrell   Social Work Care Coordinator  [phone]

## 2018-10-30 NOTE — LETTER
November 6, 2018      Pavithra Laurent  [address] [address]        Dear ,    We are writing to inform you of your test results.    Your test results below show an elevated white blood cell count.  This can be from a variety of reasons, but I'd like you to recheck this to be sure it has come back down to normal.  Please come in for a LAB ONLY appointment to recheck your CBC within the next 1-2 weeks.  Let me know if you have any questions about this.      Resulted Orders   KOH prep (skin, hair or nails only)   Result Value Ref Range    Specimen Description Skin     KOH Skin Hair Nails Test No fungal elements seen    CBC with platelets and differential   Result Value Ref Range    WBC 15.3 (H) 4.0 - 11.0 10e9/L    RBC Count 4.64 3.8 - 5.2 10e12/L    Hemoglobin 14.3 11.7 - 15.7 g/dL    Hematocrit 44.9 35.0 - 47.0 %    MCV 97 78 - 100 fl    MCH 30.8 26.5 - 33.0 pg    MCHC 31.8 31.5 - 36.5 g/dL    RDW 13.0 10.0 - 15.0 %    Platelet Count 306 150 - 450 10e9/L    Diff Method Automated Method     % Neutrophils 78.1 %    % Lymphocytes 15.0 %    % Monocytes 6.8 %    % Eosinophils 0.0 %    % Basophils 0.1 %    Absolute Neutrophil 11.9 (H) 1.6 - 8.3 10e9/L    Absolute Lymphocytes 2.3 0.8 - 5.3 10e9/L    Absolute Monocytes 1.0 0.0 - 1.3 10e9/L    Absolute Eosinophils 0.0 0.0 - 0.7 10e9/L    Absolute Basophils 0.0 0.0 - 0.2 10e9/L   INR   Result Value Ref Range    INR 1.02 0.86 - 1.14   Hepatic panel (Albumin, ALT, AST, Bili, Alk Phos, TP)   Result Value Ref Range    Bilirubin Direct 0.2 0.0 - 0.2 mg/dL    Bilirubin Total 0.7 0.2 - 1.3 mg/dL    Albumin 4.1 3.4 - 5.0 g/dL    Protein Total 7.7 6.8 - 8.8 g/dL    Alkaline Phosphatase 77 40 - 150 U/L    ALT 24 0 - 50 U/L    AST 22 0 - 45 U/L   Partial thromboplastin time   Result Value Ref Range    PTT 26 22 - 37 sec       If you have any questions or concerns, please call the clinic at the number listed above.       Sincerely,        Kitty Owens,  MD

## 2018-10-31 LAB
ALBUMIN SERPL-MCNC: 4.1 G/DL (ref 3.4–5)
ALP SERPL-CCNC: 77 U/L (ref 40–150)
ALT SERPL W P-5'-P-CCNC: 24 U/L (ref 0–50)
AST SERPL W P-5'-P-CCNC: 22 U/L (ref 0–45)
BILIRUB DIRECT SERPL-MCNC: 0.2 MG/DL (ref 0–0.2)
BILIRUB SERPL-MCNC: 0.7 MG/DL (ref 0.2–1.3)
PROT SERPL-MCNC: 7.7 G/DL (ref 6.8–8.8)

## 2018-11-01 LAB
BASOPHILS # BLD AUTO: 0 10E9/L (ref 0–0.2)
BASOPHILS NFR BLD AUTO: 0.1 %
DIFFERENTIAL METHOD BLD: ABNORMAL
EOSINOPHIL # BLD AUTO: 0 10E9/L (ref 0–0.7)
EOSINOPHIL NFR BLD AUTO: 0 %
ERYTHROCYTE [DISTWIDTH] IN BLOOD BY AUTOMATED COUNT: 13 % (ref 10–15)
HCT VFR BLD AUTO: 44.9 % (ref 35–47)
HGB BLD-MCNC: 14.3 G/DL (ref 11.7–15.7)
LYMPHOCYTES # BLD AUTO: 2.3 10E9/L (ref 0.8–5.3)
LYMPHOCYTES NFR BLD AUTO: 15 %
MCH RBC QN AUTO: 30.8 PG (ref 26.5–33)
MCHC RBC AUTO-ENTMCNC: 31.8 G/DL (ref 31.5–36.5)
MCV RBC AUTO: 97 FL (ref 78–100)
MONOCYTES # BLD AUTO: 1 10E9/L (ref 0–1.3)
MONOCYTES NFR BLD AUTO: 6.8 %
NEUTROPHILS # BLD AUTO: 11.9 10E9/L (ref 1.6–8.3)
NEUTROPHILS NFR BLD AUTO: 78.1 %
PLATELET # BLD AUTO: 306 10E9/L (ref 150–450)
RBC # BLD AUTO: 4.64 10E12/L (ref 3.8–5.2)
WBC # BLD AUTO: 15.3 10E9/L (ref 4–11)

## 2018-11-02 ENCOUNTER — TELEPHONE (OUTPATIENT)
Dept: FAMILY MEDICINE | Facility: CLINIC | Age: 78
End: 2018-11-02

## 2018-11-02 NOTE — TELEPHONE ENCOUNTER
Reason for Call:  Other call back    Detailed comments: wants to know when she can go swimming    Phone Number Patient can be reached at: Home number on file [phone] (home)    Best Time:     Can we leave a detailed message on this number? YES    Call taken on 11/2/2018 at 9:54 AM by LEN PANDEY

## 2018-11-02 NOTE — TELEPHONE ENCOUNTER
Due to her rash, she probably shouldn't swim, when do you think it would be safe for the patient to go swimming?    Routing to provider

## 2018-11-02 NOTE — TELEPHONE ENCOUNTER
Actually, I think she can swim now.  Just hydrate rash with strong lotion (vaseline, aquaphor) before and after.  Thanks,  Dr. Kitty Owens MD/[facility]

## 2018-11-07 NOTE — PROGRESS NOTES
Also asked her to recheck WBC next time in office. 1-2 months to be sure normalized.  Dr. Kitty Owens MD/[facility]

## 2018-11-07 NOTE — PROGRESS NOTES
Letter sent with normal result note below:  Dear Pavithra,  These results are in an acceptable range.  I recommend no change to your plan of care.  Please contact the clinic with any questions.  Sincerely,  Dr. Kitty Owens MD  [facility]  [phone]

## 2018-11-09 DIAGNOSIS — D72.829 LEUKOCYTOSIS, UNSPECIFIED TYPE: ICD-10-CM

## 2018-11-09 LAB
ERYTHROCYTE [DISTWIDTH] IN BLOOD BY AUTOMATED COUNT: 13.2 % (ref 10–15)
HCT VFR BLD AUTO: 44.2 % (ref 35–47)
HGB BLD-MCNC: 14.6 G/DL (ref 11.7–15.7)
MCH RBC QN AUTO: 31.1 PG (ref 26.5–33)
MCHC RBC AUTO-ENTMCNC: 33 G/DL (ref 31.5–36.5)
MCV RBC AUTO: 94 FL (ref 78–100)
PLATELET # BLD AUTO: 280 10E9/L (ref 150–450)
RBC # BLD AUTO: 4.69 10E12/L (ref 3.8–5.2)
WBC # BLD AUTO: 12.4 10E9/L (ref 4–11)

## 2018-11-09 PROCEDURE — 36415 COLL VENOUS BLD VENIPUNCTURE: CPT | Performed by: FAMILY MEDICINE

## 2018-11-09 PROCEDURE — 85027 COMPLETE CBC AUTOMATED: CPT | Performed by: FAMILY MEDICINE

## 2018-11-10 NOTE — PROGRESS NOTES
Can you call patient and let her know her WBC is looking better but not down to normal?  I'd like one more recheck in 2 weeks (lab only is fine) and if not normal at that point she should have a clinic visit to discuss.  Thanks,  Dr. Kitty Owens MD/[facility]

## 2018-11-12 ENCOUNTER — TELEPHONE (OUTPATIENT)
Dept: FAMILY MEDICINE | Facility: CLINIC | Age: 78
End: 2018-11-12

## 2018-11-12 NOTE — TELEPHONE ENCOUNTER
FYI- pt was informed of providers message below. States she has noticed some bruising and nose bleeds. She takes aspirin. Per huddle with INR nurse Serena, pt was advised to hold ASA 81 mg tablet until labs are rechecked in two weeks. She may check her INR at that time. Advised she increase leafy greens in diet. Pt agreed to call back to schedule lab appointment.

## 2018-11-12 NOTE — TELEPHONE ENCOUNTER
Left voice message asking pt to call triage back.            Can you call patient and let her know her WBC is looking better but not down to normal?   I'd like one more recheck in 2 weeks (lab only is fine) and if not normal at that point she should have a clinic visit to discuss.   Thanks,   Dr. Kitty Owens MD/[facility]

## 2018-11-27 DIAGNOSIS — D72.829 LEUKOCYTOSIS, UNSPECIFIED TYPE: ICD-10-CM

## 2018-11-27 LAB
ERYTHROCYTE [DISTWIDTH] IN BLOOD BY AUTOMATED COUNT: 13.4 % (ref 10–15)
HCT VFR BLD AUTO: 42.7 % (ref 35–47)
HGB BLD-MCNC: 14.2 G/DL (ref 11.7–15.7)
MCH RBC QN AUTO: 30.9 PG (ref 26.5–33)
MCHC RBC AUTO-ENTMCNC: 33.3 G/DL (ref 31.5–36.5)
MCV RBC AUTO: 93 FL (ref 78–100)
PLATELET # BLD AUTO: 270 10E9/L (ref 150–450)
RBC # BLD AUTO: 4.6 10E12/L (ref 3.8–5.2)
WBC # BLD AUTO: 10.5 10E9/L (ref 4–11)

## 2018-11-27 PROCEDURE — 85027 COMPLETE CBC AUTOMATED: CPT | Performed by: FAMILY MEDICINE

## 2018-11-27 PROCEDURE — 36415 COLL VENOUS BLD VENIPUNCTURE: CPT | Performed by: FAMILY MEDICINE

## 2018-11-27 NOTE — PROGRESS NOTES
Letter sent with the following information:  Good news!  Your white blood count has come down to the normal range.  No need for further follow up at this time.

## 2018-11-27 NOTE — LETTER
November 27, 2018      Pavithra Laurent  [address] [address]        Dear ,    We are writing to inform you of your test results.    Good news!  Your white blood count has come down to the normal range.  No need for further follow up at this time.    Resulted Orders   **CBC with platelets FUTURE 14d   Result Value Ref Range    WBC 10.5 4.0 - 11.0 10e9/L    RBC Count 4.60 3.8 - 5.2 10e12/L    Hemoglobin 14.2 11.7 - 15.7 g/dL    Hematocrit 42.7 35.0 - 47.0 %    MCV 93 78 - 100 fl    MCH 30.9 26.5 - 33.0 pg    MCHC 33.3 31.5 - 36.5 g/dL    RDW 13.4 10.0 - 15.0 %    Platelet Count 270 150 - 450 10e9/L       If you have any questions or concerns, please call the clinic at the number listed above.       Sincerely,        Dr. Kitty Owens MD/[facility]

## 2018-12-06 ENCOUNTER — THERAPY VISIT (OUTPATIENT)
Dept: PHYSICAL THERAPY | Facility: CLINIC | Age: 78
End: 2018-12-06
Payer: COMMERCIAL

## 2018-12-06 DIAGNOSIS — M48.00 SPINAL STENOSIS: ICD-10-CM

## 2018-12-06 DIAGNOSIS — M70.60 TROCHANTERIC BURSITIS: Primary | ICD-10-CM

## 2018-12-06 DIAGNOSIS — M70.70 ISCHIAL BURSITIS: ICD-10-CM

## 2018-12-06 PROCEDURE — G8982 BODY POS GOAL STATUS: HCPCS | Mod: GP | Performed by: PHYSICAL THERAPIST

## 2018-12-06 PROCEDURE — 97110 THERAPEUTIC EXERCISES: CPT | Mod: GP | Performed by: PHYSICAL THERAPIST

## 2018-12-06 PROCEDURE — G8981 BODY POS CURRENT STATUS: HCPCS | Mod: GP | Performed by: PHYSICAL THERAPIST

## 2018-12-06 PROCEDURE — 97161 PT EVAL LOW COMPLEX 20 MIN: CPT | Mod: GP | Performed by: PHYSICAL THERAPIST

## 2018-12-06 ASSESSMENT — ACTIVITIES OF DAILY LIVING (ADL)
SITTING_FOR_15_MINUTES: MODERATE DIFFICULTY
WALKING_15_MINUTES_OR_GREATER: EXTREME DIFFICULTY
WALKING_APPROXIMATELY_10_MINUTES: MODERATE DIFFICULTY
HOS_ADL_ITEM_SCORE_TOTAL: 31
DEEP_SQUATTING: SLIGHT DIFFICULTY
GETTING_INTO_AND_OUT_OF_A_BATHTUB: SLIGHT DIFFICULTY
WALKING_UP_STEEP_HILLS: EXTREME DIFFICULTY
GOING_UP_1_FLIGHT_OF_STAIRS: SLIGHT DIFFICULTY
STEPPING_UP_AND_DOWN_CURBS: NO DIFFICULTY AT ALL
WALKING_DOWN_STEEP_HILLS: EXTREME DIFFICULTY
WALKING_INITIALLY: NO DIFFICULTY AT ALL
GOING_DOWN_1_FLIGHT_OF_STAIRS: SLIGHT DIFFICULTY
HOS_ADL_COUNT: 12
GETTING_INTO_AND_OUT_OF_AN_AVERAGE_CAR: NO DIFFICULTY AT ALL
PUTTING_ON_SOCKS_AND_SHOES: NO DIFFICULTY AT ALL
HOS_ADL_HIGHEST_POTENTIAL_SCORE: 48
STANDING_FOR_15_MINUTES: MODERATE DIFFICULTY
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
## 2018-12-07 NOTE — PROGRESS NOTES
[facility] Initial Evaluation  Subjective:  Patient is a 78 year old female presenting with rehab left ankle/foot hpi.                                      Pertinent medical history includes:  Osteoarthritis, overweight and thyroid problems.  Medical allergies: yes (Erythromycin, codein).  Other surgeries include:  Orthopedic surgery (hip, wrist, elbow).  Current medications:  Pain medication and thyroid medication.                                      Objective:  System    Physical Exam    General     ROS    Assessment/Plan:

## 2018-12-11 ENCOUNTER — THERAPY VISIT (OUTPATIENT)
Dept: PHYSICAL THERAPY | Facility: CLINIC | Age: 78
End: 2018-12-11
Payer: COMMERCIAL

## 2018-12-11 DIAGNOSIS — M48.00 SPINAL STENOSIS: ICD-10-CM

## 2018-12-11 DIAGNOSIS — M70.70 ISCHIAL BURSITIS: ICD-10-CM

## 2018-12-11 DIAGNOSIS — M70.60 TROCHANTERIC BURSITIS: ICD-10-CM

## 2018-12-11 PROCEDURE — 97110 THERAPEUTIC EXERCISES: CPT | Mod: GP | Performed by: PHYSICAL THERAPIST

## 2018-12-11 PROCEDURE — 97140 MANUAL THERAPY 1/> REGIONS: CPT | Mod: GP | Performed by: PHYSICAL THERAPIST

## 2018-12-17 ENCOUNTER — THERAPY VISIT (OUTPATIENT)
Dept: PHYSICAL THERAPY | Facility: CLINIC | Age: 78
End: 2018-12-17
Payer: COMMERCIAL

## 2018-12-17 DIAGNOSIS — M48.00 SPINAL STENOSIS: ICD-10-CM

## 2018-12-17 DIAGNOSIS — M70.70 ISCHIAL BURSITIS: ICD-10-CM

## 2018-12-17 DIAGNOSIS — M70.60 TROCHANTERIC BURSITIS: ICD-10-CM

## 2018-12-17 PROCEDURE — 97112 NEUROMUSCULAR REEDUCATION: CPT | Mod: GP | Performed by: PHYSICAL THERAPIST

## 2018-12-17 PROCEDURE — 97110 THERAPEUTIC EXERCISES: CPT | Mod: GP | Performed by: PHYSICAL THERAPIST

## 2018-12-19 ENCOUNTER — OFFICE VISIT (OUTPATIENT)
Dept: FAMILY MEDICINE | Facility: CLINIC | Age: 78
End: 2018-12-19
Payer: COMMERCIAL

## 2018-12-19 VITALS
SYSTOLIC BLOOD PRESSURE: 110 MMHG | WEIGHT: 177 LBS | BODY MASS INDEX: 32.37 KG/M2 | TEMPERATURE: 97.6 F | OXYGEN SATURATION: 96 % | HEART RATE: 82 BPM | DIASTOLIC BLOOD PRESSURE: 62 MMHG

## 2018-12-19 DIAGNOSIS — Z86.718 HX OF BLOOD CLOTS: ICD-10-CM

## 2018-12-19 DIAGNOSIS — Z12.31 ENCOUNTER FOR SCREENING MAMMOGRAM FOR MALIGNANT NEOPLASM OF BREAST: ICD-10-CM

## 2018-12-19 DIAGNOSIS — Z00.00 MEDICARE ANNUAL WELLNESS VISIT, SUBSEQUENT: Primary | ICD-10-CM

## 2018-12-19 DIAGNOSIS — M79.2 NEUROPATHIC PAIN OF RIGHT FOREARM: ICD-10-CM

## 2018-12-19 PROBLEM — M35.00 SICCA SYNDROME (H): Status: ACTIVE | Noted: 2018-06-14

## 2018-12-19 PROBLEM — L03.116 CELLULITIS OF LEFT LEG: Status: ACTIVE | Noted: 2017-03-30

## 2018-12-19 PROBLEM — M70.60 TROCHANTERIC BURSITIS: Status: RESOLVED | Noted: 2018-12-06 | Resolved: 2018-12-19

## 2018-12-19 PROBLEM — M15.9 GENERALIZED OSTEOARTHRITIS OF MULTIPLE SITES: Status: ACTIVE | Noted: 2018-06-14

## 2018-12-19 PROCEDURE — 84443 ASSAY THYROID STIM HORMONE: CPT | Performed by: FAMILY MEDICINE

## 2018-12-19 PROCEDURE — 99397 PER PM REEVAL EST PAT 65+ YR: CPT | Performed by: FAMILY MEDICINE

## 2018-12-19 PROCEDURE — 84439 ASSAY OF FREE THYROXINE: CPT | Performed by: FAMILY MEDICINE

## 2018-12-19 PROCEDURE — 36415 COLL VENOUS BLD VENIPUNCTURE: CPT | Performed by: FAMILY MEDICINE

## 2018-12-19 RX ORDER — GABAPENTIN 400 MG/1
400 CAPSULE ORAL 4 TIMES DAILY PRN
COMMUNITY
Start: 2018-11-07 | End: 2019-08-20

## 2018-12-19 ASSESSMENT — ACTIVITIES OF DAILY LIVING (ADL): CURRENT_FUNCTION: NO ASSISTANCE NEEDED

## 2018-12-19 NOTE — PROGRESS NOTES
"SUBJECTIVE:   Pavithra Laurent is a 78 year old female who presents for Preventive Visit.      Are you in the first 12 months of your Medicare coverage?  No    Annual Wellness Visit     In general, how would you rate your overall health?  Good    Frequency of exercise:  1 day/week    Do you usually eat at least 4 servings of fruit and vegetables a day, include whole grains    & fiber and avoid regularly eating high fat or \"junk\" foods?  Yes    Taking medications regularly:  Yes    Medication side effects:  None    Ability to successfully perform activities of daily living:  No assistance needed    Home Safety:  No safety concerns identified    Hearing Impairment:  Difficulty following a conversation in a noisy restaurant or crowded room, difficulty following dialogue in the theater, difficult to understand a speaker at a public meeting or [employer] service, need to ask people to speak up or repeat themselves, find that men's voices are easier to understand than woman's and difficulty understanding soft or whispered speech    In the past 6 months, have you been bothered by leaking of urine?  No    In general, how would you rate your overall mental or emotional health?  Excellent    PHQ-2 Total Score: 0    Additional concerns today:  Yes    Do you feel safe in your environment? Yes    Do you have a Health Care Directive? Yes: Advance Directive has been received and scanned.      Fall risk  Fallen 2 or more times in the past year?: No  Any fall with injury in the past year?: No    Cognitive Screening   1) Repeat 3 items (Leader, Season, Table)    2) Clock draw: NORMAL  3) 3 item recall: Recalls 2 objects   Results: NORMAL clock, 1-2 items recalled: COGNITIVE IMPAIRMENT LESS LIKELY    Mini-CogTM Copyright RI[employer] Linda. Licensed by the author for use in [facility]; reprinted with permission (cecy@.[facility]). All rights reserved.      Do you have sleep apnea, excessive snoring or daytime drowsiness?: no    Reviewed " "and updated as needed this visit by clinical staff  Tobacco  Allergies  Meds  Problems  Med Hx  Surg Hx  Fam Hx  Soc Hx          Reviewed and updated as needed this visit by Provider  Problems        Social History     Tobacco Use     Smoking status: Never Smoker     Smokeless tobacco: Never Used   Substance Use Topics     Alcohol use: Yes     Alcohol/week: 0.0 oz     Comment: rare- 2/month       Alcohol Use 12/19/2018   If you drink alcohol do you typically have greater than 3 drinks per day OR greater than 7 drinks per week? No   No flowsheet data found.        Current providers sharing in care for this patient include:   Patient Care Team:  Kitty Owens MD as PCP - General (Family Practice)    The following health maintenance items are reviewed in Epic and correct as of today:  Health Maintenance   Topic Date Due     ZOSTER IMMUNIZATION (1 of 2) 05/22/1990     ADVANCE DIRECTIVE PLANNING Q5 YRS  05/22/1995     TSH Q1 YEAR  12/15/2018     FALL RISK ASSESSMENT  12/15/2018     PHQ-2 Q1 YR  02/20/2019     LIPID SCREEN Q5 YR FEMALE (SYSTEM ASSIGNED)  12/15/2022     DTAP/TDAP/TD IMMUNIZATION (3 - Td) 05/24/2023     DEXA SCAN SCREENING (SYSTEM ASSIGNED)  Completed     INFLUENZA VACCINE  Completed     PNEUMOVAX IMMUNIZATION 65+ LOW/MEDIUM RISK  Completed     IPV IMMUNIZATION  Aged Out     MENINGITIS IMMUNIZATION  Aged Out       Review of Systems  Constitutional, HEENT, cardiovascular, pulmonary, GI, , musculoskeletal, neuro, skin, endocrine and psych systems are negative, except as otherwise noted.    OBJECTIVE:   /62 (Cuff Size: Adult Large)   Pulse 82   Temp 97.6  F (36.4  C) (Tympanic)   Wt 80.3 kg (177 lb)   SpO2 96%   BMI 32.37 kg/m   Estimated body mass index is 32.37 kg/m  as calculated from the following:    Height as of 4/16/18: 1.575 m (5' 2\").    Weight as of this encounter: 80.3 kg (177 lb).  Physical Exam  GENERAL APPEARANCE: healthy, alert and no distress  EYES: Eyes grossly " "normal to inspection, PERRL and conjunctivae and sclerae normal  HENT: ear canals and TM's normal, nose and mouth without ulcers or lesions, oropharynx clear and oral mucous membranes moist  NECK: no adenopathy, no asymmetry, masses, or scars and thyroid normal to palpation  RESP: lungs clear to auscultation - no rales, rhonchi or wheezes  BREAST: normal without masses, tenderness or nipple discharge and no palpable axillary masses or adenopathy  CV: regular rate and rhythm, normal S1 S2, no S3 or S4, no murmur, click or rub, no peripheral edema and peripheral pulses strong  ABDOMEN: soft, nontender, no hepatosplenomegaly, no masses and bowel sounds normal  MS: no musculoskeletal defects are noted and gait is age appropriate without ataxia  SKIN: no suspicious lesions or rashes  NEURO: Normal strength and tone, sensory exam grossly normal, mentation intact and speech normal  PSYCH: mentation appears normal and affect normal/bright    Diagnostic Test Results:  none     ASSESSMENT / PLAN:   Pavithra was seen today for physical.    Diagnoses and all orders for this visit:    Medicare annual wellness visit, subsequent  -     MA Screening Digital Bilateral; Future  -     TSH with free T4 reflex    Encounter for screening mammogram for malignant neoplasm of breast   -     MA Screening Digital Bilateral; Future    Neuropathic pain of right forearm  -     ACUPUNCTURE REFERRAL    Hx of blood clots        End of Life Planning:  Patient currently has an advanced directive: Yes.  Practitioner is supportive of decision.    COUNSELING:  Reviewed preventive health counseling, as reflected in patient instructions       Regular exercise       Healthy diet/nutrition       Osteoporosis Prevention/Bone Health    BP Readings from Last 1 Encounters:   12/19/18 110/62     Estimated body mass index is 32.37 kg/m  as calculated from the following:    Height as of 4/16/18: 1.575 m (5' 2\").    Weight as of this encounter: 80.3 kg (177 " lb).      Weight management plan: Discussed healthy diet and exercise guidelines     reports that  has never smoked. she has never used smokeless tobacco.      Appropriate preventive services were discussed with this patient, including applicable screening as appropriate for cardiovascular disease, diabetes, osteopenia/osteoporosis, and glaucoma.  As appropriate for age/gender, discussed screening for colorectal cancer, prostate cancer, breast cancer, and cervical cancer. Checklist reviewing preventive services available has been given to the patient.    Reviewed patients plan of care and provided an AVS. The Basic Care Plan (routine screening as documented in Health Maintenance) for Pavithra meets the Care Plan requirement. This Care Plan has been established and reviewed with the Patient.    Counseling Resources:  ATP IV Guidelines  Pooled Cohorts Equation Calculator  Breast Cancer Risk Calculator  FRAX Risk Assessment  ICSI Preventive Guidelines  Dietary Guidelines for Americans, 2010  USDA's MyPlate  ASA Prophylaxis  Lung CA Screening    Kitty Owens MD  [facility]

## 2018-12-19 NOTE — PATIENT INSTRUCTIONS
Please call to schedule your mammogram.    [facility]  [address] [place] [address]   Appointments: [phone] or [phone]    Get your shingles vaccine through your pharmacy.        Preventive Health Recommendations    See your health care provider every year to    Review health changes.     Discuss preventive care.      Review your medicines if your doctor has prescribed any.      You no longer need a yearly Pap test unless you've had an abnormal Pap test in the past 10 years. If you have vaginal symptoms, such as bleeding or discharge, be sure to talk with your provider about a Pap test.      Every 1 to 2 years, have a mammogram.  If you are over 69, talk with your health care provider about whether or not you want to continue having screening mammograms.      Every 10 years, have a colonoscopy. Or, have a yearly FIT test (stool test). These exams will check for colon cancer.       Have a cholesterol test every 5 years, or more often if your doctor advises it.       Have a diabetes test (fasting glucose) every three years. If you are at risk for diabetes, you should have this test more often.       At age 65, have a bone density scan (DEXA) to check for osteoporosis (brittle bone disease).    Shots:    Get a flu shot each year.    Get a tetanus shot every 10 years.    Talk to your doctor about your pneumonia vaccines. There are now two you should receive - Pneumovax (PPSV 23) and Prevnar (PCV 13).    Talk to your pharmacist about the shingles vaccine.    Talk to your doctor about the hepatitis B vaccine.    Nutrition:     Eat at least 5 servings of fruits and vegetables each day.      Eat whole-grain bread, whole-wheat pasta and brown rice instead of white grains and rice.      Get adequate Calcium and Vitamin D.     Lifestyle    Exercise at least 150 minutes a week (30 minutes a day, 5 days a week). This will help you control your weight and prevent  disease.      Limit alcohol to one drink per day.      No smoking.       Wear sunscreen to prevent skin cancer.       See your dentist twice a year for an exam and cleaning.      See your eye doctor every 1 to 2 years to screen for conditions such as glaucoma, macular degeneration and cataracts.    Personalized Prevention Plan  You are due for the preventive services outlined below.  Your care team is available to assist you in scheduling these services.  If you have already completed any of these items, please share that information with your care team to update in your medical record.  Health Maintenance Due   Topic Date Due     Zoster (Chicken Pox) Vaccine (1 of 2) 05/22/1990     Discuss Advance Directive Planning  05/22/1995     FALL RISK ASSESSMENT  12/15/2018

## 2018-12-20 LAB
T4 FREE SERPL-MCNC: 1.4 NG/DL (ref 0.76–1.46)
TSH SERPL DL<=0.005 MIU/L-ACNC: 0.34 MU/L (ref 0.4–4)

## 2018-12-21 DIAGNOSIS — E03.4 HYPOTHYROIDISM DUE TO ACQUIRED ATROPHY OF THYROID: ICD-10-CM

## 2018-12-21 RX ORDER — LEVOTHYROXINE SODIUM 88 UG/1
88 TABLET ORAL DAILY
Qty: 90 TABLET | Refills: 3 | Status: SHIPPED | OUTPATIENT
Start: 2018-12-21 | End: 2020-01-30

## 2018-12-21 NOTE — RESULT ENCOUNTER NOTE
Looks like her TSH is slightly overcorrected.  Can you call and let Pavithra know, and ask her to alter her medication?  She should take her levothyroxine 88 mcg 6 days a week rather than 7.  Recheck TSH in 6-8 weeks lab only.  Let me know if you or Pavithra have any questions about this.  Thanks!  Dr. Kitty Owens MD  [facility]  [phone]

## 2019-01-03 ENCOUNTER — THERAPY VISIT (OUTPATIENT)
Dept: PHYSICAL THERAPY | Facility: CLINIC | Age: 79
End: 2019-01-03
Payer: COMMERCIAL

## 2019-01-03 DIAGNOSIS — M70.70 ISCHIAL BURSITIS, UNSPECIFIED LATERALITY: ICD-10-CM

## 2019-01-03 DIAGNOSIS — M48.00 SPINAL STENOSIS: ICD-10-CM

## 2019-01-03 PROCEDURE — 97140 MANUAL THERAPY 1/> REGIONS: CPT | Mod: GP | Performed by: PHYSICAL THERAPIST

## 2019-01-03 PROCEDURE — 97110 THERAPEUTIC EXERCISES: CPT | Mod: GP | Performed by: PHYSICAL THERAPIST

## 2019-01-10 ENCOUNTER — THERAPY VISIT (OUTPATIENT)
Dept: PHYSICAL THERAPY | Facility: CLINIC | Age: 79
End: 2019-01-10
Payer: COMMERCIAL

## 2019-01-10 DIAGNOSIS — M70.70 ISCHIAL BURSITIS, UNSPECIFIED LATERALITY: ICD-10-CM

## 2019-01-10 DIAGNOSIS — M48.00 SPINAL STENOSIS: ICD-10-CM

## 2019-01-10 PROCEDURE — 97112 NEUROMUSCULAR REEDUCATION: CPT | Mod: GP | Performed by: PHYSICAL THERAPIST

## 2019-01-10 PROCEDURE — 97110 THERAPEUTIC EXERCISES: CPT | Mod: GP | Performed by: PHYSICAL THERAPIST

## 2019-01-22 ENCOUNTER — THERAPY VISIT (OUTPATIENT)
Dept: PHYSICAL THERAPY | Facility: CLINIC | Age: 79
End: 2019-01-22
Payer: COMMERCIAL

## 2019-01-22 DIAGNOSIS — M70.70 ISCHIAL BURSITIS, UNSPECIFIED LATERALITY: ICD-10-CM

## 2019-01-22 DIAGNOSIS — M48.00 SPINAL STENOSIS: ICD-10-CM

## 2019-01-22 PROCEDURE — 97530 THERAPEUTIC ACTIVITIES: CPT | Mod: GP | Performed by: PHYSICAL THERAPIST

## 2019-01-22 PROCEDURE — 97110 THERAPEUTIC EXERCISES: CPT | Mod: GP | Performed by: PHYSICAL THERAPIST

## 2019-02-18 ENCOUNTER — TELEPHONE (OUTPATIENT)
Dept: FAMILY MEDICINE | Facility: CLINIC | Age: 79
End: 2019-02-18

## 2019-02-18 NOTE — TELEPHONE ENCOUNTER
Reason for Call:  Other    Detailed comments: belly button is starting to bulge out.  Should she be worried.  Could it be a hernia.    Phone Number Patient can be reached at: Home number on file [phone] (home)    Best Time: today    Can we leave a detailed message on this number? YES    Call taken on 2/18/2019 at 4:36 PM by HAWK MCKENNA

## 2019-02-18 NOTE — TELEPHONE ENCOUNTER
Noticed a navel bulge on Friday. It isn't painful. She noticed it after she had been shoveling. Can push in and it comes back. Wants to know if she needs to be seen or if she should wait. Routing to provider to advise.     Informed patient:  When to seek medical advice  Call your healthcare provider right away if any of these occur:    Hernia hardens, swells, or grows larger    Hernia can no longer be pushed back in    Pain moves to the lower right abdomen (just below the waistline), or spreads to the back

## 2019-02-19 ENCOUNTER — THERAPY VISIT (OUTPATIENT)
Dept: PHYSICAL THERAPY | Facility: CLINIC | Age: 79
End: 2019-02-19
Payer: COMMERCIAL

## 2019-02-19 DIAGNOSIS — M70.70 ISCHIAL BURSITIS, UNSPECIFIED LATERALITY: ICD-10-CM

## 2019-02-19 DIAGNOSIS — M48.00 SPINAL STENOSIS: ICD-10-CM

## 2019-02-19 PROCEDURE — 97140 MANUAL THERAPY 1/> REGIONS: CPT | Mod: GP | Performed by: PHYSICAL THERAPIST

## 2019-02-19 PROCEDURE — 97530 THERAPEUTIC ACTIVITIES: CPT | Mod: GP | Performed by: PHYSICAL THERAPIST

## 2019-02-19 PROCEDURE — 97110 THERAPEUTIC EXERCISES: CPT | Mod: GP | Performed by: PHYSICAL THERAPIST

## 2019-02-19 NOTE — TELEPHONE ENCOUNTER
She should be seen - doesn't need to be urgent though if no pain.  Thanks,  Dr. Kitty Owens MD/[facility]

## 2019-02-23 ENCOUNTER — OFFICE VISIT (OUTPATIENT)
Dept: URGENT CARE | Facility: URGENT CARE | Age: 79
End: 2019-02-23
Payer: COMMERCIAL

## 2019-02-23 VITALS
TEMPERATURE: 97.2 F | WEIGHT: 177.7 LBS | HEART RATE: 84 BPM | OXYGEN SATURATION: 97 % | BODY MASS INDEX: 32.5 KG/M2 | SYSTOLIC BLOOD PRESSURE: 120 MMHG | DIASTOLIC BLOOD PRESSURE: 70 MMHG | RESPIRATION RATE: 18 BRPM

## 2019-02-23 DIAGNOSIS — R31.0 GROSS HEMATURIA: Primary | ICD-10-CM

## 2019-02-23 DIAGNOSIS — R82.90 ABNORMAL FINDING IN URINE: ICD-10-CM

## 2019-02-23 LAB
ALBUMIN UR-MCNC: ABNORMAL MG/DL
APPEARANCE UR: CLEAR
BACTERIA #/AREA URNS HPF: ABNORMAL /HPF
BILIRUB UR QL STRIP: NEGATIVE
COLOR UR AUTO: YELLOW
GLUCOSE UR STRIP-MCNC: NEGATIVE MG/DL
HGB UR QL STRIP: ABNORMAL
KETONES UR STRIP-MCNC: NEGATIVE MG/DL
LEUKOCYTE ESTERASE UR QL STRIP: ABNORMAL
NITRATE UR QL: NEGATIVE
NON-SQ EPI CELLS #/AREA URNS LPF: ABNORMAL /LPF
PH UR STRIP: 5.5 PH (ref 5–7)
RBC #/AREA URNS AUTO: ABNORMAL /HPF
SOURCE: ABNORMAL
SP GR UR STRIP: 1.01 (ref 1–1.03)
UROBILINOGEN UR STRIP-ACNC: 0.2 EU/DL (ref 0.2–1)
WBC #/AREA URNS AUTO: ABNORMAL /HPF

## 2019-02-23 PROCEDURE — 87186 SC STD MICRODIL/AGAR DIL: CPT | Performed by: PHYSICIAN ASSISTANT

## 2019-02-23 PROCEDURE — 87086 URINE CULTURE/COLONY COUNT: CPT | Performed by: PHYSICIAN ASSISTANT

## 2019-02-23 PROCEDURE — 87088 URINE BACTERIA CULTURE: CPT | Performed by: PHYSICIAN ASSISTANT

## 2019-02-23 PROCEDURE — 81001 URINALYSIS AUTO W/SCOPE: CPT | Performed by: PHYSICIAN ASSISTANT

## 2019-02-23 PROCEDURE — 99214 OFFICE O/P EST MOD 30 MIN: CPT | Performed by: PHYSICIAN ASSISTANT

## 2019-02-23 RX ORDER — CIPROFLOXACIN 250 MG/1
250 TABLET, FILM COATED ORAL 2 TIMES DAILY
Qty: 10 TABLET | Refills: 0 | Status: SHIPPED | OUTPATIENT
Start: 2019-02-23 | End: 2019-02-25

## 2019-02-23 NOTE — PROGRESS NOTES
SUBJECTIVE:   Pavithra Laurent is a 78 year old female who  presents today for a possible UTI. Symptoms of voiding in small amounts have been going on for 8 hour(s).  Hematuria yes grossly x3 this am.  sudden onset and mild.  There is no history of fever, chills, nausea or vomiting.  No history of vaginal discharge. This patient does not have a history of urinary tract infections. Patient denies long duration, rigors, flank pain, temperature > 101 degrees F. and Vomiting, significant nausea or diarrhea or vaginal discharge, vaginal odor and vaginal itching. She does take an aspirin daily.     Past Medical History:   Diagnosis Date     Allergic rhinitis      Calculus of gallbladder without mention of cholecystitis or obstruction      Cataract of both eyes      Dry eye syndrome      Environmental allergies      Gastro-oesophageal reflux disease      GERD (gastroesophageal reflux disease) 5/24/2013     Hypothyroid      Pain in joint, shoulder region      Rosacea      Thoracic outlet syndrome      Current Outpatient Medications   Medication Sig Dispense Refill     acetaminophen (TYLENOL) 500 MG tablet Take 500-1,000 mg by mouth every 6 hours as needed for mild pain       ASPIRIN PO Take 81 mg by mouth       azelastine (ASTELIN) 0.1 % nasal spray Spray 2 sprays into both nostrils 2 times daily as needed        calcium-vitamin D-vitamin K (CALCIUM SOFT CHEWS) 500-500-40 MG-UNT-MCG CHEW Take 2 tablets by mouth 2 times daily       cycloSPORINE (RESTASIS) 0.05 % ophthalmic emulsion Place 1 drop into both eyes every 12 hours.         desonide (DESOWEN) 0.05 % cream Apply topically 2 times daily as needed       Dextran 70 0.1%-Hypromellose 0.3% (BION TEARS) 0.1-0.3 % SOLN ophthalmic solution Place 1 drop into both eyes as needed for dry eyes       diclofenac (VOLTAREN) 1 % GEL Place 2 g onto the skin 4 times daily       epinastine HCl (ELESTAT) 0.05 % SOLN 1 drop 2 times daily as needed       erythromycin (ROMYCIN) ophthalmic  ointment 1 Application nightly as needed        fluocinolone acetonide (DERMOTIC) 0.01 % OIL Place 1 drop in ear(s) daily as needed.       fluticasone (FLONASE) 50 MCG/ACT nasal spray Spray 1 spray into both nostrils daily        gabapentin (NEURONTIN) 400 MG capsule        IBUPROFEN PO Take 400 mg by mouth every 4 hours as needed for moderate pain        levothyroxine (SYNTHROID/LEVOTHROID) 88 MCG tablet Take 1 tablet (88 mcg) by mouth daily 6 DAYS A WEEK.  Skip Sunday. 90 tablet 3     melatonin 3 MG tablet Take 3 mg by mouth nightly as needed for sleep       metroNIDAZOLE (METROGEL) 1 % gel Apply 0.5 inches topically daily.         montelukast (SINGULAIR) 10 MG tablet Take 10 mg by mouth daily        nystatin (MYCOSTATIN) 459098 UNIT/GM POWD Apply topically 3 times daily as needed 30 g 1     order for DME Equipment being ordered: compression stockings 25-35mmHG 1 each 0     triamcinolone (KENALOG) 0.1 % cream Apply sparingly to affected area three times daily for 14 days. 80 g 1     Social History     Tobacco Use     Smoking status: Never Smoker     Smokeless tobacco: Never Used   Substance Use Topics     Alcohol use: Yes     Alcohol/week: 0.0 oz     Comment: rare- 2/month       ROS:   CONSTITUTIONAL:NEGATIVE for fever, chills, change in weight  : hematuria    OBJECTIVE:  /70   Pulse 84   Temp 97.2  F (36.2  C) (Oral)   Resp 18   Wt 80.6 kg (177 lb 11.2 oz)   SpO2 97%   BMI 32.50 kg/m    GENERAL APPEARANCE: healthy, alert and no distress  RESP: lungs clear to auscultation - no rales, rhonchi or wheezes  CV: regular rates and rhythm, normal S1 S2, no murmur noted  ABDOMEN:  soft, nontender, no HSM or masses and bowel sounds normal  BACK: No CVA tenderness  SKIN: no suspicious lesions or rashes    ASSESSMENT:     1. Gross hematuria  R/O acute cystitis  - *UA reflex to Microscopic and Culture ([facility] (except [place] and [place])  - Urine Microscopic  - ciprofloxacin (CIPRO) 250  MG tablet; Take 1 tablet (250 mg) by mouth 2 times daily for 5 days  Dispense: 10 tablet; Refill: 0    2. Abnormal finding in urine    - Urine Culture Aerobic Bacterial  - ciprofloxacin (CIPRO) 250 MG tablet; Take 1 tablet (250 mg) by mouth 2 times daily for 5 days  Dispense: 10 tablet; Refill: 0    PLAN:  As per ordered above  Drink plenty of fluids.  Prevention and treatment of UTI's discussed.Signs and symptoms of pyelonephritis mentioned.  Follow up with primary care physician if not improving over the next 3 days. Follow up UA next week.

## 2019-02-24 ENCOUNTER — NURSE TRIAGE (OUTPATIENT)
Dept: NURSING | Facility: CLINIC | Age: 79
End: 2019-02-24

## 2019-02-24 ENCOUNTER — TELEPHONE (OUTPATIENT)
Dept: INTERNAL MEDICINE | Facility: CLINIC | Age: 79
End: 2019-02-24

## 2019-02-24 NOTE — TELEPHONE ENCOUNTER
In Urgent Care yesterday and diagnosed with a urinary tract infection.  She was given a prescription for Ciprofloxacin.  After reading the warnings for the medication, she is concerned and feels uncomfortable taking it.  She would like a different antibiotic.    Some of the side effects include nerve problems.  She states she already has nerve problems (wrist and sciatica)    She states she feels the same, maybe a little better.      No blood in her urine that she can see today (stopped yesterday)  No pain in back (didn't have previously)    Will route a message to [facility] urgent care to prescribe a different antibiotic.    Also advised patein she can consult with the pharmacist re grading the likelihood of these reactions      Reason for Disposition    Pharmacy calling with prescription questions and triager unable to answer question    Protocols used: MEDICATION QUESTION CALL-ADULT-

## 2019-02-24 NOTE — TELEPHONE ENCOUNTER
Clinic Action Needed:  Yes, please call patient at [phone]  [facility] Triage Call  Presenting Problem:    In Urgent Care yesterday and diagnosed with a urinary tract infection.  She was given a prescription for Ciprofloxacin.  After reading the warnings for the medication, she is concerned and feels uncomfortable taking it.  She would like a different antibiotic.    Some of the side effects include nerve problems.  She states she already has nerve problems (wrist and sciatica)    She states she feels the same, maybe a little better.    No blood in her urine that she can see today (stopped yesterday)  No pain in back (didn't have previously)    Will route a message to [facility] urgent care to prescribe a different antibiotic.      Patient Recommendations/Teaching:  Also advised patein she can consult with the pharmacist re grading the likelihood of these reactions      Routed to:  [facility] / [facility]    Lovely Munoz RN / [facility]

## 2019-02-25 ENCOUNTER — TELEPHONE (OUTPATIENT)
Dept: FAMILY MEDICINE | Facility: CLINIC | Age: 79
End: 2019-02-25

## 2019-02-25 DIAGNOSIS — R31.9 URINARY TRACT INFECTION WITH HEMATURIA, SITE UNSPECIFIED: ICD-10-CM

## 2019-02-25 DIAGNOSIS — N39.0 URINARY TRACT INFECTION WITH HEMATURIA, SITE UNSPECIFIED: ICD-10-CM

## 2019-02-25 LAB
BACTERIA SPEC CULT: ABNORMAL
BACTERIA SPEC CULT: ABNORMAL
SPECIMEN SOURCE: ABNORMAL

## 2019-02-25 RX ORDER — CEPHALEXIN 500 MG/1
500 CAPSULE ORAL 3 TIMES DAILY
Qty: 15 CAPSULE | Refills: 0 | Status: SHIPPED | OUTPATIENT
Start: 2019-02-25 | End: 2019-03-11

## 2019-02-25 NOTE — TELEPHONE ENCOUNTER
Reason for Call:  Other prescription    Detailed comments: Pavithra was seen in UC this weekend was prescribed Cipro. Pavithra read the label of the medication and states she would like a different antibiotic.     Phone Number Patient can be reached at: Home number on file [phone] (home)    Best Time: anytime     Can we leave a detailed message on this number? YES    Call taken on 2/25/2019 at 10:13 AM by Gayle Robertson

## 2019-02-25 NOTE — TELEPHONE ENCOUNTER
"Pt was prescribed Cipro at urgent care and she doesn't want to take that because side effects read that medication can affect nerves and \"I already have issues with my nerves\". She asked if alternative abx could be sent. If a new abx is approved, this can be sent to [facility] on file.  Please advice.   "

## 2019-02-25 NOTE — TELEPHONE ENCOUNTER
23rd urine culture shows Proteus, only resistant to Macrobid.  Only antibiotic allergy listed as erythromycin.                      Cephalexin Rx sent.         Please let her know.

## 2019-02-26 ENCOUNTER — OFFICE VISIT (OUTPATIENT)
Dept: FAMILY MEDICINE | Facility: CLINIC | Age: 79
End: 2019-02-26
Payer: COMMERCIAL

## 2019-02-26 VITALS
SYSTOLIC BLOOD PRESSURE: 114 MMHG | DIASTOLIC BLOOD PRESSURE: 60 MMHG | BODY MASS INDEX: 32.74 KG/M2 | HEART RATE: 72 BPM | OXYGEN SATURATION: 99 % | WEIGHT: 179 LBS | TEMPERATURE: 96.5 F

## 2019-02-26 DIAGNOSIS — M15.9 GENERALIZED OSTEOARTHRITIS OF MULTIPLE SITES: ICD-10-CM

## 2019-02-26 DIAGNOSIS — K42.9 UMBILICAL HERNIA WITHOUT OBSTRUCTION AND WITHOUT GANGRENE: ICD-10-CM

## 2019-02-26 DIAGNOSIS — L98.9 SKIN LESION: Primary | ICD-10-CM

## 2019-02-26 PROCEDURE — 99214 OFFICE O/P EST MOD 30 MIN: CPT | Performed by: FAMILY MEDICINE

## 2019-02-26 NOTE — PROGRESS NOTES
SUBJECTIVE:   Pavithra Laurent is a 78 year old female who presents to clinic today for the following health issues:      belly button      Duration: 1 month    Description (location/character/radiation): belly button    Intensity:  moderate    Accompanying signs and symptoms: swelling    History (similar episodes/previous evaluation): None    Precipitating or alleviating factors: None    Therapies tried and outcome: None       Pt also might have a spot left side back.    Elo 78 year old well known to me here for:    1. New bump on belly button.  Not painful.  Soft.  Able to push in.   had incarcerated hernia so it makes her nervous, but she has no pain.    2. Skin bump on back.  Wonders about dermatology referral?    3. Has to get new sports med doctor as her beloved sports medicine provider (Dr. Lina Hook) is now out of network.  Anyone I could recommend?    Problem list and histories reviewed & adjusted, as indicated.  Additional history: as documented    Reviewed and updated as needed this visit by clinical staff  Tobacco  Allergies  Meds  Med Hx  Surg Hx  Fam Hx  Soc Hx      Reviewed and updated as needed this visit by Provider         ROS:  Constitutional, HEENT, cardiovascular, pulmonary, gi and gu systems are negative, except as otherwise noted.    OBJECTIVE:     /60 (Cuff Size: Adult Large)   Pulse 72   Temp 96.5  F (35.8  C) (Tympanic)   Wt 81.2 kg (179 lb)   SpO2 99%   BMI 32.74 kg/m    Body mass index is 32.74 kg/m .  GENERAL: healthy, alert and no distress  ABDOMEN: soft, nontender, no hepatosplenomegaly, no masses and bowel sounds normal. Small reducible 1.5 cm soft umbilical hernia  SKIN: no suspicious lesions or rashes.  SK on back.          ASSESSMENT/PLAN:     Pavithra was seen today for mass.    Diagnoses and all orders for this visit:    Skin lesion    Patient would like to see derm for full skin eval - referred.  -     SKIN CARE REFERRAL    Umbilical hernia without  obstruction and without gangrene    Small reducible umbilical hernia.    Gave her information about hernias and signs and symptoms to watch for.    Referred to general surgery for discussion about elective hernia repair  -     GENERAL SURG ADULT REFERRAL    Generalized osteoarthritis of multiple sites    Referred to Dr. Lyle Schulz at [facility].  Hopefully this will be good fit for patient.  -     SPORTS MEDICINE REFERRAL    Discussed with patient, all questions answered, in agreement with this plan, will return or seek further care if not improving or worsening.      Kitty Owens MD  [facility]
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
## 2019-03-08 ENCOUNTER — THERAPY VISIT (OUTPATIENT)
Dept: PHYSICAL THERAPY | Facility: CLINIC | Age: 79
End: 2019-03-08
Payer: COMMERCIAL

## 2019-03-08 DIAGNOSIS — M48.00 SPINAL STENOSIS: ICD-10-CM

## 2019-03-08 DIAGNOSIS — M70.70 ISCHIAL BURSITIS, UNSPECIFIED LATERALITY: ICD-10-CM

## 2019-03-08 PROCEDURE — G8983 BODY POS D/C STATUS: HCPCS | Mod: GP | Performed by: PHYSICAL THERAPIST

## 2019-03-08 PROCEDURE — 97112 NEUROMUSCULAR REEDUCATION: CPT | Mod: GP | Performed by: PHYSICAL THERAPIST

## 2019-03-08 PROCEDURE — G8982 BODY POS GOAL STATUS: HCPCS | Mod: GP | Performed by: PHYSICAL THERAPIST

## 2019-03-08 NOTE — PROGRESS NOTES
Subjective:  HPI                    Objective:  System    Physical Exam    General     ROS    Assessment/Plan:    DISCHARGE REPORT    Progress reporting period is from 12/6/18 to 3/8/19.       SUBJECTIVE  Subjective changes noted by patient:  .  Subjective: Pt states that she is doing well.  Right sided leg pain is good, left bursistis is OK.  Post thigh pain is decreased-will still note with driving.  [place] she does have a hernia and is consulting MD regarding.  Also is currently being treated for UTI.  Pt feels independent with HEP.    Current pain level is   .     Previous pain level was   Initial Pain level: 9/10.   Changes in function:  Yes (See Goal flowsheet attached for changes in current functional level)  Adverse reaction to treatment or activity: None    OBJECTIVE  Changes noted in objective findings:    Objective: Hip ROM is WNL and painfree.  SLR (-) to 70-75 degrees.  Reviewed core ex and emphasized breath to avoid stress on abdominal wall.  Pt appears ready for DC with HEP at this time.     ASSESSMENT/PLAN  Updated problem list and treatment plan: Diagnosis 1:   R ischial bursitis/L troch bursitis, spinal stenosis    Pain -  manual therapy, self management, education and home program  Decreased ROM/flexibility - manual therapy and therapeutic exercise  Decreased joint mobility - manual therapy and therapeutic exercise  Decreased strength - therapeutic exercise and therapeutic activities  Impaired muscle performance - neuro re-education  Decreased function - therapeutic activities  Impaired posture - neuro re-education  STG/LTGs have been met or progress has been made towards goals:  Yes (See Goal flow sheet completed today.)  Assessment of Progress: The patient has met all of their long term goals.  Self Management Plans:  Patient is independent in a home treatment program.  Patient is independent in self management of symptoms.    Pavithra continues to require the following intervention to meet STG  and LTG's:  PT    Recommendations:  This patient is ready to be discharged from therapy and continue their home treatment program.    Please refer to the daily flowsheet for treatment today, total treatment time and time spent performing 1:1 timed codes.

## 2019-03-08 NOTE — LETTER
[facility] PHYSICAL [facility] [place]  [address]  [place] [address]  [phone]    2019    Re: Pavithra Laurent   :   1940  MRN:  [id number]   REFERRING PHYSICIAN:   Lina Avery*    [facility] PHYSICAL [facility] [place]    Date of Initial Evaluation:  18  Visits:  Rxs Used: 8  Reason for Referral:     Ischial bursitis, unspecified laterality  Spinal stenosis    DISCHARGE REPORT  Progress reporting period is from 18 to 3/8/19.       SUBJECTIVE  Subjective changes noted by patient:  .  Subjective: Pt states that she is doing well.  Right sided leg pain is good, left bursistis is OK.  Post thigh pain is decreased-will still note with driving.  [place] she does have a hernia and is consulting MD regarding.  Also is currently being treated for UTI.  Pt feels independent with HEP.    Current pain level is   .     Previous pain level was   Initial Pain level: 9/10.   Changes in function:  Yes (See Goal flowsheet attached for changes in current functional level)  Adverse reaction to treatment or activity: None    OBJECTIVE  Changes noted in objective findings:    Objective: Hip ROM is WNL and painfree.  SLR (-) to 70-75 degrees.  Reviewed core ex and emphasized breath to avoid stress on abdominal wall.  Pt appears ready for DC with HEP at this time.     ASSESSMENT/PLAN  Updated problem list and treatment plan: Diagnosis 1:   R ischial bursitis/L troch bursitis, spinal stenosis    Pain -  manual therapy, self management, education and home program  Decreased ROM/flexibility - manual therapy and therapeutic exercise  Decreased joint mobility - manual therapy and therapeutic exercise  Decreased strength - therapeutic exercise and therapeutic activities  Impaired muscle performance - neuro re-education  Decreased function - therapeutic activities  Impaired posture - neuro re-education  STG/LTGs have been met or progress has  been made towards goals:  Yes (See Goal flow sheet completed today.)  Assessment of Progress: The patient has met all of their long term goals.  Self Management Plans:  Patient is independent in a home treatment program.  Patient is independent in self management of symptoms.    Pavithra continues to require the following intervention to meet STG and LTG's:  PT    Recommendations:  This patient is ready to be discharged from therapy and continue their home treatment program.    Thank you for your referral.    INQUIRIES  Therapist: Ct Martinez, PT, ScD, [facility]  [facility]  [address] [place], [place] [address] [address]  Phone: [phone]  Fax: [phone]

## 2019-03-11 ENCOUNTER — OFFICE VISIT (OUTPATIENT)
Dept: ORTHOPEDICS | Facility: CLINIC | Age: 79
End: 2019-03-11
Payer: COMMERCIAL

## 2019-03-11 ENCOUNTER — ANCILLARY PROCEDURE (OUTPATIENT)
Dept: GENERAL RADIOLOGY | Facility: CLINIC | Age: 79
End: 2019-03-11
Attending: FAMILY MEDICINE
Payer: COMMERCIAL

## 2019-03-11 VITALS — SYSTOLIC BLOOD PRESSURE: 114 MMHG | BODY MASS INDEX: 32.74 KG/M2 | DIASTOLIC BLOOD PRESSURE: 70 MMHG | WEIGHT: 179 LBS

## 2019-03-11 DIAGNOSIS — M19.071 PRIMARY OSTEOARTHRITIS OF BOTH FEET: ICD-10-CM

## 2019-03-11 DIAGNOSIS — M19.072 PRIMARY OSTEOARTHRITIS OF BOTH FEET: ICD-10-CM

## 2019-03-11 DIAGNOSIS — M19.072 PRIMARY OSTEOARTHRITIS OF BOTH FEET: Primary | ICD-10-CM

## 2019-03-11 DIAGNOSIS — M51.369 LUMBAR DEGENERATIVE DISC DISEASE: ICD-10-CM

## 2019-03-11 DIAGNOSIS — M19.071 PRIMARY OSTEOARTHRITIS OF BOTH FEET: Primary | ICD-10-CM

## 2019-03-11 DIAGNOSIS — M70.62 TROCHANTERIC BURSITIS OF BOTH HIPS: ICD-10-CM

## 2019-03-11 DIAGNOSIS — M70.61 TROCHANTERIC BURSITIS OF BOTH HIPS: ICD-10-CM

## 2019-03-11 PROCEDURE — 73630 X-RAY EXAM OF FOOT: CPT | Mod: LT | Performed by: FAMILY MEDICINE

## 2019-03-11 PROCEDURE — 99214 OFFICE O/P EST MOD 30 MIN: CPT | Mod: 25 | Performed by: FAMILY MEDICINE

## 2019-03-12 PROBLEM — M70.62 TROCHANTERIC BURSITIS OF BOTH HIPS: Status: ACTIVE | Noted: 2018-12-06

## 2019-03-12 PROBLEM — M19.072 PRIMARY OSTEOARTHRITIS OF BOTH FEET: Status: ACTIVE | Noted: 2019-03-12

## 2019-03-12 PROBLEM — M19.071 PRIMARY OSTEOARTHRITIS OF BOTH FEET: Status: ACTIVE | Noted: 2019-03-12

## 2019-03-12 PROBLEM — M70.61 TROCHANTERIC BURSITIS OF BOTH HIPS: Status: ACTIVE | Noted: 2018-12-06

## 2019-03-12 PROBLEM — M51.369 LUMBAR DEGENERATIVE DISC DISEASE: Status: ACTIVE | Noted: 2019-03-12

## 2019-03-12 ASSESSMENT — ENCOUNTER SYMPTOMS
BACK PAIN: 1
MYALGIAS: 1

## 2019-03-14 ENCOUNTER — OFFICE VISIT (OUTPATIENT)
Dept: SURGERY | Facility: CLINIC | Age: 79
End: 2019-03-14
Payer: COMMERCIAL

## 2019-03-14 VITALS — HEART RATE: 84 BPM | HEIGHT: 62 IN | BODY MASS INDEX: 32.94 KG/M2 | WEIGHT: 179 LBS

## 2019-03-14 DIAGNOSIS — K43.9 VENTRAL HERNIA WITHOUT OBSTRUCTION OR GANGRENE: Primary | ICD-10-CM

## 2019-03-14 PROCEDURE — 99204 OFFICE O/P NEW MOD 45 MIN: CPT | Performed by: SURGERY

## 2019-03-14 ASSESSMENT — MIFFLIN-ST. JEOR: SCORE: 1245.19

## 2019-03-14 NOTE — LETTER
[facility]    [address], [address]  [place], [place] [address]  Phone [phone]  Fax [phone] [address], [address]  [place] [address]  Phone [phone]  Fax [phone]    [email or web address]       March 14, 2019    Surgery Consultation, [facility], JAGJIT Solis MD     Pavithra Laurent MRN# [id number]   YOB: 1940 Age: 78 year old      PCP:  Kitty Owens [phone]     Chief Complaint: Ventral hernia     History of Present Illness:  Pavithra Laurent is a 78 year old female who presented with a bulge near the umbilicus. The bulge comes and goes but has gotten larger over time. It is uncomfortable when the patient is engaging in exertional activity.  The bulge originated from a previous laparoscopic cholecystectomy, where an incision was placed near the umbilicus.  She has noted increasing fullness in this area and occasional discomfort.  No symptoms of bowel obstruction.  The patient is here to discuss possible surgical repair of the ventral hernia.     PMH:  Pavithra Laurent  has a past medical history of Allergic rhinitis, Calculus of gallbladder without mention of cholecystitis or obstruction, Cataract of both eyes, Dry eye syndrome, Environmental allergies, Gastro-oesophageal reflux disease, GERD (gastroesophageal reflux disease) (5/24/2013), Hypothyroid, Pain in joint, shoulder region, Rosacea, and Thoracic outlet syndrome.  PSH:  Pavithra Laurent  has a past surgical history that includes knee surgery (2001); GYN surgery (1984); RAD RESEC TONSIL/PILLARS (1948); WISDOM TEETH EXTRACTION (1958); orthopedic surgery (2007,2008); TKA (2009); Laparoscopic cholecystectomy (8/16/2012); Open reduction internal fixation forearm (Right, 12/26/2014); Irrigation and debridement hand, combined (12/26/2014); Closed reduction upper extremity (12/26/2014); and Arthrotomy elbow  "(Right, 1/7/2015).     Home medications and allergies reviewed.     Social History:  Pavithra Laurent  reports that  has never smoked. she has never used smokeless tobacco. She reports that she drinks alcohol. She reports that she does not use drugs.  Family History:  Pavithra Laurent family history includes Alcohol/Drug in her father; Cancer in her maternal grandmother and mother; Cancer - colorectal in an other family member; Cardiovascular in her father; Cerebrovascular Disease in her mother; Clotting Disorder (Unknown) in her son; Depression in her father; Heart Disease in her mother, paternal grandfather, and paternal grandmother; Neurologic Disorder in her son; Unknown/Adopted in her maternal grandfather.     ROS:  The 10 point Review of Systems is negative other than noted in the HPI.     Physical Exam:  Pulse 84, height 1.575 m (5' 2\"), weight 81.2 kg (179 lb), not currently breastfeeding.  179 lbs 0 oz  Healthy-appearing overweight female in no distress.  Pupils equal round and reactive to light.   No cervical lymphadenopathy or thyromegaly.   Lung fields clear, breathing comfortably.   Heart normal sinus rhythm.  No murmurs rubs or gallops.  Abdomen soft, nontender, nondistended.  Small supraumbilical scar, well-healed.  Obvious area of fullness in the area, easily reducible.  Minimal tenderness.         Assessment/plan:  Pleasant female with what appears to be a supraumbilical hernia. I explained that these are usually not a cause for small bowel incarceration or obstruction, but do become larger over time. They can certainly be uncomfortable and repair is warranted. I recommended a laparoscopic ventral hernia repair with mesh. This would normally be done with general anesthetic as an outpatient.  I feel she would still be a good candidate for open repair, however.  She could pursue this if she wanted to avoid a general anesthetic.  Risks of surgery were explained to the patient, including hernia recurrence, " wound infection, or mesh removal.  Patient was going to notify the office when they were ready to schedule surgery.     Robert Solis M.D.  [facility]  [phone]

## 2019-03-15 PROBLEM — K43.9 VENTRAL HERNIA WITHOUT OBSTRUCTION OR GANGRENE: Status: ACTIVE | Noted: 2019-03-15

## 2019-03-15 NOTE — PROGRESS NOTES
Surgery Consultation, [facility], JAGJIT Solis MD    Pavithra Laurent MRN# [id number]   YOB: 1940 Age: 78 year old     PCP:  Kitty Owens [phone]    Chief Complaint: Ventral hernia    History of Present Illness:  Pavithra Laurent is a 78 year old female who presented with a bulge near the umbilicus. The bulge comes and goes but has gotten larger over time. It is uncomfortable when the patient is engaging in exertional activity.  The bulge originated from a previous laparoscopic cholecystectomy, where an incision was placed near the umbilicus.  She has noted increasing fullness in this area and occasional discomfort.  No symptoms of bowel obstruction.  The patient is here to discuss possible surgical repair of the ventral hernia.    PMH:  Pavithra Laurent  has a past medical history of Allergic rhinitis, Calculus of gallbladder without mention of cholecystitis or obstruction, Cataract of both eyes, Dry eye syndrome, Environmental allergies, Gastro-oesophageal reflux disease, GERD (gastroesophageal reflux disease) (5/24/2013), Hypothyroid, Pain in joint, shoulder region, Rosacea, and Thoracic outlet syndrome.  PSH:  Pavithra Laurent  has a past surgical history that includes knee surgery (2001); GYN surgery (1984); RAD RESEC TONSIL/PILLARS (1948); WISDOM TEETH EXTRACTION (1958); orthopedic surgery (2007,2008); TKA (2009); Laparoscopic cholecystectomy (8/16/2012); Open reduction internal fixation forearm (Right, 12/26/2014); Irrigation and debridement hand, combined (12/26/2014); Closed reduction upper extremity (12/26/2014); and Arthrotomy elbow (Right, 1/7/2015).    Home medications and allergies reviewed.    Social History:  Pavithra Laurent  reports that  has never smoked. she has never used smokeless tobacco. She reports that she drinks alcohol. She reports that she does not use drugs.  Family History:  Pavithra Laurent family history includes Alcohol/Drug in her father;  "Cancer in her maternal grandmother and mother; Cancer - colorectal in an other family member; Cardiovascular in her father; Cerebrovascular Disease in her mother; Clotting Disorder (Unknown) in her son; Depression in her father; Heart Disease in her mother, paternal grandfather, and paternal grandmother; Neurologic Disorder in her son; Unknown/Adopted in her maternal grandfather.    ROS:  The 10 point Review of Systems is negative other than noted in the HPI.    Physical Exam:  Pulse 84, height 1.575 m (5' 2\"), weight 81.2 kg (179 lb), not currently breastfeeding.  179 lbs 0 oz  Healthy-appearing overweight female in no distress.  Pupils equal round and reactive to light.   No cervical lymphadenopathy or thyromegaly.   Lung fields clear, breathing comfortably.   Heart normal sinus rhythm.  No murmurs rubs or gallops.  Abdomen soft, nontender, nondistended.  Small supraumbilical scar, well-healed.  Obvious area of fullness in the area, easily reducible.  Minimal tenderness.       Assessment/plan:  Pleasant female with what appears to be a supraumbilical hernia. I explained that these are usually not a cause for small bowel incarceration or obstruction, but do become larger over time. They can certainly be uncomfortable and repair is warranted. I recommended a laparoscopic ventral hernia repair with mesh. This would normally be done with general anesthetic as an outpatient.  I feel she would still be a good candidate for open repair, however.  She could pursue this if she wanted to avoid a general anesthetic.  Risks of surgery were explained to the patient, including hernia recurrence, wound infection, or mesh removal.  Patient was going to notify the office when they were ready to schedule surgery.    Robert Solis M.D.  [facility]  [phone]    Please route or send letter to:  Primary Care Provider (PCP) and Referring Provider    "

## 2019-03-25 ENCOUNTER — OFFICE VISIT (OUTPATIENT)
Dept: FAMILY MEDICINE | Facility: CLINIC | Age: 79
End: 2019-03-25
Payer: COMMERCIAL

## 2019-03-25 VITALS — HEART RATE: 91 BPM | SYSTOLIC BLOOD PRESSURE: 124 MMHG | OXYGEN SATURATION: 97 % | DIASTOLIC BLOOD PRESSURE: 60 MMHG

## 2019-03-25 DIAGNOSIS — L81.4 SOLAR LENTIGO: ICD-10-CM

## 2019-03-25 DIAGNOSIS — L30.9 ECZEMA, UNSPECIFIED TYPE: ICD-10-CM

## 2019-03-25 DIAGNOSIS — D48.5 NEOPLASM OF UNCERTAIN BEHAVIOR OF SKIN: Primary | ICD-10-CM

## 2019-03-25 DIAGNOSIS — L82.1 SEBORRHEIC KERATOSIS: ICD-10-CM

## 2019-03-25 DIAGNOSIS — L71.9 ROSACEA: ICD-10-CM

## 2019-03-25 DIAGNOSIS — M35.00 HISTORY OF SJOGREN'S DISEASE (H): ICD-10-CM

## 2019-03-25 DIAGNOSIS — L21.9 SEBORRHEIC DERMATITIS: ICD-10-CM

## 2019-03-25 DIAGNOSIS — B37.2 YEAST INFECTION OF THE SKIN: ICD-10-CM

## 2019-03-25 PROCEDURE — 88305 TISSUE EXAM BY PATHOLOGIST: CPT | Mod: TC | Performed by: FAMILY MEDICINE

## 2019-03-25 PROCEDURE — 99214 OFFICE O/P EST MOD 30 MIN: CPT | Mod: 25 | Performed by: FAMILY MEDICINE

## 2019-03-25 PROCEDURE — 11301 SHAVE SKIN LESION 0.6-1.0 CM: CPT | Performed by: FAMILY MEDICINE

## 2019-03-25 RX ORDER — KETOCONAZOLE 20 MG/G
CREAM TOPICAL DAILY
Qty: 60 G | Refills: 0 | Status: SHIPPED | OUTPATIENT
Start: 2019-03-25 | End: 2019-07-09

## 2019-03-25 RX ORDER — FLUOCINONIDE TOPICAL SOLUTION USP, 0.05% 0.5 MG/ML
SOLUTION TOPICAL 2 TIMES DAILY
Qty: 60 ML | Refills: 0 | Status: SHIPPED | OUTPATIENT
Start: 2019-03-25 | End: 2019-07-09

## 2019-03-25 RX ORDER — METRONIDAZOLE 7.5 MG/G
GEL TOPICAL 2 TIMES DAILY
Qty: 45 G | Refills: 11 | Status: SHIPPED | OUTPATIENT
Start: 2019-03-25 | End: 2019-07-09

## 2019-03-25 NOTE — PATIENT INSTRUCTIONS
"FUTURE APPOINTMENTS  Follow up in 1 year(s) for a full-body skin cancer screening.  Follow up per pathology report.    WOUND CARE INSTRUCTIONS  1. Wash hands before every dressing change.  2. After 24 hours, change dressing daily.  3. Wash the wound area with a mild soap, then rinse.  4. Gently pat dry with a sterile gauze or Q-tip.  5. Using a Q-tip, apply Vaseline or Aquaphor only over entire wound. Do NOT use Neosporin - as many people react to neomycin.  6. Finally, cover with a bandage or sterile non-stick gauze with micropore paper tape.  7. Repeat once daily until wound has healed.      Soap, water and shampoo will not hurt this area.    Do not go swimming or take baths, but showering is encouraged.    Limit use of the area where the procedure was done for a few days to allow for optimal healing.    If you experience bleeding:  Wash hands and hold firm pressure on the area for 10 minutes without checking to see if the bleeding has stopped. \"Checking\" pulls off the protective wound clot and restarts the bleeding all over again. Re-apply pressure for 10 minutes if necessary to stop bleeding.  Use additional sterile gauze and tape to maintain pressure once bleeding has stopped.  If bleeding continues, then call back to clinic at [phone].    Signs of Infection:  Infection can occur in any area where skin has been disrupted.  If you notice persistent redness, swelling, colored drainage, increasing pain, fever or other signs of infection, please call us at: [phone] and ask to have me or my colleague paged. We will call you back to discuss.    Pathology Results:  You will be notified, generally via letter or MyChart, in approximately 10 days. If there is anything we need to discuss or further treatment needed, I will call you to discuss it.    PATIENT INFORMATION : WOUNDS  During the healing process you will notice a number of changes. All wounds develop a small halo of redness surrounding the wound.  " This means healing is occurring. Severe itching with extensive redness usually indicates sensitivity to the ointment or bandage tape used to dress the wound.  You should call our office if this develops.      Swelling  and/or discoloration around your surgical site is common, particularly when performed around the eye.    All wounds normally drain.  The larger the wound the more drainage there will be.  After 7-10 days, you will notice the wound beginning to shrink and new skin will begin to grow.  The wound is healed when you can see skin has formed over the entire area.  A healed wound has a healthy, shiny look to the surface and is red to dark pink in color to normalize.  Wounds may take approximately 4-6 weeks to heal.  Larger wounds may take 6-8 weeks. After the wound is healed you may discontinue dressing changes.    You may experience a sensation of tightness as your wound heals. This is normal and will gradually subside.    Your healed wound may be sensitive to temperature changes. This sensitivity improves with time, but if you re having a lot of discomfort, try to avoid temperature extremes.    Patients frequently experience itching after their wound appears to have healed because of the continue healing under the skin.  Plain Vaseline will help relieve the itching.    DRY SKIN MANAGEMENT INSTRUCTIONS  Routine use of moisturizer is important for healthy, resilient skin not just for soft skin.     Sealing in moisture    Twice daily use of a moisturizer such as over-the-counter (OTC) CeraVe moisturizer cream (in the jar). CeraVe products contain ceramides and filaggrin proteins that can help to maintain the body's moisture layer.    After cleansing or washing, always apply moisturizer immediately after drying off (pat dry only) for best effect.    Protection while hydrating    Do not overuse soap. Unless you have been sweating extensively, just apply soap to groin and armpits.    Recommended products for  "body include: OTC unscented Dove for sensitive skin or OTC Vanicream cleansing bar.    Recommended facial cleansers include: OTC CeraVe hydrating facial cleanser or OTC Cetaphil daily facial cleanser.    Always try to wear rubber gloves when washing dishes or cleaning.    Avoid use of    Scented/perfumed products    Irritating clothing (wool, new jeans, new/unwashed clothing, scratchy synthetics)    Neosporin or triple antibiotic topical products    Products containing aloe, herbs, Vitamin E, or other \"natural ingredients\".    Dryer sheets or fabric softeners (while symptoms are present)    If a topical medication is prescribed, apply topical prescription first, followed by use of moisturizing product.    SUN PROTECTION INSTRUCTIONS  Sun damage can lead to skin cancer and premature aging of the skin.      The best way to protect from sun damage to your skin is to avoid the sun during peak hours (10 am - 2 pm) even on overcast days.    Never use tanning beds. Tanning beds are associated with much higher risks of skin cancer.    All tanning damages the skin. Aim for ivory skin year round and you will have less trouble with your skin in years to come. There is no merit in getting \"a base tan\" before a warm weather vacation, as any tanning indicates your body's response to sun damage.    Stop smoking. Smokers have higher rates of skin cancer and also have premature skin wrinkling.    Use UPF sun-protective clothing, which while more expensive initially provides longer lasting coverage without having to worry about remembering to re-apply.  1. Wear a wide-brimmed hat and sunglasses.   2. Wear sun-protective clothing.  [employer] and other [employer] make sun protective clothing that are stylish, comfortable and cool.   [employer] and other [employer] make UV arm sleeves suitable for golfing, gardening and other activities.    Sunscreen instructions:  1. Use sunscreens with Zinc Oxide, Titanium Dioxide or " "Avobenzone to protect from UVA rays.  2. Use SPF 30-50+ to protect from UVB rays.  3. Re-apply every 2 hours even if water resistant.  4. Apply on your face every day even when cloudy and even in the winter. UVA \"aging rays\" penetrate window glass and are just as strong in the winter as in the summer.    FYI  You should use about 3 tablespoons of sunscreen to protect your whole body. Thus a typical eight ounce bottle of sunscreen should last 4 applications. Remember, that the SPF rating is compromised if you don t apply enough. Most people only apply 1/2 - 1/3 of the amount they need. Also don t forget areas such as your ears, feet, upper back and harder to reach places. Keep in mind that these amounts should be increased for larger body sizes.    Sunscreens with titanium dioxide and/or zinc oxide in the active ingredients are physical blockers as opposed to chemical blockers. Chemical-free sunscreens should not irritate the skin.    Spray-on sunscreens may be used for touch-up application only, not as a base layer. Also, use with caution around small children due to inhalation risk.    SPF means sun protection factor, which is just the degree to which the sunscreen can protect against UVB rays. There is no rating system for UVA rays. SPF is calculated as the time skin will burn when sunscreen is applied vs. skin without sunscreen.    Water resistant sunscreens should be re-applied every 1-2 hours.    Product Recommendations:    Consider use of sunscreen sticks with Zinc Oxide and Titanium Dioxide active ingredients such as Neutrogena Pure&Free Baby Sunscreen Stick.    Good examples include: Blue Lizard, EltaMD, Solbar    Good daily moisturizers with SPF: Vanicream, CeraVe.    For sensitive skin, consider : SkinMedica Essential Defense Mineral Shield Broad Spectrum SPF 35    Men: consider use of Neutrogena Triple Protect Facial Lotion    Avoid retinyl palmitate products.  Avoid combination products that include both " sunscreen and insect repellant, as sunscreen should be applied every 2 hours, but insect repellant should not be applied as frequently.    For more information:  https://www.skincancer.org/prevention/sun-protection/sunscreen/sunscreens-safe-and-effective      Use either OTC antifungal powder or Nystatin powder for prophylaxis in the skin fold areas. If fungal infection develops, then use your topical prescription antifungal.    Make sure to shower, dry off and moisturize well after hot tub soaks.    TOPICAL STEROID INSTRUCTIONS  Triamcinolone 0.1% cream.  Apply two times per day for 10-14 days. Then, use only when needed.  1. Wash hands before applying topical steroid.  2. Apply sparingly (just enough to rub in) onto affected areas of the left hip and left elbow.    This higher strength steroid should never be used on face nor groin.    After the initial treatment, topical steroid may be used as needed for flare-ups but only for short-term treatment. If you are using this for prolonged periods of time to control flare-ups, return to clinic for re-evaluation of treatment.    Keep in mind to also regularly use moisturizer, as this preventative measure can help maintain your skin's natural protective moisture barrier.    Continue using other topical medications as previously instructed.

## 2019-03-25 NOTE — LETTER
"    3/25/2019         RE: Pavithra aLurent  [address] [facility] [address]        Dear Colleague,    Thank you for referring your patient, Pavithra Laurent, to the [facility] [place]. Please see a copy of my visit note below.    [facility] - PRIMARY CARE SKIN    CC: skin cancer screening (full-body)  SUBJECTIVE:   Pavithra Laurent is a(n) 78 year old female who presents to clinic today for an number of issues.    Bothersome lesions noticed by the patient or other skin concerns :  Issue One: Lesions are noticed on the back. Some of them are irritated due to friction from the bra strap.    Issue Two: Eczema is beginning to recur. A patch on the left thigh has waxed and waned. She also notes occasional intensely itchy skin of the flexor arms, elbows, and hands.    Products used: Gold Bond eczema used once daily to the affected area on the thigh. She is using Cetaphil cleanser and lotion on the arms.    Issue Three: A \"fungal\" infection under the breasts and in the groin developed insummer 2018. It took a long time to resolve. Symptoms have resolved. She is using hot tubs at this time. She was instructed to use Nystain powder as prophylaxis.    She is concerned whether Sjogren's manifested as dry mouth and dry eyes can also present with dry, itchy skin.    Issue Four: She requests a refill or alternative of metronidazole 0.75% gel for rosacea.    Previous dermatological topical medications include triamcinolone 0.1% cream, triamcinolone 0.1% ointment, mometasone 0.1% ointment, fluocinonide 0.05% topical solution, fluocinolone 0.01% oil, ketoconazole 2% shampoo, ketoconazole-hydrocortisone 2-1%, desonide 0.05% cream, metronidazole 1% gel, and Mycostatin. Oral medication since summer 2018 includes itraconazole.    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    Family Medical History  Skin cancer: NO  Eczema " Psoriasis Autoimmune   ?YES in father NO NO     Occupation: retired, business management (indoor).    Refer to electronic medical record (EMR) for past medical history and medications.    INTEGUMENTARY/SKIN: POSITIVE for changing lesions, pruritus and rash  ROS: 14 point review of systems was negative except the symptoms listed above in the HPI.    This document serves as a record of the services and decisions personally performed and made by Lore Krishnamurthy MD and was created by Bakari Adamson, a trained medical scribe, based on personal observations and provider statements to the medical scribe.  March 25, 2019 3:21 PM   Bakari Adamosn    OBJECTIVE:   GENERAL: healthy, alert and no distress.  SKIN: Gordon Skin Type - II.  This patient was examined from the top of the head to the bottom of the feet  including scalp, face, neck, trunk, buttocks, both arms, both legs, both hands, both feet, and all fingers and toes. The dermatoscope was used to help evaluate pigmented lesions.  Skin Pertinent Findings:  Scalp: no scaling, no plaques.    Upper extremities: brown, macule(s) most consistent with benign solar lentigo. Scattered stuck-on appearing papules, raised, brown, coarse-textured, round lesion(s) most consistent with seborrheic keratoses    Back: Multiple stuck-on appearing papules, raised, brown, coarse-textured, round lesion(s) most consistent with seborrheic keratoses.    Significant Findings:  Back, 9 cm left of T10: 7 mm in size verrucous lesion(s). ? Keratoacanthoma ? Verrucous keratosis ? Other    Left lateral hip, left flexor elbow: Small patch of maculopapular eruption most consistent with eczema    ASSESSMENT:     Encounter Diagnoses   Name Primary?     Neoplasm of uncertain behavior of skin Yes     Seborrheic keratosis      Rosacea      History of Sjogren's disease      Solar lentigo      Eczema, unspecified type          PLAN:   Patient Instructions   FUTURE APPOINTMENTS  Follow up in 1 year(s) for a  "full-body skin cancer screening.  Follow up per pathology report.    WOUND CARE INSTRUCTIONS  1. Wash hands before every dressing change.  2. After 24 hours, change dressing daily.  3. Wash the wound area with a mild soap, then rinse.  4. Gently pat dry with a sterile gauze or Q-tip.  5. Using a Q-tip, apply Vaseline or Aquaphor only over entire wound. Do NOT use Neosporin - as many people react to neomycin.  6. Finally, cover with a bandage or sterile non-stick gauze with micropore paper tape.  7. Repeat once daily until wound has healed.      Soap, water and shampoo will not hurt this area.    Do not go swimming or take baths, but showering is encouraged.    Limit use of the area where the procedure was done for a few days to allow for optimal healing.    If you experience bleeding:  Wash hands and hold firm pressure on the area for 10 minutes without checking to see if the bleeding has stopped. \"Checking\" pulls off the protective wound clot and restarts the bleeding all over again. Re-apply pressure for 10 minutes if necessary to stop bleeding.  Use additional sterile gauze and tape to maintain pressure once bleeding has stopped.  If bleeding continues, then call back to clinic at [phone].    Signs of Infection:  Infection can occur in any area where skin has been disrupted.  If you notice persistent redness, swelling, colored drainage, increasing pain, fever or other signs of infection, please call us at: [phone] and ask to have me or my colleague paged. We will call you back to discuss.    Pathology Results:  You will be notified, generally via letter or MyChart, in approximately 10 days. If there is anything we need to discuss or further treatment needed, I will call you to discuss it.    PATIENT INFORMATION : WOUNDS  During the healing process you will notice a number of changes. All wounds develop a small halo of redness surrounding the wound.  This means healing is occurring. Severe itching " with extensive redness usually indicates sensitivity to the ointment or bandage tape used to dress the wound.  You should call our office if this develops.      Swelling  and/or discoloration around your surgical site is common, particularly when performed around the eye.    All wounds normally drain.  The larger the wound the more drainage there will be.  After 7-10 days, you will notice the wound beginning to shrink and new skin will begin to grow.  The wound is healed when you can see skin has formed over the entire area.  A healed wound has a healthy, shiny look to the surface and is red to dark pink in color to normalize.  Wounds may take approximately 4-6 weeks to heal.  Larger wounds may take 6-8 weeks. After the wound is healed you may discontinue dressing changes.    You may experience a sensation of tightness as your wound heals. This is normal and will gradually subside.    Your healed wound may be sensitive to temperature changes. This sensitivity improves with time, but if you re having a lot of discomfort, try to avoid temperature extremes.    Patients frequently experience itching after their wound appears to have healed because of the continue healing under the skin.  Plain Vaseline will help relieve the itching.    DRY SKIN MANAGEMENT INSTRUCTIONS  Routine use of moisturizer is important for healthy, resilient skin not just for soft skin.     Sealing in moisture    Twice daily use of a moisturizer such as over-the-counter (OTC) CeraVe moisturizer cream (in the jar). CeraVe products contain ceramides and filaggrin proteins that can help to maintain the body's moisture layer.    After cleansing or washing, always apply moisturizer immediately after drying off (pat dry only) for best effect.    Protection while hydrating    Do not overuse soap. Unless you have been sweating extensively, just apply soap to groin and armpits.    Recommended products for body include: OTC unscented Dove for sensitive skin  "or OTC Vanicream cleansing bar.    Recommended facial cleansers include: OTC CeraVe hydrating facial cleanser or OTC Cetaphil daily facial cleanser.    Always try to wear rubber gloves when washing dishes or cleaning.    Avoid use of    Scented/perfumed products    Irritating clothing (wool, new jeans, new/unwashed clothing, scratchy synthetics)    Neosporin or triple antibiotic topical products    Products containing aloe, herbs, Vitamin E, or other \"natural ingredients\".    Dryer sheets or fabric softeners (while symptoms are present)    If a topical medication is prescribed, apply topical prescription first, followed by use of moisturizing product.    SUN PROTECTION INSTRUCTIONS  Sun damage can lead to skin cancer and premature aging of the skin.      The best way to protect from sun damage to your skin is to avoid the sun during peak hours (10 am - 2 pm) even on overcast days.    Never use tanning beds. Tanning beds are associated with much higher risks of skin cancer.    All tanning damages the skin. Aim for ivory skin year round and you will have less trouble with your skin in years to come. There is no merit in getting \"a base tan\" before a warm weather vacation, as any tanning indicates your body's response to sun damage.    Stop smoking. Smokers have higher rates of skin cancer and also have premature skin wrinkling.    Use UPF sun-protective clothing, which while more expensive initially provides longer lasting coverage without having to worry about remembering to re-apply.  1. Wear a wide-brimmed hat and sunglasses.   2. Wear sun-protective clothing.  [employer] and other [employer] make sun protective clothing that are stylish, comfortable and cool.   [employer] and other [employer] make UV arm sleeves suitable for golfing, gardening and other activities.    Sunscreen instructions:  1. Use sunscreens with Zinc Oxide, Titanium Dioxide or Avobenzone to protect from UVA rays.  2. Use SPF 30-50+ to " "protect from UVB rays.  3. Re-apply every 2 hours even if water resistant.  4. Apply on your face every day even when cloudy and even in the winter. UVA \"aging rays\" penetrate window glass and are just as strong in the winter as in the summer.    FYI  You should use about 3 tablespoons of sunscreen to protect your whole body. Thus a typical eight ounce bottle of sunscreen should last 4 applications. Remember, that the SPF rating is compromised if you don t apply enough. Most people only apply 1/2 - 1/3 of the amount they need. Also don t forget areas such as your ears, feet, upper back and harder to reach places. Keep in mind that these amounts should be increased for larger body sizes.    Sunscreens with titanium dioxide and/or zinc oxide in the active ingredients are physical blockers as opposed to chemical blockers. Chemical-free sunscreens should not irritate the skin.    Spray-on sunscreens may be used for touch-up application only, not as a base layer. Also, use with caution around small children due to inhalation risk.    SPF means sun protection factor, which is just the degree to which the sunscreen can protect against UVB rays. There is no rating system for UVA rays. SPF is calculated as the time skin will burn when sunscreen is applied vs. skin without sunscreen.    Water resistant sunscreens should be re-applied every 1-2 hours.    Product Recommendations:    Consider use of sunscreen sticks with Zinc Oxide and Titanium Dioxide active ingredients such as Neutrogena Pure&Free Baby Sunscreen Stick.    Good examples include: Blue Lizard, EltaMD, Solbar    Good daily moisturizers with SPF: Vanicream, CeraVe.    For sensitive skin, consider : SkinMedica Essential Defense Mineral Shield Broad Spectrum SPF 35    Men: consider use of Neutrogena Triple Protect Facial Lotion    Avoid retinyl palmitate products.  Avoid combination products that include both sunscreen and insect repellant, as sunscreen should be " applied every 2 hours, but insect repellant should not be applied as frequently.    For more information:  https://www.skincancer.org/prevention/sun-protection/sunscreen/sunscreens-safe-and-effective      Use either OTC antifungal powder or Nystatin powder for prophylaxis in the skin fold areas. If fungal infection develops, then use your topical prescription antifungal.    Make sure to shower, dry off and moisturize well after hot tub soaks.    TOPICAL STEROID INSTRUCTIONS  Triamcinolone 0.1% cream.  Apply two times per day for 10-14 days. Then, use only when needed.  1. Wash hands before applying topical steroid.  2. Apply sparingly (just enough to rub in) onto affected areas of the left hip and left elbow.    This higher strength steroid should never be used on face nor groin.    After the initial treatment, topical steroid may be used as needed for flare-ups but only for short-term treatment. If you are using this for prolonged periods of time to control flare-ups, return to clinic for re-evaluation of treatment.    Keep in mind to also regularly use moisturizer, as this preventative measure can help maintain your skin's natural protective moisture barrier.    Continue using other topical medications as previously instructed.    The patient was counseled about sunscreens and sun avoidance. The patient was counseled to check the skin regularly and report any lesion that is new, changing, itching, scabbing, bleeding or otherwise bothersome. The patient was discharged ambulatory and in stable condition.    TT: 30 minutes.  CT: 20 minutes.    The information in this document, created by the medical scribe for me, accurately reflects the services I personally performed and the decisions made by me. I have reviewed and approved this document for accuracy prior to leaving the patient care area.  March 25, 2019 3:21 PM  Lore Krishnamurthy MD  [facility] [place]    Again, thank you for allowing me to  participate in the care of your patient.        Sincerely,        Lore Krishnamurthy MD

## 2019-03-25 NOTE — PROCEDURES
Name : Shave Excision  Indication : Excision of tissue for pathology evaluation.  Location(s) : Back, 9 cm left of T10: 7 mm in size verrucous lesion(s). ? Keratoacanthoma ? Verrucous keratosis ? Other.  Completed by : Tiffany Krishnamurthy MD  Photo Taken : no.  Anesthesia : Patient was anesthetized by infiltrating the area surrounding the lesion with 1% lidocaine.   epinephrine [id number] : Yes.  Note : Discussed the risk of pain, infection, scarring, hypo- or hyperpigmentation and recurrence or need for re-treatment. The benefits of treatment and alternative treatments were also discussed.    During this procedure, the universal protocol was utilized. The patient's identity was confirmed by no less than two patient identifiers, correct procedure was verified, correct site was verified and marked as applicable and a final pause was completed.    Sterile technique was used throughout the procedure. The skin was cleaned and prepped with surgical cleanser. Once adequate anesthesia was obtained, the lesion was removed with a deep scallop shave procedure. The specimen was sent to pathology.    Direct pressure and aluminum chloride and monopolar cautery was applied for hemostasis. No bleeding was present upon the completion of the procedure. The wound was coated with antibacterial ointment. A dry sterile dressing was applied. Patient tolerated the procedure well and left in satisfactory condition.    Primary provider and referring provider will be informed regarding the tissue report when it returns.

## 2019-03-25 NOTE — PROGRESS NOTES
"[facility] - PRIMARY CARE SKIN    CC: skin cancer screening (full-body)  SUBJECTIVE:   Pavithra Laurent is a(n) 78 year old female who presents to clinic today for an number of issues.    Bothersome lesions noticed by the patient or other skin concerns :  Issue One: Lesions are noticed on the back. Some of them are irritated due to friction from the bra strap.    Issue Two: Eczema is beginning to recur. A patch on the left thigh has waxed and waned. She also notes occasional intensely itchy skin of the flexor arms, elbows, and hands.    Products used: Gold Bond eczema used once daily to the affected area on the thigh. She is using Cetaphil cleanser and lotion on the arms.    Issue Three: A \"fungal\" infection under the breasts and in the groin developed insummer 2018. It took a long time to resolve. Symptoms have resolved. She is using hot tubs at this time. She was instructed to use Nystain powder as prophylaxis.    She is concerned whether Sjogren's manifested as dry mouth and dry eyes can also present with dry, itchy skin.    Issue Four: She requests a refill or alternative of metronidazole 0.75% gel for rosacea.    Previous dermatological topical medications include triamcinolone 0.1% cream, triamcinolone 0.1% ointment, mometasone 0.1% ointment, fluocinonide 0.05% topical solution, fluocinolone 0.01% oil, ketoconazole 2% shampoo, ketoconazole-hydrocortisone 2-1%, desonide 0.05% cream, metronidazole 1% gel, and Mycostatin. Oral medication since summer 2018 includes itraconazole.    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    Family Medical History  Skin cancer: NO  Eczema Psoriasis Autoimmune   ?YES in father NO NO     Occupation: retired, business management (indoor).    Refer to electronic medical record (EMR) for past medical history and medications.    INTEGUMENTARY/SKIN: POSITIVE for changing lesions, " pruritus and rash  ROS: 14 point review of systems was negative except the symptoms listed above in the HPI.    This document serves as a record of the services and decisions personally performed and made by Lore Krishnamurthy MD and was created by Bakari Adamson, a trained medical scribe, based on personal observations and provider statements to the medical scribe.  March 25, 2019 3:21 PM   Bakari Adamson    OBJECTIVE:   GENERAL: healthy, alert and no distress.  SKIN: Gordon Skin Type - II.  This patient was examined from the top of the head to the bottom of the feet  including scalp, face, neck, trunk, buttocks, both arms, both legs, both hands, both feet, and all fingers and toes. The dermatoscope was used to help evaluate pigmented lesions.  Skin Pertinent Findings:  Scalp: no scaling, no plaques.    Upper extremities: brown, macule(s) most consistent with benign solar lentigo. Scattered stuck-on appearing papules, raised, brown, coarse-textured, round lesion(s) most consistent with seborrheic keratoses    Back: Multiple stuck-on appearing papules, raised, brown, coarse-textured, round lesion(s) most consistent with seborrheic keratoses.    Significant Findings:  Back, 9 cm left of T10: 7 mm in size verrucous lesion(s). ? Keratoacanthoma ? Verrucous keratosis ? Other    Left lateral hip, left flexor elbow: Small patch of maculopapular eruption most consistent with eczema    ASSESSMENT:     Encounter Diagnoses   Name Primary?     Neoplasm of uncertain behavior of skin Yes     Seborrheic keratosis      Rosacea      History of Sjogren's disease      Solar lentigo      Eczema, unspecified type          PLAN:   Patient Instructions   FUTURE APPOINTMENTS  Follow up in 1 year(s) for a full-body skin cancer screening.  Follow up per pathology report.    WOUND CARE INSTRUCTIONS  1. Wash hands before every dressing change.  2. After 24 hours, change dressing daily.  3. Wash the wound area with a mild soap, then rinse.  4.  "Gently pat dry with a sterile gauze or Q-tip.  5. Using a Q-tip, apply Vaseline or Aquaphor only over entire wound. Do NOT use Neosporin - as many people react to neomycin.  6. Finally, cover with a bandage or sterile non-stick gauze with micropore paper tape.  7. Repeat once daily until wound has healed.      Soap, water and shampoo will not hurt this area.    Do not go swimming or take baths, but showering is encouraged.    Limit use of the area where the procedure was done for a few days to allow for optimal healing.    If you experience bleeding:  Wash hands and hold firm pressure on the area for 10 minutes without checking to see if the bleeding has stopped. \"Checking\" pulls off the protective wound clot and restarts the bleeding all over again. Re-apply pressure for 10 minutes if necessary to stop bleeding.  Use additional sterile gauze and tape to maintain pressure once bleeding has stopped.  If bleeding continues, then call back to clinic at [phone].    Signs of Infection:  Infection can occur in any area where skin has been disrupted.  If you notice persistent redness, swelling, colored drainage, increasing pain, fever or other signs of infection, please call us at: [phone] and ask to have me or my colleague paged. We will call you back to discuss.    Pathology Results:  You will be notified, generally via letter or MyChart, in approximately 10 days. If there is anything we need to discuss or further treatment needed, I will call you to discuss it.    PATIENT INFORMATION : WOUNDS  During the healing process you will notice a number of changes. All wounds develop a small halo of redness surrounding the wound.  This means healing is occurring. Severe itching with extensive redness usually indicates sensitivity to the ointment or bandage tape used to dress the wound.  You should call our office if this develops.      Swelling  and/or discoloration around your surgical site is common, particularly " when performed around the eye.    All wounds normally drain.  The larger the wound the more drainage there will be.  After 7-10 days, you will notice the wound beginning to shrink and new skin will begin to grow.  The wound is healed when you can see skin has formed over the entire area.  A healed wound has a healthy, shiny look to the surface and is red to dark pink in color to normalize.  Wounds may take approximately 4-6 weeks to heal.  Larger wounds may take 6-8 weeks. After the wound is healed you may discontinue dressing changes.    You may experience a sensation of tightness as your wound heals. This is normal and will gradually subside.    Your healed wound may be sensitive to temperature changes. This sensitivity improves with time, but if you re having a lot of discomfort, try to avoid temperature extremes.    Patients frequently experience itching after their wound appears to have healed because of the continue healing under the skin.  Plain Vaseline will help relieve the itching.    DRY SKIN MANAGEMENT INSTRUCTIONS  Routine use of moisturizer is important for healthy, resilient skin not just for soft skin.     Sealing in moisture    Twice daily use of a moisturizer such as over-the-counter (OTC) CeraVe moisturizer cream (in the jar). CeraVe products contain ceramides and filaggrin proteins that can help to maintain the body's moisture layer.    After cleansing or washing, always apply moisturizer immediately after drying off (pat dry only) for best effect.    Protection while hydrating    Do not overuse soap. Unless you have been sweating extensively, just apply soap to groin and armpits.    Recommended products for body include: OTC unscented Dove for sensitive skin or OTC Vanicream cleansing bar.    Recommended facial cleansers include: OTC CeraVe hydrating facial cleanser or OTC Cetaphil daily facial cleanser.    Always try to wear rubber gloves when washing dishes or cleaning.    Avoid use  "of    Scented/perfumed products    Irritating clothing (wool, new jeans, new/unwashed clothing, scratchy synthetics)    Neosporin or triple antibiotic topical products    Products containing aloe, herbs, Vitamin E, or other \"natural ingredients\".    Dryer sheets or fabric softeners (while symptoms are present)    If a topical medication is prescribed, apply topical prescription first, followed by use of moisturizing product.    SUN PROTECTION INSTRUCTIONS  Sun damage can lead to skin cancer and premature aging of the skin.      The best way to protect from sun damage to your skin is to avoid the sun during peak hours (10 am - 2 pm) even on overcast days.    Never use tanning beds. Tanning beds are associated with much higher risks of skin cancer.    All tanning damages the skin. Aim for ivory skin year round and you will have less trouble with your skin in years to come. There is no merit in getting \"a base tan\" before a warm weather vacation, as any tanning indicates your body's response to sun damage.    Stop smoking. Smokers have higher rates of skin cancer and also have premature skin wrinkling.    Use UPF sun-protective clothing, which while more expensive initially provides longer lasting coverage without having to worry about remembering to re-apply.  1. Wear a wide-brimmed hat and sunglasses.   2. Wear sun-protective clothing.  Resonergy and other [employer] make sun protective clothing that are stylish, comfortable and cool.   Wiggio and other [employer] make UV arm sleeves suitable for golfing, gardening and other activities.    Sunscreen instructions:  1. Use sunscreens with Zinc Oxide, Titanium Dioxide or Avobenzone to protect from UVA rays.  2. Use SPF 30-50+ to protect from UVB rays.  3. Re-apply every 2 hours even if water resistant.  4. Apply on your face every day even when cloudy and even in the winter. UVA \"aging rays\" penetrate window glass and are just as strong in the winter as " in the summer.    FYI  You should use about 3 tablespoons of sunscreen to protect your whole body. Thus a typical eight ounce bottle of sunscreen should last 4 applications. Remember, that the SPF rating is compromised if you don t apply enough. Most people only apply 1/2 - 1/3 of the amount they need. Also don t forget areas such as your ears, feet, upper back and harder to reach places. Keep in mind that these amounts should be increased for larger body sizes.    Sunscreens with titanium dioxide and/or zinc oxide in the active ingredients are physical blockers as opposed to chemical blockers. Chemical-free sunscreens should not irritate the skin.    Spray-on sunscreens may be used for touch-up application only, not as a base layer. Also, use with caution around small children due to inhalation risk.    SPF means sun protection factor, which is just the degree to which the sunscreen can protect against UVB rays. There is no rating system for UVA rays. SPF is calculated as the time skin will burn when sunscreen is applied vs. skin without sunscreen.    Water resistant sunscreens should be re-applied every 1-2 hours.    Product Recommendations:    Consider use of sunscreen sticks with Zinc Oxide and Titanium Dioxide active ingredients such as Neutrogena Pure&Free Baby Sunscreen Stick.    Good examples include: Blue Lizard, EltaMD, Solbar    Good daily moisturizers with SPF: Vanicream, CeraVe.    For sensitive skin, consider : SkinMedica Essential Defense Mineral Shield Broad Spectrum SPF 35    Men: consider use of Neutrogena Triple Protect Facial Lotion    Avoid retinyl palmitate products.  Avoid combination products that include both sunscreen and insect repellant, as sunscreen should be applied every 2 hours, but insect repellant should not be applied as frequently.    For more information:  https://www.skincancer.org/prevention/sun-protection/sunscreen/sunscreens-safe-and-effective      Use either OTC antifungal  powder or Nystatin powder for prophylaxis in the skin fold areas. If fungal infection develops, then use your topical prescription antifungal.    Make sure to shower, dry off and moisturize well after hot tub soaks.    TOPICAL STEROID INSTRUCTIONS  Triamcinolone 0.1% cream.  Apply two times per day for 10-14 days. Then, use only when needed.  1. Wash hands before applying topical steroid.  2. Apply sparingly (just enough to rub in) onto affected areas of the left hip and left elbow.    This higher strength steroid should never be used on face nor groin.    After the initial treatment, topical steroid may be used as needed for flare-ups but only for short-term treatment. If you are using this for prolonged periods of time to control flare-ups, return to clinic for re-evaluation of treatment.    Keep in mind to also regularly use moisturizer, as this preventative measure can help maintain your skin's natural protective moisture barrier.    Continue using other topical medications as previously instructed.    The patient was counseled about sunscreens and sun avoidance. The patient was counseled to check the skin regularly and report any lesion that is new, changing, itching, scabbing, bleeding or otherwise bothersome. The patient was discharged ambulatory and in stable condition.    TT: 30 minutes.  CT: 20 minutes.    The information in this document, created by the medical scribe for me, accurately reflects the services I personally performed and the decisions made by me. I have reviewed and approved this document for accuracy prior to leaving the patient care area.  March 25, 2019 3:21 PM  Lore Krishnamurthy MD  [facility]

## 2019-03-27 LAB — COPATH REPORT: NORMAL

## 2019-04-01 ENCOUNTER — TELEPHONE (OUTPATIENT)
Dept: FAMILY MEDICINE | Facility: CLINIC | Age: 79
End: 2019-04-01

## 2019-04-01 NOTE — TELEPHONE ENCOUNTER
Left message on answering machine for patient to call back.  Michelle CarmonaRN  [facility]  [phone]

## 2019-04-01 NOTE — TELEPHONE ENCOUNTER
Patient notified of test results and providers message, patient has no further questions.    Michelle IVEYRN BSN  [facility]  [phone]

## 2019-04-01 NOTE — TELEPHONE ENCOUNTER
Patient returned call, no RN available. States detailed VM with results is okay.    Ph: [phone]    Carlota COLLINS  Patient Representative - [place]

## 2019-04-01 NOTE — TELEPHONE ENCOUNTER
Notes recorded by Lore Krishnamurthy MD on 4/1/2019 at 1:42 PM CDT  Please call the patient and let them know that the tissue report was benign ( not cancer ) and consistent with a keratosis.    Thank you, Lore Krishnamurthy M.D.

## 2019-04-03 ENCOUNTER — OFFICE VISIT (OUTPATIENT)
Dept: ORTHOPEDICS | Facility: CLINIC | Age: 79
End: 2019-04-03
Payer: COMMERCIAL

## 2019-04-03 DIAGNOSIS — M19.071 PRIMARY OSTEOARTHRITIS OF BOTH FEET: Primary | ICD-10-CM

## 2019-04-03 DIAGNOSIS — M19.072 PRIMARY OSTEOARTHRITIS OF BOTH FEET: Primary | ICD-10-CM

## 2019-04-03 PROCEDURE — 20604 DRAIN/INJ JOINT/BURSA W/US: CPT | Mod: LT | Performed by: FAMILY MEDICINE

## 2019-04-03 RX ORDER — LIDOCAINE HYDROCHLORIDE 10 MG/ML
2 INJECTION, SOLUTION INFILTRATION; PERINEURAL
Status: DISCONTINUED | OUTPATIENT
Start: 2019-04-03 | End: 2019-07-09

## 2019-04-03 RX ORDER — METHYLPREDNISOLONE ACETATE 40 MG/ML
40 INJECTION, SUSPENSION INTRA-ARTICULAR; INTRALESIONAL; INTRAMUSCULAR; SOFT TISSUE
Status: DISCONTINUED | OUTPATIENT
Start: 2019-04-03 | End: 2019-07-09

## 2019-04-03 RX ADMIN — LIDOCAINE HYDROCHLORIDE 2 ML: 10 INJECTION, SOLUTION INFILTRATION; PERINEURAL at 11:17

## 2019-04-03 RX ADMIN — METHYLPREDNISOLONE ACETATE 40 MG: 40 INJECTION, SUSPENSION INTRA-ARTICULAR; INTRALESIONAL; INTRAMUSCULAR; SOFT TISSUE at 11:17

## 2019-04-03 NOTE — LETTER
4/3/2019         RE: Pavithra Laurent  [address] [facility] [address]        Dear Colleague,    Thank you for referring your patient, Pavithra Laurent, to the  SPORTS MEDICINE. Please see a copy of my visit note below.    Small Joint Injection/Arthrocentesis: L intertarsal  Date/Time: 4/3/2019 11:17 AM  Performed by: Lyle Schulz MD  Authorized by: Lyle Schulz MD     Indications:  Pain  Needle Size:  25 G  Guidance: ultrasound    Location:  Foot  Location comment:  Left 2-4 tarsometatarsal joint injection  Site comment:  Left 2-4 tarsometatarsal joint injection  Site comment:  Left 2-4 tarsometatarsal joint injection  Site comment:  Left 2-4 tarsometatarsal joint injection  Site comment:  Left 2-4 tarsometatarsal joint injection  Site comment:  Left 2-4 tarsometatarsal joint injection  Site comment:  Left 2-4 tarsometatarsal joint injection  Site comment:  Left 2-4 tarsometatarsal joint injection  Site comment:  Left 2-4 tarsometatarsal joint injection  Site comment:  Left 2-4 tarsometatarsal joint injection  Site comment:  Left 2-4 tarsometatarsal joint injection  Site:  L intertarsal  Site comment:  Left 2-4 tarsometatarsal joint injection  Medications:  40 mg methylPREDNISolone 40 MG/ML; 2 mL lidocaine 1 %  Outcome:  Tolerated well, no immediate complications  Procedure discussed: discussed risks, benefits, and alternatives    Consent Given by:  Patient  Timeout: timeout called immediately prior to procedure    Prep: patient was prepped and draped in usual sterile fashion     Lot: [id number] EXP: 09/2020      3 ml dispersed between 3 joints            Pt returns with worsening of LEFT foot pain. Confirms it is same pain that was injected last fall. Chart review indicates:    1. Fluoroscopically guided therapeutic left second tarsometatarsal joint injection.  2. Fluoroscopically guided therapeutic left third tarsometatarsal joint injection.  3. Fluoroscopically guided therapeutic  left fourth tarsometatarsal joint injection    These sites were identified on US, and injections administered as below.    Again, thank you for allowing me to participate in the care of your patient.        Sincerely,        Lyle Schulz MD

## 2019-04-03 NOTE — PROGRESS NOTES
Pt returns with worsening of LEFT foot pain. Confirms it is same pain that was injected last fall. Chart review indicates:    1. Fluoroscopically guided therapeutic left second tarsometatarsal joint injection.  2. Fluoroscopically guided therapeutic left third tarsometatarsal joint injection.  3. Fluoroscopically guided therapeutic left fourth tarsometatarsal joint injection    These sites were identified on US, and injections administered as below.

## 2019-04-03 NOTE — PROGRESS NOTES
Small Joint Injection/Arthrocentesis: L intertarsal  Date/Time: 4/3/2019 11:17 AM  Performed by: Lyle Schulz MD  Authorized by: Lyle Schulz MD     Indications:  Pain  Needle Size:  25 G  Guidance: ultrasound    Location:  Foot  Location comment:  Left 2-4 tarsometatarsal joint injection  Site comment:  Left 2-4 tarsometatarsal joint injection  Site comment:  Left 2-4 tarsometatarsal joint injection  Site comment:  Left 2-4 tarsometatarsal joint injection  Site comment:  Left 2-4 tarsometatarsal joint injection  Site comment:  Left 2-4 tarsometatarsal joint injection  Site comment:  Left 2-4 tarsometatarsal joint injection  Site comment:  Left 2-4 tarsometatarsal joint injection  Site comment:  Left 2-4 tarsometatarsal joint injection  Site comment:  Left 2-4 tarsometatarsal joint injection  Site comment:  Left 2-4 tarsometatarsal joint injection  Site:  L intertarsal  Site comment:  Left 2-4 tarsometatarsal joint injection  Medications:  40 mg methylPREDNISolone 40 MG/ML; 2 mL lidocaine 1 %  Outcome:  Tolerated well, no immediate complications  Procedure discussed: discussed risks, benefits, and alternatives    Consent Given by:  Patient  Timeout: timeout called immediately prior to procedure    Prep: patient was prepped and draped in usual sterile fashion     Lot: [id number] EXP: 09/2020      3 ml dispersed between 3 joints

## 2019-04-23 ENCOUNTER — TELEPHONE (OUTPATIENT)
Dept: FAMILY MEDICINE | Facility: CLINIC | Age: 79
End: 2019-04-23

## 2019-04-23 NOTE — TELEPHONE ENCOUNTER
Pavithra Laurent is a 78 year old female who calls with chest burning.     PRESENTING PROBLEM:  Esophagus burning     NURSING ASSESSMENT:  Patient complains of chest burning/tightness.   Onset:  Several months  Pain is characterized as burning   Severity moderate  Located epigastric area   Radiates to none.  Duration lasting half an hour after taking something for indigestion- zantac  Associated symptoms belching and indigestion.  Exacerbated by eating .  Relieved by OTC antacids.  Cardiac risk factors: family history of MI.  Allergies:   Allergies   Allergen Reactions     Birch Trees      Codeine Sulfate Nausea and Vomiting     Dust Mites      Erythromycin Nausea and Vomiting     With oral use     Hydromorphone Other (See Comments)     nightmares       MEDICATIONS:  Taking medication(s) as prescribed? Yes  Taking over the counter medication(s) ?Yes  Any medication side effects? No significant side effects; Zantac    Any barriers to taking medication(s) as prescribed?  Yes  Medication(s) improving/managing symptoms?  Yes  Medication reconciliation completed: Yes    Last exam/Treatment:  19    RECOMMENDED DISPOSITION:  Patient requested an office visit. Her mother  of a heart attack and she wants to get an EKG.  Will comply with recommendation: Yes  If further questions/concerns or if symptoms do not improve, worsen or new symptoms develop, call your PCP or [facility] as soon as possible.      Guideline used:  Telephone Triage Protocols for Nurses, Fifth Edition, Kristal BARRETO, NELLIN, RN

## 2019-04-26 ENCOUNTER — OFFICE VISIT (OUTPATIENT)
Dept: FAMILY MEDICINE | Facility: CLINIC | Age: 79
End: 2019-04-26
Payer: COMMERCIAL

## 2019-04-26 VITALS
SYSTOLIC BLOOD PRESSURE: 120 MMHG | TEMPERATURE: 97.4 F | BODY MASS INDEX: 32.37 KG/M2 | HEART RATE: 79 BPM | WEIGHT: 177 LBS | OXYGEN SATURATION: 95 % | DIASTOLIC BLOOD PRESSURE: 66 MMHG

## 2019-04-26 DIAGNOSIS — M79.662 PAIN OF LEFT LOWER LEG: ICD-10-CM

## 2019-04-26 DIAGNOSIS — M15.9 GENERALIZED OSTEOARTHRITIS OF MULTIPLE SITES: ICD-10-CM

## 2019-04-26 DIAGNOSIS — R07.9 CHEST PAIN, UNSPECIFIED TYPE: Primary | ICD-10-CM

## 2019-04-26 PROCEDURE — 99214 OFFICE O/P EST MOD 30 MIN: CPT | Mod: 25 | Performed by: FAMILY MEDICINE

## 2019-04-26 PROCEDURE — 93000 ELECTROCARDIOGRAM COMPLETE: CPT | Performed by: FAMILY MEDICINE

## 2019-04-26 NOTE — RESULT ENCOUNTER NOTE
Here are the results we discussed in clinic today.  Let me know if you have any questions or concerns.  Dr. Kitty Owens MD/[facility]

## 2019-04-26 NOTE — PROGRESS NOTES
SUBJECTIVE:   Pavithra Laurent is a 78 year old female who presents to clinic today for the following   health issues:      Chest Pain      Onset: months    Description (location/character/radiation/duration): upper middle    Intensity:  mild, moderate    Accompanying signs and symptoms:        Shortness of breath: no        Sweating: no        Nausea/vomitting: no        Palpitations: YES, after climbing stairs       Other (fevers/chills/cough/heartburn/lightheadedness): YES- heartburn    History (similar episodes/previous evaluation): None    Precipitating or alleviating factors:       Worse with exertion: no        Worse with breathing: no        Related to eating: no        Better with burping: no , unsure    Therapies tried and outcome: shira    Mom  of heart attack and happened really suddenly.  From everything jasmyne told shouldne be heart related.  Gets compressed feeling in mid chest.  Lasts 30- minutes to 2 hours.  Not after exercise.  Usually when sitting in bed reading or watching television.      When episodes happen - takes Tums/Maalox takes quite a while and eventually goes away. Not sure if it's the medicine that helps.    No shortness of breath.  No sweating, no stomach upset.  No nausea.    Mom: 72.  MI.    Also,  Pt would like a referral to see someone about legs    Additional history: as documented    Reviewed  and updated as needed this visit by clinical staff  Tobacco  Allergies  Meds  Med Hx  Surg Hx  Fam Hx  Soc Hx        Reviewed and updated as needed this visit by Provider  Meds         BP Readings from Last 3 Encounters:   19 120/66   19 124/60   19 114/70    Wt Readings from Last 3 Encounters:   19 80.3 kg (177 lb)   19 81.2 kg (179 lb)   19 81.2 kg (179 lb)                    ROS:  Constitutional, HEENT, cardiovascular, pulmonary, gi and gu systems are negative, except as otherwise noted.    OBJECTIVE:     /66 (Cuff Size: Adult Large)    Pulse 79   Temp 97.4  F (36.3  C) (Tympanic)   Wt 80.3 kg (177 lb)   SpO2 95%   BMI 32.37 kg/m    Body mass index is 32.37 kg/m .  GENERAL: healthy, alert and no distress  EYES: Eyes grossly normal to inspection, PERRL and conjunctivae and sclerae normal  HENT: ear canals and TM's normal, nose and mouth without ulcers or lesions  NECK: no adenopathy, no asymmetry, masses, or scars and thyroid normal to palpation  RESP: lungs clear to auscultation - no rales, rhonchi or wheezes  CV: regular rate and rhythm, normal S1 S2, no S3 or S4, no murmur, click or rub, no peripheral edema and peripheral pulses strong  ABDOMEN: soft, nontender, no hepatosplenomegaly, no masses and bowel sounds normal  MS: no gross musculoskeletal defects noted, no edema    Diagnostic Test Results:  none   EKG - appears normal, NSR, normal axis, normal intervals, no acute ST/T changes c/w ischemia, no LVH by voltage criteria, unchanged from previous tracings    ASSESSMENT/PLAN:       ICD-10-CM    1. Chest pain, unspecified type R07.9 EKG 12-lead complete w/read - Clinics     Echocardiogram Exercise Stress   2. Generalized osteoarthritis of multiple sites M15.9 PHYSIATRY REFERRAL   3. Pain of left lower leg M79.662 PHYSIATRY REFERRAL       Patient Instructions   1. I referred you for physiatry.    2. For your heart:  1. EKG today  2. Call for stress test  3. If all normal, consider trying over the counter heartburn medicine (zantac) once a day for 2 weeks to see if improves symptoms.  4. If still there, let me know.        Discussed with patient, all questions answered, in agreement with this plan, will return or seek further care if not improving or worsening.      Kitty Owens MD  [facility]

## 2019-04-26 NOTE — PATIENT INSTRUCTIONS
1. I referred you for physiatry.    2. For your heart:  1. EKG today  2. Call for stress test  3. If all normal, consider trying over the counter heartburn medicine (zantac) once a day for 2 weeks to see if improves symptoms.  4. If still there, let me know.

## 2019-05-01 ENCOUNTER — OFFICE VISIT (OUTPATIENT)
Dept: FAMILY MEDICINE | Facility: CLINIC | Age: 79
End: 2019-05-01
Payer: COMMERCIAL

## 2019-05-01 VITALS
TEMPERATURE: 98.2 F | DIASTOLIC BLOOD PRESSURE: 58 MMHG | BODY MASS INDEX: 32.37 KG/M2 | SYSTOLIC BLOOD PRESSURE: 110 MMHG | WEIGHT: 177 LBS | RESPIRATION RATE: 16 BRPM | HEART RATE: 71 BPM | OXYGEN SATURATION: 97 %

## 2019-05-01 DIAGNOSIS — J04.0 LARYNGITIS, ACUTE: Primary | ICD-10-CM

## 2019-05-01 PROCEDURE — 99213 OFFICE O/P EST LOW 20 MIN: CPT | Performed by: PHYSICIAN ASSISTANT

## 2019-05-01 RX ORDER — BENZONATATE 100 MG/1
100 CAPSULE ORAL 3 TIMES DAILY PRN
Qty: 30 CAPSULE | Refills: 0 | Status: ON HOLD | OUTPATIENT
Start: 2019-05-01 | End: 2019-06-18

## 2019-05-01 RX ORDER — CODEINE PHOSPHATE AND GUAIFENESIN 10; 100 MG/5ML; MG/5ML
1 SOLUTION ORAL EVERY 4 HOURS PRN
Qty: 120 ML | Refills: 0 | Status: ON HOLD | OUTPATIENT
Start: 2019-05-01 | End: 2019-06-18

## 2019-05-01 NOTE — PROGRESS NOTES
SUBJECTIVE:   Pavithra Laurent is a 78 year old female who presents to clinic today for the following   health issues:      RESPIRATORY SYMPTOMS      Duration: yesterday    Description  sore throat    Severity: moderate    Accompanying signs and symptoms: None    History (predisposing factors):  none    Precipitating or alleviating factors: None    Therapies tried and outcome:  none      HPI additional notes:   Chief Complaint   Patient presents with     Pharyngitis     Pavithra presents today with ST since yesterday. Has chronic sinusitis and associated cough; sinus drainage at baseline but cough has been increasing. Also notes hoarse voice. Denies f/c/s, rhinorrhea, ear pain or pressure, n/v/d. Using prior rx tessalon and Robitussin AC with relief, requests refills.     ROS:    A Comprehensive greater than 10 system review of systems was carried out.    Pertinent positives and negatives are noted above.  Otherwise negative for contributory information.      Chart Review:  History   Smoking Status     Never Smoker   Smokeless Tobacco     Never Used       Patient Active Problem List   Diagnosis     Nasal congestion     Environmental allergies     Hypothyroidism     Neuropathic pain of right arm     Rosacea     Tear film insufficiency     Elbow dislocation     Low back pain     Lesion of ulnar nerve     Cellulitis of left leg     Trochanteric bursitis of left hip     Piriformis syndrome of left side     Hx of blood clots     Neck pain     TIA (transient ischemic attack)     Trochanteric bursitis of both hips     Generalized osteoarthritis of multiple sites     Sicca syndrome (H)     Urinary tract infection with hematuria, site unspecified     Lumbar degenerative disc disease     Primary osteoarthritis of both feet     Ventral hernia without obstruction or gangrene     Past Surgical History:   Procedure Laterality Date     ARTHROTOMY ELBOW Right 1/7/2015    Procedure: ARTHROTOMY ELBOW;  Surgeon: Branden Meyer MD;   Location: [facility] OR     C RAD RESEC TONSIL/PILLARS  1948    tonsillectomy     CLOSED REDUCTION UPPER EXTREMITY  12/26/2014    Procedure: CLOSED REDUCTION UPPER EXTREMITY;  Surgeon: Louis Daniel MD;  Location:  OR     GYN SURGERY  1984    tubal ligation     IRRIGATION AND DEBRIDEMENT HAND, COMBINED  12/26/2014    Procedure: COMBINED IRRIGATION AND DEBRIDEMENT HAND;  Surgeon: Louis Daniel MD;  Location:  OR     KNEE SURGERY  2001    arthroscopic right     LAPAROSCOPIC CHOLECYSTECTOMY  8/16/2012    Procedure: LAPAROSCOPIC CHOLECYSTECTOMY;  LAPAROSCOPIC CHOLECYSTECTOMY ;  Surgeon: Preston Walters MD;  Location: [facility]     OPEN REDUCTION INTERNAL FIXATION FOREARM Right 12/26/2014    Procedure: OPEN REDUCTION INTERNAL FIXATION FOREARM;  Surgeon: Louis Daniel MD;  Location:  OR     ORTHOPEDIC SURGERY  2007,2008    bunyanectomy, hammer toe, torn meniscus, toe and knee replacement     TKA  2009    right knee     WISDOM TEETH EXTRACTION  1958     Problem list, Medication list, Allergies, Medical/Social/Surg/Family hx reviewed in EPIC, updated as appropriate.   OBJECTIVE:                                                    /58   Pulse 71   Temp 98.2  F (36.8  C)   Resp 16   Wt 80.3 kg (177 lb)   SpO2 97%   BMI 32.37 kg/m    Body mass index is 32.37 kg/m .  GENERAL: WDWN, no acute distress  PSYCH: pleasant, cooperative  EYES: no discharge, no injection  HENT:  Normocephalic. Moist mucus membranes. TMs pearly gray w/o effusion. Nasal mucosa edematous, clear rhinorrhea. Oropharynx pink w/o tonsillar hypertrophy or exudate, uvula midline, clear PND. Mildly hoarse voice.  NECK:  Supple, symmetric, no CORNELIUS  LUNGS:  Clear to auscultation bilaterally without rhonchi, rales, or wheeze. Chest rise symmetric and no tenderness to palpation.  HEART:  Regular rate & rhythm. No murmur, gallop, or rub.  EXTREMITIES:  No gross deformities, moves all 4 limbs spontaneously  SKIN:  Warm and dry, no rash or suspicious lesions     NEUROLOGIC: alert, sensation grossly intact.    Diagnostic test results: none     ASSESSMENT/PLAN:                                                          ICD-10-CM    1. Laryngitis, acute J04.0 guaiFENesin-codeine (ROBITUSSIN AC) 100-10 MG/5ML solution     benzonatate (TESSALON PERLES) 100 MG capsule     S/s consistent with viral laryngitis, self-limiting illness, no need for antibx at this time. Robitussin AC and Tessalon refilled as requested. Get plenty of rest and fluids. Cover your cough and practice good handwashing. May use OTC throat lozenges and Tylenol prn pain.    Please see patient instructions for treatment details.    Follow up in 7-10 days if not improving as anticipated, sooner PRN.    Whit Cox PA-C  [facility]

## 2019-05-07 ENCOUNTER — TELEPHONE (OUTPATIENT)
Dept: PHYSICAL MEDICINE AND REHAB | Facility: CLINIC | Age: 79
End: 2019-05-07

## 2019-05-07 NOTE — TELEPHONE ENCOUNTER
[facility] Call Center    Phone Message    May a detailed message be left on voicemail: yes    Reason for Call: Other: pt calling to make an appt, she has a PHYSIATRY REFERRAL on file and is awaiting a call back to schedule. Please call pt back to discuss     Action Taken: Message routed to:  [facility] ([facility] neurology

## 2019-05-14 ENCOUNTER — HOSPITAL ENCOUNTER (OUTPATIENT)
Dept: CARDIOLOGY | Facility: CLINIC | Age: 79
Discharge: HOME | End: 2019-05-14
Attending: FAMILY MEDICINE | Admitting: FAMILY MEDICINE
Payer: COMMERCIAL

## 2019-05-14 DIAGNOSIS — R07.9 CHEST PAIN, UNSPECIFIED TYPE: ICD-10-CM

## 2019-05-14 PROCEDURE — 93321 DOPPLER ECHO F-UP/LMTD STD: CPT | Mod: 26 | Performed by: INTERNAL MEDICINE

## 2019-05-14 PROCEDURE — 40000264 ECHO STRESS ECHOCARDIOGRAM

## 2019-05-14 PROCEDURE — 93016 CV STRESS TEST SUPVJ ONLY: CPT | Performed by: INTERNAL MEDICINE

## 2019-05-14 PROCEDURE — 93350 STRESS TTE ONLY: CPT | Mod: 26 | Performed by: INTERNAL MEDICINE

## 2019-05-14 PROCEDURE — 93325 DOPPLER ECHO COLOR FLOW MAPG: CPT | Mod: 26 | Performed by: INTERNAL MEDICINE

## 2019-05-14 PROCEDURE — 25500064 ZZH RX 255 OP 636: Performed by: FAMILY MEDICINE

## 2019-05-14 PROCEDURE — 93018 CV STRESS TEST I&R ONLY: CPT | Performed by: INTERNAL MEDICINE

## 2019-05-14 RX ADMIN — HUMAN ALBUMIN MICROSPHERES AND PERFLUTREN 6 ML: 10; .22 INJECTION, SOLUTION INTRAVENOUS at 14:15

## 2019-05-16 ENCOUNTER — TRANSFERRED RECORDS (OUTPATIENT)
Dept: HEALTH INFORMATION MANAGEMENT | Facility: CLINIC | Age: 79
End: 2019-05-16

## 2019-05-19 ENCOUNTER — APPOINTMENT (OUTPATIENT)
Dept: GENERAL RADIOLOGY | Facility: CLINIC | Age: 79
End: 2019-05-19
Attending: EMERGENCY MEDICINE
Payer: COMMERCIAL

## 2019-05-19 ENCOUNTER — HOSPITAL ENCOUNTER (EMERGENCY)
Facility: CLINIC | Age: 79
Discharge: HOME | End: 2019-05-19
Attending: EMERGENCY MEDICINE | Admitting: EMERGENCY MEDICINE
Payer: COMMERCIAL

## 2019-05-19 VITALS
HEART RATE: 66 BPM | OXYGEN SATURATION: 95 % | BODY MASS INDEX: 31.28 KG/M2 | TEMPERATURE: 97.1 F | WEIGHT: 170 LBS | HEIGHT: 62 IN | RESPIRATION RATE: 16 BRPM | DIASTOLIC BLOOD PRESSURE: 73 MMHG | SYSTOLIC BLOOD PRESSURE: 168 MMHG

## 2019-05-19 DIAGNOSIS — M70.61 GREATER TROCHANTERIC BURSITIS OF RIGHT HIP: ICD-10-CM

## 2019-05-19 DIAGNOSIS — M25.551 HIP PAIN, RIGHT: ICD-10-CM

## 2019-05-19 PROCEDURE — 99283 EMERGENCY DEPT VISIT LOW MDM: CPT

## 2019-05-19 PROCEDURE — 25000132 ZZH RX MED GY IP 250 OP 250 PS 637: Performed by: EMERGENCY MEDICINE

## 2019-05-19 PROCEDURE — 73502 X-RAY EXAM HIP UNI 2-3 VIEWS: CPT

## 2019-05-19 RX ORDER — TRAMADOL HYDROCHLORIDE 50 MG/1
50 TABLET ORAL EVERY 6 HOURS PRN
Qty: 12 TABLET | Refills: 0 | Status: SHIPPED | OUTPATIENT
Start: 2019-05-19 | End: 2019-06-14

## 2019-05-19 RX ORDER — TRAMADOL HYDROCHLORIDE 50 MG/1
50 TABLET ORAL ONCE
Status: COMPLETED | OUTPATIENT
Start: 2019-05-19 | End: 2019-05-19

## 2019-05-19 RX ADMIN — TRAMADOL HYDROCHLORIDE 50 MG: 50 TABLET, COATED ORAL at 15:19

## 2019-05-19 ASSESSMENT — ENCOUNTER SYMPTOMS
NUMBNESS: 0
ARTHRALGIAS: 1
NECK PAIN: 0
MYALGIAS: 0
ABDOMINAL PAIN: 0
BACK PAIN: 1

## 2019-05-19 ASSESSMENT — MIFFLIN-ST. JEOR: SCORE: 1204.36

## 2019-05-19 NOTE — ED PROVIDER NOTES
History     Chief Complaint:  Hip Pain    HPI   Pavithra Laurent is a 78 year old female with a history of bursitis who presents with right hip pain. The patient states that today when she woke up she noticed immediate and severe right hip pain. Yesterday, the patient state she was at the grocery store and she lost her balance, having to grab the cart to stay up which resulted in her making an abnormal twisting motion. She states that after the twisting motion she felt no pain, however later in the night she started to feel mild, lower right back pain. The patient then iced her back and proceeded to go to sleep. The patient does have a history of bursitis, however she states that this feels different symptomatically. The pain is localized in the hip, and she has been taking 5 year old Tramadol to manage her pain thus far. The patient denies any other symptoms such as leg pain, numbness, tingling, or midline back or cervical tenderness.     Allergies:  Codeine sulfate  Erythromycin  Hydromorphone     Medications:    Aspirin  Astelin  Tessalon  Calcium  Restasis  Dextran  Voltaren  Elestat  Romycin  Lidex  Flonase  Neurontin  Robitussin  Nizoral  Levothyroxine  Melatonin  Singulair  Mycostatin     Past Medical History:    Allergic rhinitis  Calculus of bladder  Cataract of both eyes  Dry eye syndrome  Environmental allergies  GERD  Hypothyroid  Pain in joint  Rosacea  Thoracic outlet syndrome    Past Surgical History:    Arthrotomy  Tonsillectomy  Orthopedic surgery  Irrigation and debridement hand  Tubal ligation  Knee surgery  Lap stephanie  [place] teeth extraction    Family History:    MI  Cerebrovascular disease  Cancer  Alcohol and drug abuse  Depression  Cardiovascular     Social History:  Marital Status:   Smoking Status: Never  Alcohol Use: Yes  Drug Use: No    Review of Systems   Gastrointestinal: Negative for abdominal pain.   Musculoskeletal: Positive for arthralgias and back pain. Negative for myalgias  "and neck pain.   Neurological: Negative for numbness.   All other systems reviewed and are negative.    Physical Exam     Patient Vitals for the past 24 hrs:   BP Temp Temp src Pulse Resp SpO2 Height Weight   05/19/19 1453 168/73 97.1  F (36.2  C) Temporal 66 16 95 % 1.575 m (5' 2\") 77.1 kg (170 lb)       Physical Exam  GENERAL: well developed, pleasant  HEAD: atraumatic  EYES: pupils reactive, extraocular muscles intact, conjunctivae normal  ENT:  mucus membranes moist  NECK:  trachea midline, normal range of motion  RESPIRATORY: no tachypnea, breath sounds clear to auscultation   CVS: normal S1/S2, no murmurs, intact distal pulses  ABDOMEN: soft, nontender, nondistention  MUSCULOSKELETAL: no deformities. Pain in the right hip and mild lower, right back pain. Straight leg raise normal.   SKIN: warm and dry, no acute rashes or ulceration  NEURO: GCS 15, cranial nerves intact, alert and oriented x3  PSYCH:  Mood/affect normal    Emergency Department Course     Imaging:  Radiographic findings were communicated with the patient who voiced understanding of the findings.    XR Pelvis w Hip Right 1 View:  Hip joint spaces appear fairly well-preserved. Small  ossification near the inferior aspect of the right ischial tuberosity  appears chronic. No acute fracture or subluxation.  Per Radiologist.     Interventions:  1519: 50 mg Tramadol PO    Emergency Department Course:  Past medical records, nursing notes, and vitals reviewed.  1518: I performed an exam of the patient and obtained history, as documented above.      The patient was taken for pelvis and hip XR, see imaging results above.     I rechecked the patient. Findings and plan explained to the Patient. Patient discharged home with instructions regarding supportive care, medications, and reasons to return. The importance of close follow-up was reviewed.     Impression & Plan      Medical Decision Making:    Patient presents with hip pain and notes a twisting episode " yesterday while grocery shopping.  Patient has had greater trochanteric bursitis in the past with injection.  Certainly sciatica, hip, greater trochanteric bursitis are all considered.  Patient has have pain over the greater trochanter and worsened with internal rotation.  Straight leg raise is normal.  X-ray of the hip is unrevealing.  Discussed outpatient management with her and following up with her orthopedic clinic for injection.  Do not feel we need advanced imaging of her back at this time.    Diagnosis:    ICD-10-CM   1. Hip pain, right M25.551   2. Greater trochanteric bursitis of right hip M70.61       Disposition:  discharged to home    Discharge Medications:     Medication List      Started    traMADol 50 MG tablet  Commonly known as:  ULTRAM  50 mg, Oral, EVERY 6 HOURS PRN            Carmelo Cruz  5/19/2019    EMERGENCY DEPARTMENT    Carmelo FONSECA, sunni serving as a scribe at 3:18 PM on 5/19/2019 to document services personally performed by John Cat MD based on my observations and the provider's statements to me.          John Cat MD  05/28/19 [id number]

## 2019-05-19 NOTE — ED AVS SNAPSHOT
Emergency Department  [address] [facility] [address]  Phone:  [phone]  Fax:  [phone]                                    Pavithra Laurent   MRN: [id number]    Department:   Emergency Department   Date of Visit:  5/19/2019           After Visit Summary Signature Page    I have received my discharge instructions, and my questions have been answered. I have discussed any challenges I see with this plan with the nurse or doctor.    ..........................................................................................................................................  Patient/Patient Representative Signature      ..........................................................................................................................................  Patient Representative Print Name and Relationship to Patient    ..................................................               ................................................  Date                                   Time    ..........................................................................................................................................  Reviewed by Signature/Title    ...................................................              ..............................................  Date                                               Time          22EPIC Rev 08/18

## 2019-06-07 ENCOUNTER — OFFICE VISIT (OUTPATIENT)
Dept: PHYSICAL MEDICINE AND REHAB | Facility: CLINIC | Age: 79
End: 2019-06-07
Payer: COMMERCIAL

## 2019-06-07 VITALS
DIASTOLIC BLOOD PRESSURE: 74 MMHG | HEART RATE: 89 BPM | OXYGEN SATURATION: 95 % | WEIGHT: 172.6 LBS | SYSTOLIC BLOOD PRESSURE: 152 MMHG | HEIGHT: 61 IN | BODY MASS INDEX: 32.59 KG/M2

## 2019-06-07 DIAGNOSIS — M48.00 SPINAL STENOSIS, UNSPECIFIED SPINAL REGION: ICD-10-CM

## 2019-06-07 DIAGNOSIS — M25.551 GREATER TROCHANTERIC PAIN SYNDROME OF BOTH LOWER EXTREMITIES: ICD-10-CM

## 2019-06-07 DIAGNOSIS — M19.072 PRIMARY OSTEOARTHRITIS OF LEFT ANKLE: ICD-10-CM

## 2019-06-07 DIAGNOSIS — M16.0 PRIMARY OSTEOARTHRITIS OF BOTH HIPS: Primary | ICD-10-CM

## 2019-06-07 DIAGNOSIS — M25.552 GREATER TROCHANTERIC PAIN SYNDROME OF BOTH LOWER EXTREMITIES: ICD-10-CM

## 2019-06-07 ASSESSMENT — PAIN SCALES - GENERAL: PAINLEVEL: NO PAIN (0)

## 2019-06-07 ASSESSMENT — MIFFLIN-ST. JEOR: SCORE: 1197.27

## 2019-06-07 NOTE — NURSING NOTE
Chief Complaint   Patient presents with     Consult     [facility] NEW - ESTABLISHING WITH PROVIDER       Hans Paige, EMT
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
## 2019-06-10 ENCOUNTER — TELEPHONE (OUTPATIENT)
Dept: SURGERY | Facility: CLINIC | Age: 79
End: 2019-06-10

## 2019-06-10 NOTE — TELEPHONE ENCOUNTER
Patient is scheduled for a ventral hernia repair.  She is wanting to discuss s/s of incarceration so that she is aware what to watch out for until surgery.    Reviewed s/s of incarceration with her and advised her to go to the ER if they symptoms present.    She verbalized understanding and agreed.    Will call prn.    Christy Bolden RN-BSN

## 2019-06-10 NOTE — TELEPHONE ENCOUNTER
Type of surgery: Open ventral hernia repair  Location of surgery: [facility]  Date and time of surgery: 6/18/19 at 1:10pm  Surgeon: Dr. Last Solis  Pre-Op Appt Date: Patient to schedule  Post-Op Appt Date: Patient to schedule   Packet sent out: Yes  Pre-cert/Authorization completed:  Not Applicable  Date: 6/10/19

## 2019-06-11 ENCOUNTER — TELEPHONE (OUTPATIENT)
Dept: SURGERY | Facility: CLINIC | Age: 79
End: 2019-06-11

## 2019-06-11 NOTE — TELEPHONE ENCOUNTER
Name of caller: Patient    Reason for Call:  Questions about restrictions after hernia surgery, open surgery    Surgeon:  Dr. Solis     Recent Surgery:  No    If yes, when & what type:  N/A      Best phone number to reach pt at is:[phone]   Ok to leave a message with medical info? Yes.    Pharmacy preferred (if calling for a refill):

## 2019-06-12 NOTE — TELEPHONE ENCOUNTER
Patient curious about restrictions after surgery, particularly ability to move around and drive.    Informed her that she will be on a 10-15 lb weight restriction for 3-4 weeks.      She should not drive at all until she is done utilizing the narcotic pain medications.    She verbalized understanding.  No further questions at this time.    She will call prn.    Christy Bolden RN-BSN

## 2019-06-14 ENCOUNTER — OFFICE VISIT (OUTPATIENT)
Dept: FAMILY MEDICINE | Facility: CLINIC | Age: 79
End: 2019-06-14
Payer: COMMERCIAL

## 2019-06-14 VITALS
DIASTOLIC BLOOD PRESSURE: 70 MMHG | HEART RATE: 91 BPM | BODY MASS INDEX: 32.37 KG/M2 | RESPIRATION RATE: 16 BRPM | OXYGEN SATURATION: 96 % | SYSTOLIC BLOOD PRESSURE: 130 MMHG | WEIGHT: 172 LBS

## 2019-06-14 DIAGNOSIS — Z86.718 PERSONAL HISTORY OF DVT (DEEP VEIN THROMBOSIS): ICD-10-CM

## 2019-06-14 DIAGNOSIS — K43.9 VENTRAL HERNIA WITHOUT OBSTRUCTION OR GANGRENE: ICD-10-CM

## 2019-06-14 DIAGNOSIS — G45.9 TIA (TRANSIENT ISCHEMIC ATTACK): ICD-10-CM

## 2019-06-14 DIAGNOSIS — Z01.818 PRE-OP EXAM: Primary | ICD-10-CM

## 2019-06-14 LAB — HGB BLD-MCNC: 13.9 G/DL (ref 11.7–15.7)

## 2019-06-14 PROCEDURE — 36415 COLL VENOUS BLD VENIPUNCTURE: CPT | Performed by: PHYSICIAN ASSISTANT

## 2019-06-14 PROCEDURE — 99215 OFFICE O/P EST HI 40 MIN: CPT | Performed by: PHYSICIAN ASSISTANT

## 2019-06-14 PROCEDURE — 85018 HEMOGLOBIN: CPT | Performed by: PHYSICIAN ASSISTANT

## 2019-06-14 NOTE — PATIENT INSTRUCTIONS
- Stop taking your daily aspirin  - Take thyroid medication the morning of surgery with small sip of water

## 2019-06-14 NOTE — PROGRESS NOTES
[facility]  [address] [place] [address]  [address]  [facility] [address]  [phone]  Dept: [phone]    PRE-OP EVALUATION:  Today's date: 2019    Pavithra Laurent (: 1940) presents for pre-operative evaluation assessment as requested by Dr. Sanchez.  She requires evaluation and anesthesia risk assessment prior to undergoing surgery/procedure for treatment of Hernia .    Fax number for surgical facility:   Primary Physician: Kitty Owens  Type of Anesthesia Anticipated: General    Patient has a Health Care Directive or Living Will:  YES     Preop Questions 2019   Who is doing your surgery? dr sanchez   What are you having done? Hernia surgery   Date of Surgery/Procedure: 2019   1.  Do you have a history of Heart attack, stroke, stent, coronary bypass surgery, or other heart surgery? No   2.  Do you ever have any pain or discomfort in your chest? No   3.  Do you have a history of  Heart Failure? No   4.   Are you troubled by shortness of breath when:  walking on a level surface, or up a slight hill, or at night? No   5.  Do you currently have a cold, bronchitis or other respiratory infection? No   6.  Do you have a cough, shortness of breath, or wheezing? No   7.  Do you sometimes get pains in the calves of your legs when you walk? No   8. Do you or anyone in your family have previous history of blood clots? YES - hx DVT LLE   9.  Do you or does anyone in your family have a serious bleeding problem such as prolonged bleeding following surgeries or cuts? No   10. Have you ever had problems with anemia or been told to take iron pills? No   11. Have you had any abnormal blood loss such as black, tarry or bloody stools, or abnormal vaginal bleeding? No   12. Have you ever had a blood transfusion? No   13. Have you or any of your relatives ever had problems with anesthesia? Yes - take a long time to wake up from anesthesia   14. Do you have sleep apnea,  excessive snoring or daytime drowsiness? No   15. Do you have any prosthetic heart valves? No   16. Do you have prosthetic joints? YES - Rt TKA   17. Is there any chance that you may be pregnant? No         HPI:     HPI related to upcoming procedure: patient with symptomatic ventral hernia, undergoing repair with Dr Solis on 6/18/2019      See problem list for active medical problems.  Problems all longstanding and stable, except as noted/documented.  See ROS for pertinent symptoms related to these conditions.      MEDICAL HISTORY:     Patient Active Problem List    Diagnosis Date Noted     Ventral hernia without obstruction or gangrene 03/15/2019     Priority: Medium     Lumbar degenerative disc disease 03/12/2019     Priority: Medium     Primary osteoarthritis of both feet 03/12/2019     Priority: Medium     Urinary tract infection with hematuria, site unspecified 02/25/2019     Priority: Medium     Trochanteric bursitis of both hips 12/06/2018     Priority: Medium     Generalized osteoarthritis of multiple sites 06/14/2018     Priority: Medium     Sicca syndrome (H) 06/14/2018     Priority: Medium     TIA (transient ischemic attack) 04/16/2018     Priority: Medium     4/2018. Saw neuro Dr. Cote 4/2018-pending results carotid ultrasound & Echo with bubble study, if normal continue ASA daily.       Neck pain 04/03/2018     Priority: Medium     Hx of blood clots 02/28/2018     Priority: Medium     Left leg, lower.  3/2017       Trochanteric bursitis of left hip 09/05/2017     Priority: Medium     Piriformis syndrome of left side 09/05/2017     Priority: Medium     Cellulitis of left leg 03/30/2017     Priority: Medium     Lesion of ulnar nerve 05/04/2015     Priority: Medium     Low back pain 03/13/2015     Priority: Medium     Diagnosis updated by automated process. Provider to review and confirm.       Elbow dislocation 01/07/2015     Priority: Medium     Hypothyroidism 05/24/2013     Priority: Medium      Neuropathic pain of right arm 05/24/2013     Priority: Medium     Right arm after accident with compound fx of wrist and dislocated elbow.  Persistent nerve pain since.  Using gabapentin.       Rosacea 05/24/2013     Priority: Medium     Tear film insufficiency 05/24/2013     Priority: Medium     Nasal congestion 01/04/2013     Priority: Medium     Environmental allergies 01/04/2013     Priority: Medium      Past Medical History:   Diagnosis Date     Allergic rhinitis      Calculus of gallbladder without mention of cholecystitis or obstruction      Cataract of both eyes      Dry eye syndrome      Environmental allergies      Gastro-oesophageal reflux disease      GERD (gastroesophageal reflux disease) 5/24/2013     Hypothyroid      Pain in joint, shoulder region      Rosacea      Thoracic outlet syndrome      Past Surgical History:   Procedure Laterality Date     ARTHROTOMY ELBOW Right 1/7/2015    Procedure: ARTHROTOMY ELBOW;  Surgeon: Branden Meyer MD;  [facility]    tons[facility]     CLOSED REDUCTION UPPER EXTREMITY  12/26/2014    Procedure: CLOSED REDUCTION UPPER EXTREMITY;  Surgeon: Louis Daniel MD;  Location:  OR     GYN SURGERY  1984    tubal ligation     IRRIGATION AND DEBRIDEMENT HAND, COMBINED  12/26/2014    Procedure: COMBINED IRRIGATION AND DEBRIDEMENT HAND;  Surgeon: Louis Daniel MD;  Location:  OR     KNEE SURGERY  2001    arthroscopic right     LAPAROSCOPIC CHOLECYSTECTOMY  8/16/2012    Procedure: LAPAROSCOPIC CHOLECYSTECTOMY;  LAPAROSCOPIC CHOLECYSTECTOMY ;  Surgeon: Preston Walters MD;  Location: [facility]     OPEN REDUCTION INTERNAL FIXATION FOREARM Right 12/26/2014    Procedure: OPEN REDUCTION INTERNAL FIXATION FOREARM;  Surgeon: Louis Daniel MD;  Location:  OR     ORTHOPEDIC SURGERY  2007,2008    bunyanectomy, hammer toe, torn meniscus, toe and knee replacement     TKA  2009    right knee     WISDOM TEETH EXTRACTION  1958     Current Outpatient  Medications   Medication Sig Dispense Refill     acetaminophen (TYLENOL) 500 MG tablet Take 500-1,000 mg by mouth every 6 hours as needed for mild pain       ASPIRIN PO Take 81 mg by mouth       azelastine (ASTELIN) 0.1 % nasal spray Spray 2 sprays into both nostrils 2 times daily as needed        benzonatate (TESSALON PERLES) 100 MG capsule Take 1 capsule (100 mg) by mouth 3 times daily as needed for cough 30 capsule 0     calcium-vitamin D-vitamin K (CALCIUM SOFT CHEWS) 500-500-40 MG-UNT-MCG CHEW Take 2 tablets by mouth 2 times daily       cycloSPORINE (RESTASIS) 0.05 % ophthalmic emulsion Place 1 drop into both eyes every 12 hours.         desonide (DESOWEN) 0.05 % cream Apply topically 2 times daily as needed       Dextran 70 0.1%-Hypromellose 0.3% (BION TEARS) 0.1-0.3 % SOLN ophthalmic solution Place 1 drop into both eyes as needed for dry eyes       diclofenac (VOLTAREN) 1 % GEL Place 2 g onto the skin 4 times daily       epinastine HCl (ELESTAT) 0.05 % SOLN 1 drop 2 times daily as needed       erythromycin (ROMYCIN) ophthalmic ointment 1 Application nightly as needed        fluocinolone acetonide (DERMOTIC) 0.01 % OIL Place 1 drop in ear(s) daily as needed.       fluocinonide (LIDEX) 0.05 % external solution Apply topically 2 times daily 60 mL 0     fluticasone (FLONASE) 50 MCG/ACT nasal spray Spray 1 spray into both nostrils daily        gabapentin (NEURONTIN) 400 MG capsule        guaiFENesin-codeine (ROBITUSSIN AC) 100-10 MG/5ML solution Take 5 mLs by mouth every 4 hours as needed for cough 120 mL 0     IBUPROFEN PO Take 400 mg by mouth every 4 hours as needed for moderate pain        ketoconazole (NIZORAL) 2 % external cream Apply topically daily 60 g 0     levothyroxine (SYNTHROID/LEVOTHROID) 88 MCG tablet Take 1 tablet (88 mcg) by mouth daily 6 DAYS A WEEK.  Skip Sunday. 90 tablet 3     melatonin 3 MG tablet Take 3 mg by mouth nightly as needed for sleep       metroNIDAZOLE (METROGEL) 0.75 % external  gel Apply topically 2 times daily 45 g 11     montelukast (SINGULAIR) 10 MG tablet Take 10 mg by mouth daily        nystatin (MYCOSTATIN) 833441 UNIT/GM POWD Apply topically 3 times daily as needed 30 g 1     order for DME Equipment being ordered: compression stockings 25-35mmHG 1 each 0     triamcinolone (KENALOG) 0.1 % cream Apply sparingly to affected area three times daily for 14 days. 80 g 1     OTC products: None, except as noted above    Allergies   Allergen Reactions     Birch Trees      Codeine Sulfate Nausea and Vomiting     Oral, topical is ok     Dust Mites      Erythromycin Nausea and Vomiting     With oral use     Hydromorphone Other (See Comments)     nightmares      Latex Allergy: NO    Social History     Tobacco Use     Smoking status: Never Smoker     Smokeless tobacco: Never Used   Substance Use Topics     Alcohol use: Yes     Alcohol/week: 0.0 oz     Comment: rare- 2/month     History   Drug Use No       REVIEW OF SYSTEMS:   Constitutional, neuro, ENT, endocrine, pulmonary, cardiac, gastrointestinal, genitourinary, musculoskeletal, integument and psychiatric systems are negative, except as otherwise noted.    EXAM:   /70   Pulse 91   Resp 16   Wt 78 kg (172 lb)   SpO2 96%   BMI 32.37 kg/m    GENERAL:  WDWN, no acute distress  PSYCH: pleasant, cooperative  EYES: no discharge, no injection  HENT:  Normocephalic. Moist mucus membranes.  NECK:  Supple, symmetric  LUNGS:  Clear to auscultation bilaterally without rhonchi, rales, or wheeze. Chest rise symmetric and no tenderness to palpation.  HEART:  Regular rate & rhythm. No murmur, gallop, or rub.  ABDOMEN:  Soft, NTND. Ventral hernia, fully reducible.  EXTREMITIES:  No gross deformities, moves all 4 limbs spontaneously  SKIN:  Warm and dry, no rash or suspicious lesions    NEUROLOGIC: alert, sensation grossly intact.    DIAGNOSTICS:     Labs Resulted Today:   Results for orders placed or performed in visit on 06/14/19   Hemoglobin    Result Value Ref Range    Hemoglobin 13.9 11.7 - 15.7 g/dL     Exercise stress test on 5/14/2019 - negative/normal    Recent Labs   Lab Test 11/27/18  1444 11/09/18  1520 10/30/18  1154 04/16/18  1406 12/15/17  1443 09/28/17 05/24/13  1046   HGB 14.2 14.6 14.3 13.1  --   --    < >  --     280 306 224  --   --    < >  --    INR  --   --  1.02  --   --  1.9*   < >  --    NA  --   --   --  141 138  --    < >  --    POTASSIUM  --   --   --  3.7 3.9  --    < >  --    CR  --   --   --  0.68 0.62  --    < >  --    A1C  --   --   --   --   --   --   --  5.6    < > = values in this interval not displayed.        IMPRESSION:   Reason for surgery/procedure: ventral hernia  Diagnosis/reason for consult: pre-op exam    The proposed surgical procedure is considered INTERMEDIATE risk.    REVISED CARDIAC RISK INDEX  The patient has the following serious cardiovascular risks for perioperative complications such as (MI, PE, VFib and 3  AV Block):  Cerebrovascular Disease (TIA or CVA)  INTERPRETATION: 1 risks: Class II (low risk - 0.9% complication rate)    The patient has the following additional risks for perioperative complications:  Morbid obesity      ICD-10-CM    1. Pre-op exam Z01.818 Hemoglobin   2. Ventral hernia without obstruction or gangrene K43.9    3. Personal history of DVT (deep vein thrombosis) Z86.718    4. TIA (transient ischemic attack) G45.9        RECOMMENDATIONS:       --Patient is to take all scheduled medications on the day of surgery EXCEPT for modifications listed below.    Anticoagulant or Antiplatelet Medication Use  ASPIRIN: Discontinue ASA 7-10 days prior to procedure to reduce bleeding risk.  It should be resumed post-operatively.        APPROVAL GIVEN to proceed with proposed procedure, without further diagnostic evaluation       Signed Electronically by: Whit Cox PA-C    Copy of this evaluation report is provided to requesting physician.    [place] Preop Guidelines    Revised  Cardiac Risk Index

## 2019-06-17 PROBLEM — Z79.01 LONG TERM CURRENT USE OF ANTICOAGULANT THERAPY: Status: RESOLVED | Noted: 2017-04-03 | Resolved: 2018-10-30

## 2019-06-18 ENCOUNTER — ANESTHESIA EVENT (OUTPATIENT)
Dept: SURGERY | Facility: CLINIC | Age: 79
End: 2019-06-18
Payer: COMMERCIAL

## 2019-06-18 ENCOUNTER — ANESTHESIA (OUTPATIENT)
Dept: SURGERY | Facility: CLINIC | Age: 79
End: 2019-06-18
Payer: COMMERCIAL

## 2019-06-18 ENCOUNTER — HOSPITAL ENCOUNTER (OUTPATIENT)
Facility: CLINIC | Age: 79
Discharge: HOME | End: 2019-06-18
Attending: SURGERY | Admitting: SURGERY
Payer: COMMERCIAL

## 2019-06-18 ENCOUNTER — APPOINTMENT (OUTPATIENT)
Dept: SURGERY | Facility: PHYSICIAN GROUP | Age: 79
End: 2019-06-18
Payer: COMMERCIAL

## 2019-06-18 VITALS
WEIGHT: 172 LBS | HEART RATE: 67 BPM | OXYGEN SATURATION: 94 % | SYSTOLIC BLOOD PRESSURE: 169 MMHG | RESPIRATION RATE: 16 BRPM | BODY MASS INDEX: 32.37 KG/M2 | TEMPERATURE: 97.2 F | DIASTOLIC BLOOD PRESSURE: 89 MMHG

## 2019-06-18 DIAGNOSIS — G89.18 POST-OP PAIN: Primary | ICD-10-CM

## 2019-06-18 PROCEDURE — 25000125 ZZHC RX 250: Performed by: SURGERY

## 2019-06-18 PROCEDURE — 49568 ZZHC REPAIR HERNIA WITH MESH: CPT | Performed by: SURGERY

## 2019-06-18 PROCEDURE — 37000008 ZZH ANESTHESIA TECHNICAL FEE, 1ST 30 MIN: Performed by: SURGERY

## 2019-06-18 PROCEDURE — 71000012 ZZH RECOVERY PHASE 1 LEVEL 1 FIRST HR: Performed by: SURGERY

## 2019-06-18 PROCEDURE — 49560 ZZHC REPAIR INCISIONAL HERNIA,REDUCIBLE: CPT | Performed by: SURGERY

## 2019-06-18 PROCEDURE — 40000170 ZZH STATISTIC PRE-PROCEDURE ASSESSMENT II: Performed by: SURGERY

## 2019-06-18 PROCEDURE — 49560 ZZHC REPAIR INCISIONAL HERNIA,REDUCIBLE: CPT | Mod: AS | Performed by: PHYSICIAN ASSISTANT

## 2019-06-18 PROCEDURE — 25800030 ZZH RX IP 258 OP 636: Performed by: NURSE ANESTHETIST, CERTIFIED REGISTERED

## 2019-06-18 PROCEDURE — 25000128 H RX IP 250 OP 636: Performed by: NURSE ANESTHETIST, CERTIFIED REGISTERED

## 2019-06-18 PROCEDURE — 36000058 ZZH SURGERY LEVEL 3 EA 15 ADDTL MIN: Performed by: SURGERY

## 2019-06-18 PROCEDURE — C1781 MESH (IMPLANTABLE): HCPCS | Performed by: SURGERY

## 2019-06-18 PROCEDURE — 25000128 H RX IP 250 OP 636: Performed by: SURGERY

## 2019-06-18 PROCEDURE — 25000125 ZZHC RX 250: Performed by: NURSE ANESTHETIST, CERTIFIED REGISTERED

## 2019-06-18 PROCEDURE — 36000056 ZZH SURGERY LEVEL 3 1ST 30 MIN: Performed by: SURGERY

## 2019-06-18 PROCEDURE — 49568 ZZHC REPAIR HERNIA WITH MESH: CPT | Mod: AS | Performed by: PHYSICIAN ASSISTANT

## 2019-06-18 PROCEDURE — 37000009 ZZH ANESTHESIA TECHNICAL FEE, EACH ADDTL 15 MIN: Performed by: SURGERY

## 2019-06-18 PROCEDURE — 27210794 ZZH OR GENERAL SUPPLY STERILE: Performed by: SURGERY

## 2019-06-18 PROCEDURE — 71000027 ZZH RECOVERY PHASE 2 EACH 15 MINS: Performed by: SURGERY

## 2019-06-18 DEVICE — MESH VENTRALEX HERNIA 2.5" CIRCLE MED W/STRAP 5950008: Type: IMPLANTABLE DEVICE | Site: ABDOMEN | Status: FUNCTIONAL

## 2019-06-18 RX ORDER — HYDROCODONE BITARTRATE AND ACETAMINOPHEN 5; 325 MG/1; MG/1
1-2 TABLET ORAL EVERY 4 HOURS PRN
Qty: 15 TABLET | Refills: 0 | Status: SHIPPED | OUTPATIENT
Start: 2019-06-18 | End: 2019-07-09

## 2019-06-18 RX ORDER — ASPIRIN 81 MG/1
81 TABLET ORAL DAILY
Status: ON HOLD | COMMUNITY
End: 2021-06-11

## 2019-06-18 RX ORDER — BUPIVACAINE HYDROCHLORIDE AND EPINEPHRINE 5; 5 MG/ML; UG/ML
INJECTION, SOLUTION PERINEURAL PRN
Status: DISCONTINUED | OUTPATIENT
Start: 2019-06-18 | End: 2019-06-18 | Stop reason: HOSPADM

## 2019-06-18 RX ORDER — FENTANYL CITRATE 50 UG/ML
25-50 INJECTION, SOLUTION INTRAMUSCULAR; INTRAVENOUS
Status: DISCONTINUED | OUTPATIENT
Start: 2019-06-18 | End: 2019-06-18 | Stop reason: HOSPADM

## 2019-06-18 RX ORDER — ONDANSETRON 2 MG/ML
4 INJECTION INTRAMUSCULAR; INTRAVENOUS EVERY 30 MIN PRN
Status: DISCONTINUED | OUTPATIENT
Start: 2019-06-18 | End: 2019-06-18 | Stop reason: HOSPADM

## 2019-06-18 RX ORDER — BUPIVACAINE HYDROCHLORIDE AND EPINEPHRINE 5; 5 MG/ML; UG/ML
INJECTION, SOLUTION EPIDURAL; INTRACAUDAL; PERINEURAL
Status: DISCONTINUED
Start: 2019-06-18 | End: 2019-06-18 | Stop reason: HOSPADM

## 2019-06-18 RX ORDER — SODIUM CHLORIDE, SODIUM LACTATE, POTASSIUM CHLORIDE, CALCIUM CHLORIDE 600; 310; 30; 20 MG/100ML; MG/100ML; MG/100ML; MG/100ML
INJECTION, SOLUTION INTRAVENOUS CONTINUOUS
Status: DISCONTINUED | OUTPATIENT
Start: 2019-06-18 | End: 2019-06-18 | Stop reason: HOSPADM

## 2019-06-18 RX ORDER — ONDANSETRON 4 MG/1
4 TABLET, ORALLY DISINTEGRATING ORAL EVERY 30 MIN PRN
Status: DISCONTINUED | OUTPATIENT
Start: 2019-06-18 | End: 2019-06-18 | Stop reason: HOSPADM

## 2019-06-18 RX ORDER — GABAPENTIN 100 MG/1
100 CAPSULE ORAL DAILY PRN
COMMUNITY
End: 2020-09-01

## 2019-06-18 RX ORDER — HYDROCODONE BITARTRATE AND ACETAMINOPHEN 5; 325 MG/1; MG/1
1 TABLET ORAL
Status: DISCONTINUED | OUTPATIENT
Start: 2019-06-18 | End: 2019-06-18 | Stop reason: HOSPADM

## 2019-06-18 RX ORDER — AMOXICILLIN 250 MG
1-2 CAPSULE ORAL 2 TIMES DAILY
Qty: 20 TABLET | Refills: 0 | Status: SHIPPED | OUTPATIENT
Start: 2019-06-18 | End: 2019-06-23

## 2019-06-18 RX ORDER — CEFAZOLIN SODIUM 1 G/3ML
1 INJECTION, POWDER, FOR SOLUTION INTRAMUSCULAR; INTRAVENOUS SEE ADMIN INSTRUCTIONS
Status: DISCONTINUED | OUTPATIENT
Start: 2019-06-18 | End: 2019-06-18 | Stop reason: HOSPADM

## 2019-06-18 RX ORDER — NALOXONE HYDROCHLORIDE 0.4 MG/ML
.1-.4 INJECTION, SOLUTION INTRAMUSCULAR; INTRAVENOUS; SUBCUTANEOUS
Status: DISCONTINUED | OUTPATIENT
Start: 2019-06-18 | End: 2019-06-18 | Stop reason: HOSPADM

## 2019-06-18 RX ORDER — CEFAZOLIN SODIUM 2 G/100ML
2 INJECTION, SOLUTION INTRAVENOUS
Status: COMPLETED | OUTPATIENT
Start: 2019-06-18 | End: 2019-06-18

## 2019-06-18 RX ORDER — MEPERIDINE HYDROCHLORIDE 25 MG/ML
12.5 INJECTION INTRAMUSCULAR; INTRAVENOUS; SUBCUTANEOUS
Status: DISCONTINUED | OUTPATIENT
Start: 2019-06-18 | End: 2019-06-18 | Stop reason: HOSPADM

## 2019-06-18 RX ORDER — PROPOFOL 10 MG/ML
INJECTION, EMULSION INTRAVENOUS CONTINUOUS PRN
Status: DISCONTINUED | OUTPATIENT
Start: 2019-06-18 | End: 2019-06-18

## 2019-06-18 RX ORDER — SODIUM CHLORIDE, SODIUM LACTATE, POTASSIUM CHLORIDE, CALCIUM CHLORIDE 600; 310; 30; 20 MG/100ML; MG/100ML; MG/100ML; MG/100ML
INJECTION, SOLUTION INTRAVENOUS CONTINUOUS PRN
Status: DISCONTINUED | OUTPATIENT
Start: 2019-06-18 | End: 2019-06-18

## 2019-06-18 RX ORDER — AMOXICILLIN 500 MG/1
2000 CAPSULE ORAL ONCE
COMMUNITY
End: 2021-11-03

## 2019-06-18 RX ORDER — DEXAMETHASONE SODIUM PHOSPHATE 4 MG/ML
INJECTION, SOLUTION INTRA-ARTICULAR; INTRALESIONAL; INTRAMUSCULAR; INTRAVENOUS; SOFT TISSUE PRN
Status: DISCONTINUED | OUTPATIENT
Start: 2019-06-18 | End: 2019-06-18

## 2019-06-18 RX ORDER — FENTANYL CITRATE 50 UG/ML
INJECTION, SOLUTION INTRAMUSCULAR; INTRAVENOUS PRN
Status: DISCONTINUED | OUTPATIENT
Start: 2019-06-18 | End: 2019-06-18

## 2019-06-18 RX ORDER — ONDANSETRON 2 MG/ML
INJECTION INTRAMUSCULAR; INTRAVENOUS PRN
Status: DISCONTINUED | OUTPATIENT
Start: 2019-06-18 | End: 2019-06-18

## 2019-06-18 RX ORDER — LIDOCAINE HYDROCHLORIDE 20 MG/ML
INJECTION, SOLUTION INFILTRATION; PERINEURAL PRN
Status: DISCONTINUED | OUTPATIENT
Start: 2019-06-18 | End: 2019-06-18

## 2019-06-18 RX ADMIN — CEFAZOLIN SODIUM 2 G: 2 INJECTION, SOLUTION INTRAVENOUS at 12:52

## 2019-06-18 RX ADMIN — SODIUM CHLORIDE, POTASSIUM CHLORIDE, SODIUM LACTATE AND CALCIUM CHLORIDE: 600; 310; 30; 20 INJECTION, SOLUTION INTRAVENOUS at 12:47

## 2019-06-18 RX ADMIN — ONDANSETRON 4 MG: 2 INJECTION INTRAMUSCULAR; INTRAVENOUS at 12:52

## 2019-06-18 RX ADMIN — PHENYLEPHRINE HYDROCHLORIDE 100 MCG: 10 INJECTION INTRAVENOUS at 13:05

## 2019-06-18 RX ADMIN — FENTANYL CITRATE 50 MCG: 50 INJECTION, SOLUTION INTRAMUSCULAR; INTRAVENOUS at 13:18

## 2019-06-18 RX ADMIN — PROPOFOL 150 MCG/KG/MIN: 10 INJECTION, EMULSION INTRAVENOUS at 12:50

## 2019-06-18 RX ADMIN — FENTANYL CITRATE 50 MCG: 50 INJECTION, SOLUTION INTRAMUSCULAR; INTRAVENOUS at 12:52

## 2019-06-18 RX ADMIN — LIDOCAINE HYDROCHLORIDE 40 MG: 20 INJECTION, SOLUTION INFILTRATION; PERINEURAL at 12:52

## 2019-06-18 RX ADMIN — DEXAMETHASONE SODIUM PHOSPHATE 4 MG: 4 INJECTION, SOLUTION INTRA-ARTICULAR; INTRALESIONAL; INTRAMUSCULAR; INTRAVENOUS; SOFT TISSUE at 12:52

## 2019-06-18 RX ADMIN — MIDAZOLAM 1 MG: 1 INJECTION INTRAMUSCULAR; INTRAVENOUS at 12:52

## 2019-06-18 ASSESSMENT — ENCOUNTER SYMPTOMS: DYSRHYTHMIAS: 0

## 2019-06-18 NOTE — DISCHARGE INSTRUCTIONS
[facility] - SURGICAL CONSULTANTS  Discharge Instructions: Post-Operative Open Ventral Hernia    ACTIVITY    Take frequent, short walks and increase your activity gradually.      Avoid strenuous physical activity or heavy lifting greater than 15lbs. for 3-4 weeks.  You may climb stairs.    You may drive without restrictions when you are not using any prescription pain medication and feel comfortable in a car.    You may return to work/school when you are comfortable without any prescription pain medication.    WOUND CARE    You may remove your bandage and shower 48 hours after the surgery.  Pat your incision dry and leave it open to air.  Re-apply dressing (Band-Aids or gauze/tape) as needed for comfort or drainage.    You may have steri-strips (looks like white tape) on your incision.  You may peel off the steri-strips 2 weeks after your surgery if they have not peeled off on their own.     Do not soak your incision in a tub or pool for 2 weeks.     Do not apply any lotions, creams, or ointments to your incision.    A ridge under your incision is normal and will gradually resolve.    DIET    Start with liquids, then gradually resume your regular diet as tolerated.     Drink plenty of fluids to stay hydrated.    PAIN    Expect some tenderness and discomfort at the incision site(s).  Use the prescribed pain medication at your discretion.  Expect gradual resolution of your pain over several days.    You may take ibuprofen with food (unless you have been told not to) instead of or in addition to your prescribed pain medication.  If you are taking Norco or Percocet, do not take any additional acetaminophen/APAP/Tylenol.      Do not drink alcohol or drive while you are taking pain medications.    You may apply ice to your incisions in 20 minute intervals as needed for the next 48 hours.  After that time, consider switching to heat if you prefer.    EXPECTATIONS    Pain medications can cause constipation.   Limit use when possible.  Take over the counter stool softener/stimulant, such as Colace or Senna, 1-2 times a day with plenty of water.  You may take a mild over the counter laxative, such as Miralax or a suppository, as needed.      You may discontinue these medications once you are having regular bowel movements and/or are no longer taking your narcotic pain medication.      FOLLOW UP    Our office will contact you approximately 2 weeks to check on your progress and answer any questions you may have.  If you are doing well, you will not need to return for a follow up appointment.  If any concerns are identified over the phone, we will help you make an appointment to see a provider.     If you have not received a phone call, have any questions or concerns, or would like to be seen, please call us at [phone] and ask to speak with our nurse.  We are located at [address] [place] [address].    CALL OUR OFFICE -[phone] IF YOU HAVE:     Chills or fever above 101 F.    Increased redness, warmth, or drainage at your incisions.    Significant bleeding.    Pain not relieved by your pain medication or rest.    Increasing pain after the first 48 hours.    Any other concerns or questions.    Revised January 2018        Same Day Surgery Discharge Instructions for  Sedation and General Anesthesia       It's not unusual to feel dizzy, light-headed or faint for up to 24 hours after surgery or while taking pain medication.  If you have these symptoms: sit for a few minutes before standing and have someone assist you when you get up to walk or use the bathroom.      You should rest and relax for the next 24 hours. We recommend you make arrangements to have an adult stay with you for at least 24 hours after your discharge.  Avoid hazardous and strenuous activity.      DO NOT DRIVE any vehicle or operate mechanical equipment for 24 hours following the end of your surgery.  Even though you may feel  normal, your reactions may be affected by the medication you have received.      Do not drink alcoholic beverages for 24 hours following surgery.       Slowly progress to your regular diet as you feel able. It's not unusual to feel nauseated and/or vomit after receiving anesthesia.  If you develop these symptoms, drink clear liquids (apple juice, ginger ale, broth, 7-up, etc. ) until you feel better.  If your nausea and vomiting persists for 24 hours, please notify your surgeon.        All narcotic pain medications, along with inactivity and anesthesia, can cause constipation. Drinking plenty of liquids and increasing fiber intake will help.      For any questions of a medical nature, call your surgeon.      Do not make important decisions for 24 hours.      If you had general anesthesia, you may have a sore throat for a couple of days related to the breathing tube used during surgery.  You may use Cepacol lozenges to help with this discomfort.  If it worsens or if you develop a fever, contact your surgeon.       If you feel your pain is not well managed with the pain medications prescribed by your surgeon, please contact your surgeon's office to let them know so they can address your concerns.

## 2019-06-18 NOTE — ANESTHESIA POSTPROCEDURE EVALUATION
Patient: Pavithra Laurent    Procedure(s):  OPEN VENTRAL HERNIA REPAIR WITH MESH    Diagnosis:UMBILICAL HERNIA  Diagnosis Additional Information: No value filed.    Anesthesia Type:  MAC    Note:  Anesthesia Post Evaluation    Patient location during evaluation: PACU  Patient participation: Able to fully participate in evaluation  Level of consciousness: awake and alert  Pain management: adequate  Airway patency: patent  Cardiovascular status: acceptable and hemodynamically stable  Respiratory status: acceptable and unassisted  Hydration status: acceptable  PONV: none     Anesthetic complications: None          Last vitals:  Vitals:    06/18/19 1204 06/18/19 1355 06/18/19 1400   BP: 151/72 112/62 102/60   Pulse:   60   Resp: 16 14 20   Temp: 36.2  C (97.1  F) 36.2  C (97.2  F)    SpO2: 96% 99% 99%         Electronically Signed By: Marcos Ashraf MD  June 18, 2019  2:16 PM

## 2019-06-18 NOTE — PROGRESS NOTES
Admission medication history interview status for the 6/18/2019  admission is complete. See EPIC admission navigator for prior to admission medications     Medication history source reliability:Moderate    Medication history interview source(s):Patient    Medication history resources (including written lists, pill bottles, clinic record):None    Primary pharmacy. [facility]    Additional medication history information not noted on PTA med list :None    Time spent in this activity: 75 minutes    Prior to Admission medications    Medication Sig Last Dose Taking? Auth Provider   acetaminophen (TYLENOL) 500 MG tablet Take 500 mg by mouth 4 times daily as needed for mild pain  6/16/2019 at Unknown Yes Reported, Patient   amoxicillin (AMOXIL) 500 MG capsule Take 2,000 mg by mouth once One hour prior to dental procedure 1+ month at Unknown Yes Reported, Patient   aspirin 81 MG EC tablet Take 81 mg by mouth daily 6/14/2019 at AM Yes Reported, Patient   azelastine (ASTELIN) 0.1 % nasal spray Spray 2 sprays into both nostrils daily as needed for rhinitis  1+ month at Unknown Yes Reported, Patient   calcium-vitamin D-vitamin K (CALCIUM SOFT CHEWS) 500-500-40 MG-UNT-MCG CHEW Take 2 tablets by mouth 2 times daily 6/4/2019 at PM Yes Reported, Patient   cycloSPORINE (RESTASIS) 0.05 % ophthalmic emulsion Place 1 drop into both eyes every 12 hours.   6/16/2019 at HS Yes Reported, Patient   desonide (DESOWEN) 0.05 % cream Apply topically 2 times daily as needed (rash)  1+ month at Unknown Yes Reported, Patient   Dextran 70 0.1%-Hypromellose 0.3% (BION TEARS) 0.1-0.3 % SOLN ophthalmic solution Place 1 drop into both eyes See Admin Instructions Up to 7 times daily , as needed. 6/18/2019 at 0930 Yes Reported, Patient   diclofenac (VOLTAREN) 1 % GEL Place 2 g onto the skin daily as needed for moderate pain (knees)  5/28/2019 at Unknown Yes Reported, Patient   epinastine HCl (ELESTAT) 0.05 % SOLN Place 1 drop into both eyes daily as needed  (allergies)  1+ month at Unknown Yes Reported, Patient   erythromycin (ROMYCIN) ophthalmic ointment Place 1 Application into both eyes nightly as needed (dry/irritated eyes)  1+ month at HS Yes Reported, Patient   fluocinolone acetonide (DERMOTIC) 0.01 % OIL Place 1 drop in ear(s) daily as needed. 1+ month at Unknown Yes Reported, Patient   fluocinonide (LIDEX) 0.05 % external solution Apply topically 2 times daily  Patient taking differently: Apply topically 2 times daily as needed (rash)  1+ month at Unknown Yes Lore Krishnamurthy MD   fluticasone (FLONASE) 50 MCG/ACT nasal spray Spray 1 spray into both nostrils daily  6/18/2019 at 0700 Yes Reported, Patient   gabapentin (NEURONTIN) 100 MG capsule Take 100 mg by mouth daily as needed (on particularly active days) Past Month at Unknown time Yes Reported, Patient   gabapentin (NEURONTIN) 400 MG capsule Take 400 mg by mouth 4 times daily as needed  6/18/2019 at 0700 Yes Reported, Patient   IBUPROFEN PO Take 400 mg by mouth 3 times daily as needed for moderate pain  6/8/2019 at Unknown Yes Reported, Patient   ketoconazole (NIZORAL) 2 % external cream Apply topically daily  Patient taking differently: Apply topically 3 times daily as needed for itching or irritation  1+ month at Unknown Yes Lore Krishnamurthy MD   levothyroxine (SYNTHROID/LEVOTHROID) 88 MCG tablet Take 1 tablet (88 mcg) by mouth daily 6 DAYS A WEEK.  Skip Sunday. 6/18/2019 at 0700 Yes Kitty Owens MD   metroNIDAZOLE (METROGEL) 0.75 % external gel Apply topically 2 times daily  Patient taking differently: Apply topically daily  6/17/2019 at AM Yes Lore Krishnamurthy MD   montelukast (SINGULAIR) 10 MG tablet Take 10 mg by mouth At Bedtime  6/17/2019 at 2100 Yes Reported, Patient   nystatin (MYCOSTATIN) 181932 UNIT/GM POWD Apply topically 3 times daily as needed  Patient taking differently: Apply topically 2 times daily as needed for other (yeast infection in skin folds)  1+  month at Unknown Yes Whit Cox PA-C   Xylitol (XYLIMELTS MT) Take 1 lozenge by mouth At Bedtime 6/16/2019 at HS Yes Reported, Patient   Xylitol (XYLIMELTS MT) Take 1 lozenge by mouth daily as needed (dry mouth) 6/18/2019 at 1100 Yes Reported, Patient   order for DME Equipment being ordered: compression stockings 25-35mmHG   Siegler, Nicole Joy, PA-C

## 2019-06-18 NOTE — OP NOTE
General Surgery Operative Note    PREOPERATIVE DIAGNOSIS:  VENTRAL HERNIA    POSTOPERATIVE DIAGNOSIS:  Same    PROCEDURE:  OPEN VENTRAL HERNIA REPAIR WITH MESH    ANESTHESIA:  MAC and local    PREOPERATIVE MEDICATIONS:  Ancef IV.    SURGEON:  Last Solis MD    ASSISTANT:  Tracey Meek PA-C.  First assistant was necessary due to challenging exposure and the need for improved visualization and help maintaining hemostasis.      INDICATIONS:  Ventral Hernia at previous laparoscopy port site    DESCRIPTION OF PROCEDURE: The patient was taken to the operating suite and given IV sedation. The area around the umbilicus was prepped and draped in a sterile fashion. Surgeon initiated timeout was acknowledged. We made a curvilinear incision above the umbilicus and took this down through skin and subcutaneous tissue. I dissected this down to the level of the fascia and began clearing the fascia off. I was able to get around the umbilical stalk with a clamp and I cut the umbilical stalk off of the underlying hernia sac and fascial edge. I cleared off the fascial edge around the hernia defect which measured approximately 2.5 cm. I  the sac from the mouth of the hernia and was able to dunk the sac and contents, which were preperitoneal fat back into the abdomen. I then cleared out the preperitoneal space for a distance of several centimeters circumferentially. I deployed an 6.4 cm Ventralux patch within this space. I used four 0- Vicryl sutures at the apices to secure the mesh prior to deployment. Once I was satisfied with the position of the mesh in the preperitoneal space, I reapproximated the fascia transversely over the mesh using several interrupted 2-0 PDS sutures. The wound was irrigated. I then tacked the underside of the umbilical skin down to the fascia using a 3-0 Vicryl. Wound was closed in layers using 3-0 and 4-0 Vicryl and Steri-Strips. At the conclusion of the case, all lap and needle counts were correct.      ESTIMATED BLOOD LOSS:  5 mL    INTRAOPERATIVE FINDINGS:  2.5 cm hernia containing fat.    Last Solis MD, MD

## 2019-06-18 NOTE — ANESTHESIA CARE TRANSFER NOTE
Patient: Pavithra Laurent    Procedure(s):  OPEN VENTRAL HERNIA REPAIR WITH MESH    Diagnosis: UMBILICAL HERNIA  Diagnosis Additional Information: No value filed.    Anesthesia Type:   MAC     Note:  Airway :Face Mask  Patient transferred to:PACU  Comments: At end of procedure, spontaneous respirations, patient alert to voice, able to follow commands. Oxygen via facemask at 10 liters per minute to PACU. Oxygen tubing connected to wall O2 in PACU, SpO2, NiBP, and EKG monitors and alarms on and functioning, Lore Hugger warmer connected to patient gown, report on patient's clinical status given to PACU RN, RN questions answered.Handoff Report: Identifed the Patient, Identified the Reponsible Provider, Reviewed the pertinent medical history, Discussed the surgical course, Reviewed Intra-OP anesthesia mangement and issues during anesthesia, Set expectations for post-procedure period and Allowed opportunity for questions and acknowledgement of understanding      Vitals: (Last set prior to Anesthesia Care Transfer)    CRNA VITALS  6/18/2019 1320 - 6/18/2019 1358      6/18/2019             Resp Rate (observed):  16    EKG:  Sinus rhythm                Electronically Signed By: ASPEN Chung CRNA  June 18, 2019  1:58 PM

## 2019-06-18 NOTE — ANESTHESIA PREPROCEDURE EVALUATION
Anesthesia Pre-Procedure Evaluation    Patient: Pavithra Laurent   MRN: [id number] : 1940          Preoperative Diagnosis: UMBILICAL HERNIA    Procedure(s):  OPEN VENTRAL HERNIA REPAIR WITH MESH    Past Medical History:   Diagnosis Date     Allergic rhinitis      Calculus of gallbladder without mention of cholecystitis or obstruction      Cataract of both eyes      Dry eye syndrome      Environmental allergies      Gastro-oesophageal reflux disease      GERD (gastroesophageal reflux disease) 2013     Hypothyroid      Pain in joint, shoulder region      Rosacea      Thoracic outlet syndrome      Past Surgical History:   Procedure Laterality Date     ARTHROTOMY ELBOW Right 2015    Procedure: ARTHROTOMY ELBOW;  Surgeon: Branden Meyer MD;  Location: [facility] RAD RESEC TONSIL/PILLARS      tonsillectomy     CLOSED REDUCTION UPPER EXTREMITY  2014    Procedure: CLOSED REDUCTION UPPER EXTREMITY;  Surgeon: Louis Daniel MD;  Location:  OR     GYN SURGERY      tubal ligation     IRRIGATION AND DEBRIDEMENT HAND, COMBINED  2014    Procedure: COMBINED IRRIGATION AND DEBRIDEMENT HAND;  Surgeon: Louis Daniel MD;  Location:  OR     KNEE SURGERY      arthroscopic right     LAPAROSCOPIC CHOLECYSTECTOMY  2012    Procedure: LAPAROSCOPIC CHOLECYSTECTOMY;  LAPAROSCOPIC CHOLECYSTECTOMY ;  Surgeon: Preston Walters MD;  Location: [facility]     OPEN REDUCTION INTERNAL FIXATION FOREARM Right 2014    Procedure: OPEN REDUCTION INTERNAL FIXATION FOREARM;  Surgeon: Louis Daniel MD;  Location:  OR     ORTHOPEDIC SURGERY  ,    bunyanectomy, hammer toe, torn meniscus, toe and knee replacement     TKA  2009    right knee     WISDOM TEETH EXTRACTION       Allergies   Allergen Reactions     Birch Trees      Codeine Sulfate Nausea and Vomiting     Oral, topical is ok     Dust Mites      Erythromycin Nausea and Vomiting     With oral use     Hydromorphone Other (See Comments)      nightmares     Social History     Tobacco Use     Smoking status: Never Smoker     Smokeless tobacco: Never Used   Substance Use Topics     Alcohol use: Yes     Alcohol/week: 0.0 oz     Comment: rare- 2/month     Prior to Admission medications    Medication Sig Start Date End Date Taking? Authorizing Provider   acetaminophen (TYLENOL) 500 MG tablet Take 500 mg by mouth 4 times daily as needed for mild pain    Yes Reported, Patient   amoxicillin (AMOXIL) 500 MG capsule Take 2,000 mg by mouth once One hour prior to dental procedure   Yes Reported, Patient   aspirin 81 MG EC tablet Take 81 mg by mouth daily   Yes Reported, Patient   azelastine (ASTELIN) 0.1 % nasal spray Spray 2 sprays into both nostrils daily as needed for rhinitis  4/6/15  Yes Reported, Patient   calcium-vitamin D-vitamin K (CALCIUM SOFT CHEWS) 500-500-40 MG-UNT-MCG CHEW Take 2 tablets by mouth 2 times daily   Yes Reported, Patient   cycloSPORINE (RESTASIS) 0.05 % ophthalmic emulsion Place 1 drop into both eyes every 12 hours.     Yes Reported, Patient   desonide (DESOWEN) 0.05 % cream Apply topically 2 times daily as needed (rash)    Yes Reported, Patient   Dextran 70 0.1%-Hypromellose 0.3% (BION TEARS) 0.1-0.3 % SOLN ophthalmic solution Place 1 drop into both eyes See Admin Instructions Up to 7 times daily , as needed.   Yes Reported, Patient   diclofenac (VOLTAREN) 1 % GEL Place 2 g onto the skin daily as needed for moderate pain (knees)    Yes Reported, Patient   epinastine HCl (ELESTAT) 0.05 % SOLN Place 1 drop into both eyes daily as needed (allergies)    Yes Reported, Patient   erythromycin (ROMYCIN) ophthalmic ointment Place 1 Application into both eyes nightly as needed (dry/irritated eyes)    Yes Reported, Patient   fluocinolone acetonide (DERMOTIC) 0.01 % OIL Place 1 drop in ear(s) daily as needed.   Yes Reported, Patient   fluocinonide (LIDEX) 0.05 % external solution Apply topically 2 times daily  Patient taking differently: Apply  topically 2 times daily as needed (rash)  3/25/19  Yes Lore Krishnamurthy MD   fluticasone (FLONASE) 50 MCG/ACT nasal spray Spray 1 spray into both nostrils daily  2/19/14  Yes Reported, Patient   gabapentin (NEURONTIN) 100 MG capsule Take 100 mg by mouth daily as needed (on particularly active days)   Yes Reported, Patient   gabapentin (NEURONTIN) 400 MG capsule Take 400 mg by mouth 4 times daily as needed  11/7/18  Yes Reported, Patient   IBUPROFEN PO Take 400 mg by mouth 3 times daily as needed for moderate pain    Yes Reported, Patient   ketoconazole (NIZORAL) 2 % external cream Apply topically daily  Patient taking differently: Apply topically 3 times daily as needed for itching or irritation  3/25/19  Yes Lore Krishnamurthy MD   levothyroxine (SYNTHROID/LEVOTHROID) 88 MCG tablet Take 1 tablet (88 mcg) by mouth daily 6 DAYS A WEEK.  Skip Sunday. 12/21/18  Yes Kitty Owens MD   metroNIDAZOLE (METROGEL) 0.75 % external gel Apply topically 2 times daily  Patient taking differently: Apply topically daily  3/25/19  Yes Lore Krishnamurthy MD   montelukast (SINGULAIR) 10 MG tablet Take 10 mg by mouth At Bedtime  4/6/15  Yes Reported, Patient   nystatin (MYCOSTATIN) 652060 UNIT/GM POWD Apply topically 3 times daily as needed  Patient taking differently: Apply topically 2 times daily as needed for other (yeast infection in skin folds)  10/12/18  Yes Whit Cox PA-C   Xylitol (XYLIMELTS MT) Take 1 lozenge by mouth At Bedtime   Yes Reported, Patient   Xylitol (XYLIMELTS MT) Take 1 lozenge by mouth daily as needed (dry mouth)   Yes Reported, Patient   order for DME Equipment being ordered: compression stockings 25-35mmHG 4/11/17   Siegler, Nicole Joy, PA-C     Current Facility-Administered Medications Ordered in Epic   Medication Dose Route Frequency Last Rate Last Dose     ceFAZolin (ANCEF) 1 g vial to attach to  ml bag for ADULT or 50 ml bag for PEDS  1 g Intravenous See Admin  Instructions         ceFAZolin (ANCEF) intermittent infusion 2 g in 100 mL dextrose PRE-MIX  2 g Intravenous Pre-Op/Pre-procedure x 1 dose         No current Epic-ordered outpatient medications on file.       Recent Labs   Lab Test 04/16/18  1406 12/15/17  1443    138   POTASSIUM 3.7 3.9   CHLORIDE 105 103   CO2 29 28   ANIONGAP 7 7   * 89   BUN 10 10   CR 0.68 0.62   HARSHIL 8.9 9.1     Recent Labs   Lab Test 06/14/19  1346 11/27/18  1444 11/09/18  1520   WBC  --  10.5 12.4*   HGB 13.9 14.2 14.6   PLT  --  270 280     Recent Labs   Lab Test 12/25/14  2320   ABO A   RH  Neg     Recent Labs   Lab Test 01/24/13  0817 05/13/12  0130   TROPI 0.016 <0.012     No results for input(s): PH, PCO2, PO2, HCO3 in the last 54689 hours.  No results for input(s): HCG in the last 32869 hours.  Recent Results (from the past 744 hour(s))   XR Pelvis w Hip Right 1 View    Narrative    PELVIS AND HIP RIGHT ONE VIEW   5/19/2019 3:37 PM     HISTORY: Pain right hip/right greater tuberosity, no trauma.    COMPARISON: None.      Impression    IMPRESSION: Hip joint spaces appear fairly well-preserved. Small  ossification near the inferior aspect of the right ischial tuberosity  appears chronic. No acute fracture or subluxation.    PRATEEK MINER MD       RECENT LABS:       Anesthesia Evaluation     .             ROS/MED HX    ENT/Pulmonary:     (+)allergic rhinitis, , . Other pulmonary disease significant chronic dry mouth.    Neurologic:  - neg neurologic ROS     Cardiovascular: Comment: H/o thoracic outlet syndrome       (-) CAD and arrhythmias   METS/Exercise Tolerance:  4 - Raking leaves, gardening   Hematologic:         Musculoskeletal:   (+) arthritis,  -       GI/Hepatic:     (+) GERD Asymptomatic on medication,       Renal/Genitourinary:         Endo:     (+) thyroid problem .      Psychiatric:         Infectious Disease:         Malignancy:         Other:                          Physical Exam  Normal systems:  "dental    Airway   Mallampati: II  TM distance: >3 FB  Neck ROM: full    Dental     Cardiovascular   Rhythm and rate: regular and normal      Pulmonary    breath sounds clear to auscultation            Lab Results   Component Value Date    WBC 10.5 11/27/2018    HGB 13.9 06/14/2019    HCT 42.7 11/27/2018     11/27/2018    SED 4 10/29/2016     04/16/2018    POTASSIUM 3.7 04/16/2018    CHLORIDE 105 04/16/2018    CO2 29 04/16/2018    BUN 10 04/16/2018    CR 0.68 04/16/2018     (H) 04/16/2018    HARSHIL 8.9 04/16/2018    ALBUMIN 4.1 10/30/2018    PROTTOTAL 7.7 10/30/2018    ALT 24 10/30/2018    AST 22 10/30/2018    ALKPHOS 77 10/30/2018    BILITOTAL 0.7 10/30/2018    LIPASE 80 05/13/2012    PTT 26 10/30/2018    INR 1.02 10/30/2018    TSH 0.34 (L) 12/19/2018    T4 1.40 12/19/2018       Preop Vitals  BP Readings from Last 3 Encounters:   06/18/19 151/72   06/14/19 130/70   06/07/19 152/74    Pulse Readings from Last 3 Encounters:   06/14/19 91   06/07/19 89   05/19/19 66      Resp Readings from Last 3 Encounters:   06/18/19 16   06/14/19 16   05/19/19 16    SpO2 Readings from Last 3 Encounters:   06/18/19 96%   06/14/19 96%   06/07/19 95%      Temp Readings from Last 1 Encounters:   06/18/19 36.2  C (97.1  F) (Oral)    Ht Readings from Last 1 Encounters:   06/07/19 1.553 m (5' 1.13\")      Wt Readings from Last 1 Encounters:   06/18/19 78 kg (172 lb)    Estimated body mass index is 32.37 kg/m  as calculated from the following:    Height as of 6/7/19: 1.553 m (5' 1.13\").    Weight as of this encounter: 78 kg (172 lb).       Anesthesia Plan      History & Physical Review  History and physical reviewed and following examination; no interval change.    ASA Status:  2 .    NPO Status:  > 8 hours    Plan for MAC Reason for MAC:  Deep or markedly invasive procedure (G8)  PONV prophylaxis:  Ondansetron (or other 5HT-3) and Dexamethasone or Solumedrol       Postoperative Care  Postoperative pain management:  IV " analgesics.      Consents  Anesthetic plan, risks, benefits and alternatives discussed with:  Patient..                 Marcos Ashraf MD

## 2019-06-18 NOTE — BRIEF OP NOTE
General Surgery Brief Operative Note    Pre-operative diagnosis: UMBILICAL HERNIA   Post-operative diagnosis Same   Procedure: Procedure(s):  OPEN VENTRAL HERNIA REPAIR WITH MESH    Surgeon(s), Assistant(s): Surgeon(s) and Role:     * Last Solis MD - Primary     * Tracey Meek PA-C - Assisting   Estimated blood loss: 5 mL   Drains: None   Specimens: * No specimens in log *   Findings: See postop diagnosis   Condition: Stable   Comments:      Tracey Meek PA-C See dictated operative report for full details

## 2019-06-21 ENCOUNTER — TELEPHONE (OUTPATIENT)
Dept: SURGERY | Facility: CLINIC | Age: 79
End: 2019-06-21

## 2019-06-21 NOTE — TELEPHONE ENCOUNTER
Pavithra called to report sleepiness and fatigue. She is s/p open ventral hernia repair with mesh on 6/18/2019 with Dr. Solis.     Per Pavithra, she is not taking pain medication nor sleep aids. Explained to Pavithra, fatigue is to be expected following surgical procedure.     Informed Pavithra, fatigue should improve during recovery period. In the interim, spread activities out throughout the day and rest when needed.     Pavithra verbalized understanding. She will call if fatigue continues to worsen. Both parties in agreement of plan.    Emelia AIKENN, RN, OCN  Oncology Care Coordinator  [facility]  [phone]

## 2019-06-21 NOTE — TELEPHONE ENCOUNTER
Name of caller: Patient    Reason for Call:  Patient is feeling very sleepy even though she is not taking the pain medication    Surgeon:  Dr. Solis     Recent Surgery:  Yes.    If yes, when & what type:  06/18/19 hernia      Best phone number to reach pt at is: [phone]  Ok to leave a message with medical info? Yes    Pharmacy preferred (if calling for a refill):

## 2019-06-23 ENCOUNTER — HOSPITAL ENCOUNTER (EMERGENCY)
Facility: CLINIC | Age: 79
Discharge: HOME | End: 2019-06-23
Attending: EMERGENCY MEDICINE | Admitting: EMERGENCY MEDICINE
Payer: COMMERCIAL

## 2019-06-23 ENCOUNTER — TELEPHONE (OUTPATIENT)
Dept: SURGERY | Facility: CLINIC | Age: 79
End: 2019-06-23

## 2019-06-23 ENCOUNTER — APPOINTMENT (OUTPATIENT)
Dept: ULTRASOUND IMAGING | Facility: CLINIC | Age: 79
End: 2019-06-23
Attending: EMERGENCY MEDICINE
Payer: COMMERCIAL

## 2019-06-23 VITALS
SYSTOLIC BLOOD PRESSURE: 163 MMHG | HEIGHT: 62 IN | WEIGHT: 170 LBS | BODY MASS INDEX: 31.28 KG/M2 | RESPIRATION RATE: 16 BRPM | OXYGEN SATURATION: 97 % | DIASTOLIC BLOOD PRESSURE: 96 MMHG | TEMPERATURE: 98.2 F

## 2019-06-23 DIAGNOSIS — M79.662 PAIN OF LEFT LOWER LEG: ICD-10-CM

## 2019-06-23 LAB
ABO + RH BLD: NORMAL
ABO + RH BLD: NORMAL
ANION GAP SERPL CALCULATED.3IONS-SCNC: 6 MMOL/L (ref 3–14)
BASOPHILS # BLD AUTO: 0.1 10E9/L (ref 0–0.2)
BASOPHILS NFR BLD AUTO: 0.5 %
BLD GP AB SCN SERPL QL: NORMAL
BLOOD BANK CMNT PATIENT-IMP: NORMAL
BUN SERPL-MCNC: 7 MG/DL (ref 7–30)
CALCIUM SERPL-MCNC: 8.7 MG/DL (ref 8.5–10.1)
CHLORIDE SERPL-SCNC: 105 MMOL/L (ref 94–109)
CO2 SERPL-SCNC: 27 MMOL/L (ref 20–32)
CREAT SERPL-MCNC: 0.66 MG/DL (ref 0.52–1.04)
DIFFERENTIAL METHOD BLD: NORMAL
EOSINOPHIL # BLD AUTO: 0.5 10E9/L (ref 0–0.7)
EOSINOPHIL NFR BLD AUTO: 4.9 %
ERYTHROCYTE [DISTWIDTH] IN BLOOD BY AUTOMATED COUNT: 13.9 % (ref 10–15)
GFR SERPL CREATININE-BSD FRML MDRD: 84 ML/MIN/{1.73_M2}
GLUCOSE SERPL-MCNC: 104 MG/DL (ref 70–99)
HCT VFR BLD AUTO: 39.9 % (ref 35–47)
HGB BLD-MCNC: 13.4 G/DL (ref 11.7–15.7)
IMM GRANULOCYTES # BLD: 0.1 10E9/L (ref 0–0.4)
IMM GRANULOCYTES NFR BLD: 0.8 %
LYMPHOCYTES # BLD AUTO: 3.6 10E9/L (ref 0.8–5.3)
LYMPHOCYTES NFR BLD AUTO: 35.7 %
MCH RBC QN AUTO: 30.9 PG (ref 26.5–33)
MCHC RBC AUTO-ENTMCNC: 33.6 G/DL (ref 31.5–36.5)
MCV RBC AUTO: 92 FL (ref 78–100)
MONOCYTES # BLD AUTO: 1.3 10E9/L (ref 0–1.3)
MONOCYTES NFR BLD AUTO: 12.4 %
NEUTROPHILS # BLD AUTO: 4.6 10E9/L (ref 1.6–8.3)
NEUTROPHILS NFR BLD AUTO: 45.7 %
NRBC # BLD AUTO: 0 10*3/UL
NRBC BLD AUTO-RTO: 0 /100
PLATELET # BLD AUTO: 289 10E9/L (ref 150–450)
POTASSIUM SERPL-SCNC: 3.9 MMOL/L (ref 3.4–5.3)
RBC # BLD AUTO: 4.34 10E12/L (ref 3.8–5.2)
SODIUM SERPL-SCNC: 138 MMOL/L (ref 133–144)
SPECIMEN EXP DATE BLD: NORMAL
WBC # BLD AUTO: 10.1 10E9/L (ref 4–11)

## 2019-06-23 PROCEDURE — 80048 BASIC METABOLIC PNL TOTAL CA: CPT | Performed by: EMERGENCY MEDICINE

## 2019-06-23 PROCEDURE — 85025 COMPLETE CBC W/AUTO DIFF WBC: CPT | Performed by: EMERGENCY MEDICINE

## 2019-06-23 PROCEDURE — 99284 EMERGENCY DEPT VISIT MOD MDM: CPT | Mod: 25

## 2019-06-23 PROCEDURE — 86901 BLOOD TYPING SEROLOGIC RH(D): CPT | Performed by: EMERGENCY MEDICINE

## 2019-06-23 PROCEDURE — 93971 EXTREMITY STUDY: CPT | Mod: LT

## 2019-06-23 PROCEDURE — 86900 BLOOD TYPING SEROLOGIC ABO: CPT | Performed by: EMERGENCY MEDICINE

## 2019-06-23 PROCEDURE — 86850 RBC ANTIBODY SCREEN: CPT | Performed by: EMERGENCY MEDICINE

## 2019-06-23 RX ORDER — ONDANSETRON 2 MG/ML
4 INJECTION INTRAMUSCULAR; INTRAVENOUS EVERY 30 MIN PRN
Status: DISCONTINUED | OUTPATIENT
Start: 2019-06-23 | End: 2019-06-23

## 2019-06-23 RX ORDER — MORPHINE SULFATE 4 MG/ML
4 INJECTION, SOLUTION INTRAMUSCULAR; INTRAVENOUS
Status: DISCONTINUED | OUTPATIENT
Start: 2019-06-23 | End: 2019-06-23

## 2019-06-23 ASSESSMENT — MIFFLIN-ST. JEOR: SCORE: 1199.36

## 2019-06-23 ASSESSMENT — ENCOUNTER SYMPTOMS: SHORTNESS OF BREATH: 0

## 2019-06-23 NOTE — ED AVS SNAPSHOT
Emergency Department  [address] [facility] [address]  Phone:  [phone]  Fax:  [phone]                                    Pavithra Laurent   MRN: [id number]    Department:   Emergency Department   Date of Visit:  6/23/2019           After Visit Summary Signature Page    I have received my discharge instructions, and my questions have been answered. I have discussed any challenges I see with this plan with the nurse or doctor.    ..........................................................................................................................................  Patient/Patient Representative Signature      ..........................................................................................................................................  Patient Representative Print Name and Relationship to Patient    ..................................................               ................................................  Date                                   Time    ..........................................................................................................................................  Reviewed by Signature/Title    ...................................................              ..............................................  Date                                               Time          22EPIC Rev 08/18

## 2019-06-23 NOTE — ED PROVIDER NOTES
History   Chief Complaint:  Leg Pain     HPI   Pavithra Laurent is a 79 year old female, with a history of DVT and recent ventral hernia repair  who presents to the ED for evaluation of left leg pain. The patient reports having a ventral hernia repair by Dr. Solis 5 days ago and was instructed to come in if she developed any leg pain as it could be a sign of a blood clot. She states she woke up this morning with some left calf and thigh pain and decided to be evaluated in the ED today. The patient denies any chest pain, shortness of breath, leg swelling, or any other acute symptoms. Of note, when she had her previous unprovoked  clot in March 2017, she was placed on anticoagulation therapy for 6 months from the [facility].     PE/DVT RISK FACTORS:  Sex:    Female  Hormones:   No  Tobacco:   No  Cancer:   No  Travel:   No  Surgery:   Yes  Other immobilization: No  Personal history:  Yes  Family history:  Yes    Allergies:  Codeine Sulfate  Erythromycin  Hydromorphone    Medications:    Aspirin  Neurontin  Synthroid  Mycostatin  Singulair  Senna-docusate  Xylitol    Past Medical History:    TIA  GERD  Hypothyroid  Rosacea  Thoracic outlet syndrome    Past Surgical History:    Arthrotomy Right elbow  Tonsillectomy  Tubal ligation  Herniorrhaphy ventral  Irrigation and debridement of hand  Right knee arthroscopic  Right forearm reduction  Total knee replacement  Hammer toe repair  Mensicus repair  Bunionectomy  [place] teeth extraction    Family History:    MI  Lung cancer  Cerebrovascular disease  Alcohol abuse  Drug abuse  Depression  Colorectal cancer  Clotting disorder    Social History:  Smoking status: Never  Alcohol use: Yes  Drug use: No  PCP: Kitty Owens  Marital Status:   [5]    Review of Systems   Respiratory: Negative for shortness of breath.    Cardiovascular: Negative for chest pain and leg swelling.   Musculoskeletal:        Left calf and thigh pain   All other systems reviewed  "and are negative.      Physical Exam     Patient Vitals for the past 24 hrs:   BP Temp Temp src Heart Rate Resp SpO2 Height Weight   06/23/19 1636 (!) 163/96 98.2  F (36.8  C) Oral 96 16 97 % 1.575 m (5' 2\") 77.1 kg (170 lb)     Physical Exam  Constitutional:  Oriented to person, place, and time.      Appears well-developed and well-nourished.   HENT:   Head:    Normocephalic and atraumatic.   Right Ear:   Tympanic membrane and external ear normal.   Left Ear:   Tympanic membrane and external ear normal.   Mouth/Throat:   Oropharynx is clear and moist.      Mucous membranes are normal.   Eyes:    Conjunctivae normal and EOM are normal.      Pupils are equal, round, and reactive to light.   Neck:    Normal range of motion. Neck supple.   Cardiovascular:  Normal rate, regular rhythm, S1 normal and S2 normal.      No gallop and no friction rub. No murmur heard.  Pulmonary/Chest:  Breath sounds normal. No respiratory distress.      No wheezes. No rhonchi. No rales.   Abdominal:   Soft. No hepatosplenomegaly. No tenderness.      No rebound and no CVA tenderness. Post-surgical ecchymosis of lower abdomen. Laparoscopic sites look well.   Musculoskeletal:  Normal range of motion. Minimal left calf tenderness, minor pain behind left knee, no obvious swelling. Negative Homans sign.  Neurological:   Alert and oriented to person, place, and time. Normal strength.      GCS eye subscore is 4. GCS verbal subscore is 5.      GCS motor subscore is 6.   Skin:    Skin is warm and dry. No erythema or warmth.  Psychiatric:   Normal mood and affect.      Speech is normal and behavior is normal.      Judgment and thought content normal.      Cognition and memory are normal.     Emergency Department Course   Imaging:  Radiographic findings were communicated with the patient who voiced understanding of the findings.    US Lower Extremity Venous Duplex Left:  No evidence of deep venous thrombosis within the left  lower extremity.    Imaging " independently reviewed and agree with radiologist interpretation.     Laboratory:  CBC: WNL (WBC 10.1, HGB 13.4, )    BMP:  (H), o/w WNL (Creatinine 0.66)    Emergency Department Course:  Past medical records, nursing notes, and vitals reviewed.  1638: I performed an exam of the patient and obtained history, as documented above.  IV inserted and blood drawn.  The patient was sent for a lower extremity US while in the emergency department, findings above.    1802: I rechecked the patient. Explained findings to patient.    Findings and plan explained to the Patient. Patient discharged home with instructions regarding supportive care, medications, and reasons to return. The importance of close follow-up was reviewed.     Impression & Plan    Medical Decision Making:  The patient is a 79-year-old female status post ventral hernia repair on the 18th with a history of what sounds like unprovoked DVT currently on no anticoagulation presents with left leg pain with concern for DVT.  Her left leg exam is not remarkable.  Her ultrasound is negative.  I have advised that she can go home and continue her aspirin as she has been taking. I have advised that she follow-up with her primary for reevaluation if leg pain persists.  She was told that an ultrasound is not perfect and that she may need to have a repeat ultrasound of her pain persists or worsens.  She should return if it is much worse.  I also asked that she talk to her physician about what sounds like a previously unprovoked DVT and whether she needs further evaluation for that with blood studies.    Diagnosis:    ICD-10-CM    1. Pain of left lower leg M79.662      Disposition:  Discharged to home.    Cleve Rehman  6/23/2019    EMERGENCY DEPARTMENT  Scribe Disclosure:  Cleve FONSECA, am serving as a scribe at 4:38 PM on 6/23/2019 to document services personally performed by Lovely Delatorre MD based on my observations and the provider's statements  to me.         Lovely Delatorre MD  06/23/19 2022

## 2019-06-23 NOTE — TELEPHONE ENCOUNTER
Patient called in expressing concerned about a possible DVT.  She has a prior history of DVT approximately 2 years ago.  She was treated with anticoagulation therapy.  Source of the DVT is unclear.  She underwent open ventral hernia repair approximately 5 days ago.  She states that she is been improving but has noted discomfort and pain in her right leg.  This is particularly worse with walking.  She describes pain posterior to her knee with ambulation.  She does not note any significant swelling in the leg.    This was discussed with the patient.  Given her prior history, recent surgery as well as the admitted immobility she has been suffering with I think that the best and safest course of action is for her to come to the emergency department to be evaluated.  This was discussed with her.  She seemed amenable with this plan.

## 2019-06-23 NOTE — DISCHARGE INSTRUCTIONS
You should be re-seen this week if you persistent having left leg pain.  The ultrasound is pretty good but not perfect for diagnosing DVT and you may need to have that ultrasound repeated.  Follow-up here sooner if your left leg is becoming more swollen or painful.  I advised that you also talk to your physician about your previous blood clot which seems unprovoked and whether you need an evaluation blood work wise for that.  You should also ask your family members about any history of blood clots.

## 2019-07-09 ENCOUNTER — OFFICE VISIT (OUTPATIENT)
Dept: FAMILY MEDICINE | Facility: CLINIC | Age: 79
End: 2019-07-09
Payer: COMMERCIAL

## 2019-07-09 VITALS
OXYGEN SATURATION: 97 % | BODY MASS INDEX: 31.55 KG/M2 | HEART RATE: 92 BPM | TEMPERATURE: 97.3 F | DIASTOLIC BLOOD PRESSURE: 62 MMHG | SYSTOLIC BLOOD PRESSURE: 110 MMHG | WEIGHT: 172.5 LBS

## 2019-07-09 DIAGNOSIS — Z86.718 HX OF BLOOD CLOTS: Primary | ICD-10-CM

## 2019-07-09 DIAGNOSIS — Z82.49 FAMILY HISTORY OF BLOOD CLOTS: ICD-10-CM

## 2019-07-09 DIAGNOSIS — E03.9 HYPOTHYROIDISM, UNSPECIFIED TYPE: ICD-10-CM

## 2019-07-09 PROCEDURE — 99214 OFFICE O/P EST MOD 30 MIN: CPT | Performed by: FAMILY MEDICINE

## 2019-07-09 PROCEDURE — 84443 ASSAY THYROID STIM HORMONE: CPT | Performed by: FAMILY MEDICINE

## 2019-07-09 PROCEDURE — 36415 COLL VENOUS BLD VENIPUNCTURE: CPT | Performed by: FAMILY MEDICINE

## 2019-07-09 RX ORDER — FLUOCINONIDE TOPICAL SOLUTION USP, 0.05% 0.5 MG/ML
SOLUTION TOPICAL 2 TIMES DAILY PRN
Start: 2019-07-09 | End: 2020-07-24

## 2019-07-09 RX ORDER — KETOCONAZOLE 20 MG/G
CREAM TOPICAL 3 TIMES DAILY PRN
Start: 2019-07-09 | End: 2020-07-24

## 2019-07-09 RX ORDER — METRONIDAZOLE 7.5 MG/G
GEL TOPICAL DAILY
Start: 2019-07-09 | End: 2020-05-05

## 2019-07-09 NOTE — PROGRESS NOTES
Subjective     Pavithra Laurent is a 79 year old female who presents to clinic today for the following health issues:    HPI   ED/UC Followup:    Facility:  emergency  Date of visit: 6-23-19  Reason for visit: leg pain  Current Status: still there but different       Pt wants to know about levothyroxine? Should take it 7 days instead of 6 feeling tired all the time?    Also, was seen in ER and they suggested she consider seeing hematology.  Possibly unprovoked blood clot.    Reviewed and updated as needed this visit by Provider  Meds  Problems  Fam Hx         Review of Systems   ROS COMP: Constitutional, HEENT, cardiovascular, pulmonary, GI, , musculoskeletal, neuro, skin, endocrine and psych systems are negative, except as otherwise noted.      Objective    /62 (Cuff Size: Adult Large)   Pulse 92   Temp 97.3  F (36.3  C) (Tympanic)   Wt 78.2 kg (172 lb 8 oz)   SpO2 97%   BMI 31.55 kg/m    Body mass index is 31.55 kg/m .  Physical Exam   GENERAL: healthy, alert and no distress  PSYCH: mentation appears normal, affect normal/bright    Diagnostic Test Results:  Labs reviewed in Epic        Assessment & Plan     Pavithra was seen today for hospital f/u.    Diagnoses and all orders for this visit:    Hx of blood clots  Comments:  Unclear if provoked or unprovoked, was in PT at the time after accident but it had been over a year.  Refer to hematology to see if needs clotting workup.  Orders:  -     ONC/HEME ADULT REFERRAL    Family history of blood clots  Comments:  Son.  clotting disorder.  Orders:  -     ONC/HEME ADULT REFERRAL    Hypothyroidism, unspecified type  Comments:  More tired than normal.  Recheck TSH today.  If TSH high, increase to 7 days weekly from 6 days weekly of levothyroxine.  Orders:  -     TSH with free T4 reflex    Other orders  -     metroNIDAZOLE (METROGEL) 0.75 % external gel; Apply topically daily  -     ketoconazole (NIZORAL) 2 % external cream; Apply topically 3 times daily as  "needed for itching or irritation  -     fluocinonide (LIDEX) 0.05 % external solution; Apply topically 2 times daily as needed (rash)         BMI:   Estimated body mass index is 31.55 kg/m  as calculated from the following:    Height as of 6/23/19: 1.575 m (5' 2\").    Weight as of this encounter: 78.2 kg (172 lb 8 oz).   Weight management plan: Discussed healthy diet and exercise guidelines        Return in about 5 months (around 12/9/2019) for Preventative Visit.    25 minutes total were spent face to face with the patient including history, exam with >50% spent on counseling and coordination of care.      Kitty Owens MD  [facility]        "

## 2019-07-09 NOTE — LETTER
July 10, 2019      Pavithra Laurent  [address] [facility] [address]        Dear ,    We are writing to inform you of your test results.    Your TSH is still in the low-normal range, so I'm afraid I would not recommend increasing your levothyroxine dose to 7 days a week.  Let me know if you have questions about this.    Resulted Orders   TSH with free T4 reflex   Result Value Ref Range    TSH 0.72 0.40 - 4.00 mU/L       If you have any questions or concerns, please call the clinic at the number listed above.       Sincerely,        Kitty Owens MD

## 2019-07-10 ENCOUNTER — TELEPHONE (OUTPATIENT)
Dept: SURGERY | Facility: CLINIC | Age: 79
End: 2019-07-10

## 2019-07-10 LAB — TSH SERPL DL<=0.005 MIU/L-ACNC: 0.72 MU/L (ref 0.4–4)

## 2019-07-10 NOTE — RESULT ENCOUNTER NOTE
Letter sent with the following information:  Your TSH is still in the low-normal range, so I'm afraid I would not recommend increasing your levothyroxine dose to 7 days a week.  Let me know if you have questions about this.  Dr. Kitty Owens MD/[facility]

## 2019-08-06 ENCOUNTER — TRANSFERRED RECORDS (OUTPATIENT)
Dept: HEALTH INFORMATION MANAGEMENT | Facility: CLINIC | Age: 79
End: 2019-08-06

## 2019-08-06 ENCOUNTER — NURSE TRIAGE (OUTPATIENT)
Dept: FAMILY MEDICINE | Facility: CLINIC | Age: 79
End: 2019-08-06

## 2019-08-06 ENCOUNTER — TELEPHONE (OUTPATIENT)
Dept: FAMILY MEDICINE | Facility: CLINIC | Age: 79
End: 2019-08-06

## 2019-08-06 DIAGNOSIS — Z86.718 HX OF BLOOD CLOTS: ICD-10-CM

## 2019-08-06 DIAGNOSIS — M79.89 SWOLLEN LEG: Primary | ICD-10-CM

## 2019-08-06 NOTE — TELEPHONE ENCOUNTER
Reason for Call: Request for an order or referral:  Order or referral being requested: order for ultra sound at [facility]  Date needed: as soon as possible  Has the patient been seen by the PCP for this problem? YES  Additional comments: please call patient when order is place  Phone number Patient can be reached at:  Home number on file [phone] (home)  Best Time:  any  Can we leave a detailed message on this number?  YES  Call taken on 8/6/2019 at 10:48 AM by ALFREDO TIDWELL

## 2019-08-06 NOTE — TELEPHONE ENCOUNTER
"  Writer advised patient to go to ED to r/o DVT. Patient agreed to follow care advice.   Additional Information    Thigh, calf, or ankle swelling in only one leg    Answer Assessment - Initial Assessment Questions  1. ONSET: \"When did the swelling start?\" (e.g., minutes, hours, days)      Last night when I took my shoe off, I noticed my ankle and foot were swollen and very tender.   2. LOCATION: \"What part of the leg is swollen?\"  \"Are both legs swollen or just one leg?\"      Left foot and ankle; lump below ankle toward the heel. \"When I touch it, it's painful.\" My left leg below the knee is tender to touch as well.   3. SEVERITY: \"How bad is the swelling?\" (e.g., localized; mild, moderate, severe)   - Localized - small area of swelling localized to one leg   - MILD pedal edema - swelling limited to foot and ankle, pitting edema < 1/4 inch (6 mm) deep, rest and elevation eliminate most or all swelling   - MODERATE edema - swelling of lower leg to knee, pitting edema > 1/4 inch (6 mm) deep, rest and elevation only partially reduce swelling   - SEVERE edema - swelling extends above knee, facial or hand swelling present       Mild   4. REDNESS: \"Does the swelling look red or infected?\"      Yes, red  5. PAIN: \"Is the swelling painful to touch?\" If so, ask: \"How painful is it?\"   (Scale 1-10; mild, moderate or severe)      When I touch it, about a 6. Otherwise, no pain   6. FEVER: \"Do you have a fever?\" If so, ask: \"What is it, how was it measured, and when did it start?\"       Not that I know of   7. CAUSE: \"What do you think is causing the leg swelling?\"      Hx of DVT   8. MEDICAL HISTORY: \"Do you have a history of heart failure, kidney disease, liver failure, or cancer?\"      No   9. RECURRENT SYMPTOM: \"Have you had leg swelling before?\" If so, ask: \"When was the last time?\" \"What happened that time?\"      DVT in spring of 2017, recent visit to ED for blood clot, but this was ruled out.   10. OTHER SYMPTOMS: \"Do you " "have any other symptoms?\" (e.g., chest pain, difficulty breathing)        No  11. PREGNANCY: \"Is there any chance you are pregnant?\" \"When was your last menstrual period?\"        n/a    Protocols used: LEG SWELLING AND EDEMA-A-OH    "

## 2019-08-06 NOTE — TELEPHONE ENCOUNTER
Reason for call:  Patient reporting a symptom  Symptom or request: blood clot in left foot and ankle  Duration (how long have symptoms been present): last night  Have you been treated for this before? No  Additional comments: please call patient  Phone Number patient can be reached at:  Home number on file [phone] (home)  Best Time:  any  Can we leave a detailed message on this number:  YES  Call taken on 8/6/2019 at 9:12 AM by ALFREDO TIDWELL

## 2019-08-06 NOTE — TELEPHONE ENCOUNTER
Huddle with provider:    Okay to complete US versus ED visit at this time.     Pended referral for provider     Routine or STAT?    Please send back to triage.

## 2019-08-06 NOTE — TELEPHONE ENCOUNTER
Done, ordered stat.  Thanks!  Dr. Kitty Owens MD/[facility]

## 2019-08-20 DIAGNOSIS — M79.2 NEUROPATHIC PAIN OF RIGHT FOREARM: ICD-10-CM

## 2019-08-20 NOTE — TELEPHONE ENCOUNTER
Reason for Call:  Medication or medication refill:    Do you use a [facility]?  Name of the pharmacy and phone number for the current request:  [facility]    Name of the medication requested: gabapentin (NEURONTIN) 400 MG capsule    Other request: needs new script for J for this med    Can we leave a detailed message on this number? YES    Phone number patient can be reached at: Home number on file [phone] (home)    Best Time:     Call taken on 8/20/2019 at 11:26 AM by LEN PANDEY

## 2019-08-20 NOTE — TELEPHONE ENCOUNTER
Controlled Substance Refill Request for gabapentin (NEURONTIN) 400 MG capsule  Problem List Complete:  No     PROVIDER TO CONSIDER COMPLETION OF PROBLEM LIST AND OVERVIEW/CONTROLLED SUBSTANCE AGREEMENT    Last Written Prescription Date:  11/7/2018  Last Fill Quantity: na,  # refills: na   Last office visit: 7/9/2019 with prescribing provider:  Roman Lee Office Visit:         Controlled substance agreement:   Encounter-Level CSA:    There are no encounter-level csa.     Patient-Level CSA:    There are no patient-level csa.         Last Urine Drug Screen: No results found for: CDAUT, No results found for: COMDAT, No results found for: THC13, PCP13, COC13, MAMP13, OPI13, AMP13, BZO13, TCA13, MTD13, BAR13, OXY13, PPX13, BUP13     Processing:  Fax Rx to pended pharmacy     [email or web address]       checked in past 3 months?  No, route to RN

## 2019-08-22 NOTE — TELEPHONE ENCOUNTER
Per  check, patient gets gabapentin Rx from [facility]- Dr. Olesya Shelton. Last Rx was filled on 7/29 for 270 capsules (90 day supply).     Patient Contact    Attempt # 1    Was call answered?  No.  Left message on voicemail with information to call back and schedule an appointment to discuss this with her provider.    Upon callback, explain that since Dr. Owens has not previously prescribed this medication, the request would need to go to Dr. Shelton at [facility]. If she is no longer seeing that , she would need to schedule an appointment with Dr. Owens to discuss.

## 2019-08-23 RX ORDER — GABAPENTIN 400 MG/1
400 CAPSULE ORAL 3 TIMES DAILY
Qty: 270 CAPSULE | Refills: 1 | Status: SHIPPED | OUTPATIENT
Start: 2019-08-23 | End: 2020-02-19

## 2019-08-23 NOTE — TELEPHONE ENCOUNTER
Call back from patient this AM.     States that she has discussed with her PCP that she will be taking over this medication during last OV 7/2019 instead of getting from [facility]. Please see previous notes for more info.     Sending to PCP. Able to fill for this patient? She will be out of medication over the weekend and this needs to be done ASAP.

## 2019-08-23 NOTE — TELEPHONE ENCOUNTER
Called patient to clarify:     Gabapentin 400 mg capsules. Take 1 capsule by mouth 3 times daily. Was given 270 capsules (90 day supply).     She is taking as directed.

## 2019-08-23 NOTE — TELEPHONE ENCOUNTER
I'm happy to fill - but I see two different prescriptions in her pharm history. One for 100 mg and one for 400 mg.  Can you clarify with patient and pharmacy which she needs refilled (100 or 400), and exact sig, exactly how she takes now, and # prescribed and refills?  Thanks  Dr. Kitty Owens MD/[facility]

## 2019-09-19 ENCOUNTER — TELEPHONE (OUTPATIENT)
Dept: FAMILY MEDICINE | Facility: CLINIC | Age: 79
End: 2019-09-19

## 2019-09-20 ENCOUNTER — APPOINTMENT (OUTPATIENT)
Dept: ULTRASOUND IMAGING | Facility: CLINIC | Age: 79
End: 2019-09-20
Attending: EMERGENCY MEDICINE
Payer: COMMERCIAL

## 2019-09-20 ENCOUNTER — HOSPITAL ENCOUNTER (EMERGENCY)
Facility: CLINIC | Age: 79
Discharge: HOME | End: 2019-09-20
Attending: EMERGENCY MEDICINE | Admitting: EMERGENCY MEDICINE
Payer: COMMERCIAL

## 2019-09-20 VITALS
SYSTOLIC BLOOD PRESSURE: 144 MMHG | HEART RATE: 76 BPM | RESPIRATION RATE: 16 BRPM | WEIGHT: 170 LBS | OXYGEN SATURATION: 96 % | TEMPERATURE: 98.1 F | DIASTOLIC BLOOD PRESSURE: 86 MMHG | HEIGHT: 62 IN | BODY MASS INDEX: 31.28 KG/M2

## 2019-09-20 DIAGNOSIS — M79.89 LEG SWELLING: ICD-10-CM

## 2019-09-20 PROCEDURE — 99284 EMERGENCY DEPT VISIT MOD MDM: CPT | Mod: 25

## 2019-09-20 PROCEDURE — 93971 EXTREMITY STUDY: CPT | Mod: LT

## 2019-09-20 ASSESSMENT — ENCOUNTER SYMPTOMS
NUMBNESS: 1
PALPITATIONS: 0
LIGHT-HEADEDNESS: 0
ABDOMINAL PAIN: 0
SHORTNESS OF BREATH: 0
CHILLS: 0
FEVER: 0
DIAPHORESIS: 0

## 2019-09-20 ASSESSMENT — MIFFLIN-ST. JEOR: SCORE: 1199.36

## 2019-09-20 NOTE — ED AVS SNAPSHOT
Emergency Department  [address] [facility] [address]  Phone:  [phone]  Fax:  [phone]                                    Pavithra Laurent   MRN: [id number]    Department:   Emergency Department   Date of Visit:  9/20/2019           After Visit Summary Signature Page    I have received my discharge instructions, and my questions have been answered. I have discussed any challenges I see with this plan with the nurse or doctor.    ..........................................................................................................................................  Patient/Patient Representative Signature      ..........................................................................................................................................  Patient Representative Print Name and Relationship to Patient    ..................................................               ................................................  Date                                   Time    ..........................................................................................................................................  Reviewed by Signature/Title    ...................................................              ..............................................  Date                                               Time          22EPIC Rev 08/18
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
## 2019-10-03 ENCOUNTER — OFFICE VISIT (OUTPATIENT)
Dept: HEMATOLOGY | Facility: CLINIC | Age: 79
End: 2019-10-03
Attending: INTERNAL MEDICINE
Payer: COMMERCIAL

## 2019-10-03 DIAGNOSIS — I87.002 POST-THROMBOTIC SYNDROME OF LEFT LOWER EXTREMITY: ICD-10-CM

## 2019-10-03 PROCEDURE — 99204 OFFICE O/P NEW MOD 45 MIN: CPT | Performed by: INTERNAL MEDICINE

## 2019-10-03 ASSESSMENT — PAIN SCALES - GENERAL: PAINLEVEL: NO PAIN (0)

## 2019-10-03 ASSESSMENT — MIFFLIN-ST. JEOR: SCORE: 1208.21

## 2019-10-03 NOTE — PATIENT INSTRUCTIONS
Your visit today was to discuss your history of blood clots and more recently your swollen and painful leg.    I think your original clot was triggered by your skin infection (cellulitis) which means that your risk for future blood clots is low enough that you do not need a blood thinner medication every day.    I think your episodes of leg swelling is due to Post Thrombotic Syndrome or episodes of vein dysfunction that is quite common (1/3-1/2 of patients with a blood clot in the leg).  If you have a painful and/or swollen leg again, you shouldn't ignore it because it certainly could be a new clot or could be the post thrombotic syndrome again.  A blood test called D-Dimer is a very sensitive blood test that can be done in the clinic.  If this test is negative we can reassure you that you do not have a new clot and could avoid the ER and ultrasound.    We also discussed testing for genetic causes of blood clots and at this point I think it is not necessary to do so.    Please don't hesitate to call with questions!

## 2019-10-04 PROBLEM — I87.002 POST-THROMBOTIC SYNDROME OF LEFT LOWER EXTREMITY: Status: ACTIVE | Noted: 2019-10-04

## 2019-10-04 NOTE — PROGRESS NOTES
[facility]  [address] [place] [address]  Phone: [phone], Fax: [phone]    Outpatient Visit Note:    Patient: Pavithra Laurent  MRN: [id number]  : 1940  ALEXA: Oct 3, 2019    Reason for Consultation:  Pavtihra Laurent is a referred by Kitty Owens MD for evaluation and treatment of a history of DVT.    History of Present Illness:  Pavithra Laurent is a 79 year old woman who is essentially healthy who has a history of a distal DVT involving the left Soleal and Peroneal veins, about mid-calf, on 2017.  She reports that earlier in 2017 she had noted discoloration or possibly bruising on the medial aspect of her left calf.  Given that her friend had recently had cellulitis, she grew concerned that she was also developing this presented to her physician for evaluation.  We agreed that it appeared to be cellulitis and was treated with a course of antibiotics.  Her symptoms seem to improve with this treatment.  She never had fever chills or night sweats nor an expanding area of erythema that can be typical of cellulitis.  She notes that the later the area of redness returned.  She never had pain or swelling associated with this.  Return to her doctor for further evaluation and in addition to treating with another round of antibiotics and ultrasound was performed which revealed a deep vein thrombosis.  She was treated with completion of course of antibiotics in addition she was treated with 3 months of anticoagulation.  She notes no clear provoking incidents besides the possible cellulitis such as hospitalization, surgery, use of estrogen, or travel more than 4 hours.    Since his deep vein thrombosis she has struggled with concerns over recurrence.  She currently takes no form of prophylactic anticoagulation but does take 81 mg aspirin each day.  She is had several urgent care or emergency room visits for evaluation of recurrent swelling that made her  concern for recurrence.  She intimately uses compression stockings but generally finds them uncomfortable and does not appear to be effective in preventing these episodes.    Past Medical History:  Past Medical History:   Diagnosis Date     Allergic rhinitis      Calculus of gallbladder without mention of cholecystitis or obstruction      Cataract of both eyes      Dry eye syndrome      Environmental allergies      Gastro-oesophageal reflux disease      GERD (gastroesophageal reflux disease) 5/24/2013     Hypothyroid      Pain in joint, shoulder region      Rosacea      Thoracic outlet syndrome        Past Surgical History:  Past Surgical History:   Procedure Laterality Date     ARTHROTOMY ELBOW Right 1/7/2015    Procedure: ARTHROTOMY ELBOW;  Surgeon: Branden Meyer MD;  Location: [facility] RAD RESEC TONSIL/PILLARS  1948    tonsillectomy     CLOSED REDUCTION UPPER EXTREMITY  12/26/2014    Procedure: CLOSED REDUCTION UPPER EXTREMITY;  Surgeon: Louis Daniel MD;  Location:  OR     GYN SURGERY  1984    tubal ligation     HERNIORRHAPHY VENTRAL N/A 6/18/2019    Procedure: OPEN VENTRAL HERNIA REPAIR WITH MESH;  Surgeon: Last Solis MD;  Location:  OR     IRRIGATION AND DEBRIDEMENT HAND, COMBINED  12/26/2014    Procedure: COMBINED IRRIGATION AND DEBRIDEMENT HAND;  Surgeon: Louis Daniel MD;  Location:  OR     KNEE SURGERY  2001    arthroscopic right     LAPAROSCOPIC CHOLECYSTECTOMY  8/16/2012    Procedure: LAPAROSCOPIC CHOLECYSTECTOMY;  LAPAROSCOPIC CHOLECYSTECTOMY ;  Surgeon: Preston Walters MD;  Location: [facility]     OPEN REDUCTION INTERNAL FIXATION FOREARM Right 12/26/2014    Procedure: OPEN REDUCTION INTERNAL FIXATION FOREARM;  Surgeon: Louis Daniel MD;  Location:  OR     ORTHOPEDIC SURGERY  2007,2008    bunyanectomy, hammer toe, torn meniscus, toe and knee replacement     TKA  2009    right knee     WISDOM TEETH EXTRACTION  1958       Medications:  Current Outpatient Medications   Medication Sig      amoxicillin (AMOXIL) 500 MG capsule Take 2,000 mg by mouth once One hour prior to dental procedure     aspirin 81 MG EC tablet Take 81 mg by mouth daily     azelastine (ASTELIN) 0.1 % nasal spray Spray 2 sprays into both nostrils daily as needed for rhinitis      calcium-vitamin D-vitamin K (CALCIUM SOFT CHEWS) 500-500-40 MG-UNT-MCG CHEW Take 2 tablets by mouth 2 times daily     cycloSPORINE (RESTASIS) 0.05 % ophthalmic emulsion Place 1 drop into both eyes every 12 hours.       desonide (DESOWEN) 0.05 % cream Apply topically 2 times daily as needed (rash)      Dextran 70 0.1%-Hypromellose 0.3% (BION TEARS) 0.1-0.3 % SOLN ophthalmic solution Place 1 drop into both eyes See Admin Instructions Up to 7 times daily , as needed.     diclofenac (VOLTAREN) 1 % GEL Place 2 g onto the skin daily as needed for moderate pain (knees)      epinastine HCl (ELESTAT) 0.05 % SOLN Place 1 drop into both eyes daily as needed (allergies)      erythromycin (ROMYCIN) ophthalmic ointment Place 1 Application into both eyes nightly as needed (dry/irritated eyes)      fluocinolone acetonide (DERMOTIC) 0.01 % OIL Place 1 drop in ear(s) daily as needed.     fluocinonide (LIDEX) 0.05 % external solution Apply topically 2 times daily as needed (rash)     fluticasone (FLONASE) 50 MCG/ACT nasal spray Spray 1 spray into both nostrils daily      gabapentin (NEURONTIN) 100 MG capsule Take 100 mg by mouth daily as needed (on particularly active days)     gabapentin (NEURONTIN) 400 MG capsule Take 1 capsule (400 mg) by mouth 3 times daily     IBUPROFEN PO Take 400 mg by mouth 3 times daily as needed for moderate pain      ketoconazole (NIZORAL) 2 % external cream Apply topically 3 times daily as needed for itching or irritation     levothyroxine (SYNTHROID/LEVOTHROID) 88 MCG tablet Take 1 tablet (88 mcg) by mouth daily 6 DAYS A WEEK.  Skip Sunday.     metroNIDAZOLE (METROGEL) 0.75 % external gel Apply topically daily     montelukast (SINGULAIR) 10  MG tablet Take 10 mg by mouth At Bedtime      nystatin (MYCOSTATIN) 401154 UNIT/GM POWD Apply topically 3 times daily as needed (Patient taking differently: Apply topically 2 times daily as needed for other (yeast infection in skin folds) )     order for DME Equipment being ordered: compression stockings 25-35mmHG     Xylitol (XYLIMELTS MT) Take 1 lozenge by mouth At Bedtime     Xylitol (XYLIMELTS MT) Take 1 lozenge by mouth daily as needed (dry mouth)     No current facility-administered medications for this visit.        Allergies:  Allergies   Allergen Reactions     Birch Trees      Codeine Sulfate Nausea and Vomiting     Oral, topical is ok     Dust Mites      Erythromycin Nausea and Vomiting     With oral use     Hydromorphone Other (See Comments)     nightmares       ROS:  Denies any bleeding issues. No gum bleeding, No nose bleed. Denies any hematuria or blood in stools. Denies any ecchymosis. Denies any lower extremities swelling or pain. Denies any fever, no chest pain. Denies any shortness of breath.     Social History:  Denies any tobacco use. No significant alcohol use. Denies any illicit drug use.    Family History:  None of blood clots or other blood disorders    Objectives:  Pleasant 79 year old female in no acute distress.  Vitals: B/P: Data Unavailable, T: Data Unavailable, P: Data Unavailable, R: Data Unavailable, Wt: 0 lbs 0 oz Body mass index is 31.95 kg/m  (pended).   Exam:   Gen: Appears well, no distress  HEENT: No scleral icterus or hemorrahge, no wet purpura, no lymphadenopathy  Ext: no edema but she has greater erythema on the left compared with the right.  Both legs have varicosities.  The left leg appears slightly greater in circumference compared with the right    Labs:  Results for CIARA BROWN (MRN [id number]) as of 10/4/2019 11:53   Ref. Range 6/23/2019 16:56   Creatinine Latest Ref Range: 0.52 - 1.04 mg/dL 0.66   GFR Estimate Latest Ref Range: >60 mL/min/1.73_m2 84        Imaging:  Reviewed her scanned ultrasound report from March 2019 that showed distal DVTs.    Assessment:  In summary, Pavithra Laurent is a 79 year old woman with a history of distal deep vein thrombosis, likely provoked by cellulitis, and recurrent episodes of swelling and erythema seem to be consistent with post thrombotic syndrome (especially since she has had several negative ultrasound studies).  Overall given that this appears to be a provoked and distal thrombosis I suspect she is at low risk for recurrence and would not require secondary prophylaxis with anticoagulation except around the times of highest risk such as hospitalization, surgery or distant travel.    Plan:  1. Majority of today's visit was spent counseling the patient regarding my assessment of her risk for future thrombosis and my diagnosis of post thrombotic syndrome along with the treatment and natural history of this disease.  2.  In the future if she is hospitalized, she would benefit from extended prophylaxis after surgery hospitalization for at least 2 weeks and ideally up to 4 weeks.  I would recommend use of Xarelto 10 mg daily for this purpose  3.  We discussed post thrombotic syndrome including his intermittent nature and how it symptoms mimic recurrent DVT.  I still think she should be evaluated with the symptoms as there is no way to tell if these are related to a new clot or PTS. my recommendation is to call this clinic or discuss with her PCP with any symptoms such as leg swelling, redness or pain with the first test would be to perform a d-dimer test.  If the test is negative then we can offer reassurance that she does not need further imaging to exclude the presence of a deep vein thrombosis.  4.  I discussed with her that there are no medical therapies to improve post thrombotic syndrome and that the only therapy that is proven beneficial has been use of compression stockings on a regular basis.    The patient is given our  center's contact information and is instructed to call if she should have any further questions or concerns.  Otherwise, we will plan on seeing her back as needed.      Abigail Ashford , nurse clinician is present throughout the entire clinic visit with the patient today.  Patient understands and agrees with the above plan and recommendation.    Total Time Spent:  I spent a total of 45 minutes face-to-face with Pavithra Laurent during today's office visit.  Over 50% of this time was spent counseling the patient and/or coordinating care regarding DVT and post thrombotic syndrome.      Jadon Martinez MD   of Medicine  [facility] [employer]

## 2020-01-13 ENCOUNTER — TELEPHONE (OUTPATIENT)
Dept: FAMILY MEDICINE | Facility: CLINIC | Age: 80
End: 2020-01-13

## 2020-01-28 DIAGNOSIS — E03.4 HYPOTHYROIDISM DUE TO ACQUIRED ATROPHY OF THYROID: ICD-10-CM

## 2020-01-29 NOTE — TELEPHONE ENCOUNTER
"Requested Prescriptions   Pending Prescriptions Disp Refills     levothyroxine (SYNTHROID/LEVOTHROID) 88 MCG tablet [Pharmacy Med Name: Levothyroxine Sodium Oral Tablet 88 MCG]  Last Written Prescription Date:  12/21/2018  Last Fill Quantity: 90 tablet,  # refills: 3   Last office visit: 7/9/2019 with prescribing provider:  Roman   Future Office Visit:   Next 5 appointments (look out 90 days)    Mar 25, 2020  4:00 PM CDT  Return Visit with Lore Krishnamurthy MD  [facility] ([address] [address]  [phone]          90 tablet 2     Sig: TAKE ONE TABLET BY MOUTH ONE TIME DAILY       Thyroid Protocol Passed - 1/28/2020  9:59 PM        Passed - Patient is 12 years or older        Passed - Recent (12 mo) or future (30 days) visit within the authorizing provider's specialty     Patient has had an office visit with the authorizing provider or a provider within the authorizing providers department within the previous 12 mos or has a future within next 30 days. See \"Patient Info\" tab in inbasket, or \"Choose Columns\" in Meds & Orders section of the refill encounter.              Passed - Medication is active on med list        Passed - Normal TSH on file in past 12 months     Recent Labs   Lab Test 07/09/19  1539   TSH 0.72              Passed - No active pregnancy on record     If patient is pregnant or has had a positive pregnancy test, please check TSH.          Passed - No positive pregnancy test in past 12 months     If patient is pregnant or has had a positive pregnancy test, please check TSH.             "

## 2020-01-30 ENCOUNTER — OFFICE VISIT (OUTPATIENT)
Dept: FAMILY MEDICINE | Facility: CLINIC | Age: 80
End: 2020-01-30
Payer: COMMERCIAL

## 2020-01-30 VITALS
OXYGEN SATURATION: 98 % | DIASTOLIC BLOOD PRESSURE: 74 MMHG | HEART RATE: 90 BPM | SYSTOLIC BLOOD PRESSURE: 140 MMHG | TEMPERATURE: 98.5 F

## 2020-01-30 DIAGNOSIS — I82.402 DEEP VEIN THROMBOSIS (DVT) OF LEFT LOWER EXTREMITY, UNSPECIFIED CHRONICITY, UNSPECIFIED VEIN (H): ICD-10-CM

## 2020-01-30 DIAGNOSIS — J06.9 UPPER RESPIRATORY INFECTION, VIRAL: Primary | ICD-10-CM

## 2020-01-30 PROCEDURE — 99213 OFFICE O/P EST LOW 20 MIN: CPT | Performed by: FAMILY MEDICINE

## 2020-01-30 RX ORDER — BENZONATATE 100 MG/1
100 CAPSULE ORAL 3 TIMES DAILY PRN
Qty: 21 CAPSULE | Refills: 0 | Status: SHIPPED | OUTPATIENT
Start: 2020-01-30 | End: 2020-07-24

## 2020-01-30 RX ORDER — LEVOTHYROXINE SODIUM 88 UG/1
TABLET ORAL
Qty: 90 TABLET | Refills: 2 | Status: SHIPPED | OUTPATIENT
Start: 2020-01-30 | End: 2020-07-24

## 2020-01-30 RX ORDER — BENZONATATE 100 MG/1
100 CAPSULE ORAL 3 TIMES DAILY PRN
COMMUNITY
End: 2020-01-30

## 2020-01-30 NOTE — TELEPHONE ENCOUNTER
Prescription approved per [facility] for 12 months of refills since last appointment, which was 1/30/2020

## 2020-02-06 ENCOUNTER — NURSE TRIAGE (OUTPATIENT)
Dept: FAMILY MEDICINE | Facility: CLINIC | Age: 80
End: 2020-02-06

## 2020-02-06 ENCOUNTER — TELEPHONE (OUTPATIENT)
Dept: FAMILY MEDICINE | Facility: CLINIC | Age: 80
End: 2020-02-06

## 2020-02-06 NOTE — TELEPHONE ENCOUNTER
-Pt states she saw Roman recently  -No new sx, just not getting better  -States has appt tomorrow, just wanted to clarify that OTC is OK for pt to take today  -Will continue with current regimen until tomorrow  -Understands to see UC/ED if breathing is worse, fever occurs before tomorrow, or sx suddenly progress.     Additional Information    Negative: Bluish (or gray) lips or face    Negative: Severe difficulty breathing (e.g., struggling for each breath, speaks in single words)    Negative: Rapid onset of cough and has hives    Negative: Coughing started suddenly after medicine, an allergic food or bee sting    Negative: Difficulty breathing after exposure to flames, smoke, or fumes    Negative: Sounds like a life-threatening emergency to the triager    Negative: Previous asthma attacks and this feels like asthma attack    Negative: Chest pain present when not coughing    Negative: Difficulty breathing    Negative: Passed out (i.e., fainted, collapsed and was not responding)    Negative: Patient sounds very sick or weak to the triager    Negative: Coughed up > 1 tablespoon (15 ml) blood (Exception: blood-tinged sputum)    Negative: Fever > 103 F (39.4 C)    Negative: Fever > 101 F (38.3 C) and over 60 years of age    Negative: Fever > 100.0 F (37.8 C) and has diabetes mellitus or a weak immune system (e.g., HIV positive, cancer chemotherapy, organ transplant, splenectomy, chronic steroids)    Negative: Fever > 100.0 F (37.8 C) and bedridden (e.g., nursing home patient, stroke, chronic illness, recovering from surgery)    Negative: Increasing ankle swelling    Negative: Wheezing is present    Negative: SEVERE coughing spells (e.g., whooping sound after coughing, vomiting after coughing)    Negative: Coughing up branden-colored (reddish-brown) or blood-tinged sputum    Negative: Fever present > 3 days (72 hours)    Negative: Fever returns after gone for over 24 hours and symptoms worse or not improved    Negative:  "Using nasal washes and pain medicine > 24 hours and sinus pain persists    Negative: Known COPD or other severe lung disease (i.e., bronchiectasis, cystic fibrosis, lung surgery) and worsening symptoms (i.e., increased sputum purulence or amount, increased breathing difficulty)    Negative: Continuous (nonstop) coughing interferes with work or school and no improvement using cough treatment per Care Advice    Patient wants to be seen    Answer Assessment - Initial Assessment Questions  1. ONSET: \"When did the cough begin?\"       1 week ago    2. SEVERITY: \"How bad is the cough today?\"       Moderate, has gotten better since last week    3. RESPIRATORY DISTRESS: \"Describe your breathing.\"       Breathing normally    4. FEVER: \"Do you have a fever?\" If so, ask: \"What is your temperature, how was it measured, and when did it start?\"      No     5. HEMOPTYSIS: \"Are you coughing up any blood?\" If so ask: \"How much?\" (flecks, streaks, tablespoons, etc.)      No    6. TREATMENT: \"What have you done so far to treat the cough?\" (e.g., meds, fluids, humidifier)      Rest, 'vertussin' at night, other cough suppressant     7. CARDIAC HISTORY: \"Do you have any history of heart disease?\" (e.g., heart attack, congestive heart failure)       No    8. LUNG HISTORY: \"Do you have any history of lung disease?\"  (e.g., pulmonary embolus, asthma, emphysema)      No    9. PE RISK FACTORS: \"Do you have a history of blood clots?\" (or: recent major surgery, recent prolonged travel, bedridden )      Hx of clots in the leg    10. OTHER SYMPTOMS: \"Do you have any other symptoms? (e.g., runny nose, wheezing, chest pain)        Green phlemg    11. PREGNANCY: \"Is there any chance you are pregnant?\" \"When was your last menstrual period?\"        No    12. TRAVEL: \"Have you traveled out of the country in the last month?\" (e.g., travel history, exposures)        No    Protocols used: COUGH-A-OH    "

## 2020-02-06 NOTE — TELEPHONE ENCOUNTER
Reason for Call:  Other call back    Detailed comments: The patient called and scheduled an appointment with Martin Smallwood for tomorrow.  She has been experiencing URI like symptoms and she is wondering if a nurse would be willing to call her and discuss options to help her get through until her appt tomorrow.     Phone Number Patient can be reached at: Home number on file [phone] (home)    Best Time: Any    Can we leave a detailed message on this number? YES    Call taken on 2/6/2020 at 9:26 AM by Ludy Montiel

## 2020-02-07 ENCOUNTER — OFFICE VISIT (OUTPATIENT)
Dept: FAMILY MEDICINE | Facility: CLINIC | Age: 80
End: 2020-02-07
Payer: COMMERCIAL

## 2020-02-07 VITALS
TEMPERATURE: 97.4 F | OXYGEN SATURATION: 98 % | HEART RATE: 74 BPM | DIASTOLIC BLOOD PRESSURE: 74 MMHG | BODY MASS INDEX: 32.74 KG/M2 | RESPIRATION RATE: 12 BRPM | WEIGHT: 179 LBS | SYSTOLIC BLOOD PRESSURE: 126 MMHG

## 2020-02-07 DIAGNOSIS — J01.01 ACUTE RECURRENT MAXILLARY SINUSITIS: Primary | ICD-10-CM

## 2020-02-07 PROCEDURE — 99213 OFFICE O/P EST LOW 20 MIN: CPT | Performed by: PHYSICIAN ASSISTANT

## 2020-02-07 ASSESSMENT — ENCOUNTER SYMPTOMS
MUSCULOSKELETAL NEGATIVE: 1
ENDOCRINE NEGATIVE: 1
EYES NEGATIVE: 1
GASTROINTESTINAL NEGATIVE: 1
PSYCHIATRIC NEGATIVE: 1
NEUROLOGICAL NEGATIVE: 1
CARDIOVASCULAR NEGATIVE: 1

## 2020-02-07 NOTE — PATIENT INSTRUCTIONS
Treatments for sinus infection:  1. Flonase - use one spray in each nostril once a day  2. Sinus Rinse or Neti Pot - these can be purchased at any pharmacy.  Use 1-2 times a day to relieve sinus congestion.     Additional treatment options for symptom relieve:  3. Take ibuprofen and acetaminophen (Tylenol) as needed for pain and/or fever.   4. Mucinex (guaifenisen) may help to thin the nasal mucus  5. Humidifiers or diffusers.  There is some evidence to support using essential oils such as eucalyptus, peppermint, citrus blends, or oregano in a diffuser for nasal congestion.  I do not recommend drinking the oils or placing them directly on the skin, since the concentrations of the oils are not regulated and vary between brands.     Most sinus infections are caused by a virus.  Antibiotics are only considered for sinus infections lasting 10 or more days.    When we prescribe antibiotics for simple sinus infections - there is a greater chance of harm (1 in 8 patients have antibiotic side effects) than benefit (1 in 18 patients treated with antibiotics will have faster resolution of symptoms).

## 2020-02-07 NOTE — PROGRESS NOTES
Subjective     Pavithra Laurent is a 79 year old female who presents to clinic today for the following health issues:    HPI   RESPIRATORY SYMPTOMS      Duration: week and half    Description  nasal congestion, facial pain/pressure, cough, headache and fatigue/malaise    Severity: mild    Accompanying signs and symptoms: None    History (predisposing factors):  none    Precipitating or alleviating factors: None    Therapies tried and outcome:  benzonatate and codeine cough syrup- pt Is feeling better today    Started as a cold  Yesterday still felt ill  Today a little better  Ongoing sinus issues  Upper tooth pain  Also didn't wear her mouth guard last night          Patient Active Problem List   Diagnosis     Nasal congestion     Environmental allergies     Hypothyroidism     Neuropathic pain of right arm     Rosacea     Tear film insufficiency     Elbow dislocation     Low back pain     Lesion of ulnar nerve     Cellulitis of left leg     Trochanteric bursitis of left hip     Piriformis syndrome of left side     History of deep venous thrombosis (DVT) of distal vein of left lower extremity     Neck pain     TIA (transient ischemic attack)     Trochanteric bursitis of both hips     Generalized osteoarthritis of multiple sites     Sicca syndrome (H)     Urinary tract infection with hematuria, site unspecified     Lumbar degenerative disc disease     Primary osteoarthritis of both feet     Ventral hernia without obstruction or gangrene s/p repair 6/2019     Family history of blood clots     Post-thrombotic syndrome of left lower extremity     Deep vein thrombosis (DVT) of left lower extremity, unspecified chronicity, unspecified vein (H)     Past Surgical History:   Procedure Laterality Date     ARTHROTOMY ELBOW Right 1/7/2015    Procedure: ARTHROTOMY ELBOW;  Surgeon: Branden Meyer MD;  Location: [facility] RESEC TONSIL/PILLARS  1948    tonsillectomy     CLOSED REDUCTION UPPER EXTREMITY  12/26/2014     Procedure: CLOSED REDUCTION UPPER EXTREMITY;  Surgeon: Louis Daniel MD;  Location:  OR     GYN SURGERY  1984    tubal ligation     HERNIORRHAPHY VENTRAL N/A 6/18/2019    Procedure: OPEN VENTRAL HERNIA REPAIR WITH MESH;  Surgeon: Last Solis MD;  Location:  OR     IRRIGATION AND DEBRIDEMENT HAND, COMBINED  12/26/2014    Procedure: COMBINED IRRIGATION AND DEBRIDEMENT HAND;  Surgeon: Louis Daniel MD;  Location:  OR     KNEE SURGERY  2001    arthroscopic right     LAPAROSCOPIC CHOLECYSTECTOMY  8/16/2012    Procedure: LAPAROSCOPIC CHOLECYSTECTOMY;  LAPAROSCOPIC CHOLECYSTECTOMY ;  Surgeon: Preston Walters MD;  Location:  [facility]     OPEN REDUCTION INTERNAL FIXATION FOREARM Right 12/26/2014    Procedure: OPEN REDUCTION INTERNAL FIXATION FOREARM;  Surgeon: Louis Daniel MD;  Location:  OR     ORTHOPEDIC SURGERY  2007,2008    bunyanectomy, hammer toe, torn meniscus, toe and knee replacement     TKA  2009    right knee     WISDOM TEETH EXTRACTION  1958       Social History     Tobacco Use     Smoking status: Never Smoker     Smokeless tobacco: Never Used   Substance Use Topics     Alcohol use: Yes     Alcohol/week: 0.0 standard drinks     Comment: rare- 2/month     Family History   Problem Relation Age of Onset     Heart Disease Mother         MI at 72     Cerebrovascular Disease Mother      Cancer Mother         lung     Alcohol/Drug Father      Depression Father      Cardiovascular Father      Heart Disease Paternal Grandmother      Heart Disease Paternal Grandfather      Cancer Maternal Grandmother         stomach     Cancer - colorectal Other      Neurologic Disorder Son         brain injury     Clotting Disorder (Unknown) Son      Unknown/Adopted Maternal Grandfather          Current Outpatient Medications   Medication Sig Dispense Refill     amoxicillin (AMOXIL) 500 MG capsule Take 2,000 mg by mouth once One hour prior to dental procedure       aspirin 81 MG EC tablet Take 81 mg by mouth daily        azelastine (ASTELIN) 0.1 % nasal spray Spray 2 sprays into both nostrils daily as needed for rhinitis        benzonatate (TESSALON) 100 MG capsule Take 1 capsule (100 mg) by mouth 3 times daily as needed for cough 21 capsule 0     calcium-vitamin D-vitamin K (CALCIUM SOFT CHEWS) 500-500-40 MG-UNT-MCG CHEW Take 2 tablets by mouth 2 times daily       cycloSPORINE (RESTASIS) 0.05 % ophthalmic emulsion Place 1 drop into both eyes every 12 hours.         desonide (DESOWEN) 0.05 % cream Apply topically 2 times daily as needed (rash)        Dextran 70 0.1%-Hypromellose 0.3% (BION TEARS) 0.1-0.3 % SOLN ophthalmic solution Place 1 drop into both eyes See Admin Instructions Up to 7 times daily , as needed.       diclofenac (VOLTAREN) 1 % GEL Place 2 g onto the skin daily as needed for moderate pain (knees)        epinastine HCl (ELESTAT) 0.05 % SOLN Place 1 drop into both eyes daily as needed (allergies)        erythromycin (ROMYCIN) ophthalmic ointment Place 1 Application into both eyes nightly as needed (dry/irritated eyes)        fluocinolone acetonide (DERMOTIC) 0.01 % OIL Place 1 drop in ear(s) daily as needed.       fluocinonide (LIDEX) 0.05 % external solution Apply topically 2 times daily as needed (rash)       fluticasone (FLONASE) 50 MCG/ACT nasal spray Spray 1 spray into both nostrils daily        gabapentin (NEURONTIN) 100 MG capsule Take 100 mg by mouth daily as needed (on particularly active days)       IBUPROFEN PO Take 400 mg by mouth 3 times daily as needed for moderate pain        ketoconazole (NIZORAL) 2 % external cream Apply topically 3 times daily as needed for itching or irritation       levothyroxine (SYNTHROID/LEVOTHROID) 88 MCG tablet TAKE ONE TABLET BY MOUTH ONE TIME DAILY  90 tablet 2     metroNIDAZOLE (METROGEL) 0.75 % external gel Apply topically daily       montelukast (SINGULAIR) 10 MG tablet Take 10 mg by mouth At Bedtime        order for DME Equipment being ordered: compression stockings  25-35mmHG 1 each 0     Xylitol (XYLIMELTS MT) Take 1 lozenge by mouth At Bedtime       gabapentin (NEURONTIN) 400 MG capsule Take 1 capsule (400 mg) by mouth 3 times daily 270 capsule 1     Allergies   Allergen Reactions     Birch Trees      Codeine Sulfate Nausea and Vomiting     Oral, topical is ok     Dust Mites      Erythromycin Nausea and Vomiting     With oral use     Hydromorphone Other (See Comments)     nightmares       Reviewed and updated as needed this visit by Provider  Meds         Review of Systems   Constitutional:        As in HPI   HENT:        As in HPI   Eyes: Negative.    Respiratory:        As in HPI   Cardiovascular: Negative.    Gastrointestinal: Negative.    Endocrine: Negative.    Genitourinary: Negative.    Musculoskeletal: Negative.    Skin: Negative.    Neurological: Negative.    Psychiatric/Behavioral: Negative.          Objective    /74 (Cuff Size: Adult Regular)   Pulse 74   Temp 97.4  F (36.3  C) (Tympanic)   Resp 12   Wt 81.2 kg (179 lb)   SpO2 98%   BMI (P) 33.06 kg/m    Physical Exam  Constitutional:       Appearance: She is well-developed.   HENT:      Head: Normocephalic.      Right Ear: Tympanic membrane and ear canal normal.      Left Ear: Tympanic membrane and ear canal normal.      Nose: Mucosal edema and rhinorrhea present.      Right Sinus: No maxillary sinus tenderness or frontal sinus tenderness.      Left Sinus: Maxillary sinus tenderness present. No frontal sinus tenderness.      Mouth/Throat:      Pharynx: Uvula midline. No oropharyngeal exudate or posterior oropharyngeal erythema.   Eyes:      Conjunctiva/sclera: Conjunctivae normal.   Cardiovascular:      Rate and Rhythm: Normal rate and regular rhythm.      Heart sounds: Normal heart sounds.   Pulmonary:      Effort: Pulmonary effort is normal.      Breath sounds: Normal breath sounds.   Skin:     General: Skin is warm.   Neurological:      Mental Status: She is alert and oriented to person, place, and  time.         Diagnostic Test Results:  No results found for this or any previous visit (from the past 24 hour(s)).        Assessment & Plan   Problem List Items Addressed This Visit     None      Visit Diagnoses     Acute recurrent maxillary sinusitis    -  Primary         Patient is improving and wishes to avoid antibiotics - I agree no need for antibiotics at this time.     If she is getting worse, she may call in for an antibiotic - if she call please fill:  Doxycycline 100 mg BID for 10 days no refills.       Patient Instructions   Treatments for sinus infection:  1. Flonase - use one spray in each nostril once a day  2. Sinus Rinse or Neti Pot - these can be purchased at any pharmacy.  Use 1-2 times a day to relieve sinus congestion.     Additional treatment options for symptom relieve:  3. Take ibuprofen and acetaminophen (Tylenol) as needed for pain and/or fever.   4. Mucinex (guaifenisen) may help to thin the nasal mucus  5. Humidifiers or diffusers.  There is some evidence to support using essential oils such as eucalyptus, peppermint, citrus blends, or oregano in a diffuser for nasal congestion.  I do not recommend drinking the oils or placing them directly on the skin, since the concentrations of the oils are not regulated and vary between brands.     Most sinus infections are caused by a virus.  Antibiotics are only considered for sinus infections lasting 10 or more days.    When we prescribe antibiotics for simple sinus infections - there is a greater chance of harm (1 in 8 patients have antibiotic side effects) than benefit (1 in 18 patients treated with antibiotics will have faster resolution of symptoms).         Return in about 2 weeks (around 2/21/2020) for a recheck if you are not improved.    Ana Smallwood PA-C  [facility]

## 2020-02-17 NOTE — TELEPHONE ENCOUNTER
DIAGNOSIS: Left knee pain, replacement. No new images.   APPOINTMENT DATE: Mar 31, 2020    NOTES STATUS DETAILS   OFFICE NOTE from referring provider Care Everywhere 12/17/19 CRISTIAN Rios   OFFICE NOTE from other specialist N/A    DISCHARGE SUMMARY from hospital N/A    DISCHARGE REPORT from the ER N/A    OPERATIVE REPORT N/A    MEDICATION LIST Care Everywhere    IMPLANT RECORD/STICKER N/A    LABS     CBC/DIFF N/A    CULTURES N/A    INJECTIONS DONE IN RADIOLOGY N/A    MRI N/A    CT SCAN N/A    XRAYS (IMAGES & REPORTS) Received  [facility] 4/17/18, 5/27/15, 3/11/15, 7/23/12   TUMOR     PATHOLOGY  Slides & report N/A       02/24/20 SE  5:18 PM  [facility] only sent 2018 xray. Faxed 2nd request with specific dates    02/17/20 SE  1:05 PM  Faxed request to [facility] for knee images

## 2020-02-19 DIAGNOSIS — M79.2 NEUROPATHIC PAIN OF RIGHT FOREARM: ICD-10-CM

## 2020-02-19 RX ORDER — GABAPENTIN 400 MG/1
CAPSULE ORAL
Qty: 270 CAPSULE | Refills: 0 | Status: SHIPPED | OUTPATIENT
Start: 2020-02-19 | End: 2020-05-27

## 2020-02-19 NOTE — TELEPHONE ENCOUNTER
Last Written Prescription Date:  8/23/19  Last Fill Quantity: 270,  # refills: 1   Last office visit: 2/7/2020 with prescribing provider:     Future Office Visit:   Next 5 appointments (look out 90 days)    Mar 25, 2020  4:00 PM CDT  Return Visit with Lore Krishnamurthy MD  [facility]) [address] [place] [id number]  [phone]   Mar 27, 2020  2:40 PM CDT  PHYSICAL with Kitty Owens MD  [facility] ([facility] [address] [place] [id number] [phone]         Routing refill request to provider for review/approval because:  Drug not on the [facility] refill protocol   Tiffany Hernandez RN

## 2020-03-13 DIAGNOSIS — M25.562 LEFT KNEE PAIN, UNSPECIFIED CHRONICITY: Primary | ICD-10-CM

## 2020-03-30 ENCOUNTER — TELEPHONE (OUTPATIENT)
Dept: ORTHOPEDICS | Facility: CLINIC | Age: 80
End: 2020-03-30

## 2020-03-30 NOTE — TELEPHONE ENCOUNTER
Called patient to let her know that someone will schedule her imaging for her prior to her appointment. Patient expressed understanding.

## 2020-03-30 NOTE — TELEPHONE ENCOUNTER
[facility] Call Center    Phone Message    May a detailed message be left on voicemail: yes     Reason for Call: Other: Pt is requesting a call back to reschedule her imaging before her visit on 05/12/2020.      Action Taken: Message routed to:  [facility] Ortho    Travel Screening: Not Applicable

## 2020-03-31 ENCOUNTER — PRE VISIT (OUTPATIENT)
Dept: ORTHOPEDICS | Facility: CLINIC | Age: 80
End: 2020-03-31

## 2020-04-14 ENCOUNTER — TELEPHONE (OUTPATIENT)
Dept: FAMILY MEDICINE | Facility: CLINIC | Age: 80
End: 2020-04-14

## 2020-04-14 NOTE — TELEPHONE ENCOUNTER
Patient scheduled with Dr Krishnamurthy 4/15 and would like to change this appointment.  Please contact patient.    Ph. [phone] ok to leave detailed message    Quynh Wolfe

## 2020-04-14 NOTE — TELEPHONE ENCOUNTER
"Called and spoke to patient.    Rescheduled patients upcoming appointment for \"spot on leg not healing\". Patient requested a FSE get added to appointment as well- updated appointment notes.    Patient voiced understanding.    JOSE RAFAEL Richter-BSN-N  [facility]  [phone]    "

## 2020-04-29 ENCOUNTER — TELEPHONE (OUTPATIENT)
Dept: ORTHOPEDICS | Facility: CLINIC | Age: 80
End: 2020-04-29

## 2020-05-05 ENCOUNTER — OFFICE VISIT (OUTPATIENT)
Dept: FAMILY MEDICINE | Facility: CLINIC | Age: 80
End: 2020-05-05
Payer: COMMERCIAL

## 2020-05-05 VITALS — TEMPERATURE: 97 F | DIASTOLIC BLOOD PRESSURE: 62 MMHG | SYSTOLIC BLOOD PRESSURE: 118 MMHG

## 2020-05-05 DIAGNOSIS — D48.5 NEOPLASM OF UNCERTAIN BEHAVIOR OF SKIN: Primary | ICD-10-CM

## 2020-05-05 DIAGNOSIS — L82.1 SEBORRHEIC KERATOSIS: ICD-10-CM

## 2020-05-05 DIAGNOSIS — L82.0 INFLAMED SEBORRHEIC KERATOSIS: ICD-10-CM

## 2020-05-05 DIAGNOSIS — L81.6 POIKILODERMA: ICD-10-CM

## 2020-05-05 DIAGNOSIS — L71.9 ROSACEA: ICD-10-CM

## 2020-05-05 PROCEDURE — 99213 OFFICE O/P EST LOW 20 MIN: CPT | Mod: 25 | Performed by: FAMILY MEDICINE

## 2020-05-05 PROCEDURE — 11301 SHAVE SKIN LESION 0.6-1.0 CM: CPT | Mod: 51 | Performed by: FAMILY MEDICINE

## 2020-05-05 PROCEDURE — 88305 TISSUE EXAM BY PATHOLOGIST: CPT | Mod: TC | Performed by: FAMILY MEDICINE

## 2020-05-05 PROCEDURE — 11302 SHAVE SKIN LESION 1.1-2.0 CM: CPT | Performed by: FAMILY MEDICINE

## 2020-05-05 RX ORDER — METRONIDAZOLE 7.5 MG/G
GEL TOPICAL DAILY
Qty: 45 G | Refills: 3 | Status: SHIPPED | OUTPATIENT
Start: 2020-05-05 | End: 2020-07-24

## 2020-05-05 NOTE — PROGRESS NOTES
[facility] - PRIMARY CARE SKIN    CC: lesion  SUBJECTIVE:   Pavithra Laurent is a(n) 79 year old female who presents to clinic today because of lesion on the left lower leg, 6-7 months duration. No bleeding    Spot on the dorsum of the left hand that resolves with application of triamcinolone 0.1% cream .    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    Family Medical History  Skin cancer: NO  Eczema Psoriasis Autoimmune   ?YES in father NO NO     Occupation: retired, business management (indoor).      Refer to electronic medical record (EMR) for past medical history and medications.      ROS: 14 point review of systems was negative except the symptoms listed above in the HPI.        OBJECTIVE:   GENERAL: healthy, alert and no distress.  HEENT: PERRL. Conjunctiva, sclera clear.  SKIN: Gordon Skin Type - II.  Trunk and Hands examined. The dermatoscope was used to help evaluate pigmented lesions.  Skin Pertinent Findings:  Left dorsum of hand : flat, blanchable 5 mm lesion. No suspicious changes  Left mid anterior lower leg- 6 mm erythematous, raised lesion with slight rolled border - ? poikiloderma ? Basal cell carcinoma ? Squamous cell carcinoma ? Other  Right upper back- 1.5 cm brown , raised , coarse textured lesion consistent with irritated seborrheic keratosis because of irritation from bra strap this will be removed.              Diagnostic Test Results:  none     ASSESSMENT:     Encounter Diagnoses   Name Primary?     Neoplasm of uncertain behavior of skin Yes     Seborrheic keratosis      Inflamed seborrheic keratosis      Poikiloderma      Rosacea          PLAN:   Patient Instructions   FUTURE APPOINTMENTS  Follow up per pathology report. You will be notified, generally via letter or MyChart, in approximately 10 days. If there is anything we need to discuss or further treatment needed, I will call you to  "discuss it.    Vaseline and a bandage for 5-7 days.       WOUND CARE INSTRUCTIONS  1. Wash hands before every dressing change.  2. Change the dressing after 24 hours and once daily, or earlier if it becomes saturated.  3. Wash the wound area with a mild soap, then rinse.  4. Gently pat dry with a sterile gauze or Q-tip.  5. Using a Q-tip, apply Vaseline or Aquaphor only over entire wound. DO NOT use Neosporin - as many people react to neomycin.  6. Finally, cover with a bandage or sterile non-stick gauze with micropore paper tape.  7. Repeat once daily until wound has healed.      Soap, water and shampoo will not hurt this area.    Do not go swimming or take baths, but showering is encouraged.    Limit use of the area where the procedure was done for a few days to allow for optimal healing.    Signs of Infection:  Infection can occur in any area where skin has been disrupted. If you notice persistent redness, swelling, colored drainage, increasing pain, fever or other signs of infection, please call us at: [phone] and ask to have me or my colleague paged. We will call you back to discuss.    If you experience bleeding:  Wash hands and hold firm pressure on the area for 10 minutes without checking to see if the bleeding has stopped. \"Checking\" pulls off the protective wound clot and restarts the bleeding all over again. Re-apply pressure for 10 minutes if necessary to stop bleeding.  Use additional sterile gauze and tape to maintain pressure once bleeding has stopped.  If bleeding continues, then call back to clinic at [phone].    PATIENT INFORMATION : WOUNDS  During the healing process you will notice a number of changes.     All wounds normally drain.  The larger the wound the more drainage there will be.  After 7-10 days, you will notice the wound beginning to shrink and new skin will begin to grow.  The wound is healed when you can see that skin has formed over the entire area.  A healed wound has a " healthy, shiny look to the surface and is red to dark pink in color to normalize.  Wounds may take approximately 4-6 weeks to heal.  Larger wounds may take 6-8 weeks. After the wound is healed you may discontinue dressing changes.    All wounds develop a small halo of redness surrounding the wound which means that healing is occurring. Severe itching with extensive redness usually indicates sensitivity to the ointment or bandage tape used to dress the wound.  You should call our office if severe itching with extensive redness develops.    Swelling  and/or discoloration around your surgical site is common, particularly when performed around the eye.  You may experience a sensation of tightness as your wound heals. This is normal and will gradually subside.  Your healed wound may be sensitive to temperature changes. This sensitivity improves with time, but if you re having a lot of discomfort, try to avoid temperature extremes.  Patients frequently experience itching after their wound appears to have healed because of the continue healing under the skin.  Plain Vaseline will help relieve the itching.          TT: 20 minutes.  CT: 15 minutes.

## 2020-05-05 NOTE — PATIENT INSTRUCTIONS
"FUTURE APPOINTMENTS  Follow up per pathology report. You will be notified, generally via letter or MyChart, in approximately 10 days. If there is anything we need to discuss or further treatment needed, I will call you to discuss it.    Vaseline and a bandage for 5-7 days.       WOUND CARE INSTRUCTIONS  1. Wash hands before every dressing change.  2. Change the dressing after 24 hours and once daily, or earlier if it becomes saturated.  3. Wash the wound area with a mild soap, then rinse.  4. Gently pat dry with a sterile gauze or Q-tip.  5. Using a Q-tip, apply Vaseline or Aquaphor only over entire wound. DO NOT use Neosporin - as many people react to neomycin.  6. Finally, cover with a bandage or sterile non-stick gauze with micropore paper tape.  7. Repeat once daily until wound has healed.      Soap, water and shampoo will not hurt this area.    Do not go swimming or take baths, but showering is encouraged.    Limit use of the area where the procedure was done for a few days to allow for optimal healing.    Signs of Infection:  Infection can occur in any area where skin has been disrupted. If you notice persistent redness, swelling, colored drainage, increasing pain, fever or other signs of infection, please call us at: [phone] and ask to have me or my colleague paged. We will call you back to discuss.    If you experience bleeding:  Wash hands and hold firm pressure on the area for 10 minutes without checking to see if the bleeding has stopped. \"Checking\" pulls off the protective wound clot and restarts the bleeding all over again. Re-apply pressure for 10 minutes if necessary to stop bleeding.  Use additional sterile gauze and tape to maintain pressure once bleeding has stopped.  If bleeding continues, then call back to clinic at [phone].    PATIENT INFORMATION : WOUNDS  During the healing process you will notice a number of changes.     All wounds normally drain.  The larger the wound the more " drainage there will be.  After 7-10 days, you will notice the wound beginning to shrink and new skin will begin to grow.  The wound is healed when you can see that skin has formed over the entire area.  A healed wound has a healthy, shiny look to the surface and is red to dark pink in color to normalize.  Wounds may take approximately 4-6 weeks to heal.  Larger wounds may take 6-8 weeks. After the wound is healed you may discontinue dressing changes.    All wounds develop a small halo of redness surrounding the wound which means that healing is occurring. Severe itching with extensive redness usually indicates sensitivity to the ointment or bandage tape used to dress the wound.  You should call our office if severe itching with extensive redness develops.    Swelling  and/or discoloration around your surgical site is common, particularly when performed around the eye.  You may experience a sensation of tightness as your wound heals. This is normal and will gradually subside.  Your healed wound may be sensitive to temperature changes. This sensitivity improves with time, but if you re having a lot of discomfort, try to avoid temperature extremes.  Patients frequently experience itching after their wound appears to have healed because of the continue healing under the skin.  Plain Vaseline will help relieve the itching.

## 2020-05-05 NOTE — LETTER
5/5/2020         RE: Pavithra Laurent  [address] [facility] [address]        Dear Colleague,    Thank you for referring your patient, Pavithra Laurent, to the [facility]. Please see a copy of my visit note below.    [facility] - PRIMARY CARE SKIN    CC: lesion  SUBJECTIVE:   Pavithra Laurent is a(n) 79 year old female who presents to clinic today because of lesion on the left lower leg, 6-7 months duration. No bleeding    Spot on the dorsum of the left hand that resolves with application of triamcinolone 0.1% cream .    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    Family Medical History  Skin cancer: NO  Eczema Psoriasis Autoimmune   ?YES in father NO NO     Occupation: retired, business management (indoor).      Refer to electronic medical record (EMR) for past medical history and medications.      ROS: 14 point review of systems was negative except the symptoms listed above in the HPI.        OBJECTIVE:   GENERAL: healthy, alert and no distress.  HEENT: PERRL. Conjunctiva, sclera clear.  SKIN: Gordon Skin Type - II.  Trunk and Hands examined. The dermatoscope was used to help evaluate pigmented lesions.  Skin Pertinent Findings:  Left dorsum of hand : flat, blanchable 5 mm lesion. No suspicious changes  Left mid anterior lower leg- 6 mm erythematous, raised lesion with slight rolled border - ? poikiloderma ? Basal cell carcinoma ? Squamous cell carcinoma ? Other  Right upper back- 1.5 cm brown , raised , coarse textured lesion consistent with irritated seborrheic keratosis because of irritation from bra strap this will be removed.              Diagnostic Test Results:  none     ASSESSMENT:     Encounter Diagnoses   Name Primary?     Neoplasm of uncertain behavior of skin Yes     Seborrheic keratosis      Inflamed seborrheic keratosis      Poikiloderma      Rosacea          PLAN:  "  Patient Instructions   FUTURE APPOINTMENTS  Follow up per pathology report. You will be notified, generally via letter or MyChart, in approximately 10 days. If there is anything we need to discuss or further treatment needed, I will call you to discuss it.    Vaseline and a bandage for 5-7 days.       WOUND CARE INSTRUCTIONS  1. Wash hands before every dressing change.  2. Change the dressing after 24 hours and once daily, or earlier if it becomes saturated.  3. Wash the wound area with a mild soap, then rinse.  4. Gently pat dry with a sterile gauze or Q-tip.  5. Using a Q-tip, apply Vaseline or Aquaphor only over entire wound. DO NOT use Neosporin - as many people react to neomycin.  6. Finally, cover with a bandage or sterile non-stick gauze with micropore paper tape.  7. Repeat once daily until wound has healed.      Soap, water and shampoo will not hurt this area.    Do not go swimming or take baths, but showering is encouraged.    Limit use of the area where the procedure was done for a few days to allow for optimal healing.    Signs of Infection:  Infection can occur in any area where skin has been disrupted. If you notice persistent redness, swelling, colored drainage, increasing pain, fever or other signs of infection, please call us at: [phone] and ask to have me or my colleague paged. We will call you back to discuss.    If you experience bleeding:  Wash hands and hold firm pressure on the area for 10 minutes without checking to see if the bleeding has stopped. \"Checking\" pulls off the protective wound clot and restarts the bleeding all over again. Re-apply pressure for 10 minutes if necessary to stop bleeding.  Use additional sterile gauze and tape to maintain pressure once bleeding has stopped.  If bleeding continues, then call back to clinic at [phone].    PATIENT INFORMATION : WOUNDS  During the healing process you will notice a number of changes.     All wounds normally drain.  The " larger the wound the more drainage there will be.  After 7-10 days, you will notice the wound beginning to shrink and new skin will begin to grow.  The wound is healed when you can see that skin has formed over the entire area.  A healed wound has a healthy, shiny look to the surface and is red to dark pink in color to normalize.  Wounds may take approximately 4-6 weeks to heal.  Larger wounds may take 6-8 weeks. After the wound is healed you may discontinue dressing changes.    All wounds develop a small halo of redness surrounding the wound which means that healing is occurring. Severe itching with extensive redness usually indicates sensitivity to the ointment or bandage tape used to dress the wound.  You should call our office if severe itching with extensive redness develops.    Swelling  and/or discoloration around your surgical site is common, particularly when performed around the eye.  You may experience a sensation of tightness as your wound heals. This is normal and will gradually subside.  Your healed wound may be sensitive to temperature changes. This sensitivity improves with time, but if you re having a lot of discomfort, try to avoid temperature extremes.  Patients frequently experience itching after their wound appears to have healed because of the continue healing under the skin.  Plain Vaseline will help relieve the itching.          TT: 20 minutes.  CT: 15 minutes.        Again, thank you for allowing me to participate in the care of your patient.        Sincerely,        Lore Krishnamurthy MD

## 2020-05-05 NOTE — PROCEDURES
Name : Shave Excision  Indication : Excision of tissue for pathology evaluation.  Location(s) : Left mid anterior lower leg- 6 mm erythematous, raised lesion with slight rolled border - ? Basal cell carcinoma ? Squamous cell carcinoma ? other  Completed by : Tiffany Krishnamurthy MD  Photo Taken : yes.  Anesthesia : Patient was anesthetized by infiltrating the area surrounding the lesion with 1% lidocaine.   epinephrine 1:467608 : Yes.  Note : Discussed the risk of pain, infection, scarring, hypo- or hyperpigmentation and recurrence or need for re-treatment. The benefits of treatment and alternative treatments were also discussed.    During this procedure, the universal protocol was utilized. The patient's identity was confirmed by no less than two patient identifiers, correct procedure was verified, correct site was verified and marked as applicable and a final pause was completed.    Sterile technique was used throughout the procedure. The skin was cleaned and prepped with surgical cleanser. Once adequate anesthesia was obtained, the lesion was removed with a deep scallop shave procedure. The specimen was sent to pathology.    Direct pressure and aluminum chloride and monopolar cautery was applied for hemostasis. No bleeding was present upon the completion of the procedure. The wound was coated with antibacterial ointment. A dry sterile dressing was applied. Patient tolerated the procedure well and left in satisfactory condition.    Primary provider and referring provider will be informed regarding the tissue report when it returns.      Name : Shave Excision  Indication : Excision of tissue for pathology evaluation.  Location(s) : Right upper back- 1.5 cm brown , raised , coarse textured lesion consistent with irritated seborrheic keratosis .  Completed by : Tiffany Krishnamurthy MD  Photo Taken : no.  Anesthesia : Patient was anesthetized by infiltrating the area surrounding the lesion with 1% lidocaine.   epinephrine 1:501908  : Yes.  Note : Discussed the risk of pain, infection, scarring, hypo- or hyperpigmentation and recurrence or need for re-treatment. The benefits of treatment and alternative treatments were also discussed.    During this procedure, the universal protocol was utilized. The patient's identity was confirmed by no less than two patient identifiers, correct procedure was verified, correct site was verified and marked as applicable and a final pause was completed.    Sterile technique was used throughout the procedure. The skin was cleaned and prepped with surgical cleanser. Once adequate anesthesia was obtained, the lesion was removed with a deep scallop shave procedure. The specimen was sent to pathology.    Direct pressure and aluminum chloride and monopolar cautery was applied for hemostasis. No bleeding was present upon the completion of the procedure. The wound was coated with antibacterial ointment. A dry sterile dressing was applied. Patient tolerated the procedure well and left in satisfactory condition.    Primary provider and referring provider will be informed regarding the tissue report when it returns.

## 2020-05-06 LAB — COPATH REPORT: NORMAL

## 2020-05-07 ENCOUNTER — TELEPHONE (OUTPATIENT)
Dept: FAMILY MEDICINE | Facility: CLINIC | Age: 80
End: 2020-05-07

## 2020-05-07 NOTE — TELEPHONE ENCOUNTER
----- Message from Lore Krishnamurthy MD sent at 5/7/2020  3:57 PM CDT -----  Please call :      Porokeratosis ( left leg ) is benign condition but some times can be precanerous. No further treatment is needed at this time. Recommend total body skin exam. Porokeratosis usually occurs on sun exposed areas on legs and arms. Can have only one spot or can develop more- requires once a year skin exams.      Lesion on the back- seborrheic keratosis , benign.    Thank you,  Lore Krishnamurthy M.D.

## 2020-05-07 NOTE — TELEPHONE ENCOUNTER
Patient notified of test results and providers message, patient has no further questions.    Michelle BARRETORN BSN  [facility]  [phone]

## 2020-05-19 DIAGNOSIS — B35.4 TINEA CORPORIS: ICD-10-CM

## 2020-05-20 ENCOUNTER — TELEPHONE (OUTPATIENT)
Dept: FAMILY MEDICINE | Facility: CLINIC | Age: 80
End: 2020-05-20

## 2020-05-20 ENCOUNTER — OFFICE VISIT (OUTPATIENT)
Dept: FAMILY MEDICINE | Facility: CLINIC | Age: 80
End: 2020-05-20
Payer: COMMERCIAL

## 2020-05-20 VITALS — SYSTOLIC BLOOD PRESSURE: 130 MMHG | DIASTOLIC BLOOD PRESSURE: 68 MMHG

## 2020-05-20 DIAGNOSIS — L03.119 CELLULITIS AND ABSCESS OF LEG: Primary | ICD-10-CM

## 2020-05-20 DIAGNOSIS — L02.419 CELLULITIS AND ABSCESS OF LEG: Primary | ICD-10-CM

## 2020-05-20 PROCEDURE — 87070 CULTURE OTHR SPECIMN AEROBIC: CPT | Performed by: FAMILY MEDICINE

## 2020-05-20 PROCEDURE — 99213 OFFICE O/P EST LOW 20 MIN: CPT | Performed by: FAMILY MEDICINE

## 2020-05-20 RX ORDER — NYSTATIN 100000 [USP'U]/G
POWDER TOPICAL 3 TIMES DAILY PRN
Qty: 30 G | Refills: 0 | OUTPATIENT
Start: 2020-05-20

## 2020-05-20 RX ORDER — CEPHALEXIN 500 MG/1
500 CAPSULE ORAL 3 TIMES DAILY
Qty: 30 CAPSULE | Refills: 0 | Status: SHIPPED | OUTPATIENT
Start: 2020-05-20 | End: 2020-05-27

## 2020-05-20 NOTE — PATIENT INSTRUCTIONS
Cephalexin 500 mg one cap three times per day X10 days  Left leg elevated as much as possible   Recheck in one week  Recheck sooner if the redness, tenderness or swelling increases.

## 2020-05-20 NOTE — PROGRESS NOTES
[facility] - PRIMARY CARE SKIN    CC: infection  SUBJECTIVE:   Pavithra Laurent is a(n) 79 year old female who presents to clinic today because of redness in the leg where she had a shave excision performed on 05/05/2020. She noticed more redness and a red streak going down the leg, noticed this 2 days ago. Noticed pain in the area of irritation of  left leg last nite.  No fever, no chills.  The tissue report was consistent with porokeratosis.     Personal Medical History  Skin Cancer: NO  Eczema Psoriasis Lupus   NO NO NO   Other:   .    Family Medical History  Skin Cancer: NO  Eczema Psoriasis Lupus   NO NO NO   Other: .      Occupation: indoor ().    Refer to electronic medical record (EMR) for past medical history and medications.      ROS: 14 point review of systems was negative except the symptoms listed above in the HPI.        OBJECTIVE:   GENERAL: healthy, alert and no distress.  HEENT: PERRL. Conjunctiva, sclera clear.  SKIN: Gordon Skin Type - I.  Legs examined. The dermatoscope was used to help evaluate pigmented lesions.  Skin Pertinent Findings:  Left anterior lower leg- erythema on the lower from mid shin. No drainage.      Diagnostic Test Results:  No results found for this or any previous visit (from the past 24 hour(s)).    ASSESSMENT:     Encounter Diagnosis   Name Primary?     Cellulitis and abscess of leg Yes         PLAN:   Patient Instructions   Cephalexin 500 mg one cap three times per day X10 days  Left leg elevated as much as possible   Recheck in one week  Recheck sooner if the redness, tenderness or swelling increases.      TT: 20 minutes.  CT: 15 minutes.

## 2020-05-20 NOTE — TELEPHONE ENCOUNTER
Patient returning nurse call. She has set up her mychart, But she does not have a smart phone.  She does not have the usb cord to upload photos to her desk from her flip phone.   Advised patient we can not send text messages

## 2020-05-20 NOTE — LETTER
5/20/2020         RE: Pavithra Laurent  [address] [facility] [address]        Dear Colleague,    Thank you for referring your patient, Paivthra Laurent, to the [facility]. Please see a copy of my visit note below.    [facility] - PRIMARY CARE SKIN    CC: infection  SUBJECTIVE:   Pavithra Laurent is a(n) 79 year old female who presents to clinic today because of redness in the leg where she had a shave excision performed on 05/05/2020. She noticed more redness and a red streak going down the leg, noticed this 2 days ago. Noticed pain in the area of irritation of  left leg last nite.  No fever, no chills.  The tissue report was consistent with porokeratosis.     Personal Medical History  Skin Cancer: NO  Eczema Psoriasis Lupus   NO NO NO   Other:   .    Family Medical History  Skin Cancer: NO  Eczema Psoriasis Lupus   NO NO NO   Other: .      Occupation: indoor ().    Refer to electronic medical record (EMR) for past medical history and medications.      ROS: 14 point review of systems was negative except the symptoms listed above in the HPI.        OBJECTIVE:   GENERAL: healthy, alert and no distress.  HEENT: PERRL. Conjunctiva, sclera clear.  SKIN: Gordon Skin Type - I.  Legs examined. The dermatoscope was used to help evaluate pigmented lesions.  Skin Pertinent Findings:  Left anterior lower leg- erythema on the lower from mid shin. No drainage.      Diagnostic Test Results:  No results found for this or any previous visit (from the past 24 hour(s)).    ASSESSMENT:     Encounter Diagnosis   Name Primary?     Cellulitis and abscess of leg Yes         PLAN:   Patient Instructions   Cephalexin 500 mg one cap three times per day X10 days  Left leg elevated as much as possible   Recheck in one week  Recheck sooner if the redness, tenderness or swelling increases.      TT: 20 minutes.  CT: 15 minutes.        Again, thank you for allowing me to participate in the care of your patient.         Sincerely,        Lore Krishnamurthy MD

## 2020-05-20 NOTE — TELEPHONE ENCOUNTER
Patient thinks he shave site may be infected- she is unable to send a photos- advised its best to come in for a check with the nurse.  Appointment scheduled    Michelle BARRETO RN BSN  [facility]  [phone]

## 2020-05-21 ENCOUNTER — TELEPHONE (OUTPATIENT)
Dept: FAMILY MEDICINE | Facility: CLINIC | Age: 80
End: 2020-05-21

## 2020-05-21 NOTE — TELEPHONE ENCOUNTER
nystatin (MYCOSTATIN) 051026 UNIT/GM POWD (Discontinued)  30 g  1  10/12/2018  2/7/2020  No    Sig - Route: Apply topically 3 times daily as needed - Topical    Patient taking differently: Apply topically 2 times daily as needed for other (yeast infection in skin folds)         Sent to pharmacy as: nystatin (MYCOSTATIN) 617700 UNIT/GM POWD    Class: E-Prescribe    Reason for Discontinue: Medication Reconciliation Clean Up    Order: [id number]          Thanks! Mansi Boland RN

## 2020-05-22 LAB
BACTERIA SPEC CULT: NORMAL
Lab: NORMAL
SPECIMEN SOURCE: NORMAL

## 2020-05-26 DIAGNOSIS — M79.2 NEUROPATHIC PAIN OF RIGHT FOREARM: ICD-10-CM

## 2020-05-26 NOTE — PROGRESS NOTES
[facility] - PRIMARY CARE SKIN    CC: infection  SUBJECTIVE:   Pavithra Laurent is a(n) 79 year old female who presents to clinic today for follow up of a secondary wound infection on the left lower leg, this occurred after a shave excision performed on 05/05/2020. Still some aching in the left lower  leg  Current treatment : cephalexin 500 mg 1 cap tid  The tissue report was consistent with porokeratosis.     Personal Medical History  Skin Cancer: NO  Eczema Psoriasis Lupus   NO NO NO   Other:   .    Family Medical History  Skin Cancer: NO  Eczema Psoriasis Lupus   NO NO NO   Other: .      Occupation: indoor ().    Refer to electronic medical record (EMR) for past medical history and medications.      ROS: 14 point review of systems was negative except the symptoms listed above in the HPI.        OBJECTIVE:   GENERAL: healthy, alert and no distress.  HEENT: PERRL. Conjunctiva, sclera clear.  SKIN: Gordon Skin Type - I.  Legs examined. The dermatoscope was used to help evaluate pigmented lesions.  Skin Pertinent Findings:  Left anterior lower leg- erythema on the anterior lower leg, 7 mm wound, clean base, no drainage . Less edema      Diagnostic Test Results:  No results found for this or any previous visit (from the past 24 hour(s)).    ASSESSMENT:     Encounter Diagnoses   Name Primary?     Cellulitis and abscess of leg      Wound healing, delayed Yes         PLAN:   Patient Instructions   Cephalexin 500mg three times per day for 7 more days  Recheck in at appointment that is already scheduled for skin exam in July  Clean daily with warm water and q tip then apply Vaseline and dressing      TT: 20 minutes.  CT: 15 minutes.

## 2020-05-27 ENCOUNTER — OFFICE VISIT (OUTPATIENT)
Dept: FAMILY MEDICINE | Facility: CLINIC | Age: 80
End: 2020-05-27
Payer: COMMERCIAL

## 2020-05-27 VITALS — DIASTOLIC BLOOD PRESSURE: 60 MMHG | SYSTOLIC BLOOD PRESSURE: 130 MMHG

## 2020-05-27 DIAGNOSIS — L02.419 CELLULITIS AND ABSCESS OF LEG: ICD-10-CM

## 2020-05-27 DIAGNOSIS — T14.8XXD WOUND HEALING, DELAYED: Primary | ICD-10-CM

## 2020-05-27 DIAGNOSIS — L03.119 CELLULITIS AND ABSCESS OF LEG: ICD-10-CM

## 2020-05-27 PROCEDURE — 99213 OFFICE O/P EST LOW 20 MIN: CPT | Performed by: FAMILY MEDICINE

## 2020-05-27 RX ORDER — GABAPENTIN 400 MG/1
CAPSULE ORAL
Qty: 270 CAPSULE | Refills: 0 | Status: SHIPPED | OUTPATIENT
Start: 2020-05-27 | End: 2020-08-04

## 2020-05-27 RX ORDER — CEPHALEXIN 500 MG/1
500 CAPSULE ORAL 3 TIMES DAILY
Qty: 21 CAPSULE | Refills: 0 | Status: SHIPPED | OUTPATIENT
Start: 2020-05-27 | End: 2020-07-24

## 2020-05-27 NOTE — PATIENT INSTRUCTIONS
Cephalexin 500mg three times per day for 7 more days  Recheck in at appointment that is already scheduled for skin exam in July  Clean daily with warm water and q tip then apply Vaseline and dressing

## 2020-05-27 NOTE — TELEPHONE ENCOUNTER
Controlled Substance Refill Request for GABAPENTIN 400 MG PO capsule   Problem List Complete:  No     PROVIDER TO CONSIDER COMPLETION OF PROBLEM LIST AND OVERVIEW/CONTROLLED SUBSTANCE AGREEMENT    Future Office visit:   Next 5 appointments (look out 90 days)    Jul 09, 2020 11:20 AM CDT  PHYSICAL with Kitty Owens MD  [facility] [address] [place] [address]  [phone]   Jul 27, 2020 10:00 AM CDT  Return Visit with Lore Krishnamurthy MD  [facility] ([facility] [address] [place] [address]  [phone]          Controlled substance agreement:   Encounter-Level CSA:    There are no encounter-level csa.     Patient-Level CSA:    There are no patient-level csa.         Last Urine Drug Screen: No results found for: CDAUT, No results found for: COMDAT, No results found for: THC13, PCP13, COC13, MAMP13, OPI13, AMP13, BZO13, TCA13, MTD13, BAR13, OXY13, PPX13, BUP13     Processing:  Rx to be electronically transmitted to pharmacy by provider      [email or web address]       checked in past 3 months?  No, route to RN

## 2020-05-27 NOTE — LETTER
5/27/2020         RE: Pavithra Laurent  [address] [facility] [facility] [address]        Dear Colleague,    Thank you for referring your patient, Pavithra Laurent, to the [facility]. Please see a copy of my visit note below.    [facility] - PRIMARY CARE SKIN    CC: infection  SUBJECTIVE:   Pavithra Laurent is a(n) 79 year old female who presents to clinic today for follow up of a secondary wound infection on the left lower leg, this occurred after a shave excision performed on 05/05/2020. Still some aching in the left lower  leg  Current treatment : cephalexin 500 mg 1 cap tid  The tissue report was consistent with porokeratosis.     Personal Medical History  Skin Cancer: NO  Eczema Psoriasis Lupus   NO NO NO   Other:   .    Family Medical History  Skin Cancer: NO  Eczema Psoriasis Lupus   NO NO NO   Other: .      Occupation: indoor ().    Refer to electronic medical record (EMR) for past medical history and medications.      ROS: 14 point review of systems was negative except the symptoms listed above in the HPI.        OBJECTIVE:   GENERAL: healthy, alert and no distress.  HEENT: PERRL. Conjunctiva, sclera clear.  SKIN: Gordon Skin Type - I.  Legs examined. The dermatoscope was used to help evaluate pigmented lesions.  Skin Pertinent Findings:  Left anterior lower leg- erythema on the anterior lower leg, 7 mm wound, clean base, no drainage . Less edema      Diagnostic Test Results:  No results found for this or any previous visit (from the past 24 hour(s)).    ASSESSMENT:     Encounter Diagnoses   Name Primary?     Cellulitis and abscess of leg      Wound healing, delayed Yes         PLAN:   Patient Instructions   Cephalexin 500mg three times per day for 7 more days  Recheck in at appointment that is already scheduled for skin exam in July  Clean daily with warm water and q tip then apply Vaseline and dressing      TT: 20 minutes.  CT: 15 minutes.        Again, thank you for allowing me to  participate in the care of your patient.        Sincerely,        Lore Krishnamurthy MD

## 2020-06-02 ENCOUNTER — TELEPHONE (OUTPATIENT)
Dept: FAMILY MEDICINE | Facility: CLINIC | Age: 80
End: 2020-06-02

## 2020-06-02 NOTE — TELEPHONE ENCOUNTER
Patient called back-  Just has itchiness in patches- not all over her body  No hives-breathing issues/throat or tongue swelling    Patient has an allergy antihistamine generic over the counter- advised that should be fine as long as she doesn't develop any of the above symptoms    Redness has gone away from the wound- bx site is getting smaller    Advised to continue with the daily cleaning and Vaseline until fully healed  Advised to make sure she is moisturizing the rest of the body- dry skin can cause itching    Advised provider does not want to start another antibiotic as of now- will reevaluate as needed.    Patient verbalized understanding,    Michelle IVEYRN  [facility]  [phone]

## 2020-06-02 NOTE — TELEPHONE ENCOUNTER
Called and left a detailed message to stop the antibiotic take benadryl per box instructions  If there is any throat/tongue swelling, sob/breathing difficulties- go to the ER    Call clinic back as we need to change antibiotic.      Michelle BARRETORN BSN  [facility]  [phone]

## 2020-06-02 NOTE — TELEPHONE ENCOUNTER
Please call :    I would like not to prescribe another antibiotic at this time , unless this area gets more tender, increase swelling or erythema.     Thank you,   Lore Krishnamurthy M.D.

## 2020-06-02 NOTE — TELEPHONE ENCOUNTER
Reason for Call:  Other prescription-- Medication question     Detailed comments: Pt called this morning and would like Dr. Krishnamurthy or her nurse to give her a call in regards to whether or not she can stop taking the cephalexin prescription she is on. The pt states that the medication is making her itchy. Please give pt a call back when you can. Thank you.    Phone Number Patient can be reached at: Home Number: [phone]    Best Time:     Can we leave a detailed message on this number? YES    Call taken on 6/2/2020 at 11:49 AM by Yvette Hoskins

## 2020-06-10 NOTE — TELEPHONE ENCOUNTER
Pavithra is calling back stating that the itching has spread to her entire body. To include her head.

## 2020-06-10 NOTE — TELEPHONE ENCOUNTER
Itching is all over body  patient is using generic allegra, Flonase and another antihistamine   Advised to try 2 Claritin/zyrtec bid  Cool shower- avoid heat  Moisturize daily or twice a day     Michelle BARRETO RN BSN  [facility]  [phone]

## 2020-06-23 ENCOUNTER — OFFICE VISIT (OUTPATIENT)
Dept: ORTHOPEDICS | Facility: CLINIC | Age: 80
End: 2020-06-23
Payer: COMMERCIAL

## 2020-06-23 ENCOUNTER — ANCILLARY PROCEDURE (OUTPATIENT)
Dept: GENERAL RADIOLOGY | Facility: CLINIC | Age: 80
End: 2020-06-23
Attending: ORTHOPAEDIC SURGERY
Payer: COMMERCIAL

## 2020-06-23 VITALS — HEIGHT: 62 IN | WEIGHT: 175.3 LBS | BODY MASS INDEX: 32.26 KG/M2

## 2020-06-23 DIAGNOSIS — M25.562 LEFT KNEE PAIN, UNSPECIFIED CHRONICITY: ICD-10-CM

## 2020-06-23 DIAGNOSIS — Z98.890 S/P KNEE SURGERY: Primary | ICD-10-CM

## 2020-06-23 DIAGNOSIS — M17.12 ARTHRITIS OF LEFT KNEE: ICD-10-CM

## 2020-06-23 ASSESSMENT — MIFFLIN-ST. JEOR: SCORE: 1216.66
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
## 2020-07-24 ENCOUNTER — VIRTUAL VISIT (OUTPATIENT)
Dept: FAMILY MEDICINE | Facility: CLINIC | Age: 80
End: 2020-07-24
Payer: COMMERCIAL

## 2020-07-24 DIAGNOSIS — M35.00 HISTORY OF SJOGREN'S DISEASE (H): ICD-10-CM

## 2020-07-24 DIAGNOSIS — Z00.00 MEDICARE ANNUAL WELLNESS VISIT, SUBSEQUENT: Primary | ICD-10-CM

## 2020-07-24 DIAGNOSIS — I82.402 DEEP VEIN THROMBOSIS (DVT) OF LEFT LOWER EXTREMITY, UNSPECIFIED CHRONICITY, UNSPECIFIED VEIN (H): ICD-10-CM

## 2020-07-24 DIAGNOSIS — E03.4 HYPOTHYROIDISM DUE TO ACQUIRED ATROPHY OF THYROID: ICD-10-CM

## 2020-07-24 DIAGNOSIS — M79.2 NEUROPATHIC PAIN OF RIGHT FOREARM: ICD-10-CM

## 2020-07-24 PROCEDURE — 99214 OFFICE O/P EST MOD 30 MIN: CPT | Mod: TEL | Performed by: FAMILY MEDICINE

## 2020-07-24 RX ORDER — LEVOTHYROXINE SODIUM 88 UG/1
88 TABLET ORAL DAILY
Qty: 90 TABLET | Refills: 2 | Status: SHIPPED | OUTPATIENT
Start: 2020-07-24 | End: 2020-11-06

## 2020-07-24 RX ORDER — MV-MIN/FA/VIT K/LUTEIN/ZEAXANT 200MCG-5MG
1 CAPSULE ORAL 2 TIMES DAILY
COMMUNITY
Start: 2020-07-24 | End: 2022-02-11

## 2020-07-24 NOTE — PATIENT INSTRUCTIONS
So nice to talk to you today!  We discussed:    1. You need a thyroid test.  My nurse will call you to schedule this and then I'll get the results to you, and adjust your levothyroxine if we need to.    2. For your gabapentin - it's okay to take that extra 100 mg as needed when your pain is acting up.  Ask Dr. Hook about an EMG to evaluate this if worsens.    3. Take acetaminophen 1 tablet in the morning, 1 at lunch, 1 at dinner to see if helps your pain overall.  Try this for 1 week.
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
## 2020-07-27 ENCOUNTER — OFFICE VISIT (OUTPATIENT)
Dept: FAMILY MEDICINE | Facility: CLINIC | Age: 80
End: 2020-07-27
Payer: COMMERCIAL

## 2020-07-27 VITALS — SYSTOLIC BLOOD PRESSURE: 122 MMHG | DIASTOLIC BLOOD PRESSURE: 76 MMHG

## 2020-07-27 DIAGNOSIS — L81.4 SOLAR LENTIGINOSIS: ICD-10-CM

## 2020-07-27 DIAGNOSIS — L82.1 SEBORRHEIC KERATOSIS: ICD-10-CM

## 2020-07-27 DIAGNOSIS — B35.4 TINEA CORPORIS: ICD-10-CM

## 2020-07-27 DIAGNOSIS — Q82.8 POROKERATOSIS: ICD-10-CM

## 2020-07-27 DIAGNOSIS — L30.9 ECZEMA, UNSPECIFIED TYPE: Primary | ICD-10-CM

## 2020-07-27 PROCEDURE — 99213 OFFICE O/P EST LOW 20 MIN: CPT | Performed by: FAMILY MEDICINE

## 2020-07-27 RX ORDER — NYSTATIN 100000 [USP'U]/G
POWDER TOPICAL 2 TIMES DAILY PRN
Qty: 60 G | Refills: 1 | Status: SHIPPED | OUTPATIENT
Start: 2020-07-27 | End: 2021-11-03

## 2020-07-27 RX ORDER — TRIAMCINOLONE ACETONIDE 1 MG/G
CREAM TOPICAL 2 TIMES DAILY
Qty: 15 G | Refills: 0 | Status: SHIPPED | OUTPATIENT
Start: 2020-07-27 | End: 2021-06-25

## 2020-07-27 NOTE — PATIENT INSTRUCTIONS
"Hand  -     Triamcinolone 0.1% cream apply two times per day for 14 days to the lesion on the left hand    Recheck if not totally resolved    Skin exam in one year    SUN PROTECTION INSTRUCTIONS  Sun damage can lead to skin cancer and premature aging of the skin.      The best way to protect from sun damage to your skin is to avoid the sun during peak hours (10 am - 2 pm) even on overcast days.    Never use tanning beds. Tanning beds are associated with much higher risks of skin cancer.    All tanning damages the skin. Aim for ivory skin year round and you will have less trouble with your skin in years to come. There is no merit in getting \"a base tan\" before a warm weather vacation, as any tanning indicates your body's response to sun damage.    Stop smoking. Smokers have higher rates of skin cancer and also have premature skin wrinkling.    Use UPF sun-protective clothing, which while more expensive initially provides longer lasting coverage without having to worry about remembering to re-apply.  1. Wear a wide-brimmed hat and sunglasses.   2. Wear sun-protective clothing.  [employer] and other [employer] make sun protective clothing that are stylish, comfortable and cool.   [employer] and other [employer] make UV arm sleeves suitable for golfing, gardening and other activities.    Sunscreen instructions:  1. Use sunscreens with Zinc Oxide, Titanium Dioxide or Avobenzone to protect from UVA rays.  2. Use SPF 30-50+ to protect from UVB rays.  3. Re-apply every 2 hours even if water resistant.  4. Apply on your face every day even when cloudy and even in the winter. UVA \"aging rays\" penetrate window glass and are just as strong in the winter as in the summer.    FYI  You should use about 3 tablespoons of sunscreen to protect your whole body. Thus a typical eight ounce bottle of sunscreen should last 4 applications. Remember, that the SPF rating is compromised if you don t apply enough. Most people only " apply 1/2 - 1/3 of the amount they need. Also don t forget areas such as your ears, feet, upper back and harder to reach places. Keep in mind that these amounts should be increased for larger body sizes.    Sunscreens with titanium dioxide and/or zinc oxide in the active ingredients are physical blockers as opposed to chemical blockers. Chemical-free sunscreens should not irritate the skin.    Spray-on sunscreens may be used for touch-up application only, not as a base layer. Also, use with caution around small children due to inhalation risk.    SPF means sun protection factor, which is just the degree to which the sunscreen can protect against UVB rays. There is no rating system for UVA rays. SPF is calculated as the time skin will burn when sunscreen is applied vs. skin without sunscreen.    Water resistant sunscreens should be re-applied every 1-2 hours.    Product Recommendations:    Consider use of sunscreen sticks with Zinc Oxide and Titanium Dioxide active ingredients such as Neutrogena Pure&Free Baby Sunscreen Stick.    Good examples include: Blue Lizard, EltaMD, Solbar    Good daily moisturizers with SPF: Vanicream, CeraVe.    For sensitive skin, consider : Skin[employer] Essential Defense Mineral Shield Broad Spectrum SPF 35    Men: consider use of Neutrogena Triple Protect Facial Lotion    Avoid retinyl palmitate products.  Avoid combination products that include both sunscreen and insect repellant, as sunscreen should be applied every 2 hours, but insect repellant should not be applied as frequently.    For more information:  https://www.skincancer.org/prevention/sun-protection/sunscreen/sunscreens-safe-and-effective

## 2020-07-27 NOTE — PROGRESS NOTES
[facility] - PRIMARY CARE SKIN    CC: skin cancer screening (full-body)  SUBJECTIVE:   Pavithra Laurent is a(n) 80 year old female who presents to clinic today for a full-body skin exam.    Bothersome lesions noticed by the patient or other skin concerns : none    Personal Medical History  Skin Cancer: NO  Eczema Psoriasis Lupus   NO NO NO   Other:   .    Family Medical History  Skin Cancer: NO  Eczema Psoriasis Lupus   NO NO NO   Other: .      Occupation: indoor ().    Refer to electronic medical record (EMR) for past medical history and medications.    Refer to electronic medical record (EMR) for past medical history and medications.      ROS: 14 point review of systems was negative except the symptoms listed above in the HPI.        OBJECTIVE:   GENERAL: healthy, alert and no distress.  HEENT: PERRL. Conjunctiva, sclera clear.  SKIN: Gordon Skin Type - II.  This patient was examined from the top of the head to the bottom of the feet  including scalp, face, neck, trunk, buttocks, both arms, both legs, both hands, both feet, and all fingers and toes. The dermatoscope was used to help evaluate pigmented lesions.  Skin Pertinent Findings:  Face: scattered brown macules.  Left dorsal hand- 4 mm X 1 mm erythematous, smooth lesions with no suspicious changes  Trunk, arms, legs,            Brown, macule(s) most consistent with benign solar lentigo          Raised, coarse textured, stuck appearing lesion consistent with seborrheic keratosis - left lower leg, trunk             Brown macules of various sizes and shapes most consistent with (benign) melanocytic nevi      Left anterior mid shin- well healed shave excision site- porokeratosis               ASSESSMENT:     Encounter Diagnoses   Name Primary?     Eczema, unspecified type Yes     Seborrheic keratosis      Solar lentiginosis      Porokeratosis      Tinea corporis          PLAN:   Patient Instructions   Hand  -     Triamcinolone 0.1% cream apply two times  "per day for 14 days to the lesion on the left hand    Recheck if not totally resolved    Skin exam in one year    SUN PROTECTION INSTRUCTIONS  Sun damage can lead to skin cancer and premature aging of the skin.      The best way to protect from sun damage to your skin is to avoid the sun during peak hours (10 am - 2 pm) even on overcast days.    Never use tanning beds. Tanning beds are associated with much higher risks of skin cancer.    All tanning damages the skin. Aim for ivory skin year round and you will have less trouble with your skin in years to come. There is no merit in getting \"a base tan\" before a warm weather vacation, as any tanning indicates your body's response to sun damage.    Stop smoking. Smokers have higher rates of skin cancer and also have premature skin wrinkling.    Use UPF sun-protective clothing, which while more expensive initially provides longer lasting coverage without having to worry about remembering to re-apply.  1. Wear a wide-brimmed hat and sunglasses.   2. Wear sun-protective clothing.  [employer] and other [employer] make sun protective clothing that are stylish, comfortable and cool.   [employer] and other [employer] make UV arm sleeves suitable for golfing, gardening and other activities.    Sunscreen instructions:  1. Use sunscreens with Zinc Oxide, Titanium Dioxide or Avobenzone to protect from UVA rays.  2. Use SPF 30-50+ to protect from UVB rays.  3. Re-apply every 2 hours even if water resistant.  4. Apply on your face every day even when cloudy and even in the winter. UVA \"aging rays\" penetrate window glass and are just as strong in the winter as in the summer.    FYI  You should use about 3 tablespoons of sunscreen to protect your whole body. Thus a typical eight ounce bottle of sunscreen should last 4 applications. Remember, that the SPF rating is compromised if you don t apply enough. Most people only apply 1/2 - 1/3 of the amount they need. Also don t " forget areas such as your ears, feet, upper back and harder to reach places. Keep in mind that these amounts should be increased for larger body sizes.    Sunscreens with titanium dioxide and/or zinc oxide in the active ingredients are physical blockers as opposed to chemical blockers. Chemical-free sunscreens should not irritate the skin.    Spray-on sunscreens may be used for touch-up application only, not as a base layer. Also, use with caution around small children due to inhalation risk.    SPF means sun protection factor, which is just the degree to which the sunscreen can protect against UVB rays. There is no rating system for UVA rays. SPF is calculated as the time skin will burn when sunscreen is applied vs. skin without sunscreen.    Water resistant sunscreens should be re-applied every 1-2 hours.    Product Recommendations:    Consider use of sunscreen sticks with Zinc Oxide and Titanium Dioxide active ingredients such as Neutrogena Pure&Free Baby Sunscreen Stick.    Good examples include: Blue Lizard, EltaMD, Solbar    Good daily moisturizers with SPF: Vanicream, CeraVe.    For sensitive skin, consider : SkinMedica Essential Defense Mineral Shield Broad Spectrum SPF 35    Men: consider use of Neutrogena Triple Protect Facial Lotion    Avoid retinyl palmitate products.  Avoid combination products that include both sunscreen and insect repellant, as sunscreen should be applied every 2 hours, but insect repellant should not be applied as frequently.    For more information:  https://www.skincancer.org/prevention/sun-protection/sunscreen/sunscreens-safe-and-effective        The patient was counseled about sunscreens and sun avoidance. The patient was counseled to check the skin regularly and report any lesion that is new, changing, itching, scabbing, bleeding or otherwise bothersome. The patient was discharged ambulatory and in stable condition.        TT: 20 minutes.  CT:15 minutes.

## 2020-07-27 NOTE — LETTER
7/27/2020         RE: Pavithra Laurent  [address] [facility] [id number]        Dear Colleague,    Thank you for referring your patient, Pavithra Laurent, to the [facility]. Please see a copy of my visit note below.    [facility] - PRIMARY CARE SKIN    CC: skin cancer screening (full-body)  SUBJECTIVE:   Pavithra Laurent is a(n) 80 year old female who presents to clinic today for a full-body skin exam.    Bothersome lesions noticed by the patient or other skin concerns : none    Personal Medical History  Skin Cancer: NO  Eczema Psoriasis Lupus   NO NO NO   Other:   .    Family Medical History  Skin Cancer: NO  Eczema Psoriasis Lupus   NO NO NO   Other: .      Occupation: indoor ().    Refer to electronic medical record (EMR) for past medical history and medications.    Refer to electronic medical record (EMR) for past medical history and medications.      ROS: 14 point review of systems was negative except the symptoms listed above in the HPI.        OBJECTIVE:   GENERAL: healthy, alert and no distress.  HEENT: PERRL. Conjunctiva, sclera clear.  SKIN: Gordon Skin Type - II.  This patient was examined from the top of the head to the bottom of the feet  including scalp, face, neck, trunk, buttocks, both arms, both legs, both hands, both feet, and all fingers and toes. The dermatoscope was used to help evaluate pigmented lesions.  Skin Pertinent Findings:  Face: scattered brown macules.  Left dorsal hand- 4 mm X 1 mm erythematous, smooth lesions with no suspicious changes  Trunk, arms, legs,            Brown, macule(s) most consistent with benign solar lentigo          Raised, coarse textured, stuck appearing lesion consistent with seborrheic keratosis - left lower leg, trunk             Brown macules of various sizes and shapes most consistent with (benign) melanocytic nevi      Left anterior mid shin- well healed shave excision site- porokeratosis               ASSESSMENT:     Encounter  "Diagnoses   Name Primary?     Eczema, unspecified type Yes     Seborrheic keratosis      Solar lentiginosis      Porokeratosis      Tinea corporis          PLAN:   Patient Instructions   Hand  -     Triamcinolone 0.1% cream apply two times per day for 14 days to the lesion on the left hand    Recheck if not totally resolved    Skin exam in one year    SUN PROTECTION INSTRUCTIONS  Sun damage can lead to skin cancer and premature aging of the skin.      The best way to protect from sun damage to your skin is to avoid the sun during peak hours (10 am - 2 pm) even on overcast days.    Never use tanning beds. Tanning beds are associated with much higher risks of skin cancer.    All tanning damages the skin. Aim for ivory skin year round and you will have less trouble with your skin in years to come. There is no merit in getting \"a base tan\" before a warm weather vacation, as any tanning indicates your body's response to sun damage.    Stop smoking. Smokers have higher rates of skin cancer and also have premature skin wrinkling.    Use UPF sun-protective clothing, which while more expensive initially provides longer lasting coverage without having to worry about remembering to re-apply.  1. Wear a wide-brimmed hat and sunglasses.   2. Wear sun-protective clothing.  [employer] and other [employer] make sun protective clothing that are stylish, comfortable and cool.   [employer] and other [employer] make UV arm sleeves suitable for golfing, gardening and other activities.    Sunscreen instructions:  1. Use sunscreens with Zinc Oxide, Titanium Dioxide or Avobenzone to protect from UVA rays.  2. Use SPF 30-50+ to protect from UVB rays.  3. Re-apply every 2 hours even if water resistant.  4. Apply on your face every day even when cloudy and even in the winter. UVA \"aging rays\" penetrate window glass and are just as strong in the winter as in the summer.    FYI  You should use about 3 tablespoons of sunscreen to " protect your whole body. Thus a typical eight ounce bottle of sunscreen should last 4 applications. Remember, that the SPF rating is compromised if you don t apply enough. Most people only apply 1/2 - 1/3 of the amount they need. Also don t forget areas such as your ears, feet, upper back and harder to reach places. Keep in mind that these amounts should be increased for larger body sizes.    Sunscreens with titanium dioxide and/or zinc oxide in the active ingredients are physical blockers as opposed to chemical blockers. Chemical-free sunscreens should not irritate the skin.    Spray-on sunscreens may be used for touch-up application only, not as a base layer. Also, use with caution around small children due to inhalation risk.    SPF means sun protection factor, which is just the degree to which the sunscreen can protect against UVB rays. There is no rating system for UVA rays. SPF is calculated as the time skin will burn when sunscreen is applied vs. skin without sunscreen.    Water resistant sunscreens should be re-applied every 1-2 hours.    Product Recommendations:    Consider use of sunscreen sticks with Zinc Oxide and Titanium Dioxide active ingredients such as Neutrogena Pure&Free Baby Sunscreen Stick.    Good examples include: Blue Lizard, EltaMD, Solbar    Good daily moisturizers with SPF: Vanicream, CeraVe.    For sensitive skin, consider : SkinMedica Essential Defense Mineral Shield Broad Spectrum SPF 35    Men: consider use of Neutrogena Triple Protect Facial Lotion    Avoid retinyl palmitate products.  Avoid combination products that include both sunscreen and insect repellant, as sunscreen should be applied every 2 hours, but insect repellant should not be applied as frequently.    For more information:  https://www.skincancer.org/prevention/sun-protection/sunscreen/sunscreens-safe-and-effective        The patient was counseled about sunscreens and sun avoidance. The patient was counseled to check  the skin regularly and report any lesion that is new, changing, itching, scabbing, bleeding or otherwise bothersome. The patient was discharged ambulatory and in stable condition.        TT: 20 minutes.  CT:15 minutes.        Again, thank you for allowing me to participate in the care of your patient.        Sincerely,        Lore Krishnamurthy MD

## 2020-08-04 DIAGNOSIS — M79.2 NEUROPATHIC PAIN OF RIGHT FOREARM: ICD-10-CM

## 2020-08-04 RX ORDER — GABAPENTIN 400 MG/1
CAPSULE ORAL
Qty: 270 CAPSULE | Refills: 0 | Status: SHIPPED | OUTPATIENT
Start: 2020-08-04 | End: 2020-08-31

## 2020-08-21 DIAGNOSIS — M79.2 NEUROPATHIC PAIN OF RIGHT FOREARM: ICD-10-CM

## 2020-08-24 RX ORDER — GABAPENTIN 400 MG/1
CAPSULE ORAL
Qty: 270 CAPSULE | Refills: 0 | OUTPATIENT
Start: 2020-08-24

## 2020-08-24 NOTE — TELEPHONE ENCOUNTER
Gabapentin - Refusal reason: Should already have refills on file (SEE ORDER SENT TO [facility]377 ON 8/4/2020 FOR 3 MONTH SUPPLY.)     Trino MANTILLA

## 2020-08-27 DIAGNOSIS — M79.2 NEUROPATHIC PAIN OF RIGHT FOREARM: ICD-10-CM

## 2020-08-28 ENCOUNTER — MYC REFILL (OUTPATIENT)
Dept: FAMILY MEDICINE | Facility: CLINIC | Age: 80
End: 2020-08-28

## 2020-08-28 DIAGNOSIS — M79.2 NEUROPATHIC PAIN OF RIGHT FOREARM: ICD-10-CM

## 2020-08-31 DIAGNOSIS — M79.2 NEUROPATHIC PAIN OF RIGHT FOREARM: ICD-10-CM

## 2020-08-31 RX ORDER — GABAPENTIN 400 MG/1
CAPSULE ORAL
Qty: 270 CAPSULE | Refills: 0 | OUTPATIENT
Start: 2020-08-31

## 2020-08-31 RX ORDER — GABAPENTIN 400 MG/1
400 CAPSULE ORAL 3 TIMES DAILY
Qty: 270 CAPSULE | Refills: 3 | Status: SHIPPED | OUTPATIENT
Start: 2020-08-31 | End: 2021-09-01

## 2020-09-01 RX ORDER — GABAPENTIN 100 MG/1
100 CAPSULE ORAL DAILY PRN
Qty: 90 CAPSULE | Refills: 3 | Status: SHIPPED | OUTPATIENT
Start: 2020-09-01 | End: 2021-07-14

## 2020-09-01 RX ORDER — GABAPENTIN 400 MG/1
CAPSULE ORAL
Qty: 270 CAPSULE | Refills: 0 | Status: SHIPPED | OUTPATIENT
Start: 2020-09-01 | End: 2020-09-16

## 2020-09-01 NOTE — TELEPHONE ENCOUNTER
Med list has both the 300 mg that pt is NOT needing and the 100 mg for PRN.    I pended the 100mg rx.  Sending to PCP.  Thanks!  JOSE RAFAEL Sanchez

## 2020-09-01 NOTE — TELEPHONE ENCOUNTER
Rx refilled on 8/31 for the 400 MG Capsule.    Patient is also wondering why PCP did not refill the 100 MG.   Per patient she has discussed this with PCP in the past. Patient takes 1-2 tablets of 100 MG as needed for the pain. Please advise, thank you!    Chiara GAMEZ    [facility]

## 2020-09-14 ENCOUNTER — ALLIED HEALTH/NURSE VISIT (OUTPATIENT)
Dept: NURSING | Facility: CLINIC | Age: 80
End: 2020-09-14
Payer: COMMERCIAL

## 2020-09-14 DIAGNOSIS — E03.4 HYPOTHYROIDISM DUE TO ACQUIRED ATROPHY OF THYROID: ICD-10-CM

## 2020-09-14 DIAGNOSIS — Z23 NEED FOR PROPHYLACTIC VACCINATION AND INOCULATION AGAINST INFLUENZA: Primary | ICD-10-CM

## 2020-09-14 PROCEDURE — 36415 COLL VENOUS BLD VENIPUNCTURE: CPT | Performed by: FAMILY MEDICINE

## 2020-09-14 PROCEDURE — G0008 ADMIN INFLUENZA VIRUS VAC: HCPCS

## 2020-09-14 PROCEDURE — 84443 ASSAY THYROID STIM HORMONE: CPT | Performed by: FAMILY MEDICINE

## 2020-09-14 PROCEDURE — 90662 IIV NO PRSV INCREASED AG IM: CPT

## 2020-09-14 PROCEDURE — 99207 ZZC NO CHARGE NURSE ONLY: CPT

## 2020-09-15 ENCOUNTER — TELEPHONE (OUTPATIENT)
Dept: FAMILY MEDICINE | Facility: CLINIC | Age: 80
End: 2020-09-15

## 2020-09-15 LAB — TSH SERPL DL<=0.005 MIU/L-ACNC: 0.73 MU/L (ref 0.4–4)

## 2020-09-15 NOTE — TELEPHONE ENCOUNTER
Looks like pt has a preop already scheduled for tomorrow.  Spoke with pt and she is all set for her preop tomorrow  Marti Mcadams RN

## 2020-09-15 NOTE — TELEPHONE ENCOUNTER
Reason for Call:  Other call back    Detailed comments: pt states has surgery next week and covid test Monday , needs a pre op can anyone squeeze her in.    Phone Number Patient can be reached at: Home number on file [phone] (home)    Best Time: asap    Can we leave a detailed message on this number? YES    Call taken on 9/15/2020 at 10:00 AM by Fe Ann

## 2020-09-16 ENCOUNTER — OFFICE VISIT (OUTPATIENT)
Dept: FAMILY MEDICINE | Facility: CLINIC | Age: 80
End: 2020-09-16
Payer: COMMERCIAL

## 2020-09-16 VITALS
HEART RATE: 72 BPM | SYSTOLIC BLOOD PRESSURE: 126 MMHG | DIASTOLIC BLOOD PRESSURE: 74 MMHG | WEIGHT: 182 LBS | BODY MASS INDEX: 33.41 KG/M2 | RESPIRATION RATE: 16 BRPM | OXYGEN SATURATION: 97 % | TEMPERATURE: 97.1 F

## 2020-09-16 DIAGNOSIS — Z91.09 ENVIRONMENTAL ALLERGIES: ICD-10-CM

## 2020-09-16 DIAGNOSIS — Z01.818 PRE-OP EXAM: Primary | ICD-10-CM

## 2020-09-16 DIAGNOSIS — G45.9 TIA (TRANSIENT ISCHEMIC ATTACK): ICD-10-CM

## 2020-09-16 DIAGNOSIS — Z86.718 HISTORY OF DEEP VENOUS THROMBOSIS (DVT) OF DISTAL VEIN OF LEFT LOWER EXTREMITY: ICD-10-CM

## 2020-09-16 DIAGNOSIS — E03.9 HYPOTHYROIDISM, UNSPECIFIED TYPE: ICD-10-CM

## 2020-09-16 DIAGNOSIS — M15.9 GENERALIZED OSTEOARTHRITIS OF MULTIPLE SITES: ICD-10-CM

## 2020-09-16 DIAGNOSIS — G56.21 ULNAR NEUROPATHY OF RIGHT UPPER EXTREMITY: ICD-10-CM

## 2020-09-16 LAB
BASOPHILS # BLD AUTO: 0 10E9/L (ref 0–0.2)
BASOPHILS NFR BLD AUTO: 0.4 %
DIFFERENTIAL METHOD BLD: ABNORMAL
EOSINOPHIL # BLD AUTO: 0.1 10E9/L (ref 0–0.7)
EOSINOPHIL NFR BLD AUTO: 1.2 %
ERYTHROCYTE [DISTWIDTH] IN BLOOD BY AUTOMATED COUNT: 13.8 % (ref 10–15)
HCT VFR BLD AUTO: 40.8 % (ref 35–47)
HGB BLD-MCNC: 13.4 G/DL (ref 11.7–15.7)
LYMPHOCYTES # BLD AUTO: 3.9 10E9/L (ref 0.8–5.3)
LYMPHOCYTES NFR BLD AUTO: 37.5 %
MCH RBC QN AUTO: 30.8 PG (ref 26.5–33)
MCHC RBC AUTO-ENTMCNC: 32.8 G/DL (ref 31.5–36.5)
MCV RBC AUTO: 94 FL (ref 78–100)
MONOCYTES # BLD AUTO: 1.4 10E9/L (ref 0–1.3)
MONOCYTES NFR BLD AUTO: 13.4 %
NEUTROPHILS # BLD AUTO: 4.9 10E9/L (ref 1.6–8.3)
NEUTROPHILS NFR BLD AUTO: 47.5 %
PLATELET # BLD AUTO: 257 10E9/L (ref 150–450)
RBC # BLD AUTO: 4.35 10E12/L (ref 3.8–5.2)
WBC # BLD AUTO: 10.4 10E9/L (ref 4–11)

## 2020-09-16 PROCEDURE — 80048 BASIC METABOLIC PNL TOTAL CA: CPT | Performed by: FAMILY MEDICINE

## 2020-09-16 PROCEDURE — 85025 COMPLETE CBC W/AUTO DIFF WBC: CPT | Performed by: FAMILY MEDICINE

## 2020-09-16 PROCEDURE — 93000 ELECTROCARDIOGRAM COMPLETE: CPT | Performed by: FAMILY MEDICINE

## 2020-09-16 PROCEDURE — 36415 COLL VENOUS BLD VENIPUNCTURE: CPT | Performed by: FAMILY MEDICINE

## 2020-09-16 PROCEDURE — 99214 OFFICE O/P EST MOD 30 MIN: CPT | Mod: 25 | Performed by: FAMILY MEDICINE

## 2020-09-16 NOTE — RESULT ENCOUNTER NOTE
Eliseo Arshad,  This note is to let you know that your results were normal.  Please let me know if you have any further questions or concerns.  Sincerely,  Dr. Kitty Owens MD    Thank you for choosing the [facility] as your health care provider. Please don't hesitate to call with any questions or concerns [phone].

## 2020-09-16 NOTE — PROGRESS NOTES
[facility]  [address]  [place] [address]  Phone: [phone]  Fax: [phone]  Primary Provider: Kitty Owens  Pre-op Performing Provider: LAWRENCE CARDENAS    PREOPERATIVE EVALUATION:  Today's date: 9/16/2020    Pavithra Laurent is a 80 year old female who presents for a preoperative evaluation.    Surgical Information:  Surgery Details 9/16/2020   Surgery/Procedure: surgery on my elbow  ulnar nerve entrapment   [facility]   Surgeon: Dr Allen   Surgery Date: 9/23   Time of Surgery: 11:20   Where patient plans to recover: At home with family     Fax number for surgical facility: [phone]  Type of Anesthesia Anticipated: local with IV sedation    Subjective     HPI related to upcoming procedure:     Having chronic nerve pain issues in right upper extremity.  Needs surgery to decompress/release the entrapped nerve that is causing the issue.    Has hx DVT and TIA.  On daily ASA.  No CKD, diabetes or hx MI.      Preop Questions 9/16/2020   1. Have you ever had a heart attack or stroke? No   3. Do you have chest pain with activity? No   4. Do you have a history of  heart failure? No   5. Do you currently have a cold, bronchitis or symptoms of other infection? No   6. Do you have a cough, shortness of breath, or wheezing? No   7. Do you or anyone in your family have previous history of blood clots? YES - patient had DVT, on daily 81 mg ASA   8. Do you or does anyone in your family have a serious bleeding problem such as prolonged bleeding following surgeries or cuts? No   9. Have you ever had problems with anemia or been told to take iron pills? No   10. Have you had any abnormal blood loss such as black, tarry or bloody stools, or abnormal vaginal bleeding? No   11. Have you ever had a blood transfusion? No   13. Have you or any of your relatives ever had problems with anesthesia? No   14. Do you have sleep apnea, excessive  snoring or daytime drowsiness? No   15. Do you have any artifical heart valves or other implanted medical devices like a pacemaker, defibrillator, or continuous glucose monitor? No   16. Do you have artificial joints? YES - right knee replacement   18. Is there any chance that you may be pregnant? No       RX monitoring program (MNPMP) reviewed:  reviewed- no concerns    See problem list for active medical problems.  Problems all longstanding and stable, except as noted/documented.  See ROS for pertinent symptoms related to these conditions.      Review of Systems  CONSTITUTIONAL: NEGATIVE for fever, chills, change in weight  INTEGUMENTARY/SKIN: NEGATIVE for worrisome rashes, moles or lesions  EYES: NEGATIVE for vision changes or irritation  ENT/MOUTH: NEGATIVE for ear, mouth and throat problems  RESP: NEGATIVE for significant cough or SOB  CV: NEGATIVE for chest pain, palpitations or peripheral edema  GI: NEGATIVE for nausea, abdominal pain, heartburn, or change in bowel habits  : NEGATIVE for frequency, dysuria, or hematuria  HEME: NEGATIVE for bleeding problems  PSYCHIATRIC: NEGATIVE for changes in mood or affect    Patient Active Problem List    Diagnosis Date Noted     Deep vein thrombosis (DVT) of left lower extremity, unspecified chronicity, unspecified vein (H) 01/30/2020     Priority: Medium     Post-thrombotic syndrome of left lower extremity 10/04/2019     Priority: Medium     With symptoms, D-Dimer will help exclude DVT recurrence       Family history of blood clots 07/09/2019     Priority: Medium     Son.  clotting disorder.       Ventral hernia without obstruction or gangrene s/p repair 6/2019 03/15/2019     Priority: Medium     Lumbar degenerative disc disease 03/12/2019     Priority: Medium     Primary osteoarthritis of both feet 03/12/2019     Priority: Medium     Trochanteric bursitis of both hips 12/06/2018     Priority: Medium     Generalized osteoarthritis of multiple sites 06/14/2018      Priority: Medium     Sicca syndrome (H) 06/14/2018     Priority: Medium     TIA (transient ischemic attack) 04/16/2018     Priority: Medium     4/2018. Saw neuro Dr. Cote 4/2018-pending results carotid ultrasound & Echo with bubble study, if normal continue ASA daily.       Neck pain 04/03/2018     Priority: Medium     History of deep venous thrombosis (DVT) of distal vein of left lower extremity 02/28/2018     Priority: Medium     Left leg, lower.  3/2017       Trochanteric bursitis of left hip 09/05/2017     Priority: Medium     Piriformis syndrome of left side 09/05/2017     Priority: Medium     Lesion of ulnar nerve 05/04/2015     Priority: Medium     Low back pain 03/13/2015     Priority: Medium     Diagnosis updated by automated process. Provider to review and confirm.       Elbow dislocation 01/07/2015     Priority: Medium     Hypothyroidism 05/24/2013     Priority: Medium     Neuropathic pain of right arm 05/24/2013     Priority: Medium     Right arm after accident with compound fx of wrist and dislocated elbow.  Persistent nerve pain since.  Using gabapentin. [facility]       Rosacea 05/24/2013     Priority: Medium     Tear film insufficiency 05/24/2013     Priority: Medium     Nasal congestion 01/04/2013     Priority: Medium     Environmental allergies 01/04/2013     Priority: Medium      Past Medical History:   Diagnosis Date     Allergic rhinitis      Calculus of gallbladder without mention of cholecystitis or obstruction      Cataract of both eyes      Dry eye syndrome      Environmental allergies      Gastro-oesophageal reflux disease      GERD (gastroesophageal reflux disease) 5/24/2013     Hypothyroid      Pain in joint, shoulder region      Rosacea      Thoracic outlet syndrome      Past Surgical History:   Procedure Laterality Date     ARTHROTOMY ELBOW Right 1/7/2015    Procedure: ARTHROTOMY ELBOW;  Surgeon: Branden Meyer MD;  Location:  OR      C RAD RESEC TONSIL/PILLARS  1948    tonsillectomy     CLOSED REDUCTION UPPER EXTREMITY  12/26/2014    Procedure: CLOSED REDUCTION UPPER EXTREMITY;  Surgeon: Louis Daniel MD;  Location:  OR     GYN SURGERY  1984    tubal ligation     HERNIORRHAPHY VENTRAL N/A 6/18/2019    Procedure: OPEN VENTRAL HERNIA REPAIR WITH MESH;  Surgeon: Last Solis MD;  Location:  OR     IRRIGATION AND DEBRIDEMENT HAND, COMBINED  12/26/2014    Procedure: COMBINED IRRIGATION AND DEBRIDEMENT HAND;  Surgeon: Louis Daniel MD;  Location:  OR     KNEE SURGERY  2001    arthroscopic right     LAPAROSCOPIC CHOLECYSTECTOMY  8/16/2012    Procedure: LAPAROSCOPIC CHOLECYSTECTOMY;  LAPAROSCOPIC CHOLECYSTECTOMY ;  Surgeon: Preston Walters MD;  Location: [facility]     OPEN REDUCTION INTERNAL FIXATION FOREARM Right 12/26/2014    Procedure: OPEN REDUCTION INTERNAL FIXATION FOREARM;  Surgeon: Louis Daniel MD;  Location:  OR     ORTHOPEDIC SURGERY  2007,2008    bunyanectomy, hammer toe, torn meniscus, toe and knee replacement     TKA  2009    right knee     WISDOM TEETH EXTRACTION  1958     Current Outpatient Medications   Medication Sig Dispense Refill     amoxicillin (AMOXIL) 500 MG capsule Take 2,000 mg by mouth once One hour prior to dental procedure       aspirin 81 MG EC tablet Take 81 mg by mouth daily       azelastine (ASTELIN) 0.1 % nasal spray Spray 2 sprays into both nostrils daily as needed for rhinitis        calcium-vitamin D-vitamin K (CALCIUM SOFT CHEWS) 500-500-40 MG-UNT-MCG CHEW Take 2 tablets by mouth 2 times daily       desonide (DESOWEN) 0.05 % cream Apply topically 2 times daily as needed (rash)        diclofenac (VOLTAREN) 1 % GEL Place 2 g onto the skin daily as needed for moderate pain (knees)        erythromycin (ROMYCIN) ophthalmic ointment Place 1 Application into both eyes nightly as needed (dry/irritated eyes)        fluocinolone acetonide (DERMOTIC) 0.01 % OIL Place 1 drop in ear(s) daily as needed.        fluticasone (FLONASE) 50 MCG/ACT nasal spray Spray 1 spray into both nostrils daily        gabapentin (NEURONTIN) 100 MG capsule Take 1 capsule (100 mg) by mouth daily as needed (on particularly active days) 90 capsule 3     gabapentin (NEURONTIN) 400 MG capsule Take 1 capsule (400 mg) by mouth 3 times daily 270 capsule 3     levothyroxine (SYNTHROID/LEVOTHROID) 88 MCG tablet Take 1 tablet (88 mcg) by mouth daily 6 days a week 90 tablet 2     montelukast (SINGULAIR) 10 MG tablet Take 10 mg by mouth At Bedtime        Multiple Vitamins-Minerals (PRESERVISION AREDS 2+MULTI VIT) CAPS        nystatin (MYCOSTATIN) 887845 UNIT/GM external powder Apply topically 2 times daily as needed for other (yeast infection in skin folds) 60 g 1     order for DME Equipment being ordered: compression stockings 25-35mmHG 1 each 0     triamcinolone (KENALOG) 0.1 % external cream Apply topically 2 times daily Apply to AA on left hand bid for 14 days 15 g 0       Allergies   Allergen Reactions     Birch Trees      Codeine Sulfate Nausea and Vomiting     Oral, topical is ok     Dust Mites      Erythromycin Nausea and Vomiting     With oral use     Hydromorphone Other (See Comments)     nightmares        Social History     Tobacco Use     Smoking status: Never Smoker     Smokeless tobacco: Never Used   Substance Use Topics     Alcohol use: Yes     Alcohol/week: 0.0 standard drinks     Comment: rare- 2/month     Family History   Problem Relation Age of Onset     Heart Disease Mother         MI at 72     Cerebrovascular Disease Mother      Cancer Mother         lung     Alcohol/Drug Father      Depression Father      Cardiovascular Father      Heart Disease Paternal Grandmother      Heart Disease Paternal Grandfather      Cancer Maternal Grandmother         stomach     Cancer - colorectal Other      Neurologic Disorder Son         brain injury     Clotting Disorder (Unknown) Son      Unknown/Adopted Maternal Grandfather      History   Drug Use  No            Objective   /74   Pulse 72   Temp 97.1  F (36.2  C) (Tympanic)   Resp 16   Wt 82.6 kg (182 lb)   SpO2 97%   BMI 33.41 kg/m    Physical Exam    GENERAL APPEARANCE: healthy, alert and no distress     EYES: EOMI, PERRL     HENT: wearing face mask     NECK: no adenopathy, no asymmetry, masses, or scars and thyroid normal to palpation     RESP: lungs clear to auscultation - no rales, rhonchi or wheezes     CV: regular rates and rhythm, normal S1 S2, no S3 or S4 and no murmur, click or rub     ABDOMEN:  soft, nontender, no HSM or masses and bowel sounds normal     MS: b/l upper extremities with normal ROM     SKIN: no suspicious lesions or rashes     NEURO: +abnormal sensations/pain in right upper extremity from entrapped ulnar nerve     PSYCH: mentation appears normal. and affect normal/bright     LYMPHATICS: No cervical adenopathy    Recent Labs   Lab Test 06/23/19  1656 06/14/19  1346 11/27/18  1444  10/30/18  1154   HGB 13.4 13.9 14.2   < > 14.3     --  270   < > 306   INR  --   --   --   --  1.02     --   --   --   --    POTASSIUM 3.9  --   --   --   --    CR 0.66  --   --   --   --     < > = values in this interval not displayed.        PRE-OP Diagnostics:  Labs pending at this time.  Results will be reviewed when available.  EKG: appears normal, NSR, normal axis, normal intervals, no acute ST/T changes c/w ischemia, no LVH by voltage criteria, unchanged from previous tracings         Assessment & Plan   The proposed surgical procedure is considered INTERMEDIATE risk.    REVISED CARDIAC RISK INDEX  The patient has the following serious cardiovascular risks for perioperative complications:  Cerebrovascular Disease (TIA or CVA) = 1 point    INTERPRETATION: 1 point: Class II (low risk - 0.9% complication rate)       Pavithra was seen today for pre-op exam.    Diagnoses and all orders for this visit:    Pre-op exam  -     EKG 12-lead complete w/read - Clinics    Ulnar neuropathy of  right upper extremity    Generalized osteoarthritis of multiple sites    History of deep venous thrombosis (DVT) of distal vein of left lower extremity  -     Basic metabolic panel  (Ca, Cl, CO2, Creat, Gluc, K, Na, BUN)  -     CBC with platelets and differential    TIA (transient ischemic attack)    Environmental allergies    Hypothyroidism, unspecified type        The patient has the following additional risks and recommendations for perioperative complications:      OBSTRUCTIVE SLEEP APNEA:   CHELSI Instructions:  - Hospital staff are advised to monitor for sleep related oxygen desaturations due to suspicion of CHELSI     MEDICATION INSTRUCTIONS:  Patient is to take all scheduled medications on the day of surgery EXCEPT for modifications listed below:    Anticoagulant or Antiplatelet Medication Use   - ASPIRIN: Discontinue aspirin 7-10 days prior to procedure to reduce bleeding risk. It should be resumed postoperatively.     RECOMMENDATION:  APPROVAL GIVEN to proceed with proposed procedure, without further diagnostic evaluation.    Return in about 3 months (around 12/16/2020) for Routine Visit.    Signed Electronically by: Isela Bowen DO    Copy of this evaluation report is provided to requesting physician.    Preop [facility] Preop Guidelines    Revised Cardiac Risk Index

## 2020-09-16 NOTE — PROGRESS NOTES
[facility]  [address]  [facility] [address]  Phone: [phone]  Fax: [phone]  Primary Provider: Kitty Owens  {FV AMB Performing Provider (Optional):167766}    PREOPERATIVE EVALUATION:  Today's date: 9/16/2020    Pavithra Laurent is a 80 year old female who presents for a preoperative evaluation.    Surgical Information:  Surgery Details 6/14/2019   Surgery/Procedure: hernia surgery   Surgeon: dr sanchez   Surgery Date: 6 18 2019   Time of Surgery: 1 00 pm     Fax number for surgical facility: {SURGERY FAX NUMBER:[id number]}  Type of Anesthesia Anticipated: {ANESTHESIA:901263}    Subjective     HPI related to upcoming procedure: ***  Preop Questions 6/14/2019   1. Have you ever had a heart attack or stroke? No   3. Do you have chest pain with activity? No   4. Do you have a history of  heart failure? No   5. Do you currently have a cold, bronchitis or symptoms of other infection? No   6. Do you have a cough, shortness of breath, or wheezing? No   7. Do you or anyone in your family have previous history of blood clots? YES - ***   8. Do you or does anyone in your family have a serious bleeding problem such as prolonged bleeding following surgeries or cuts? UNKNOWN - ***   9. Have you ever had problems with anemia or been told to take iron pills? No   10. Have you had any abnormal blood loss such as black, tarry or bloody stools, or abnormal vaginal bleeding? No   11. Have you ever had a blood transfusion? UNKNOWN - ***   13. Have you or any of your relatives ever had problems with anesthesia? UNKNOWN - ***   14. Do you have sleep apnea, excessive snoring or daytime drowsiness? No   15. Do you have any artifical heart valves or other implanted medical devices like a pacemaker, defibrillator, or continuous glucose monitor? No   16. Do you have artificial joints? YES - ***   18. Is there any chance that you may be pregnant? No       RX monitoring program  (MNPMP) reviewed: {Mnpmpreport:203612}  {Review MNPMP for all patients per [employer] [email or web address]/login:501486}  {Chronic problem details (Optional):611697}    Review of Systems  {ROS Preop Choices:992134}    Patient Active Problem List    Diagnosis Date Noted     Deep vein thrombosis (DVT) of left lower extremity, unspecified chronicity, unspecified vein (H) 01/30/2020     Priority: Medium     Post-thrombotic syndrome of left lower extremity 10/04/2019     Priority: Medium     With symptoms, D-Dimer will help exclude DVT recurrence       Family history of blood clots 07/09/2019     Priority: Medium     Son.  clotting disorder.       Ventral hernia without obstruction or gangrene s/p repair 6/2019 03/15/2019     Priority: Medium     Lumbar degenerative disc disease 03/12/2019     Priority: Medium     Primary osteoarthritis of both feet 03/12/2019     Priority: Medium     Trochanteric bursitis of both hips 12/06/2018     Priority: Medium     Generalized osteoarthritis of multiple sites 06/14/2018     Priority: Medium     Sicca syndrome (H) 06/14/2018     Priority: Medium     TIA (transient ischemic attack) 04/16/2018     Priority: Medium     4/2018. Saw neuro Dr. Ctoe 4/2018-pending results carotid ultrasound & Echo with bubble study, if normal continue ASA daily.       Neck pain 04/03/2018     Priority: Medium     History of deep venous thrombosis (DVT) of distal vein of left lower extremity 02/28/2018     Priority: Medium     Left leg, lower.  3/2017       Trochanteric bursitis of left hip 09/05/2017     Priority: Medium     Piriformis syndrome of left side 09/05/2017     Priority: Medium     Lesion of ulnar nerve 05/04/2015     Priority: Medium     Low back pain 03/13/2015     Priority: Medium     Diagnosis updated by automated process. Provider to review and confirm.       Elbow dislocation 01/07/2015     Priority: Medium     Hypothyroidism 05/24/2013     Priority: Medium     Neuropathic pain of  right arm 05/24/2013     Priority: Medium     Right arm after accident with compound fx of wrist and dislocated elbow.  Persistent nerve pain since.  Using gabapentin. [facility]       Rosacea 05/24/2013     Priority: Medium     Tear film insufficiency 05/24/2013     Priority: Medium     Nasal congestion 01/04/2013     Priority: Medium     Environmental allergies 01/04/2013     Priority: Medium      Past Medical History:   Diagnosis Date     Allergic rhinitis      Calculus of gallbladder without mention of cholecystitis or obstruction      Cataract of both eyes      Dry eye syndrome      Environmental allergies      Gastro-oesophageal reflux disease      GERD (gastroesophageal reflux disease) 5/24/2013     Hypothyroid      Pain in joint, shoulder region      Rosacea      Thoracic outlet syndrome      Past Surgical History:   Procedure Laterality Date     ARTHROTOMY ELBOW Right 1/7/2015    Procedure: ARTHROTOMY ELBOW;  Surgeon: Branden Meyer MD;  Location: [facility] TONSIL/PILLARS  1948    tonsillectomy     CLOSED REDUCTION UPPER EXTREMITY  12/26/2014    Procedure: CLOSED REDUCTION UPPER EXTREMITY;  Surgeon: Louis Daniel MD;  Location:  OR     GYN SURGERY  1984    tubal ligation     HERNIORRHAPHY VENTRAL N/A 6/18/2019    Procedure: OPEN VENTRAL HERNIA REPAIR WITH MESH;  Surgeon: Last Solis MD;  Location:  OR     IRRIGATION AND DEBRIDEMENT HAND, COMBINED  12/26/2014    Procedure: COMBINED IRRIGATION AND DEBRIDEMENT HAND;  Surgeon: Louis Daniel MD;  Location:  OR     KNEE SURGERY  2001    arthroscopic right     LAPAROSCOPIC CHOLECYSTECTOMY  8/16/2012    Procedure: LAPAROSCOPIC CHOLECYSTECTOMY;  LAPAROSCOPIC CHOLECYSTECTOMY ;  Surgeon: Preston Walters MD;  Location: [facility]     OPEN REDUCTION INTERNAL FIXATION FOREARM Right 12/26/2014    Procedure: OPEN REDUCTION INTERNAL FIXATION FOREARM;  Surgeon: Louis Daniel MD;  Location: [facility]      ORTHOPEDIC SURGERY  2007,2008    bunyanectomy, hammer toe, torn meniscus, toe and knee replacement     TKA  2009    right knee     WISDOM TEETH EXTRACTION  1958     Current Outpatient Medications   Medication Sig Dispense Refill     amoxicillin (AMOXIL) 500 MG capsule Take 2,000 mg by mouth once One hour prior to dental procedure       aspirin 81 MG EC tablet Take 81 mg by mouth daily       azelastine (ASTELIN) 0.1 % nasal spray Spray 2 sprays into both nostrils daily as needed for rhinitis        calcium-vitamin D-vitamin K (CALCIUM SOFT CHEWS) 500-500-40 MG-UNT-MCG CHEW Take 2 tablets by mouth 2 times daily       desonide (DESOWEN) 0.05 % cream Apply topically 2 times daily as needed (rash)        diclofenac (VOLTAREN) 1 % GEL Place 2 g onto the skin daily as needed for moderate pain (knees)        erythromycin (ROMYCIN) ophthalmic ointment Place 1 Application into both eyes nightly as needed (dry/irritated eyes)        fluocinolone acetonide (DERMOTIC) 0.01 % OIL Place 1 drop in ear(s) daily as needed.       fluticasone (FLONASE) 50 MCG/ACT nasal spray Spray 1 spray into both nostrils daily        gabapentin (NEURONTIN) 100 MG capsule Take 1 capsule (100 mg) by mouth daily as needed (on particularly active days) 90 capsule 3     gabapentin (NEURONTIN) 400 MG capsule TAKE ONE CAPSULE BY MOUTH THREE TIMES DAILY  270 capsule 0     gabapentin (NEURONTIN) 400 MG capsule Take 1 capsule (400 mg) by mouth 3 times daily 270 capsule 3     levothyroxine (SYNTHROID/LEVOTHROID) 88 MCG tablet Take 1 tablet (88 mcg) by mouth daily 6 days a week 90 tablet 2     montelukast (SINGULAIR) 10 MG tablet Take 10 mg by mouth At Bedtime        Multiple Vitamins-Minerals (PRESERVISION AREDS 2+MULTI VIT) CAPS        nystatin (MYCOSTATIN) 411799 UNIT/GM external powder Apply topically 2 times daily as needed for other (yeast infection in skin folds) 60 g 1     order for DME Equipment being ordered: compression stockings 25-35mmHG 1 each 0  "    triamcinolone (KENALOG) 0.1 % external cream Apply topically 2 times daily Apply to AA on left hand bid for 14 days 15 g 0       Allergies   Allergen Reactions     Birch Trees      Codeine Sulfate Nausea and Vomiting     Oral, topical is ok     Dust Mites      Erythromycin Nausea and Vomiting     With oral use     Hydromorphone Other (See Comments)     nightmares        Social History     Tobacco Use     Smoking status: Never Smoker     Smokeless tobacco: Never Used   Substance Use Topics     Alcohol use: Yes     Alcohol/week: 0.0 standard drinks     Comment: rare- 2/month     {FAMILY HISTORY (Optional):740338146}  History   Drug Use No            Objective   There were no vitals taken for this visit.  Physical Exam  {EXAM Preop Choices:590893}    Recent Labs   Lab Test 19  1656 19  1346 18  1444  10/30/18  1154   HGB 13.4 13.9 14.2   < > 14.3     --  270   < > 306   INR  --   --   --   --  1.02     --   --   --   --    POTASSIUM 3.9  --   --   --   --    CR 0.66  --   --   --   --     < > = values in this interval not displayed.        PRE-OP Diagnostics:  {LABS:596266}  {EK}    {Provider  Link to PREOP SmartSet :972401}     Assessment & Plan   The proposed surgical procedure is considered {HIGH=major cardiovascular or procedures requiring prolonged anesthesia >4 hours or large fluid shifts;    INTERMEDIATE=abdominal, most orthopedic and intrathoracic surgery; LOW= endoscopy, cataract and breast surgery:506389} risk.    REVISED CARDIAC RISK INDEX  The patient has the following serious cardiovascular risks for perioperative complications:  {PREOP REVISED CARDIAC RISK INDEX (RCRI):083578::\"No serious cardiac risks = 0 points\"}    INTERPRETATION: {REVISED CARDIAC RISK INTERPRETATION:587202}    {Diag Picklist :477107}    The patient has the following additional risks and recommendations for perioperative complications:    {IMPORTANT - Conditions - complete " "carefully!!:284763}     MEDICATION INSTRUCTIONS:  {IMPORTANT - Medications:516124}    RECOMMENDATION:  {IMPORTANT - Approval:223152::\"APPROVAL GIVEN to proceed with proposed procedure, without further diagnostic evaluation.\"}    No follow-ups on file.    Signed Electronically by: Isela Bowen DO    Copy of this evaluation report is provided to requesting physician.    [facility]    Revised Cardiac Risk Index  "

## 2020-09-17 LAB
ANION GAP SERPL CALCULATED.3IONS-SCNC: 4 MMOL/L (ref 3–14)
BUN SERPL-MCNC: 9 MG/DL (ref 7–30)
CALCIUM SERPL-MCNC: 8.9 MG/DL (ref 8.5–10.1)
CHLORIDE SERPL-SCNC: 100 MMOL/L (ref 94–109)
CO2 SERPL-SCNC: 28 MMOL/L (ref 20–32)
CREAT SERPL-MCNC: 0.75 MG/DL (ref 0.52–1.04)
GFR SERPL CREATININE-BSD FRML MDRD: 75 ML/MIN/{1.73_M2}
GLUCOSE SERPL-MCNC: 89 MG/DL (ref 70–99)
POTASSIUM SERPL-SCNC: 4.7 MMOL/L (ref 3.4–5.3)
SODIUM SERPL-SCNC: 132 MMOL/L (ref 133–144)

## 2020-11-10 ENCOUNTER — TELEPHONE (OUTPATIENT)
Dept: FAMILY MEDICINE | Facility: CLINIC | Age: 80
End: 2020-11-10

## 2020-11-10 ENCOUNTER — TRANSFERRED RECORDS (OUTPATIENT)
Dept: HEALTH INFORMATION MANAGEMENT | Facility: CLINIC | Age: 80
End: 2020-11-10

## 2020-11-10 ENCOUNTER — NURSE TRIAGE (OUTPATIENT)
Dept: FAMILY MEDICINE | Facility: CLINIC | Age: 80
End: 2020-11-10

## 2020-11-10 ENCOUNTER — OFFICE VISIT (OUTPATIENT)
Dept: FAMILY MEDICINE | Facility: CLINIC | Age: 80
End: 2020-11-10
Payer: COMMERCIAL

## 2020-11-10 VITALS
SYSTOLIC BLOOD PRESSURE: 130 MMHG | HEART RATE: 76 BPM | TEMPERATURE: 98.3 F | DIASTOLIC BLOOD PRESSURE: 76 MMHG | RESPIRATION RATE: 14 BRPM | OXYGEN SATURATION: 95 %

## 2020-11-10 DIAGNOSIS — M79.662 PAIN OF LEFT LOWER LEG: ICD-10-CM

## 2020-11-10 DIAGNOSIS — Z86.718 HISTORY OF DEEP VENOUS THROMBOSIS (DVT) OF DISTAL VEIN OF LEFT LOWER EXTREMITY: Primary | ICD-10-CM

## 2020-11-10 LAB — D DIMER PPP FEU-MCNC: 0.8 UG/ML FEU (ref 0–0.5)

## 2020-11-10 PROCEDURE — 36415 COLL VENOUS BLD VENIPUNCTURE: CPT | Performed by: PHYSICIAN ASSISTANT

## 2020-11-10 PROCEDURE — 85379 FIBRIN DEGRADATION QUANT: CPT | Performed by: PHYSICIAN ASSISTANT

## 2020-11-10 PROCEDURE — 99215 OFFICE O/P EST HI 40 MIN: CPT | Performed by: PHYSICIAN ASSISTANT

## 2020-11-10 ASSESSMENT — ENCOUNTER SYMPTOMS
RESPIRATORY NEGATIVE: 1
NEUROLOGICAL NEGATIVE: 1
GASTROINTESTINAL NEGATIVE: 1
CARDIOVASCULAR NEGATIVE: 1
EYES NEGATIVE: 1
CONSTITUTIONAL NEGATIVE: 1
PSYCHIATRIC NEGATIVE: 1

## 2020-11-10 NOTE — TELEPHONE ENCOUNTER
"Pt is walk in into clinic. Different clinic RN triaged pt in person.    This RN called to [facility] as pt was a walk in for follow up after [facility] appt.    [facility] nurse relayed that recommendations from Lina Rios- provider who saw pt with [facility]- was that \"Pt be evaluated for a d-dimer and ultrasound for LLE DVT concern\".    This information was provided to ERIN, triaging RN.  "

## 2020-11-10 NOTE — TELEPHONE ENCOUNTER
Thank you, please route Results to ordered provider. No immediate action needed at this time for DVT concerns as it is negative per preliminary report.     Daphne Barnett MD on 11/10/2020 at 3:19 PM

## 2020-11-10 NOTE — TELEPHONE ENCOUNTER
Incoming call from [place] with [facility], ultrasound tech ([phone]).     She reports preliminary results that are negative for DVT. Also notes that she had a popliteal fossa cyst on left side.     Routing to covering provider as JEFF.

## 2020-11-10 NOTE — PROGRESS NOTES
Subjective     Pavithra Laurent is a 80 year old female who presents to clinic today for the following health issues:    HPI         Musculoskeletal problem/pain  Onset/Duration: 2 days  Description  Location: leg - left  Joint Swelling: no  Redness: YES  Pain: YES  Warmth: no  Intensity:  moderate  Progression of Symptoms:  same  Accompanying signs and symptoms:   Fevers: no  Numbness/tingling/weakness: no  History  Trauma to the area: no  Recent illness:  no  Previous similar problem: YES- hx DVT  Previous evaluation:  Yes - ortho sent her over today for d-dimer and doppler US left leg  Precipitating or alleviating factors:  Aggravating factors include: walking  Therapies tried and outcome: nothing        Past Medical History:   Diagnosis Date     Allergic rhinitis      Calculus of gallbladder without mention of cholecystitis or obstruction      Cataract of both eyes      Dry eye syndrome      Environmental allergies      Gastro-oesophageal reflux disease      GERD (gastroesophageal reflux disease) 5/24/2013     Hypothyroid      Pain in joint, shoulder region      Rosacea      Thoracic outlet syndrome        Past Surgical History:   Procedure Laterality Date     ARTHROTOMY ELBOW Right 1/7/2015    Procedure: ARTHROTOMY ELBOW;  Surgeon: Branden Meyer MD;  Location: [facility] RAD RESEC TONSIL/PILLARS  1948    tonsillectomy     CLOSED REDUCTION UPPER EXTREMITY  12/26/2014    Procedure: CLOSED REDUCTION UPPER EXTREMITY;  Surgeon: Louis Daniel MD;  Location:  OR     GYN SURGERY  1984    tubal ligation     HERNIORRHAPHY VENTRAL N/A 6/18/2019    Procedure: OPEN VENTRAL HERNIA REPAIR WITH MESH;  Surgeon: Last Solis MD;  Location:  OR     IRRIGATION AND DEBRIDEMENT HAND, COMBINED  12/26/2014    Procedure: COMBINED IRRIGATION AND DEBRIDEMENT HAND;  Surgeon: Louis Daniel MD;  Location:  OR     KNEE SURGERY  2001    arthroscopic right     LAPAROSCOPIC CHOLECYSTECTOMY  8/16/2012    Procedure:  LAPAROSCOPIC CHOLECYSTECTOMY;  LAPAROSCOPIC CHOLECYSTECTOMY ;  Surgeon: Preston Walters MD;  [facility]     OPEN REDUCTION INTERNAL FIXATION FOREARM Right 12/26/2014    Procedure: OPEN REDUCTION INTERNAL FIXATION FOREARM;  Surgeon: Louis Daniel MD;  Location:  OR     ORTHOPEDIC SURGERY  2007,2008    bunyanectomy, hammer toe, torn meniscus, toe and knee replacement     TKA  2009    right knee     WISDOM TEETH EXTRACTION  1958       Family History   Problem Relation Age of Onset     Heart Disease Mother         MI at 72     Cerebrovascular Disease Mother      Cancer Mother         lung     Alcohol/Drug Father      Depression Father      Cardiovascular Father      Heart Disease Paternal Grandmother      Heart Disease Paternal Grandfather      Cancer Maternal Grandmother         stomach     Cancer - colorectal Other      Neurologic Disorder Son         brain injury     Clotting Disorder (Unknown) Son      Unknown/Adopted Maternal Grandfather        Social History     Tobacco Use     Smoking status: Never Smoker     Smokeless tobacco: Never Used   Substance Use Topics     Alcohol use: Yes     Alcohol/week: 0.0 standard drinks     Comment: rare- 2/month        Current Outpatient Medications   Medication     amoxicillin (AMOXIL) 500 MG capsule     aspirin 81 MG EC tablet     azelastine (ASTELIN) 0.1 % nasal spray     calcium-vitamin D-vitamin K (CALCIUM SOFT CHEWS) 500-500-40 MG-UNT-MCG CHEW     desonide (DESOWEN) 0.05 % cream     diclofenac (VOLTAREN) 1 % GEL     erythromycin (ROMYCIN) ophthalmic ointment     fluocinolone acetonide (DERMOTIC) 0.01 % OIL     fluticasone (FLONASE) 50 MCG/ACT nasal spray     gabapentin (NEURONTIN) 100 MG capsule     gabapentin (NEURONTIN) 400 MG capsule     levothyroxine (SYNTHROID/LEVOTHROID) 88 MCG tablet     montelukast (SINGULAIR) 10 MG tablet     Multiple Vitamins-Minerals (PRESERVISION AREDS 2+MULTI VIT) CAPS     nystatin (MYCOSTATIN) 012103 UNIT/GM external powder     order for  DME     triamcinolone (KENALOG) 0.1 % external cream     No current facility-administered medications for this visit.         Allergies   Allergen Reactions     Birch Trees      Codeine Sulfate Nausea and Vomiting     Oral, topical is ok     Dust Mites      Erythromycin Nausea and Vomiting     With oral use     Hydromorphone Other (See Comments)     nightmares          Review of Systems   Constitutional: Negative.    HENT: Negative.    Eyes: Negative.    Respiratory: Negative.    Cardiovascular: Negative.    Gastrointestinal: Negative.    Genitourinary: Negative.    Musculoskeletal:        As in HPI   Skin: Negative.    Neurological: Negative.    Psychiatric/Behavioral: Negative.          Objective    /76   Pulse 76   Temp 98.3  F (36.8  C)   Resp 14   SpO2 95%   Physical Exam  Constitutional:       General: She is not in acute distress.     Appearance: She is well-developed. She is not diaphoretic.   HENT:      Head: Normocephalic.      Right Ear: External ear normal.      Left Ear: External ear normal.      Nose: Nose normal.   Eyes:      Conjunctiva/sclera: Conjunctivae normal.   Neck:      Musculoskeletal: Normal range of motion.   Pulmonary:      Effort: Pulmonary effort is normal.   Musculoskeletal:      Left ankle: She exhibits swelling.      Comments: Left calf - pain/tenderness running vertical along posterior calf   Neurological:      Mental Status: She is alert and oriented to person, place, and time.   Psychiatric:         Judgment: Judgment normal.         Diagnostic Test Results:  No results found for this or any previous visit (from the past 24 hour(s)).        Assessment & Plan   Problem List Items Addressed This Visit        Other    History of deep venous thrombosis (DVT) of distal vein of left lower extremity - Primary    Relevant Orders    D dimer, quantitative    US Lower Extremity Venous Duplex Left      Other Visit Diagnoses     Pain of left lower leg        Relevant Orders    D  "dimer, quantitative    US Lower Extremity Venous Duplex Left         STAT US doppler and d-dimer ordered to r/o DVT.   Will start Xarelto or Eliquis if positive for DVT.   Discussed with Dr. Barnett (on call today).     BMI:   Estimated body mass index is 33.41 kg/m  as calculated from the following:    Height as of 6/23/20: 1.572 m (5' 1.89\").    Weight as of 9/16/20: 82.6 kg (182 lb).   Weight management plan: did not discuss today        There are no Patient Instructions on file for this visit.    No follow-ups on file.    Ana Smallwood PA-C  [facility]      "

## 2020-11-10 NOTE — TELEPHONE ENCOUNTER
"Patient walked into the clinic coming from [facility] requesting be assessed for left leg pain which started several days ago.  No redness, no red streaks, minimal left lower leg swelling. History of left leg clot.    Temp 98.3 F  /76 left arm   O2 95%  HR 76  RR 14    Huddled with JAGJIT Pinto who agreed to see her as an add on.     Additional Information    History of prior \"blood clot\" in leg or lungs (i.e., deep vein thrombosis, pulmonary embolism)    [1] Thigh or calf pain AND [2] only 1 side AND [3] present > 1 hour    Protocols used: LEG PAIN-A-AH      "

## 2021-01-30 ENCOUNTER — IMMUNIZATION (OUTPATIENT)
Dept: NURSING | Facility: CLINIC | Age: 81
End: 2021-01-30
Payer: COMMERCIAL

## 2021-01-30 PROCEDURE — 0001A PR COVID VAC PFIZER DIL RECON 30 MCG/0.3 ML IM: CPT

## 2021-01-30 PROCEDURE — 91300 PR COVID VAC PFIZER DIL RECON 30 MCG/0.3 ML IM: CPT

## 2021-02-20 ENCOUNTER — IMMUNIZATION (OUTPATIENT)
Dept: NURSING | Facility: CLINIC | Age: 81
End: 2021-02-20
Attending: NURSE PRACTITIONER
Payer: COMMERCIAL

## 2021-02-20 PROCEDURE — 91300 PR COVID VAC PFIZER DIL RECON 30 MCG/0.3 ML IM: CPT

## 2021-02-20 PROCEDURE — 0002A PR COVID VAC PFIZER DIL RECON 30 MCG/0.3 ML IM: CPT

## 2021-03-02 ENCOUNTER — VIRTUAL VISIT (OUTPATIENT)
Dept: ORTHOPEDICS | Facility: CLINIC | Age: 81
End: 2021-03-02
Payer: COMMERCIAL

## 2021-03-02 DIAGNOSIS — M17.12 ARTHRITIS OF LEFT KNEE: Primary | ICD-10-CM

## 2021-03-02 PROCEDURE — 99213 OFFICE O/P EST LOW 20 MIN: CPT | Mod: 95 | Performed by: ORTHOPAEDIC SURGERY
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
## 2021-03-15 ENCOUNTER — MYC MEDICAL ADVICE (OUTPATIENT)
Dept: FAMILY MEDICINE | Facility: CLINIC | Age: 81
End: 2021-03-15
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
## 2021-03-16 ENCOUNTER — HOSPITAL ENCOUNTER (OUTPATIENT)
Dept: ULTRASOUND IMAGING | Facility: CLINIC | Age: 81
Discharge: HOME | End: 2021-03-16
Attending: FAMILY MEDICINE | Admitting: FAMILY MEDICINE
Payer: COMMERCIAL

## 2021-03-16 ENCOUNTER — OFFICE VISIT (OUTPATIENT)
Dept: URGENT CARE | Facility: URGENT CARE | Age: 81
End: 2021-03-16
Payer: COMMERCIAL

## 2021-03-16 VITALS
OXYGEN SATURATION: 96 % | RESPIRATION RATE: 14 BRPM | TEMPERATURE: 97.9 F | HEIGHT: 62 IN | HEART RATE: 83 BPM | SYSTOLIC BLOOD PRESSURE: 132 MMHG | DIASTOLIC BLOOD PRESSURE: 78 MMHG | BODY MASS INDEX: 32.94 KG/M2 | WEIGHT: 179 LBS

## 2021-03-16 DIAGNOSIS — M79.89 RIGHT LEG SWELLING: ICD-10-CM

## 2021-03-16 DIAGNOSIS — Z86.718 PERSONAL HISTORY OF DVT (DEEP VEIN THROMBOSIS): ICD-10-CM

## 2021-03-16 DIAGNOSIS — M79.661 PAIN OF RIGHT LOWER LEG: Primary | ICD-10-CM

## 2021-03-16 LAB — D DIMER PPP FEU-MCNC: 0.3 UG/ML FEU (ref 0–0.5)

## 2021-03-16 PROCEDURE — 85379 FIBRIN DEGRADATION QUANT: CPT | Performed by: FAMILY MEDICINE

## 2021-03-16 PROCEDURE — 99215 OFFICE O/P EST HI 40 MIN: CPT | Performed by: FAMILY MEDICINE

## 2021-03-16 PROCEDURE — 36415 COLL VENOUS BLD VENIPUNCTURE: CPT | Performed by: FAMILY MEDICINE

## 2021-03-16 PROCEDURE — 93971 EXTREMITY STUDY: CPT | Mod: RT

## 2021-03-16 ASSESSMENT — MIFFLIN-ST. JEOR: SCORE: 1235.19

## 2021-03-16 NOTE — TELEPHONE ENCOUNTER
Pt was called.  She will come to  today    Penelope Barrios RN, BSN  [facility]

## 2021-03-16 NOTE — TELEPHONE ENCOUNTER
Dr Gutiérrez,    This pt was put on your schedule in a hold spot at 3pm.     IF you cannot see her then please let staff know and she will have to go to  instead.    Thanks.    Penelope Barrios, RN, BSN  [facility]

## 2021-03-16 NOTE — TELEPHONE ENCOUNTER
She does need to be seen - can we get her in today?  Dr. Kitty Owens MD / [facility]

## 2021-03-16 NOTE — PROGRESS NOTES
"SUBJECTIVE: Pavithra Laurent is a 80 year old female presenting with a chief complaint of rt calf pain/swelling.  Onset of symptoms was day(s) ago.  Course of illness is same.    Current and Associated symptoms: none  Treatment measures tried include None tried.  Predisposing factors include HX of DVT.    Past Medical History:   Diagnosis Date     Allergic rhinitis      Calculus of gallbladder without mention of cholecystitis or obstruction      Cataract of both eyes      Dry eye syndrome      Environmental allergies      Gastro-oesophageal reflux disease      GERD (gastroesophageal reflux disease) 5/24/2013     Hypothyroid      Pain in joint, shoulder region      Rosacea      Thoracic outlet syndrome      Allergies   Allergen Reactions     Birch Trees      Codeine Sulfate Nausea and Vomiting     Oral, topical is ok     Dust Mites      Erythromycin Nausea and Vomiting     With oral use     Hydromorphone Other (See Comments)     nightmares     Social History     Tobacco Use     Smoking status: Never Smoker     Smokeless tobacco: Never Used   Substance Use Topics     Alcohol use: Yes     Alcohol/week: 0.0 standard drinks     Comment: rare- 2/month       ROS:  SKIN: no rash  GI: no vomiting    OBJECTIVE:  /78   Pulse 83   Temp 97.9  F (36.6  C)   Resp 14   Ht 1.575 m (5' 2\")   Wt 81.2 kg (179 lb)   SpO2 96%   BMI 32.74 kg/m  GENERAL APPEARANCE: healthy, alert and no distress  NEURO: Normal strength and tone, sensory exam grossly normal,  normal speech and mentation  SKIN: no suspicious lesions or rashes  Rt low calf pain    Study Result    ULTRASOUND VENOUS LOWER EXTREMITY UNILATERAL RIGHT  3/16/2021 6:26 PM      HISTORY: Pain of right lower leg; Right leg swelling; Personal history  of DVT (deep vein thrombosis).     COMPARISON: September 20, 2019     TECHNIQUE: Ultrasound gray scale, Color Doppler flow, and spectral  Doppler waveform analysis performed.     FINDINGS: The right common femoral, superficial " Infectious Disease femoral, popliteal and  posterior tibial veins are patent and fully compressible and  demonstrate normal venous Doppler flow. The visualized greater  saphenous vein is negative for thrombus. For comparison the right  common femoral vein was evaluated and was unremarkable. Hypoechoic  area within the right proximal medial gastrocnemius measuring 2.4 x  1.9 x 0.9 cm, question small hematoma. Ultrasound follow-up to confirm  resolution recommended.                                                                      IMPRESSION:   1. No DVT demonstrated.  2. Small probable intramuscular hematoma, ultrasound follow-up to  confirm resolution recommended.         ICD-10-CM    1. Pain of right lower leg  M79.661 D dimer, quantitative     US Lower Extremity Venous Duplex Right     Orthopedic & Spine  Referral   2. Right leg swelling  M79.89 D dimer, quantitative     US Lower Extremity Venous Duplex Right     Orthopedic & Spine  Referral   3. Personal history of DVT (deep vein thrombosis)  Z86.718 US Lower Extremity Venous Duplex Right     Negative for DVT by US but small abnormality in gastrocnemius muscle, f/u Podiatry  Ice/conpression, elevation/rest

## 2021-03-17 ENCOUNTER — MYC MEDICAL ADVICE (OUTPATIENT)
Dept: FAMILY MEDICINE | Facility: CLINIC | Age: 81
End: 2021-03-17

## 2021-03-31 ENCOUNTER — TELEPHONE (OUTPATIENT)
Dept: ORTHOPEDICS | Facility: CLINIC | Age: 81
End: 2021-03-31

## 2021-03-31 PROBLEM — M17.12 ARTHRITIS OF LEFT KNEE: Status: ACTIVE | Noted: 2021-03-31

## 2021-03-31 NOTE — TELEPHONE ENCOUNTER
Noted.    No further action needed from triage.  NELLI CrawfordN, RN  [facility]

## 2021-03-31 NOTE — TELEPHONE ENCOUNTER
Patient is scheduled for surgery with Dr. Jimenez    Spoke with: Spoke with Pavithra    Date of Surgery: 6/10/21    Location: [place]    Informed patient they will need an adult  Yes    Pre-op with surgeon (if applicable): Complete    H&P: Patient to schedule with PCP    Pre-procedure COVID-19 Test: Patient will await call from covid scheduling team    Additional imaging/appointments: Given in clinic    Surgery packet: Mailed 3/31/21     Additional comments: patient will await call from pre admission nursing 1-2 days prior to surgery for arrival time

## 2021-04-02 DIAGNOSIS — M17.12 ARTHRITIS OF LEFT KNEE: Primary | ICD-10-CM

## 2021-04-02 RX ORDER — CELECOXIB 200 MG/1
400 CAPSULE ORAL ONCE
Status: CANCELLED | OUTPATIENT
Start: 2021-06-10

## 2021-05-16 DIAGNOSIS — Z11.59 ENCOUNTER FOR SCREENING FOR OTHER VIRAL DISEASES: Primary | ICD-10-CM

## 2021-05-18 ENCOUNTER — PATIENT OUTREACH (OUTPATIENT)
Dept: CARE COORDINATION | Facility: CLINIC | Age: 81
End: 2021-05-18

## 2021-05-18 PROBLEM — I82.402 DEEP VEIN THROMBOSIS (DVT) OF LEFT LOWER EXTREMITY, UNSPECIFIED CHRONICITY, UNSPECIFIED VEIN (H): Status: RESOLVED | Noted: 2020-01-30 | Resolved: 2021-05-18

## 2021-05-18 NOTE — PROGRESS NOTES
Social Work Intervention  [facility]    Data/Intervention:    Patient Name:  Pavithra Laurent  /Age:  1940 (80 year old)    Visit Type: telephone  Referral Source: Ortho Clinic, Jeni Huber RN   Reason for Referral: TCU questions    Collaborated With:    -Pavithra Mehran, Patient     Patient Concerns/Issues:   Patient was seen in Ortho clinic and is having an upcoming scheduled surgery and expressed interest in a TCU stay. Spoke with Patient in order to assess post op needs. She stated that she would like to go to a TCU, as she previously went [facility] her [facility]. [facility] [place]. We discussed general insurance requirements in order to meet criteria for coverage of a TCU stay, as well as outpatient rehab options. Patient feels that she would have a better outcome if she has in home PT rather than going to [facility], and has concerns about being unable to drive herself to and from PT. Patient's daughter lives in [place] and will be available to assist Patient for the first couple of days of her recovery.     Patient lives alone in a 2 story home with 13 stairs with railings. She has steps in the entrance to the front of the home as well. She currently utilizes a bathtub chair and a cane occasionally due to arthritis in her feet. Patient stated that she was interested in homemaking services to assist during her recovery. We discussed private pay options for homemaking and general costs. We also discussed meal delivery services and transportation. Patient's daughter will provide transportation to and from the procedure.     Intervention/Education/Resources Provided:  -Discussed general insurance requirements to meet criteria for coverage of a TCU  -Discussed home health care options including private pay services   -Provided home health care resources for homemaking   -Provided Senior Linkage line for a list of more  exhaustive choices [phone]  -Discussed meal delivery services and transportation    Assessment/Plan:  Resources for homemaking, senior linkage line, and a  link to Medicare.gov explaining home health coverage and definition of homebound were sent to [email or web address].  Will continue to follow up as surgery nears.     Provided patient with contact information and availability.    SANDRA Dubois, [facility]   Phone: [phone]

## 2021-05-26 ENCOUNTER — OFFICE VISIT (OUTPATIENT)
Dept: FAMILY MEDICINE | Facility: CLINIC | Age: 81
End: 2021-05-26
Payer: COMMERCIAL

## 2021-05-26 VITALS
TEMPERATURE: 97.8 F | BODY MASS INDEX: 32.56 KG/M2 | DIASTOLIC BLOOD PRESSURE: 72 MMHG | WEIGHT: 178 LBS | HEART RATE: 71 BPM | SYSTOLIC BLOOD PRESSURE: 128 MMHG | OXYGEN SATURATION: 97 %

## 2021-05-26 DIAGNOSIS — G45.9 TIA (TRANSIENT ISCHEMIC ATTACK): ICD-10-CM

## 2021-05-26 DIAGNOSIS — Z86.718 HISTORY OF DEEP VENOUS THROMBOSIS (DVT) OF DISTAL VEIN OF LEFT LOWER EXTREMITY: ICD-10-CM

## 2021-05-26 DIAGNOSIS — E03.9 HYPOTHYROIDISM, UNSPECIFIED TYPE: ICD-10-CM

## 2021-05-26 DIAGNOSIS — Z01.818 PREOPERATIVE EXAMINATION: Primary | ICD-10-CM

## 2021-05-26 DIAGNOSIS — M35.00 HISTORY OF SJOGREN'S DISEASE (H): ICD-10-CM

## 2021-05-26 DIAGNOSIS — M17.12 ARTHRITIS OF LEFT KNEE: ICD-10-CM

## 2021-05-26 LAB
ANION GAP SERPL CALCULATED.3IONS-SCNC: 8 MMOL/L (ref 3–14)
BUN SERPL-MCNC: 10 MG/DL (ref 7–30)
CALCIUM SERPL-MCNC: 8.7 MG/DL (ref 8.5–10.1)
CHLORIDE SERPL-SCNC: 104 MMOL/L (ref 94–109)
CO2 SERPL-SCNC: 27 MMOL/L (ref 20–32)
CREAT SERPL-MCNC: 0.66 MG/DL (ref 0.52–1.04)
ERYTHROCYTE [DISTWIDTH] IN BLOOD BY AUTOMATED COUNT: 13.9 % (ref 10–15)
GFR SERPL CREATININE-BSD FRML MDRD: 83 ML/MIN/{1.73_M2}
GLUCOSE SERPL-MCNC: 90 MG/DL (ref 70–99)
HCT VFR BLD AUTO: 41.7 % (ref 35–47)
HGB BLD-MCNC: 13.7 G/DL (ref 11.7–15.7)
MCH RBC QN AUTO: 29.7 PG (ref 26.5–33)
MCHC RBC AUTO-ENTMCNC: 32.9 G/DL (ref 31.5–36.5)
MCV RBC AUTO: 90 FL (ref 78–100)
PLATELET # BLD AUTO: 292 10E9/L (ref 150–450)
POTASSIUM SERPL-SCNC: 4.2 MMOL/L (ref 3.4–5.3)
RBC # BLD AUTO: 4.62 10E12/L (ref 3.8–5.2)
SODIUM SERPL-SCNC: 139 MMOL/L (ref 133–144)
TSH SERPL DL<=0.005 MIU/L-ACNC: 1.39 MU/L (ref 0.4–4)
WBC # BLD AUTO: 8 10E9/L (ref 4–11)

## 2021-05-26 PROCEDURE — 85027 COMPLETE CBC AUTOMATED: CPT | Performed by: FAMILY MEDICINE

## 2021-05-26 PROCEDURE — 84443 ASSAY THYROID STIM HORMONE: CPT | Performed by: FAMILY MEDICINE

## 2021-05-26 PROCEDURE — 99214 OFFICE O/P EST MOD 30 MIN: CPT | Performed by: FAMILY MEDICINE

## 2021-05-26 PROCEDURE — 36415 COLL VENOUS BLD VENIPUNCTURE: CPT | Performed by: FAMILY MEDICINE

## 2021-05-26 PROCEDURE — 80048 BASIC METABOLIC PNL TOTAL CA: CPT | Performed by: FAMILY MEDICINE

## 2021-05-26 ASSESSMENT — PATIENT HEALTH QUESTIONNAIRE - PHQ9
10. IF YOU CHECKED OFF ANY PROBLEMS, HOW DIFFICULT HAVE THESE PROBLEMS MADE IT FOR YOU TO DO YOUR WORK, TAKE CARE OF THINGS AT HOME, OR GET ALONG WITH OTHER PEOPLE: NOT DIFFICULT AT ALL
SUM OF ALL RESPONSES TO PHQ QUESTIONS 1-9: 4
SUM OF ALL RESPONSES TO PHQ QUESTIONS 1-9: 4

## 2021-05-26 ASSESSMENT — ANXIETY QUESTIONNAIRES
GAD7 TOTAL SCORE: 2
7. FEELING AFRAID AS IF SOMETHING AWFUL MIGHT HAPPEN: SEVERAL DAYS
5. BEING SO RESTLESS THAT IT IS HARD TO SIT STILL: SEVERAL DAYS
GAD7 TOTAL SCORE: 2
GAD7 TOTAL SCORE: 2
2. NOT BEING ABLE TO STOP OR CONTROL WORRYING: NOT AT ALL
7. FEELING AFRAID AS IF SOMETHING AWFUL MIGHT HAPPEN: SEVERAL DAYS
6. BECOMING EASILY ANNOYED OR IRRITABLE: NOT AT ALL
3. WORRYING TOO MUCH ABOUT DIFFERENT THINGS: NOT AT ALL
4. TROUBLE RELAXING: NOT AT ALL
1. FEELING NERVOUS, ANXIOUS, OR ON EDGE: NOT AT ALL

## 2021-05-26 NOTE — Clinical Note
Hi Dr. Jimenez:  Pavithra has a tKA scheduled with you on June 10th.  Are you okay if she continues her baby aspirin?  She has a history of DVT and TIA.  For now I've told her to hold it 7 days prior to surgery, but if you are okay with her continuing it I'll let her know.  Thank you,  Dr. Kitty Owens MD / [facility]

## 2021-05-26 NOTE — Clinical Note
Also - Pavithra is hoping to go to [place] after surgery for recovery.  I believe she's let your team know this already.  Thanks.  -Dr. Kitty Owens MD / [facility]

## 2021-05-26 NOTE — PATIENT INSTRUCTIONS
Medication Instructions:  Patient is to take all scheduled medications on the day of surgery   - aspirin: Discontinue aspirin 7-10 days prior to procedure to reduce bleeding risk. It should be resumed postoperatively.    - pregabalin, gabapentin: Continue without modification.  Preparing for Your Surgery  Getting started  A nurse will call you to review your health history and instructions. They will give you an arrival time based on your scheduled surgery time.  Please be ready to share the following:    Your doctor's clinic name and phone number    Your medical, surgical and anesthesia history    A list of allergies and sensitivities    A list of medicines, including herbal treatments and over-the-counter drugs    Whether the patient has a legal guardian (ask how to send us the papers in advance)  If you have a child who's having surgery, please ask for a copy of Preparing for Your Child's Surgery.    Preparing for surgery    Within 30 days of surgery: Have a pre-op exam (sometimes called an H&P, or History and Physical). This can be done at a clinic or pre-operative center.  ? If you're having a , you may not need this exam. Talk to your care team    At your pre-op exam, talk to your care team about all medicines you take. If you need to stop any medicines before surgery, ask when to start taking them again.  ? We do this for your safety. Many medicines can make you bleed too much during surgery. Some change how well surgery (anesthesia) drugs work.    Call your insurance company to let them know you're having surgery. (If you don't have insurance, call [phone].)    Call your clinic if there's any change in your health. This includes signs of a cold or flu (sore throat, runny nose, cough, rash, fever). It also includes a scrape or scratch near the surgery site.    If you have questions on the day of surgery, call your hospital or surgery center.  Eating and drinking guidelines  For your safety:  Unless your surgeon tells you otherwise, follow the guidelines below.    Eat and drink as usual until 8 hours before surgery. After that, no food or milk.    Drink clear liquids until 2 hours before surgery. These are liquids you can see through, like water, Gatorade and Propel Water. You may also have black coffee and tea (no cream or milk).    Nothing by mouth within 2 hours of surgery. This includes gum, candy and breath mints.    If you drink, stop drinking alcohol the night before surgery.    If your care team tells you to take medicine on the morning of surgery, it's okay to take it with a sip of water.  Preventing infection    Shower or bathe the night before and morning of your surgery. Follow the instructions your clinic gave you. (If no instructions, use regular soap.)    Don't shave or clip hair near your surgery site. We'll remove the hair if needed.    Don't smoke or vape the morning of surgery. You may chew nicotine gum up to 2 hours before surgery. A nicotine patch is okay.  ? Note: Some surgeries require you to completely quit smoking and nicotine. Check with your surgeon.    Your care team will make every effort to keep you safe from infection. We will:  ? Clean our hands often with soap and water (or an alcohol-based hand rub).  ? Clean the skin at your surgery site with a special soap that kills germs.  ? Give you a special gown to keep you warm. (Cold raises the risk of infection.)  ? Wear special hair covers, masks, gowns and gloves during surgery.  ? Give antibiotic medicine, if prescribed. Not all surgeries need antibiotics.  What to bring on the day of surgery    Photo ID and insurance card    Copy of your health care directive, if you have one    Glasses and hearing aides (bring cases)  ? You can't wear contacts during surgery    Inhaler and eye drops, if you use them (tell us about these when you arrive)    CPAP machine or breathing device, if you use them    A few personal items, if  spending the night    If you have . . .  ? A pacemaker or ICD (cardiac defibrillator): Bring the ID card.  ? An implanted stimulator: Bring the remote control.  ? A legal guardian: Bring a copy of the certified (court-stamped) guardianship papers.  Please remove any jewelry, including body piercings. Leave jewelry and other valuables at home.  If you're going home the day of surgery  Important: If you don't follow the rules below, we must cancel your surgery.     Arrange for someone to drive you home after surgery. You may not drive, take a taxi or take public transportation by yourself (unless you'll have local anesthesia only).    Arrange for a responsible adult to stay with you overnight. If you don't, we may keep you in the hospital overnight, and you may need to pay the costs yourself.  Questions?   If you have any questions for your care team, list them here: _________________________________________________________________________________________________________________________________________________________________________________________________________________________________________________________________________________________________________________________  For informational purposes only. Not to replace the advice of your health care provider. Copyright   2003, 2019 [employer] [facility]. All rights reserved. Clinically reviewed by Susy Jenkins MD. [employer] [id number] - REV 4/20.    How to Take Your Medication Before Surgery  - Take all of your medications before surgery as usual  - HOLD (do not take) Aspirin for 7 days   -I have asked Dr. Jimenez if you can continue this - I'll let you know what she says.

## 2021-05-26 NOTE — PROGRESS NOTES
[facility]  [address] [place] [address]  Phone: [phone]  Primary Provider: Kitty Owens  Pre-op Performing Provider: KITTY OWENS      PREOPERATIVE EVALUATION:  Today's date: 5/26/2021    Pavithra Laurent is a 81 year old female who presents for a preoperative evaluation.    Surgical Information:  Surgery/Procedure: LEFT Total Knee Arthroplasty  Surgery Location: [facility]  Surgeon: Isela Jimenez MD  Surgery Date: 6/10/2021  Time of Surgery: tbd   Where patient plans to recover: Other: she wanted to go to [facility] but in order for her to do that she needs to stay in the hospital for 3 days, she has questions about this  backup plan: daughter will come over or she will go to her, she lives in [place] [facility] and she has no elevator just stairs.   Fax number for surgical facility: Note does not need to be faxed, will be available electronically in Epic.    Type of Anesthesia Anticipated: Choice    Assessment & Plan     The proposed surgical procedure is considered INTERMEDIATE risk.    Preoperative examination  And  Arthritis of left knee  - REVIEW OF HEALTH MAINTENANCE PROTOCOL ORDERS  - CBC with platelets  - Basic metabolic panel  - TSH with free T4 reflex    Hypothyroidism, unspecified type  - TSH with free T4 reflex    History of deep venous thrombosis (DVT) of distal vein of left lower extremity  and  TIA (transient ischemic attack)    For now plan to hold asa 7 days before surgery.    However sent this note to orthopedic surgeon:  Eliseo Jimenez:  Pavithra has a tKA scheduled with you on June 10th.  Are you okay if she continues her baby aspirin?  She has a history of DVT and TIA.  For now I've told her to hold it 7 days prior to surgery, but if you are okay with her continuing it I'll let her know.  Thank you,  Dr. Kitty Owens MD / [facility]      History of Sjogren's  disease (H)    Borderline - follows with rheumatologist.    Watch for dry eye and mouth post surgery.           Risks and Recommendations:  The patient has the following additional risks and recommendations for perioperative complications:   - Consult Hospitalist / IM to assist with post-op medical management   - History of slow recovery from anesthesia  - History of DVT and TIA - would like her to continue her aspirin if possible, will send message to her orthopedist.    Medication Instructions:  Patient is to take all scheduled medications on the day of surgery   - aspirin: Discontinue aspirin 7-10 days prior to procedure to reduce bleeding risk. It should be resumed postoperatively.    - pregabalin, gabapentin: Continue without modification.    RECOMMENDATION:  APPROVAL GIVEN to proceed with proposed procedure, without further diagnostic evaluation.      Subjective     HPI related to upcoming procedure: Left knee pain and arthritis.  Needs TKA.      Preop Questions 5/26/2021   1. Have you ever had a heart attack or stroke? No   2. Have you ever had surgery on your heart or blood vessels, such as a stent placement, a coronary artery bypass, or surgery on an artery in your head, neck, heart, or legs? No   3. Do you have chest pain with activity? No   4. Do you have a history of  heart failure? No   5. Do you currently have a cold, bronchitis or symptoms of other infection? No   6. Do you have a cough, shortness of breath, or wheezing? No   7. Do you or anyone in your family have previous history of blood clots? YES - herself and son.  One DVT in 2017.   8. Do you or does anyone in your family have a serious bleeding problem such as prolonged bleeding following surgeries or cuts? No    9. Have you ever had problems with anemia or been told to take iron pills? No   10. Have you had any abnormal blood loss such as black, tarry or bloody stools, or abnormal vaginal bleeding? No   11. Have you ever had a blood  transfusion? No   12. Are you willing to have a blood transfusion if it is medically needed before, during, or after your surgery? Yes   13. Have you or any of your relatives ever had problems with anesthesia? Yes - coming out of anesthesia.  Slow to come out of anesthesia.   14. Do you have sleep apnea, excessive snoring or daytime drowsiness? No   15. Do you have any artifical heart valves or other implanted medical devices like a pacemaker, defibrillator, or continuous glucose monitor? No   16. Do you have artificial joints? YES - right knee    17. Are you allergic to latex? YES: she thinks that she might be    18. Is there any chance that you may be pregnant? -     Answers for HPI/ROS submitted by the patient on 5/26/2021   If you checked off any problems, how difficult have these problems made it for you to do your work, take care of things at home, or get along with other people?: Not difficult at all  PHQ9 TOTAL SCORE: 4  DIOMEDES 7 TOTAL SCORE: 2      Health Care Directive:  Patient has a Health Care Directive on file      Preoperative Review of :   reviewed - no record of controlled substances prescribed.        Review of Systems  CONSTITUTIONAL: NEGATIVE for fever, chills, change in weight  INTEGUMENTARY/SKIN: NEGATIVE for worrisome rashes, moles or lesions  EYES: NEGATIVE for vision changes or irritation  ENT/MOUTH: NEGATIVE for ear, mouth and throat problems  RESP: NEGATIVE for significant cough or SOB  BREAST: NEGATIVE for masses, tenderness or discharge  CV: NEGATIVE for chest pain, palpitations or peripheral edema  GI: NEGATIVE for nausea, abdominal pain, heartburn, or change in bowel habits  : NEGATIVE for frequency, dysuria, or hematuria  MUSCULOSKELETAL: NEGATIVE for significant arthralgias or myalgia  NEURO: NEGATIVE for weakness, dizziness or paresthesias  ENDOCRINE: NEGATIVE for temperature intolerance, skin/hair changes  HEME: NEGATIVE for bleeding problems  PSYCHIATRIC: NEGATIVE for changes  in mood or affect    Patient Active Problem List    Diagnosis Date Noted     Arthritis of left knee 03/31/2021     Priority: Medium     Added automatically from request for surgery [id number]       Post-thrombotic syndrome of left lower extremity 10/04/2019     Priority: Medium     With symptoms, D-Dimer will help exclude DVT recurrence       Family history of blood clots 07/09/2019     Priority: Medium     Son.  clotting disorder.       Ventral hernia without obstruction or gangrene s/p repair 6/2019 03/15/2019     Priority: Medium     Lumbar degenerative disc disease 03/12/2019     Priority: Medium     Primary osteoarthritis of both feet 03/12/2019     Priority: Medium     Trochanteric bursitis of both hips 12/06/2018     Priority: Medium     Generalized osteoarthritis of multiple sites 06/14/2018     Priority: Medium     Sicca syndrome (H) 06/14/2018     Priority: Medium     TIA (transient ischemic attack) 04/16/2018     Priority: Medium     4/2018. Saw neuro Dr. Cote 4/2018-pending results carotid ultrasound & Echo with bubble study, if normal continue ASA daily.       Neck pain 04/03/2018     Priority: Medium     History of deep venous thrombosis (DVT) of distal vein of left lower extremity 02/28/2018     Priority: Medium     Left leg, lower.  3/2017       Trochanteric bursitis of left hip 09/05/2017     Priority: Medium     Piriformis syndrome of left side 09/05/2017     Priority: Medium     Lesion of ulnar nerve 05/04/2015     Priority: Medium     Low back pain 03/13/2015     Priority: Medium     Diagnosis updated by automated process. Provider to review and confirm.       Elbow dislocation 01/07/2015     Priority: Medium     Hypothyroidism 05/24/2013     Priority: Medium     Neuropathic pain of right arm 05/24/2013     Priority: Medium     Right arm after accident with compound fx of wrist and dislocated elbow.  Persistent nerve pain since.  Using gabapentin. Prescribing provider is Dr. Olesya Shelton  from [facility]       Rosacea 05/24/2013     Priority: Medium     Tear film insufficiency 05/24/2013     Priority: Medium     Nasal congestion 01/04/2013     Priority: Medium     Environmental allergies 01/04/2013     Priority: Medium      Past Medical History:   Diagnosis Date     Allergic rhinitis      Calculus of gallbladder without mention of cholecystitis or obstruction      Cataract of both eyes      Deep vein thrombosis (DVT) of left lower extremity, unspecified chronicity, unspecified vein (H) 1/30/2020     Dry eye syndrome      Environmental allergies      Gastro-oesophageal reflux disease      GERD (gastroesophageal reflux disease) 5/24/2013     Hypothyroid      Pain in joint, shoulder region      Rosacea      Thoracic outlet syndrome      Past Surgical History:   Procedure Laterality Date     ARTHROTOMY ELBOW Right 1/7/2015    Procedure: ARTHROTOMY ELBOW;  Surgeon: Branden Meyer MD;  Location: [facility] TONSIL/PILLARS  1948    tonsillectomy     CLOSED REDUCTION UPPER EXTREMITY  12/26/2014    Procedure: CLOSED REDUCTION UPPER EXTREMITY;  Surgeon: Louis Daniel MD;  Location:  OR     GYN SURGERY  1984    tubal ligation     HERNIORRHAPHY VENTRAL N/A 6/18/2019    Procedure: OPEN VENTRAL HERNIA REPAIR WITH MESH;  Surgeon: Last Solis MD;  Location:  OR     IRRIGATION AND DEBRIDEMENT HAND, COMBINED  12/26/2014    Procedure: COMBINED IRRIGATION AND DEBRIDEMENT HAND;  Surgeon: Louis Daniel MD;  Location:  OR     KNEE SURGERY  2001    arthroscopic right     LAPAROSCOPIC CHOLECYSTECTOMY  8/16/2012    Procedure: LAPAROSCOPIC CHOLECYSTECTOMY;  LAPAROSCOPIC CHOLECYSTECTOMY ;  Surgeon: Preston Walters MD;  Location: [facility]     OPEN REDUCTION INTERNAL FIXATION FOREARM Right 12/26/2014    Procedure: OPEN REDUCTION INTERNAL FIXATION FOREARM;  Surgeon: Louis Daniel MD;  Location:  OR     ORTHOPEDIC SURGERY  2007,2008    bunyanectomy, hammer toe, torn meniscus, toe and knee replacement     TKA   2009    right knee     WISDOM TEETH EXTRACTION  1958     Current Outpatient Medications   Medication Sig Dispense Refill     amoxicillin (AMOXIL) 500 MG capsule Take 2,000 mg by mouth once One hour prior to dental procedure       aspirin 81 MG EC tablet Take 81 mg by mouth daily       azelastine (ASTELIN) 0.1 % nasal spray Spray 2 sprays into both nostrils daily as needed for rhinitis        calcium-vitamin D-vitamin K (CALCIUM SOFT CHEWS) 500-500-40 MG-UNT-MCG CHEW Take 2 tablets by mouth 2 times daily       desonide (DESOWEN) 0.05 % cream Apply topically 2 times daily as needed (rash)        diclofenac (VOLTAREN) 1 % GEL Place 2 g onto the skin daily as needed for moderate pain (knees)        erythromycin (ROMYCIN) ophthalmic ointment Place 1 Application into both eyes nightly as needed (dry/irritated eyes)        fluocinolone acetonide (DERMOTIC) 0.01 % OIL Place 1 drop in ear(s) daily as needed.       fluticasone (FLONASE) 50 MCG/ACT nasal spray Spray 1 spray into both nostrils daily        gabapentin (NEURONTIN) 100 MG capsule Take 1 capsule (100 mg) by mouth daily as needed (on particularly active days) 90 capsule 3     gabapentin (NEURONTIN) 400 MG capsule Take 1 capsule (400 mg) by mouth 3 times daily 270 capsule 3     levothyroxine (SYNTHROID/LEVOTHROID) 88 MCG tablet TAKE 1 TABLET BY MOUTH ONE TIME DAILY 90 tablet 1     montelukast (SINGULAIR) 10 MG tablet Take 10 mg by mouth At Bedtime        Multiple Vitamins-Minerals (PRESERVISION AREDS 2+MULTI VIT) CAPS        nystatin (MYCOSTATIN) 251948 UNIT/GM external powder Apply topically 2 times daily as needed for other (yeast infection in skin folds) 60 g 1     order for DME Equipment being ordered: compression stockings 25-35mmHG 1 each 0     triamcinolone (KENALOG) 0.1 % external cream Apply topically 2 times daily Apply to AA on left hand bid for 14 days 15 g 0       Allergies   Allergen Reactions     Birch Trees      Codeine Sulfate Nausea and Vomiting      Oral, topical is ok     Dust Mites      Erythromycin Nausea and Vomiting     With oral use     Hydromorphone Other (See Comments)     nightmares        Social History     Tobacco Use     Smoking status: Never Smoker     Smokeless tobacco: Never Used   Substance Use Topics     Alcohol use: Yes     Alcohol/week: 0.0 standard drinks     Comment: rare- 2/month       History   Drug Use No         Objective     Wt 80.7 kg (178 lb)   BMI 32.56 kg/m      Physical Exam    GENERAL APPEARANCE: healthy, alert and no distress     EYES: EOMI, PERRL     HENT: ear canals and TM's normal and nose and mouth without ulcers or lesions     NECK: no adenopathy, no asymmetry, masses, or scars and thyroid normal to palpation     RESP: lungs clear to auscultation - no rales, rhonchi or wheezes     CV: regular rates and rhythm, normal S1 S2, no S3 or S4 and no murmur, click or rub     ABDOMEN:  soft, nontender, no HSM or masses and bowel sounds normal     MS: extremities normal- no gross deformities noted, no evidence of inflammation in joints, FROM in all extremities.     SKIN: no suspicious lesions or rashes     NEURO: Normal strength and tone, sensory exam grossly normal, mentation intact and speech normal     PSYCH: mentation appears normal. and affect normal/bright     LYMPHATICS: No cervical adenopathy    Recent Labs   Lab Test 09/16/20  1637 06/23/19  1656   HGB 13.4 13.4    289   * 138   POTASSIUM 4.7 3.9   CR 0.75 0.66        Diagnostics:  Labs pending at this time.  Results will be reviewed when available.   No EKG required, no history of coronary heart disease, significant arrhythmia, peripheral arterial disease or other structural heart disease.   EKG in 9/2020 - normal    Revised Cardiac Risk Index (RCRI):  The patient has the following serious cardiovascular risks for perioperative complications:   - No serious cardiac risks = 0 points     RCRI Interpretation: 0 points: Class I (very low risk - 0.4% complication  rate)           Signed Electronically by: Kitty Owens MD  Copy of this evaluation report is provided to requesting physician.

## 2021-05-27 ASSESSMENT — ANXIETY QUESTIONNAIRES: GAD7 TOTAL SCORE: 2

## 2021-05-27 ASSESSMENT — PATIENT HEALTH QUESTIONNAIRE - PHQ9: SUM OF ALL RESPONSES TO PHQ QUESTIONS 1-9: 4

## 2021-05-28 ENCOUNTER — PATIENT OUTREACH (OUTPATIENT)
Dept: ORTHOPEDICS | Facility: CLINIC | Age: 81
End: 2021-05-28

## 2021-05-28 NOTE — TELEPHONE ENCOUNTER
Reached out to patient for pre-arranged phone call to discuss preop questions. Answered patients questions regarding COVID test (scheduling will call 5-7 days before), surgery time (PAN will call 1-2 days before). Patient had her preop on 5/26 (in [facility]) and set up her PT visits. Discussed showering night before and day of; patient has one bottle of soap but will  another at the pharmacy. Her daughter will be her support person and will be there at the hospital on the day of discharge and plans to be with her 3-4 days postop. Discussed medications to stop. Patient states she had a blood clot in the operative leg in 2017 and has been on a baby aspirin since. Her PCP recommends she continue the aspirin until surgery. Will let Dr. Jimenez know and get back to the patient regarding if she should continue the aspirin up to the day of surgery. Patient expresses understanding of all discussed and will reach out if she has any further questions.

## 2021-06-04 DIAGNOSIS — E03.4 HYPOTHYROIDISM DUE TO ACQUIRED ATROPHY OF THYROID: ICD-10-CM

## 2021-06-04 RX ORDER — LEVOTHYROXINE SODIUM 88 UG/1
TABLET ORAL
Qty: 90 TABLET | Refills: 3 | Status: SHIPPED | OUTPATIENT
Start: 2021-06-04 | End: 2021-11-03

## 2021-06-04 NOTE — TELEPHONE ENCOUNTER
Thyroid Protocol [id number]/04/2021 10:47 AM   Patient is 12 years or older Protocol Details    Recent (12 mo) or future (30 days) visit within the authorizing provider's specialty     Medication is active on med list     Normal TSH on file in past 12 months     No active pregnancy on record     No positive pregnancy test in past 12 months

## 2021-06-07 DIAGNOSIS — Z11.59 ENCOUNTER FOR SCREENING FOR OTHER VIRAL DISEASES: ICD-10-CM

## 2021-06-07 LAB
SARS-COV-2 RNA RESP QL NAA+PROBE: NORMAL
SPECIMEN SOURCE: NORMAL

## 2021-06-07 PROCEDURE — U0005 INFEC AGEN DETEC AMPLI PROBE: HCPCS | Performed by: ORTHOPAEDIC SURGERY

## 2021-06-07 PROCEDURE — U0003 INFECTIOUS AGENT DETECTION BY NUCLEIC ACID (DNA OR RNA); SEVERE ACUTE RESPIRATORY SYNDROME CORONAVIRUS 2 (SARS-COV-2) (CORONAVIRUS DISEASE [COVID-19]), AMPLIFIED PROBE TECHNIQUE, MAKING USE OF HIGH THROUGHPUT TECHNOLOGIES AS DESCRIBED BY CMS-2020-01-R: HCPCS | Performed by: ORTHOPAEDIC SURGERY

## 2021-06-08 ENCOUNTER — TELEPHONE (OUTPATIENT)
Dept: ORTHOPEDICS | Facility: CLINIC | Age: 81
End: 2021-06-08

## 2021-06-08 LAB
LABORATORY COMMENT REPORT: NORMAL
SARS-COV-2 RNA RESP QL NAA+PROBE: NEGATIVE
SPECIMEN SOURCE: NORMAL

## 2021-06-08 RX ORDER — SENNOSIDES 8.6 MG
650 CAPSULE ORAL EVERY 8 HOURS PRN
Status: ON HOLD | COMMUNITY
End: 2021-06-11

## 2021-06-08 NOTE — OR NURSING
Pt stated she was never notified about continuing her baby ASA per her PCP's guidelines. She is currently holding. In basket sent to LAKISHA Contreras and Dr. Jimenez to advise and contact patient is she is able to start again and continue through surgery yajaira to DVT hx and TIA.

## 2021-06-08 NOTE — TELEPHONE ENCOUNTER
Reached out to patient to relay that Dr. Jimenez recommend that she continue to hold her 81 mg aspirin until day of surgery and that she will manage anticoagulation in the postop period. Left voicemail with information and callback number.

## 2021-06-09 ENCOUNTER — ANESTHESIA EVENT (OUTPATIENT)
Dept: SURGERY | Facility: CLINIC | Age: 81
End: 2021-06-09
Payer: COMMERCIAL

## 2021-06-09 NOTE — ANESTHESIA PREPROCEDURE EVALUATION
Anesthesia Pre-Procedure Evaluation    Patient: Pavithra Laurent   MRN: [id number] : 1940        Preoperative Diagnosis: Arthritis of left knee [M17.12]   Procedure : Procedure(s):  LEFT Total Knee Arthroplasty     Past Medical History:   Diagnosis Date     Allergic rhinitis      Calculus of gallbladder without mention of cholecystitis or obstruction      Cataract of both eyes      Deep vein thrombosis (DVT) of left lower extremity, unspecified chronicity, unspecified vein (H) 2020     Dry eye syndrome      Environmental allergies      Gastro-oesophageal reflux disease      GERD (gastroesophageal reflux disease) 2013     Hypothyroid      Pain in joint, shoulder region      Rosacea      Thoracic outlet syndrome       Past Surgical History:   Procedure Laterality Date     ARTHROTOMY ELBOW Right 2015    Procedure: ARTHROTOMY ELBOW;  Surgeon: Branden Meyer MD;  Location: [facility] TONSIL/PILLARS      tonsillectomy     CLOSED REDUCTION UPPER EXTREMITY  2014    Procedure: CLOSED REDUCTION UPPER EXTREMITY;  Surgeon: Louis Daniel MD;  Location:  OR     GYN SURGERY      tubal ligation     HERNIORRHAPHY VENTRAL N/A 2019    Procedure: OPEN VENTRAL HERNIA REPAIR WITH MESH;  Surgeon: Last Solis MD;  Location:  OR     IRRIGATION AND DEBRIDEMENT HAND, COMBINED  2014    Procedure: COMBINED IRRIGATION AND DEBRIDEMENT HAND;  Surgeon: Louis Daniel MD;  Location:  OR     KNEE SURGERY      arthroscopic right     LAPAROSCOPIC CHOLECYSTECTOMY  2012    Procedure: LAPAROSCOPIC CHOLECYSTECTOMY;  LAPAROSCOPIC CHOLECYSTECTOMY ;  Surgeon: Preston Walters MD;  Location: [facility]     OPEN REDUCTION INTERNAL FIXATION FOREARM Right 2014    Procedure: OPEN REDUCTION INTERNAL FIXATION FOREARM;  Surgeon: Louis Daniel MD;  Location:  OR     ORTHOPEDIC SURGERY  ,    bunyanectomy, hammer toe, torn meniscus, toe and knee replacement     TKA  2009    right  knee     WISDOM TEETH EXTRACTION  1958      Allergies   Allergen Reactions     Birch Trees      Codeine Sulfate Nausea and Vomiting     Oral, topical is ok     Dust Mites      Erythromycin Nausea and Vomiting     With oral use     Hydromorphone Other (See Comments)     nightmares     Other Environmental Allergy Itching     Coban      Social History     Tobacco Use     Smoking status: Never Smoker     Smokeless tobacco: Never Used   Substance Use Topics     Alcohol use: Yes     Alcohol/week: 0.0 standard drinks     Comment: rare- 2/month      Wt Readings from Last 1 Encounters:   05/26/21 80.7 kg (178 lb)        Anesthesia Evaluation   Pt has had prior anesthetic. Type: General.    No history of anesthetic complications       ROS/MED HX  ENT/Pulmonary: Comment: Cataract of both eyes  Dry eye syndrome    (+) allergic rhinitis,     Neurologic:     (+) peripheral neuropathy, - Neuropathic pain of right arm. TIA,     Cardiovascular:  - neg cardiovascular ROS     METS/Exercise Tolerance:     Hematologic: Comments: Deep vein thrombosis (DVT) of left lower extremity, unspecified chronicity, unspecified             (+) History of blood clots,     Musculoskeletal: Comment: Thoracic outlet syndrome  Pain in joint, shoulder region    Piriformis syndrome of left side  Trochanteric bursitis of both hips  Generalized osteoarthritis of multiple sites    Lumbar degenerative disc disease    Primary osteoarthritis of both feet  (+) arthritis (Arthritis of left knee),     GI/Hepatic: Comment: Calculus of gallbladder without mention of cholecystitis or obstruction    (+) GERD, Asymptomatic on medication,     Renal/Genitourinary:  - neg Renal ROS     Endo:     (+) thyroid problem, hypothyroidism,     Psychiatric/Substance Use:  - neg psychiatric ROS     Infectious Disease:  - neg infectious disease ROS     Malignancy:  - neg malignancy ROS     Other: Comment: Environmental allergies  Rosacea    SICCA syndrome              OUTSIDE  LABS:  CBC:   Lab Results   Component Value Date    WBC 8.0 05/26/2021    WBC 10.4 09/16/2020    HGB 13.7 05/26/2021    HGB 13.4 09/16/2020    HCT 41.7 05/26/2021    HCT 40.8 09/16/2020     05/26/2021     09/16/2020     BMP:   Lab Results   Component Value Date     05/26/2021     (L) 09/16/2020    POTASSIUM 4.2 05/26/2021    POTASSIUM 4.7 09/16/2020    CHLORIDE 104 05/26/2021    CHLORIDE 100 09/16/2020    CO2 27 05/26/2021    CO2 28 09/16/2020    BUN 10 05/26/2021    BUN 9 09/16/2020    CR 0.66 05/26/2021    CR 0.75 09/16/2020    GLC 90 05/26/2021    GLC 89 09/16/2020     COAGS:   Lab Results   Component Value Date    PTT 26 10/30/2018    INR 1.02 10/30/2018     POC:   Lab Results   Component Value Date     (H) 01/08/2015     HEPATIC:   Lab Results   Component Value Date    ALBUMIN 4.1 10/30/2018    PROTTOTAL 7.7 10/30/2018    ALT 24 10/30/2018    AST 22 10/30/2018    ALKPHOS 77 10/30/2018    BILITOTAL 0.7 10/30/2018     OTHER:   Lab Results   Component Value Date    A1C 5.6 05/24/2013    HARSHIL 8.7 05/26/2021    LIPASE 80 05/13/2012    TSH 1.39 05/26/2021    T4 1.40 12/19/2018    SED 4 10/29/2016       Anesthesia Plan    ASA Status:  3      Anesthesia Type: General.     - Airway: LMA   Induction: Intravenous, Propofol.   Maintenance: Balanced.        Consents         - Extended Intubation/Ventilatory Support Discussed: No.      - Patient is DNR/DNI Status: No    Use of blood products discussed: No .     Postoperative Care    Pain management: IV analgesics, Oral pain medications.   PONV prophylaxis: Ondansetron (or other 5HT-3), Dexamethasone or Solumedrol     Comments:    80 yo for  LEFT Total Knee Arthroplasty under GA/LMA. Anesthesia risks and benefits discussed. Questions answered. Patient understands and agrees to proceed with anesthesia plan.       No nerve block            Hilton Hernandez MD

## 2021-06-10 ENCOUNTER — APPOINTMENT (OUTPATIENT)
Dept: GENERAL RADIOLOGY | Facility: CLINIC | Age: 81
End: 2021-06-10
Attending: PHYSICIAN ASSISTANT
Payer: COMMERCIAL

## 2021-06-10 ENCOUNTER — HOSPITAL ENCOUNTER (OUTPATIENT)
Facility: CLINIC | Age: 81
Discharge: HOME | End: 2021-06-11
Attending: ORTHOPAEDIC SURGERY | Admitting: ORTHOPAEDIC SURGERY
Payer: COMMERCIAL

## 2021-06-10 ENCOUNTER — ANESTHESIA (OUTPATIENT)
Dept: SURGERY | Facility: CLINIC | Age: 81
End: 2021-06-10
Payer: COMMERCIAL

## 2021-06-10 DIAGNOSIS — Z98.890 STATUS POST KNEE SURGERY: Primary | ICD-10-CM

## 2021-06-10 DIAGNOSIS — M17.12 ARTHRITIS OF LEFT KNEE: ICD-10-CM

## 2021-06-10 LAB
ABO + RH BLD: NORMAL
ABO + RH BLD: NORMAL
BLD GP AB SCN SERPL QL: NORMAL
BLOOD BANK CMNT PATIENT-IMP: NORMAL
CREAT SERPL-MCNC: 0.58 MG/DL (ref 0.52–1.04)
GFR SERPL CREATININE-BSD FRML MDRD: 86 ML/MIN/{1.73_M2}
GLUCOSE BLDC GLUCOMTR-MCNC: 96 MG/DL (ref 70–99)
SPECIMEN EXP DATE BLD: NORMAL

## 2021-06-10 PROCEDURE — 278N000051 HC OR IMPLANT GENERAL: Performed by: ORTHOPAEDIC SURGERY

## 2021-06-10 PROCEDURE — 999N000065 XR KNEE PORT LEFT 1/2 VIEWS: Mod: LT

## 2021-06-10 PROCEDURE — 272N000001 HC OR GENERAL SUPPLY STERILE: Performed by: ORTHOPAEDIC SURGERY

## 2021-06-10 PROCEDURE — 258N000003 HC RX IP 258 OP 636: Performed by: PHYSICIAN ASSISTANT

## 2021-06-10 PROCEDURE — 250N000011 HC RX IP 250 OP 636: Performed by: NURSE ANESTHETIST, CERTIFIED REGISTERED

## 2021-06-10 PROCEDURE — 360N000077 HC SURGERY LEVEL 4, PER MIN: Performed by: ORTHOPAEDIC SURGERY

## 2021-06-10 PROCEDURE — 86900 BLOOD TYPING SEROLOGIC ABO: CPT | Performed by: ORTHOPAEDIC SURGERY

## 2021-06-10 PROCEDURE — 250N000013 HC RX MED GY IP 250 OP 250 PS 637: Performed by: PHYSICIAN ASSISTANT

## 2021-06-10 PROCEDURE — 250N000024 HC ISOFLURANE, PER MIN: Performed by: ORTHOPAEDIC SURGERY

## 2021-06-10 PROCEDURE — 250N000009 HC RX 250: Performed by: NURSE ANESTHETIST, CERTIFIED REGISTERED

## 2021-06-10 PROCEDURE — 86901 BLOOD TYPING SEROLOGIC RH(D): CPT | Performed by: ORTHOPAEDIC SURGERY

## 2021-06-10 PROCEDURE — 86850 RBC ANTIBODY SCREEN: CPT | Performed by: ORTHOPAEDIC SURGERY

## 2021-06-10 PROCEDURE — 82565 ASSAY OF CREATININE: CPT | Performed by: PHYSICIAN ASSISTANT

## 2021-06-10 PROCEDURE — 258N000003 HC RX IP 258 OP 636: Performed by: ORTHOPAEDIC SURGERY

## 2021-06-10 PROCEDURE — 999N000141 HC STATISTIC PRE-PROCEDURE NURSING ASSESSMENT: Performed by: ORTHOPAEDIC SURGERY

## 2021-06-10 PROCEDURE — 36415 COLL VENOUS BLD VENIPUNCTURE: CPT | Performed by: PHYSICIAN ASSISTANT

## 2021-06-10 PROCEDURE — 82962 GLUCOSE BLOOD TEST: CPT

## 2021-06-10 PROCEDURE — 73560 X-RAY EXAM OF KNEE 1 OR 2: CPT | Mod: 26 | Performed by: RADIOLOGY

## 2021-06-10 PROCEDURE — 250N000025 HC SEVOFLURANE, PER MIN: Performed by: ORTHOPAEDIC SURGERY

## 2021-06-10 PROCEDURE — 250N000011 HC RX IP 250 OP 636: Performed by: ORTHOPAEDIC SURGERY

## 2021-06-10 PROCEDURE — 99202 OFFICE O/P NEW SF 15 MIN: CPT | Performed by: INTERNAL MEDICINE

## 2021-06-10 PROCEDURE — 36415 COLL VENOUS BLD VENIPUNCTURE: CPT | Performed by: ORTHOPAEDIC SURGERY

## 2021-06-10 PROCEDURE — 258N000001 HC RX 258: Performed by: ORTHOPAEDIC SURGERY

## 2021-06-10 PROCEDURE — 370N000017 HC ANESTHESIA TECHNICAL FEE, PER MIN: Performed by: ORTHOPAEDIC SURGERY

## 2021-06-10 PROCEDURE — 99207 PR CDG-CODE CATEGORY CHANGED: CPT | Performed by: INTERNAL MEDICINE

## 2021-06-10 PROCEDURE — 258N000003 HC RX IP 258 OP 636: Performed by: NURSE ANESTHETIST, CERTIFIED REGISTERED

## 2021-06-10 PROCEDURE — 250N000009 HC RX 250: Performed by: ORTHOPAEDIC SURGERY

## 2021-06-10 PROCEDURE — 710N000010 HC RECOVERY PHASE 1, LEVEL 2, PER MIN: Performed by: ORTHOPAEDIC SURGERY

## 2021-06-10 PROCEDURE — 250N000011 HC RX IP 250 OP 636: Performed by: PHYSICIAN ASSISTANT

## 2021-06-10 PROCEDURE — C1713 ANCHOR/SCREW BN/BN,TIS/BN: HCPCS | Performed by: ORTHOPAEDIC SURGERY

## 2021-06-10 PROCEDURE — 250N000013 HC RX MED GY IP 250 OP 250 PS 637: Performed by: ORTHOPAEDIC SURGERY

## 2021-06-10 PROCEDURE — C1776 JOINT DEVICE (IMPLANTABLE): HCPCS | Performed by: ORTHOPAEDIC SURGERY

## 2021-06-10 DEVICE — TIBIAL BEARING INSERT - CS
Type: IMPLANTABLE DEVICE | Site: KNEE | Status: FUNCTIONAL
Brand: TRIATHLON

## 2021-06-10 DEVICE — PRIMARY TIBIAL BASEPLATE
Type: IMPLANTABLE DEVICE | Site: KNEE | Status: FUNCTIONAL
Brand: TRIATHLON

## 2021-06-10 DEVICE — FULL DOSE BONE CEMENT, 10 PACK CATALOG NUMBER IS 6191-1-010
Type: IMPLANTABLE DEVICE | Site: KNEE | Status: FUNCTIONAL
Brand: SIMPLEX

## 2021-06-10 DEVICE — PATELLA
Type: IMPLANTABLE DEVICE | Site: KNEE | Status: FUNCTIONAL
Brand: TRIATHLON

## 2021-06-10 DEVICE — CRUCIATE RETAINING FEMORAL
Type: IMPLANTABLE DEVICE | Site: KNEE | Status: FUNCTIONAL
Brand: TRIATHLON

## 2021-06-10 RX ORDER — CEFAZOLIN SODIUM 2 G/100ML
2 INJECTION, SOLUTION INTRAVENOUS
Status: COMPLETED | OUTPATIENT
Start: 2021-06-10 | End: 2021-06-10

## 2021-06-10 RX ORDER — SODIUM CHLORIDE, SODIUM LACTATE, POTASSIUM CHLORIDE, CALCIUM CHLORIDE 600; 310; 30; 20 MG/100ML; MG/100ML; MG/100ML; MG/100ML
INJECTION, SOLUTION INTRAVENOUS CONTINUOUS
Status: DISCONTINUED | OUTPATIENT
Start: 2021-06-10 | End: 2021-06-10 | Stop reason: HOSPADM

## 2021-06-10 RX ORDER — LIDOCAINE 40 MG/G
CREAM TOPICAL
Status: DISCONTINUED | OUTPATIENT
Start: 2021-06-10 | End: 2021-06-11 | Stop reason: HOSPADM

## 2021-06-10 RX ORDER — LIDOCAINE HYDROCHLORIDE 20 MG/ML
INJECTION, SOLUTION INFILTRATION; PERINEURAL PRN
Status: DISCONTINUED | OUTPATIENT
Start: 2021-06-10 | End: 2021-06-10

## 2021-06-10 RX ORDER — TRANEXAMIC ACID 650 MG/1
1950 TABLET ORAL ONCE
Status: COMPLETED | OUTPATIENT
Start: 2021-06-10 | End: 2021-06-10

## 2021-06-10 RX ORDER — DEXAMETHASONE SODIUM PHOSPHATE 4 MG/ML
INJECTION, SOLUTION INTRA-ARTICULAR; INTRALESIONAL; INTRAMUSCULAR; INTRAVENOUS; SOFT TISSUE PRN
Status: DISCONTINUED | OUTPATIENT
Start: 2021-06-10 | End: 2021-06-10

## 2021-06-10 RX ORDER — ACETAMINOPHEN 325 MG/1
975 TABLET ORAL ONCE
Status: COMPLETED | OUTPATIENT
Start: 2021-06-10 | End: 2021-06-10

## 2021-06-10 RX ORDER — CEFAZOLIN SODIUM 2 G/100ML
2 INJECTION, SOLUTION INTRAVENOUS SEE ADMIN INSTRUCTIONS
Status: DISCONTINUED | OUTPATIENT
Start: 2021-06-10 | End: 2021-06-10 | Stop reason: HOSPADM

## 2021-06-10 RX ORDER — DOCUSATE SODIUM 100 MG/1
100 CAPSULE, LIQUID FILLED ORAL 2 TIMES DAILY
Status: DISCONTINUED | OUTPATIENT
Start: 2021-06-10 | End: 2021-06-11 | Stop reason: HOSPADM

## 2021-06-10 RX ORDER — FENTANYL CITRATE 50 UG/ML
25-50 INJECTION, SOLUTION INTRAMUSCULAR; INTRAVENOUS
Status: CANCELLED | OUTPATIENT
Start: 2021-06-10

## 2021-06-10 RX ORDER — GABAPENTIN 100 MG/1
200 CAPSULE ORAL 3 TIMES DAILY
Status: DISCONTINUED | OUTPATIENT
Start: 2021-06-10 | End: 2021-06-11 | Stop reason: HOSPADM

## 2021-06-10 RX ORDER — ONDANSETRON 2 MG/ML
4 INJECTION INTRAMUSCULAR; INTRAVENOUS EVERY 6 HOURS PRN
Status: DISCONTINUED | OUTPATIENT
Start: 2021-06-10 | End: 2021-06-11 | Stop reason: HOSPADM

## 2021-06-10 RX ORDER — AMOXICILLIN 250 MG
1 CAPSULE ORAL 2 TIMES DAILY
Status: DISCONTINUED | OUTPATIENT
Start: 2021-06-10 | End: 2021-06-11 | Stop reason: HOSPADM

## 2021-06-10 RX ORDER — MONTELUKAST SODIUM 10 MG/1
10 TABLET ORAL AT BEDTIME
Status: DISCONTINUED | OUTPATIENT
Start: 2021-06-10 | End: 2021-06-11 | Stop reason: HOSPADM

## 2021-06-10 RX ORDER — FENTANYL CITRATE 50 UG/ML
INJECTION, SOLUTION INTRAMUSCULAR; INTRAVENOUS PRN
Status: DISCONTINUED | OUTPATIENT
Start: 2021-06-10 | End: 2021-06-10

## 2021-06-10 RX ORDER — NALOXONE HYDROCHLORIDE 0.4 MG/ML
0.2 INJECTION, SOLUTION INTRAMUSCULAR; INTRAVENOUS; SUBCUTANEOUS
Status: DISCONTINUED | OUTPATIENT
Start: 2021-06-10 | End: 2021-06-11 | Stop reason: HOSPADM

## 2021-06-10 RX ORDER — HYDROMORPHONE HYDROCHLORIDE 1 MG/ML
0.2 INJECTION, SOLUTION INTRAMUSCULAR; INTRAVENOUS; SUBCUTANEOUS
Status: DISCONTINUED | OUTPATIENT
Start: 2021-06-10 | End: 2021-06-11 | Stop reason: HOSPADM

## 2021-06-10 RX ORDER — PROPOFOL 10 MG/ML
INJECTION, EMULSION INTRAVENOUS PRN
Status: DISCONTINUED | OUTPATIENT
Start: 2021-06-10 | End: 2021-06-10

## 2021-06-10 RX ORDER — NALOXONE HYDROCHLORIDE 0.4 MG/ML
0.4 INJECTION, SOLUTION INTRAMUSCULAR; INTRAVENOUS; SUBCUTANEOUS
Status: DISCONTINUED | OUTPATIENT
Start: 2021-06-10 | End: 2021-06-11 | Stop reason: HOSPADM

## 2021-06-10 RX ORDER — ONDANSETRON 2 MG/ML
4 INJECTION INTRAMUSCULAR; INTRAVENOUS EVERY 30 MIN PRN
Status: CANCELLED | OUTPATIENT
Start: 2021-06-10

## 2021-06-10 RX ORDER — ACETAMINOPHEN 325 MG/1
975 TABLET ORAL EVERY 8 HOURS
Status: DISCONTINUED | OUTPATIENT
Start: 2021-06-10 | End: 2021-06-11 | Stop reason: HOSPADM

## 2021-06-10 RX ORDER — SODIUM CHLORIDE, SODIUM LACTATE, POTASSIUM CHLORIDE, CALCIUM CHLORIDE 600; 310; 30; 20 MG/100ML; MG/100ML; MG/100ML; MG/100ML
INJECTION, SOLUTION INTRAVENOUS CONTINUOUS PRN
Status: DISCONTINUED | OUTPATIENT
Start: 2021-06-10 | End: 2021-06-10

## 2021-06-10 RX ORDER — ACETAMINOPHEN 325 MG/1
650 TABLET ORAL EVERY 4 HOURS PRN
Status: DISCONTINUED | OUTPATIENT
Start: 2021-06-13 | End: 2021-06-11 | Stop reason: HOSPADM

## 2021-06-10 RX ORDER — SODIUM CHLORIDE, SODIUM LACTATE, POTASSIUM CHLORIDE, CALCIUM CHLORIDE 600; 310; 30; 20 MG/100ML; MG/100ML; MG/100ML; MG/100ML
INJECTION, SOLUTION INTRAVENOUS CONTINUOUS
Status: DISCONTINUED | OUTPATIENT
Start: 2021-06-10 | End: 2021-06-11 | Stop reason: HOSPADM

## 2021-06-10 RX ORDER — MEPERIDINE HYDROCHLORIDE 25 MG/ML
12.5 INJECTION INTRAMUSCULAR; INTRAVENOUS; SUBCUTANEOUS
Status: CANCELLED | OUTPATIENT
Start: 2021-06-10

## 2021-06-10 RX ORDER — LEVOTHYROXINE SODIUM 88 UG/1
88 TABLET ORAL DAILY
Status: DISCONTINUED | OUTPATIENT
Start: 2021-06-11 | End: 2021-06-11 | Stop reason: HOSPADM

## 2021-06-10 RX ORDER — ASPIRIN 81 MG/1
81 TABLET ORAL DAILY
Status: DISCONTINUED | OUTPATIENT
Start: 2021-06-10 | End: 2021-06-11 | Stop reason: HOSPADM

## 2021-06-10 RX ORDER — ONDANSETRON 4 MG/1
4 TABLET, ORALLY DISINTEGRATING ORAL EVERY 6 HOURS PRN
Status: DISCONTINUED | OUTPATIENT
Start: 2021-06-10 | End: 2021-06-11 | Stop reason: HOSPADM

## 2021-06-10 RX ORDER — NALOXONE HYDROCHLORIDE 0.4 MG/ML
0.2 INJECTION, SOLUTION INTRAMUSCULAR; INTRAVENOUS; SUBCUTANEOUS
Status: CANCELLED | OUTPATIENT
Start: 2021-06-10

## 2021-06-10 RX ORDER — MORPHINE SULFATE 1 MG/ML
INJECTION, SOLUTION EPIDURAL; INTRATHECAL; INTRAVENOUS PRN
Status: DISCONTINUED | OUTPATIENT
Start: 2021-06-10 | End: 2021-06-10

## 2021-06-10 RX ORDER — ONDANSETRON 4 MG/1
4 TABLET, ORALLY DISINTEGRATING ORAL EVERY 30 MIN PRN
Status: CANCELLED | OUTPATIENT
Start: 2021-06-10

## 2021-06-10 RX ORDER — NALOXONE HYDROCHLORIDE 0.4 MG/ML
0.4 INJECTION, SOLUTION INTRAMUSCULAR; INTRAVENOUS; SUBCUTANEOUS
Status: CANCELLED | OUTPATIENT
Start: 2021-06-10

## 2021-06-10 RX ORDER — LIDOCAINE 40 MG/G
CREAM TOPICAL
Status: DISCONTINUED | OUTPATIENT
Start: 2021-06-10 | End: 2021-06-10 | Stop reason: HOSPADM

## 2021-06-10 RX ORDER — POLYETHYLENE GLYCOL 3350 17 G/17G
17 POWDER, FOR SOLUTION ORAL DAILY
Status: DISCONTINUED | OUTPATIENT
Start: 2021-06-11 | End: 2021-06-11 | Stop reason: HOSPADM

## 2021-06-10 RX ORDER — OXYCODONE HYDROCHLORIDE 10 MG/1
10 TABLET ORAL EVERY 4 HOURS PRN
Status: DISCONTINUED | OUTPATIENT
Start: 2021-06-10 | End: 2021-06-11 | Stop reason: HOSPADM

## 2021-06-10 RX ORDER — HYDROMORPHONE HYDROCHLORIDE 1 MG/ML
0.4 INJECTION, SOLUTION INTRAMUSCULAR; INTRAVENOUS; SUBCUTANEOUS
Status: DISCONTINUED | OUTPATIENT
Start: 2021-06-10 | End: 2021-06-11 | Stop reason: HOSPADM

## 2021-06-10 RX ORDER — PROCHLORPERAZINE MALEATE 5 MG
5 TABLET ORAL EVERY 6 HOURS PRN
Status: DISCONTINUED | OUTPATIENT
Start: 2021-06-10 | End: 2021-06-11 | Stop reason: HOSPADM

## 2021-06-10 RX ORDER — BISACODYL 10 MG
10 SUPPOSITORY, RECTAL RECTAL DAILY PRN
Status: DISCONTINUED | OUTPATIENT
Start: 2021-06-10 | End: 2021-06-11 | Stop reason: HOSPADM

## 2021-06-10 RX ORDER — EPHEDRINE SULFATE 50 MG/ML
INJECTION, SOLUTION INTRAMUSCULAR; INTRAVENOUS; SUBCUTANEOUS PRN
Status: DISCONTINUED | OUTPATIENT
Start: 2021-06-10 | End: 2021-06-10

## 2021-06-10 RX ORDER — ONDANSETRON 2 MG/ML
INJECTION INTRAMUSCULAR; INTRAVENOUS PRN
Status: DISCONTINUED | OUTPATIENT
Start: 2021-06-10 | End: 2021-06-10

## 2021-06-10 RX ORDER — SODIUM CHLORIDE, SODIUM LACTATE, POTASSIUM CHLORIDE, CALCIUM CHLORIDE 600; 310; 30; 20 MG/100ML; MG/100ML; MG/100ML; MG/100ML
INJECTION, SOLUTION INTRAVENOUS CONTINUOUS
Status: CANCELLED | OUTPATIENT
Start: 2021-06-10

## 2021-06-10 RX ORDER — CEFAZOLIN SODIUM 2 G/100ML
2 INJECTION, SOLUTION INTRAVENOUS EVERY 8 HOURS
Status: COMPLETED | OUTPATIENT
Start: 2021-06-10 | End: 2021-06-11

## 2021-06-10 RX ORDER — OXYCODONE HYDROCHLORIDE 5 MG/1
5 TABLET ORAL EVERY 4 HOURS PRN
Status: DISCONTINUED | OUTPATIENT
Start: 2021-06-10 | End: 2021-06-11 | Stop reason: HOSPADM

## 2021-06-10 RX ADMIN — SODIUM CHLORIDE, POTASSIUM CHLORIDE, SODIUM LACTATE AND CALCIUM CHLORIDE: 600; 310; 30; 20 INJECTION, SOLUTION INTRAVENOUS at 11:36

## 2021-06-10 RX ADMIN — MONTELUKAST 10 MG: 10 TABLET, FILM COATED ORAL at 21:56

## 2021-06-10 RX ADMIN — FENTANYL CITRATE 50 MCG: 50 INJECTION, SOLUTION INTRAMUSCULAR; INTRAVENOUS at 11:41

## 2021-06-10 RX ADMIN — ACETAMINOPHEN 975 MG: 325 TABLET, FILM COATED ORAL at 15:32

## 2021-06-10 RX ADMIN — PROPOFOL 150 MG: 10 INJECTION, EMULSION INTRAVENOUS at 11:44

## 2021-06-10 RX ADMIN — FENTANYL CITRATE 25 MCG: 50 INJECTION, SOLUTION INTRAMUSCULAR; INTRAVENOUS at 12:23

## 2021-06-10 RX ADMIN — CEFAZOLIN SODIUM 2 G: 2 INJECTION, SOLUTION INTRAVENOUS at 20:10

## 2021-06-10 RX ADMIN — SODIUM CHLORIDE, POTASSIUM CHLORIDE, SODIUM LACTATE AND CALCIUM CHLORIDE: 600; 310; 30; 20 INJECTION, SOLUTION INTRAVENOUS at 16:50

## 2021-06-10 RX ADMIN — FENTANYL CITRATE 25 MCG: 50 INJECTION, SOLUTION INTRAMUSCULAR; INTRAVENOUS at 12:17

## 2021-06-10 RX ADMIN — Medication 0.6 MG: at 14:08

## 2021-06-10 RX ADMIN — Medication 0.4 MG: at 14:09

## 2021-06-10 RX ADMIN — PHENYLEPHRINE HYDROCHLORIDE 100 MCG: 10 INJECTION INTRAVENOUS at 12:06

## 2021-06-10 RX ADMIN — GABAPENTIN 200 MG: 100 CAPSULE ORAL at 15:32

## 2021-06-10 RX ADMIN — PHENYLEPHRINE HYDROCHLORIDE 100 MCG: 10 INJECTION INTRAVENOUS at 11:54

## 2021-06-10 RX ADMIN — PHENYLEPHRINE HYDROCHLORIDE 0.2 MCG/KG/MIN: 10 INJECTION INTRAVENOUS at 12:12

## 2021-06-10 RX ADMIN — ASPIRIN 81 MG: 81 TABLET ORAL at 18:27

## 2021-06-10 RX ADMIN — DEXAMETHASONE SODIUM PHOSPHATE 8 MG: 4 INJECTION, SOLUTION INTRAMUSCULAR; INTRAVENOUS at 12:04

## 2021-06-10 RX ADMIN — GABAPENTIN 200 MG: 100 CAPSULE ORAL at 20:10

## 2021-06-10 RX ADMIN — ACETAMINOPHEN 975 MG: 325 TABLET, FILM COATED ORAL at 21:56

## 2021-06-10 RX ADMIN — SODIUM CHLORIDE, POTASSIUM CHLORIDE, SODIUM LACTATE AND CALCIUM CHLORIDE: 600; 310; 30; 20 INJECTION, SOLUTION INTRAVENOUS at 13:38

## 2021-06-10 RX ADMIN — SUGAMMADEX 160 MG: 100 INJECTION, SOLUTION INTRAVENOUS at 14:36

## 2021-06-10 RX ADMIN — OXYCODONE HYDROCHLORIDE 5 MG: 5 TABLET ORAL at 15:33

## 2021-06-10 RX ADMIN — Medication 5 MG: at 11:51

## 2021-06-10 RX ADMIN — Medication 2 G: at 12:00

## 2021-06-10 RX ADMIN — LIDOCAINE HYDROCHLORIDE 80 MG: 20 INJECTION, SOLUTION INFILTRATION; PERINEURAL at 11:44

## 2021-06-10 RX ADMIN — ONDANSETRON 4 MG: 2 INJECTION INTRAMUSCULAR; INTRAVENOUS at 12:04

## 2021-06-10 RX ADMIN — ROCURONIUM BROMIDE 50 MG: 10 INJECTION INTRAVENOUS at 11:44

## 2021-06-10 RX ADMIN — TRANEXAMIC ACID 1950 MG: 650 TABLET ORAL at 09:51

## 2021-06-10 RX ADMIN — MIDAZOLAM 2 MG: 1 INJECTION INTRAMUSCULAR; INTRAVENOUS at 11:37

## 2021-06-10 RX ADMIN — ACETAMINOPHEN 975 MG: 325 TABLET, FILM COATED ORAL at 09:30

## 2021-06-10 ASSESSMENT — MIFFLIN-ST. JEOR: SCORE: 1227.25

## 2021-06-10 NOTE — BRIEF OP NOTE
Orthopaedic Surgery Brief Op-Note      Patient: Pavithra Laurent  : 1940  Date of Service: 6/10/2021 3:01 PM    Pre-operative Diagnosis: Arthritis of left knee [M17.12]  Post-operative Diagnosis: same    Procedure(s) Performed: Procedure(s):  LEFT Total Knee Arthroplasty    Staff: Dr. Jimenez  Assistants:   Denisa Cantor, PGY4  Gennaro Nina PA-C    Anesthesia: General  EBL: 50 cc  UOP: see anesthesia record  Tourniquet Time: 102 minutes at 250 mmHg    Implants:   Implant Name Type Inv. Item Serial No.  Lot No. LRB No. Used Action   BONE CEMENT SIMPLEX FULL DOSE 6191-1-001 - [id number] Cement, Bone BONE CEMENT SIMPLEX FULL DOSE 6191-1-001  [employer] [id number] Left 1 Implanted   IMP BASEPLATE TIBIAL HOWM TRI 4 5520-B-400 - [id number] Total Joint Component/Insert IMP BASEPLATE TIBIAL HOWM TRI 4 5520-B-400  [employer] [id number] Left 1 Implanted   IMP COMP PATELLA HOWM ASYM TRI [id number] 5551-L-320 - [id number] Total Joint Component/Insert IMP COMP PATELLA HOWM ASYM TRI [id number] 5551-L-320  [employer] [id number] Left 1 Implanted   IMP COMP FEM STRK TRIATHLN CR LT 3 5510-F-301 - [id number] Total Joint Component/Insert IMP COMP FEM STRK TRIATHLN CR LT 3 5510-F-301  [employer] [id number] Left 1 Implanted   TIBIAL BEARING INSERT CS SIZE 4 THICKNESS 9 MM Total Joint Component/Insert   [employer] [id number] Left 1 Implanted     Drains: hemovac  Intra-op Labs/Cxs/Specimens: None  Complications: No apparent complications during procedure  Findings: Please see dictated operative note for details    Disposition: Stable to PACU, then admit to Orthopaedics.    Post-Op Plan:  Assessment/Plan: Pavithra Laurent is a 81 year old female s/p Procedure(s):  LEFT Total Knee Arthroplasty on 6/10/2021 with Dr. Jimenez.    Activity: Up with assist and assistive devices as needed until independent. Knee ROM as tolerated. Advance CPM as tolerated to goal of 0 to 90 degrees.  Weight bearing status: WBAT     Antibiotics: Cefazolin x 24 hours  Diet: Begin with clear fluids and progress diet as tolerated. Bowel regimen. Anti-emetics PRN.    DVT prophylaxis: Enoxaparin daily x 10 days, beginning on morning of POD 1 per Dr. Jimenez; continue aspirin 81mg daily  Elevation: Elevate heels off of bed on pillows, no pillows behind the knee at any time    Wound Care: No dressing change until POD #7  Drains: Document output per shift, Ortho will discontinue on POD #1-2.    Pain management: Orals PRN, IV for breakthrough only  X-rays: AP/Lat operative knee XR in PACU  Physical Therapy: Mobilization, ROM, ADL's  Occupational Therapy: ADL's  Labs: Trend Hgb on PODs #1 & 2  Cultures: None  Consults: PT, OT. Hospitalist, appreciate assistance in caring for this patient throughout the perioperative period    Future Appointments   Date Time Provider Department Center   6/11/2021  9:45 AM Arabella Rivero Pt, PT [facility]   6/11/2021  1:00 PM Renée Johnson OT [facility]   6/11/2021  1:45 PM Arabella Rivero Pt, PT [facility]   6/14/2021  2:00 PM Yanni Rehman R, PT [facility]   6/16/2021  3:20 PM Yanni Rehman R, PT [facility]   6/17/2021  2:00 PM Yanni Rehman R, PT [facility]   6/18/2021  2:00 PM Yanni Rehman R, PT [facility]   6/21/2021  2:00 PM SchPhil gregorycy R, PT [facility]   6/23/2021  2:00 PM Yanni Rehman R, PT [facility]   6/28/2021  2:00 PM Yanni Rehman R, PT [facility]   6/30/2021  2:00 PM SchYanni gregory R, PT [facility]   7/2/2021  2:00 PM Yanni Rehman R, PT [facility]   7/6/2021  1:20 PM Isela Jimenez MD [facility]   7/27/2021 11:30 AM Lore Krishnamurthy MD [facility]   8/10/2021  1:20 PM Isela Jimenez MD [facility]       Disposition: Pending progress with therapies, pain control on orals, and medical stability, anticipate discharge to Home vs TCU on POD #1-2.    I assisted with positioning, prepping and  draping, and closure.      Gennaro Nina PA-C 6/10/2021 3:01 PM  Orthopaedic Surgery     Please page me at [phone] with any questions/concerns during regular weekday hours before 5pm. If there is no response, if it is a weekend, or if it is during evening hours then please page the orthopaedic surgery resident on call.

## 2021-06-10 NOTE — ANESTHESIA PROCEDURE NOTES
Airway       Patient location during procedure: OR  Staff -        CRNA: Sudeep Barrios APRN CRNA       Performed By: CRNA  Consent for Airway        Urgency: elective  Indications and Patient Condition       Indications for airway management: jos-procedural       Induction type:intravenous       Mask difficulty assessment: 2 - vent by mask + OA or adjuvant +/- NMBA    Final Airway Details       Final airway type: endotracheal airway       Successful airway: ETT - single and Oral  Endotracheal Airway Details        ETT size (mm): 6.5       Cuffed: yes       Successful intubation technique: direct laryngoscopy       DL Blade Type: Verma 2       Grade View of Cords: 1       Adjucts: stylet       Position: Right       Measured from: lips       Secured at (cm): 20       Bite block used: None    Post intubation assessment        Placement verified by: capnometry, equal breath sounds and chest rise        Number of attempts at approach: 1       Number of other approaches attempted: 0       Ease of procedure: easy       Dentition: Intact and Unchanged    Medication(s) Administered   Medication Administration Time: 6/10/2021 11:49 AM

## 2021-06-10 NOTE — PHARMACY
"The following home medications were NOT continued on inpatient admission per \"Discontinuation of nonessential home medications during hospitalization\" policy:     Viactiv supplement    If a therapeutic holiday is deemed inappropriate per the prescriber, please notify the pharmacist regarding the medication order.    The pharmacist is available to answer any questions and/or concerns the patient may have regarding discontinuation of non-essential medications.    Please ensure that these medications are restarted as needed upon discharge via the medication reconciliation discharge process and included on the discharge medication reconciliation report.    Thank you,  Pamela Hester [facility]    "

## 2021-06-10 NOTE — CONSULTS
HOSPITALIST INITIAL CONSULT NOTE    Referring Provider:  Isela Jimenez MD      Reason for Consult         History of Present Illness     Pavithra Laurent is 81 year old year old female with hx of Sjogren's disease, DVT LLE, TIA, Hypothyroidism, GERD is being admitted s/p Left TKA on 06/10/2021    EBL 50 ml    Currently reports doing well. No nausea, vomiting, chest pain, shortness of breath, lightheadedness or dizziness            Past Medical History     Past Medical History:   Diagnosis Date     Allergic rhinitis      Calculus of gallbladder without mention of cholecystitis or obstruction      Cataract of both eyes      Deep vein thrombosis (DVT) of left lower extremity, unspecified chronicity, unspecified vein (H) 1/30/2020     Dry eye syndrome      Environmental allergies      Gastro-oesophageal reflux disease      GERD (gastroesophageal reflux disease) 5/24/2013     Hypothyroid      Pain in joint, shoulder region      Rosacea      Thoracic outlet syndrome           Past Surgical History     Past Surgical History:   Procedure Laterality Date     ARTHROTOMY ELBOW Right 1/7/2015    Procedure: ARTHROTOMY ELBOW;  Surgeon: Branden Meyer MD;  Location: [facility] TONSIL/PILLARS  1948    tonsillectomy     CLOSED REDUCTION UPPER EXTREMITY  12/26/2014    Procedure: CLOSED REDUCTION UPPER EXTREMITY;  Surgeon: Louis Daniel MD;  Location:  OR     GYN SURGERY  1984    tubal ligation     HERNIORRHAPHY VENTRAL N/A 6/18/2019    Procedure: OPEN VENTRAL HERNIA REPAIR WITH MESH;  Surgeon: Last Solis MD;  Location:  OR     IRRIGATION AND DEBRIDEMENT HAND, COMBINED  12/26/2014    Procedure: COMBINED IRRIGATION AND DEBRIDEMENT HAND;  Surgeon: Louis Daniel MD;  Location:  OR     KNEE SURGERY  2001    arthroscopic right     LAPAROSCOPIC CHOLECYSTECTOMY  8/16/2012    Procedure: LAPAROSCOPIC CHOLECYSTECTOMY;  LAPAROSCOPIC CHOLECYSTECTOMY ;  Surgeon: Preston Walters MD;  Location: [facility]     OPEN  REDUCTION INTERNAL FIXATION FOREARM Right 12/26/2014    Procedure: OPEN REDUCTION INTERNAL FIXATION FOREARM;  Surgeon: Louis Daniel MD;  Location:  OR     ORTHOPEDIC SURGERY  2007,2008    bunyanectomy, hammer toe, torn meniscus, toe and knee replacement     TKA  2009    right knee     WISDOM TEETH EXTRACTION  1958          Medications     All his current medications are reviewed in current medication section of [facility].  Home medications are reviewed.  Current Facility-Administered Medications   Medication     [START ON 6/13/2021] acetaminophen (TYLENOL) tablet 650 mg     acetaminophen (TYLENOL) tablet 975 mg     aspirin EC tablet 81 mg     benzocaine-menthol (CEPACOL) 15-3.6 MG lozenge 1 lozenge     bisacodyl (DULCOLAX) Suppository 10 mg     ceFAZolin (ANCEF) intermittent infusion 2 g in 100 mL dextrose PRE-MIX     docusate sodium (COLACE) capsule 100 mg     [START ON 6/11/2021] enoxaparin ANTICOAGULANT (LOVENOX) injection 40 mg     gabapentin (NEURONTIN) capsule 200 mg     HYDROmorphone (PF) (DILAUDID) injection 0.2 mg    Or     HYDROmorphone (PF) (DILAUDID) injection 0.4 mg     lactated ringers infusion     [START ON 6/11/2021] levothyroxine (SYNTHROID/LEVOTHROID) tablet 88 mcg     lidocaine (LMX4) cream     lidocaine 1 % 0.1-1 mL     magnesium hydroxide (MILK OF MAGNESIA) suspension 30 mL     montelukast (SINGULAIR) tablet 10 mg     naloxone (NARCAN) injection 0.2 mg    Or     naloxone (NARCAN) injection 0.4 mg    Or     naloxone (NARCAN) injection 0.2 mg    Or     naloxone (NARCAN) injection 0.4 mg     ondansetron (ZOFRAN-ODT) ODT tab 4 mg    Or     ondansetron (ZOFRAN) injection 4 mg     oxyCODONE (ROXICODONE) tablet 5 mg    Or     oxyCODONE IR (ROXICODONE) tablet 10 mg     [START ON 6/11/2021] polyethylene glycol (MIRALAX) Packet 17 g     prochlorperazine (COMPAZINE) injection 5 mg    Or     prochlorperazine (COMPAZINE) tablet 5 mg     senna-docusate (SENOKOT-S/PERICOLACE) 8.6-50 MG per tablet 1 tablet      sodium chloride (PF) 0.9% PF flush 3 mL     sodium chloride (PF) 0.9% PF flush 3 mL     sodium phosphate (FLEET ENEMA) 1 enema        Allergies        Allergies   Allergen Reactions     Birch Trees      Codeine Sulfate Nausea and Vomiting     Oral, topical is ok     Dust Mites      Erythromycin Nausea and Vomiting     With oral use     Hydromorphone Other (See Comments)     nightmares     Other Environmental Allergy Itching     Coban        Family History     Family History   Problem Relation Age of Onset     Heart Disease Mother         MI at 72     Cerebrovascular Disease Mother      Cancer Mother         lung     Alcohol/Drug Father      Depression Father      Cardiovascular Father      Heart Disease Paternal Grandmother      Heart Disease Paternal Grandfather      Cancer Maternal Grandmother         stomach     Cancer - colorectal Other      Neurologic Disorder Son         brain injury     Clotting Disorder (Unknown) Son      Unknown/Adopted Maternal Grandfather           Social History     Social History     Socioeconomic History     Marital status:      Spouse name: Not on file     Number of children: Not on file     Years of education: Not on file     Highest education level: Not on file   Occupational History     Not on file   Social Needs     Financial resource strain: Not on file     Food insecurity     Worry: Not on file     Inability: Not on file     Transportation needs     Medical: Not on file     Non-medical: Not on file   Tobacco Use     Smoking status: Never Smoker     Smokeless tobacco: Never Used   Substance and Sexual Activity     Alcohol use: Yes     Alcohol/week: 0.0 standard drinks     Comment: rare- 2/month     Drug use: No     Sexual activity: Not Currently     Partners: Male     Birth control/protection: Surgical     Comment: bilateral tubal ligation   Lifestyle     Physical activity     Days per week: Not on file     Minutes per session: Not on file     Stress: Not on file  "  Relationships     Social connections     Talks on phone: Not on file     Gets together: Not on file     Attends [employer] service: Not on file     Active member of club or organization: Not on file     Attends meetings of clubs or organizations: Not on file     Relationship status: Not on file     Intimate partner violence     Fear of current or ex partner: Not on file     Emotionally abused: Not on file     Physically abused: Not on file     Forced sexual activity: Not on file   Other Topics Concern     Parent/sibling w/ CABG, MI or angioplasty before 65F 55M? Yes      Service Not Asked     Blood Transfusions Not Asked     Caffeine Concern Not Asked     Occupational Exposure Not Asked     Hobby Hazards Not Asked     Sleep Concern Not Asked     Stress Concern Not Asked     Weight Concern Not Asked     Special Diet Not Asked     Back Care Not Asked     Exercise Not Asked     Bike Helmet Not Asked     Seat Belt Yes     Self-Exams Not Asked   Social History Narrative     Not on file      Tobacco:  Alcohol:  Illicit Drug:      Review of Systems   A 10 point review of systems was taken and largely negative except for that which was stated above.      Physical Exam       Vital signs:    Blood pressure 133/65, pulse 72, temperature 95.7  F (35.4  C), temperature source Oral, resp. rate 16, height 1.575 m (5' 2\"), weight 80.9 kg (178 lb 5.6 oz), SpO2 94 %, not currently breastfeeding.  Estimated body mass index is 32.62 kg/m  as calculated from the following:    Height as of this encounter: 1.575 m (5' 2\").    Weight as of this encounter: 80.9 kg (178 lb 5.6 oz).      Intake/Output Summary (Last 24 hours) at 6/10/2021 3584  Last data filed at 6/10/2021 1500  Gross per 24 hour   Intake 1370 ml   Output 50 ml   Net 1320 ml      Constitutional: Laying in bed in no acute distress  Eye: No icterus, no pallor  Mouth/ENT: Normal oral mucosa  Cardiovascular: S1, S2 normal  Respiratory: B/L CTA  GI: Soft, NT, BS+  : No " guzman's catheter  Neurology: Alert, awake, and oriented. No tremors  Psych:   MSK: Left knee dressing in place  Integumentary:   Heme/Lymph/Imm:        Laboratory and Imaging Studies     Laboratory and Imaging studies reviewed in the results review section of Epic. Pertinent studies are as below:    CMPNo lab results found in last 7 days.  CBCNo lab results found in last 7 days.  INRNo lab results found in last 7 days.  Arterial Blood GasNo lab results found in last 7 days.       Impression/Recommendations     81 year old year old female with hx of Sjogren's disease, DVT LLE, TIA, Hypothyroidism, GERD is being admitted s/p Left TKA on 06/10/2021      # s/p Left TKA on 06/10/2021:  On Analgesics, and dressing in place  - Wound cares, Dressings, Surgical pain management, DVT Prophylaxis  per primary team.   - Monitor anemia, hemodynamics, Input/Output  - Monitor Capnogrphy  - Encourage Incentive spirometry  - Laxatives for constipation prophylaxis     # Hx of TIA: PTA on Aspirin  - Restart ASAP when okay with surgery    # Hx of DVT LLE: Likely diagnosed on 01/2020. Repeat US in 03/21 with NO DVT  -  DVT prophylaxis per Ortho protocol.     # Hypothyroidism: On Levothyroxine  - Continue     # Hx of Allergies: PTA on Montelukast, Flonase, Azelastine nasal spry  - Continue     # Hx of mild Sjogren's syndrome: Follows with Rheumatology. Not on any treatmetn              Jun Carter  Internal Medicine/Hospitalist  [facility]  [phone]

## 2021-06-10 NOTE — ANESTHESIA CARE TRANSFER NOTE
Patient: Pavithra Laurent    Procedure(s):  LEFT Total Knee Arthroplasty    Diagnosis: Arthritis of left knee [M17.12]  Diagnosis Additional Information: No value filed.    Anesthesia Type:   General     Note:    Oropharynx: spontaneously breathing  Level of Consciousness: awake  Oxygen Supplementation: face mask  Level of Supplemental Oxygen (L/min / FiO2): 10  Independent Airway: airway patency satisfactory and stable  Dentition: dentition unchanged  Vital Signs Stable: post-procedure vital signs reviewed and stable  Report to RN Given: handoff report given  Patient transferred to: PACU    Handoff Report: Identifed the Patient, Identified the Reponsible Provider, Reviewed the pertinent medical history, Discussed the surgical course, Reviewed Intra-OP anesthesia mangement and issues during anesthesia, Set expectations for post-procedure period and Allowed opportunity for questions and acknowledgement of understanding      Vitals: (Last set prior to Anesthesia Care Transfer)  CRNA VITALS  6/10/2021 1420 - 6/10/2021 1457      6/10/2021             Temp:  36.8  C (98.2  F)     ax        Electronically Signed By: ASPEN Cruz CRNA  Anupama 10, 2021  2:57 PM

## 2021-06-10 NOTE — OR NURSING
PACU to Inpatient Nursing Handoff    Patient Pavithra Laurent is a 81 year old female who speaks English.   Procedure Procedure(s):  LEFT Total Knee Arthroplasty   Surgeon(s) Primary: Isela Jimenez MD  Assisting: Gennaro Nina PA-C  Resident - Assisting: Denisa Cantor MD     Allergies   Allergen Reactions     Birch Trees      Codeine Sulfate Nausea and Vomiting     Oral, topical is ok     Dust Mites      Erythromycin Nausea and Vomiting     With oral use     Hydromorphone Other (See Comments)     nightmares     Other Environmental Allergy Itching     Coban       Isolation  [unfilled]     Past Medical History   has a past medical history of Allergic rhinitis, Calculus of gallbladder without mention of cholecystitis or obstruction, Cataract of both eyes, Deep vein thrombosis (DVT) of left lower extremity, unspecified chronicity, unspecified vein (H) (1/30/2020), Dry eye syndrome, Environmental allergies, Gastro-oesophageal reflux disease, GERD (gastroesophageal reflux disease) (5/24/2013), Hypothyroid, Pain in joint, shoulder region, Rosacea, and Thoracic outlet syndrome.    Anesthesia General   Dermatome Level     Preop Meds acetaminophen (Tylenol) - time given: 0930  Lysteda 1950 mg - time given: 0951   Nerve block Not applicable   Intraop Meds dexamethasone (Decadron)  fentanyl (Sublimaze): 100 mcg total  morphine (IV): 1 mg total  ondansetron (Zofran): last given at 1204  ephedrine, phenylephrine and sugamadex   Local Meds Yes - Local Cocktail (morphine, ropivacaine, epinephrine, Toradol)   Antibiotics cefazolin (Ancef) - last given at 1200     Pain Patient Currently in Pain: denies   PACU meds  acetaminophen (Tylenol): 975 mg (total dose) last given at 1532   oxycodone (Roxicodone): 5 mg (total dose) last given at 1532   gabapentin 200 mg at 1532   PCA / epidural No   Capnography     Telemetry ECG Rhythm: Normal sinus rhythm   Inpatient Telemetry Monitor Ordered? Yes        Labs Glucose Lab  Results   Component Value Date    GLC 90 05/26/2021       Hgb Lab Results   Component Value Date    HGB 13.7 05/26/2021       INR Lab Results   Component Value Date    INR 1.02 10/30/2018      PACU Imaging Completed     Wound/Incision Incision/Surgical Site 06/10/21 Left Knee (Active)   Incision Assessment UTV 06/10/21 1453   Aby-Incision Assessment UTV 06/10/21 1453   Closure FABIEN 06/10/21 1453   Incision Drainage Amount None 06/10/21 1453   Dressing Intervention Clean, dry, intact 06/10/21 1453   Number of days: 0      CMS        Equipment ice pack and continuous passive motion machine (CPM) (CPM at bedside, not hooked up yet, waiting for x-ray)   Other LDA       IV Access Peripheral IV 06/10/21 Right Hand (Active)   Site Assessment WDL 06/10/21 1453   Line Status Infusing 06/10/21 1453   Dressing Intervention New dressing  06/10/21 1014   Phlebitis Scale 0-->no symptoms 06/10/21 1453   Infiltration Scale 0 06/10/21 1453   Number of days: 0      Blood Products Not applicable EBL 50 mL   Intake/Output Date 06/10/21 0700 - 06/11/21 0659   Shift 4737-5747 9785-8486 8978-1160 24 Hour Total   INTAKE   P.O.  120  120   I.V. 1250   1250   Shift Total(mL/kg) 1250(15.45) 120(1.48)  1370(16.93)   OUTPUT   Blood 50   50   Shift Total(mL/kg) 50(0.62)   50(0.62)   Weight (kg) 80.9 80.9 80.9 80.9      Drains / Torres Closed/Suction Drain 1 Left Knee Accordion 10 German (Active)   Site Description UTV 06/10/21 1453   Dressing Status Normal: Clean, Dry & Intact 06/10/21 1453   Drainage Appearance Serosanguenous 06/10/21 1453   Status To bulb suction 06/10/21 1453   Number of days: 0      Time of void PreOp Void Prior to Procedure: 1135 (06/10/21 1132)    PostOp      Diapered? No   Bladder Scan      mL(water and ice chips) (06/10/21 1500)  tolerating sips and crackers     Vitals    B/P: 123/63  T: 98.2  F (36.8  C)    Temp src: Axillary  P:  Pulse: 95 (06/10/21 1500)          R: 16  O2:  SpO2: 96 %    O2 Device: Nasal cannula  (06/10/21 1500)    Oxygen Delivery: 2 LPM (06/10/21 1500)         Family/support present daughter in patient lounge and updated via volunteer   Patient belongings     Patient transported on bed   DC meds/scripts (obs/outpt) Not applicable   Inpatient Pain Meds Released? Yes       Special needs/considerations None   Tasks needing completion None       Arjun Merino, RN  [facility] [id number]

## 2021-06-10 NOTE — OR NURSING
Pt has h/o DVT, was on Coumadin for 6 months after, only takes baby ASA now daily.  Denies h/o TIA.  TXA ordered preop, Dr. Jimenez states pt may take preop.

## 2021-06-10 NOTE — OP NOTE
PREOPERATIVE DIAGNOSES: Left Knee Osteoarthritis,            Genu Valgum            BMI 33           Mild Flexion Contracture           S/P Right total knee arthroplasty 2009    POSTOPERATIVE DIAGNOSES:  Same       PROCEDURES:     Total knee arthoplasty left        COMPONENTS USED:  Marie Triathalon System,   #3 CR femur,#4 tibia, 9mm CS  tibial insert, [id number] patella       OPERATORS:  Isela Jimenez MD     ASSISTANTS: Alexandra Cantor MD; Gennaro Nina PA-C     ANESTHESIA:  General mask supplemented with Aby-articular Injection    Exam under anesthesia revealed range of motion 10 degrees to 120 degrees     OPERATIVE FINDINGS: Grade 4 MFC, grade 4MTP, grade 1  LFC, grade 1 LTP, grade 3-4 patella, grade2 trochlea     DESCRIPTION OF PROCEDURE:  Under General endotracheal anesthesia, the patient was positioned supine on the operating room table.   The patient's leg was prepped and draped in usual sterile fashion.  A pause was performed identifying the correct leg and administration of antibiotics.      A tourniquet, which was on the upper aspect of the patient's leg was elevated   to 250 mmHg after Esmarch exsanguination of the leg.  A midline incision   was used to enter the knee.  A vastus splitting incision was used to enter the joint.    Findings as above.      The procedure began with soft tissue balancing by  a  moderate release of the  medial side down to but not including the distal  MCL.    We used an intramedullary femoral guide set at 6 degrees of anatomic valgus and making a distal cut of 10 mm.  We then made  the appropriate anterior and posterior cuts to fit a #3 femur.      We then turned our attention to the tibia.  We used an extramedullary guide set at neutral  and made a minimal cut on the bone on the medial tibial side.  We used the tensioning device to evaluate balance between the collaterals and between flexion and extension and felt that we felt we had good balance between the  collaterals and between flexion and extension.    We made the appropriate chamfer cuts on the femur to fit it for a CR femur.     We then returned to the tibia and fit the tibia to a #4 tibia base plate.      We then turned attention to the patella.  Total thickness was 16-21 mm.  We   removed bone to a 14 mm thickness and put an asymmetric patella button  for resulting thickness of 24 mm.  We did a minimal jos-retinacular release.  With all components in place, we did a trial  range of motion and felt we had good balance, good tracking of the patella through a passive range of motion.      We then removed the trial prosthesis, brought the permanent prosthesis in the room, pulse lavaged the knee with antibiotic irrigation.  Cemented all components at once in the following order; tibia, femur, patella.  Excess cement was removed both during and after the hardening process.  For the bulk of the hardening process, the leg was held in full extension with a trial insert in place.  Once we verified that the trial insert was the   correct size, we put the permanent prosthesis in place.      We then pulse lavaged the knee again with antibiotic irrigation.  A drain was placed deep to the wound.  The subvastus and superficial vastus fascia was  closed with a running #1 Vicryl, 2-0 Ethibond sutures followed by #1 Vicryl, closed the extensor mechanism proper.  Subcutaneous tissue was closed with 2-0 Vicryl.  The skin was closed with a running Monocryl.  Exofin wound closure system, sterile dressing and a Tubigrip stockinette was put in place.      Tourniquet was let down at the end of the case.  Total tourniquet time was 110 minutes.  The patient tolerated the procedure well and was taken to the recovery room in satisfactory condition.     The patient will be maintained weightbearing as tolerated.  The patient will use a Active Ice and a CPM postoperatively.     At three distinct times during the case the knee was injected  with a pre-mixed cocktail of epimorphine, toradol, and ropivicaine.  This was done in the posterior knee before cementing, in the retinacular tissues while the cement was hardening, and in the subcutaneious space prior to closure.    Gennaro Nina PA was a necessary component of this case for tissue retraction and limb positioning.  There was a resident learner available for the case, but a second set of hands was necessary  to  help manage the limb in the OR, help with tissue retraction to ensure maximum exposure and maximum surgical efficiency, both which promotes best practice and best surgical outcomes.  A PA was an essential part of this case.        LAWRENCE TURNER MD          6/10/2021     Pavithra Laurent   MRN# [id number]                             YOB: 1940

## 2021-06-10 NOTE — ANESTHESIA POSTPROCEDURE EVALUATION
Patient: Pavithra Laurent    Procedure(s):  LEFT Total Knee Arthroplasty    Diagnosis:Arthritis of left knee [M17.12]  Diagnosis Additional Information: No value filed.    Anesthesia Type:  General    Note:  Disposition: Inpatient   Postop Pain Control: Uneventful            Sign Out: Well controlled pain   PONV: No   Neuro/Psych: Uneventful            Sign Out: Acceptable/Baseline neuro status   Airway/Respiratory: Uneventful            Sign Out: Acceptable/Baseline resp. status   CV/Hemodynamics: Uneventful            Sign Out: Acceptable CV status; No obvious hypovolemia; No obvious fluid overload   Other NRE:    DID A NON-ROUTINE EVENT OCCUR?     Event details/Postop Comments:  Patient doing well. Ready for discharge to floor.           Last vitals:  Vitals:    06/10/21 1453 06/10/21 1457 06/10/21 1500   BP: 131/75 138/66 123/63   Pulse: 96 90 95   Resp: 16 14 16   Temp: 36.8  C (98.2  F)     SpO2: 95% 98% 96%       Last vitals prior to Anesthesia Care Transfer:  CRNA VITALS  6/10/2021 1420 - 6/10/2021 1520      6/10/2021             Temp:  36.8  C (98.2  F)     ax          Electronically Signed By: Hilton Hernandez MD  Anupama 10, 2021  3:22 PM

## 2021-06-11 ENCOUNTER — APPOINTMENT (OUTPATIENT)
Dept: PHYSICAL THERAPY | Facility: CLINIC | Age: 81
End: 2021-06-11
Attending: ORTHOPAEDIC SURGERY
Payer: COMMERCIAL

## 2021-06-11 VITALS
SYSTOLIC BLOOD PRESSURE: 124 MMHG | TEMPERATURE: 97.3 F | BODY MASS INDEX: 32.82 KG/M2 | OXYGEN SATURATION: 96 % | DIASTOLIC BLOOD PRESSURE: 56 MMHG | WEIGHT: 178.35 LBS | HEIGHT: 62 IN | RESPIRATION RATE: 16 BRPM | HEART RATE: 62 BPM

## 2021-06-11 LAB
ANION GAP SERPL CALCULATED.3IONS-SCNC: 6 MMOL/L (ref 3–14)
BUN SERPL-MCNC: 8 MG/DL (ref 7–30)
CALCIUM SERPL-MCNC: 8.2 MG/DL (ref 8.5–10.1)
CHLORIDE SERPL-SCNC: 102 MMOL/L (ref 94–109)
CO2 SERPL-SCNC: 26 MMOL/L (ref 20–32)
CREAT SERPL-MCNC: 0.62 MG/DL (ref 0.52–1.04)
GFR SERPL CREATININE-BSD FRML MDRD: 85 ML/MIN/{1.73_M2}
GLUCOSE SERPL-MCNC: 132 MG/DL (ref 70–99)
HGB BLD-MCNC: 10.9 G/DL (ref 11.7–15.7)
PLATELET # BLD AUTO: 215 10E9/L (ref 150–450)
POTASSIUM SERPL-SCNC: 4.5 MMOL/L (ref 3.4–5.3)
SODIUM SERPL-SCNC: 134 MMOL/L (ref 133–144)

## 2021-06-11 PROCEDURE — 85018 HEMOGLOBIN: CPT | Performed by: PHYSICIAN ASSISTANT

## 2021-06-11 PROCEDURE — 97530 THERAPEUTIC ACTIVITIES: CPT | Mod: GP | Performed by: PHYSICAL THERAPIST

## 2021-06-11 PROCEDURE — 97116 GAIT TRAINING THERAPY: CPT | Mod: GP | Performed by: PHYSICAL THERAPIST

## 2021-06-11 PROCEDURE — 99207 PR CDG-CODE CATEGORY CHANGED: CPT | Performed by: INTERNAL MEDICINE

## 2021-06-11 PROCEDURE — 999N000111 HC STATISTIC OT IP EVAL DEFER

## 2021-06-11 PROCEDURE — 96372 THER/PROPH/DIAG INJ SC/IM: CPT | Performed by: PHYSICIAN ASSISTANT

## 2021-06-11 PROCEDURE — 36415 COLL VENOUS BLD VENIPUNCTURE: CPT | Performed by: PHYSICIAN ASSISTANT

## 2021-06-11 PROCEDURE — 80048 BASIC METABOLIC PNL TOTAL CA: CPT | Performed by: PHYSICIAN ASSISTANT

## 2021-06-11 PROCEDURE — 97161 PT EVAL LOW COMPLEX 20 MIN: CPT | Mod: GP | Performed by: PHYSICAL THERAPIST

## 2021-06-11 PROCEDURE — 99213 OFFICE O/P EST LOW 20 MIN: CPT | Performed by: INTERNAL MEDICINE

## 2021-06-11 PROCEDURE — 250N000013 HC RX MED GY IP 250 OP 250 PS 637: Performed by: PHYSICIAN ASSISTANT

## 2021-06-11 PROCEDURE — 250N000011 HC RX IP 250 OP 636: Performed by: PHYSICIAN ASSISTANT

## 2021-06-11 PROCEDURE — 97110 THERAPEUTIC EXERCISES: CPT | Mod: GP | Performed by: PHYSICAL THERAPIST

## 2021-06-11 PROCEDURE — 85049 AUTOMATED PLATELET COUNT: CPT | Performed by: PHYSICIAN ASSISTANT

## 2021-06-11 RX ORDER — TRAMADOL HYDROCHLORIDE 50 MG/1
25-50 TABLET ORAL EVERY 6 HOURS PRN
Qty: 40 TABLET | Refills: 0 | Status: ON HOLD | OUTPATIENT
Start: 2021-06-11 | End: 2021-06-26

## 2021-06-11 RX ORDER — TRAMADOL HYDROCHLORIDE 50 MG/1
25-50 TABLET ORAL EVERY 6 HOURS PRN
Qty: 40 TABLET | Refills: 0 | Status: SHIPPED | OUTPATIENT
Start: 2021-06-11 | End: 2021-06-11

## 2021-06-11 RX ORDER — POLYETHYLENE GLYCOL 3350 17 G/17G
17 POWDER, FOR SOLUTION ORAL DAILY
Qty: 7 PACKET | Refills: 0 | Status: SHIPPED | OUTPATIENT
Start: 2021-06-11 | End: 2021-06-25

## 2021-06-11 RX ORDER — AMOXICILLIN 250 MG
1 CAPSULE ORAL 2 TIMES DAILY
Qty: 30 TABLET | Refills: 0 | Status: SHIPPED | OUTPATIENT
Start: 2021-06-11 | End: 2021-06-25

## 2021-06-11 RX ORDER — OXYCODONE HYDROCHLORIDE 5 MG/1
5 TABLET ORAL EVERY 4 HOURS PRN
Qty: 40 TABLET | Refills: 0 | Status: SHIPPED | OUTPATIENT
Start: 2021-06-11 | End: 2021-06-11

## 2021-06-11 RX ORDER — TRAMADOL HYDROCHLORIDE 50 MG/1
25-50 TABLET ORAL EVERY 6 HOURS PRN
Qty: 10 TABLET | Refills: 0 | Status: SHIPPED | OUTPATIENT
Start: 2021-06-11 | End: 2021-06-11

## 2021-06-11 RX ORDER — CARBOXYMETHYLCELLULOSE SODIUM 5 MG/ML
1 SOLUTION/ DROPS OPHTHALMIC 3 TIMES DAILY PRN
Status: DISCONTINUED | OUTPATIENT
Start: 2021-06-11 | End: 2021-06-11 | Stop reason: HOSPADM

## 2021-06-11 RX ORDER — ACETAMINOPHEN 325 MG/1
650 TABLET ORAL EVERY 4 HOURS PRN
Qty: 60 TABLET | Refills: 0 | Status: SHIPPED | OUTPATIENT
Start: 2021-06-13 | End: 2021-06-25

## 2021-06-11 RX ADMIN — LEVOTHYROXINE SODIUM 88 MCG: 88 TABLET ORAL at 08:27

## 2021-06-11 RX ADMIN — ENOXAPARIN SODIUM 40 MG: 40 INJECTION SUBCUTANEOUS at 08:27

## 2021-06-11 RX ADMIN — CEFAZOLIN SODIUM 2 G: 2 INJECTION, SOLUTION INTRAVENOUS at 04:25

## 2021-06-11 RX ADMIN — DOCUSATE SODIUM AND SENNOSIDES 1 TABLET: 8.6; 5 TABLET ORAL at 08:27

## 2021-06-11 RX ADMIN — ACETAMINOPHEN 975 MG: 325 TABLET, FILM COATED ORAL at 05:42

## 2021-06-11 RX ADMIN — GABAPENTIN 200 MG: 100 CAPSULE ORAL at 08:27

## 2021-06-11 RX ADMIN — DOCUSATE SODIUM 100 MG: 100 CAPSULE, LIQUID FILLED ORAL at 08:27

## 2021-06-11 RX ADMIN — ASPIRIN 81 MG: 81 TABLET ORAL at 08:27

## 2021-06-11 NOTE — PROGRESS NOTES
Orthopaedic Surgery Progress Note 06/11/2021    IE: No acute events overnight. AFVSS.     S: Pain well controlled. Denies numbness or tingling. Denies chest pain, SOB, nausea/vomiting. Tolerating diet. +flatus. Voiding spontaneously.  Up with nursing. No PT yet. Care plan reviewed and questions answered.     O:  Temp: 97.3  F (36.3  C) Temp src: Oral BP: 124/56 Pulse: 62   Resp: 16 SpO2: 96 % O2 Device: None (Room air) Oxygen Delivery: 2 LPM    Exam:  Gen: No acute distress, resting comfortably in bed.  Resp: Non-labored breathing  MSK:  LLE:  - Dressings c/d/i  - SILT femoral/tibial/sural/saphenous/DP/SP nerves  - Fires TA, EHL, FHL, GaSC  - sets quad   - PT/DP pulses 2+, foot wwp    Drain output: .  Output by Drain (mL) 06/09/21 0700 - 06/09/21 1459 06/09/21 1500 - 06/09/21 2259 06/09/21 2300 - 06/10/21 0659 06/10/21 0700 - 06/10/21 1459 06/10/21 1500 - 06/10/21 2259 06/10/21 2300 - 06/11/21 0659 06/11/21 0700 - 06/11/21 0757   Closed/Suction Drain 1 Left Knee Accordion 10 French      0        Recent Labs   Lab 06/11/21  0512   HGB 10.9*        Assessment: Pavithra Laurent is a 81 year old female s/p L TKA on 6/10/21 with Dr. Jimenez under general anesthesia. Doing well.    Plan:  Ortho primary   Activity: Up with assist and assistive devices as needed until independent. Knee ROM as tolerated. Advance CPM as tolerated to goal of 0 to 90 degrees.  Weight bearing status: WBAT    Antibiotics: Cefazolin x 24 hours  Diet: Begin with clear fluids and progress diet as tolerated. Bowel regimen. Anti-emetics PRN.    DVT prophylaxis: Enoxaparin daily x 10 days, beginning on morning of POD 1 per Dr. Jimenez; continue aspirin 81mg daily  Elevation: Elevate heels off of bed on pillows, no pillows behind the knee at any time    Wound Care: No dressing change until POD #7  Drains: Document output per shift, Ortho removed on POD1.   Pain management: Orals PRN, IV for breakthrough only  X-rays: AP/Lat operative knee XR in  PACU  Physical Therapy: Mobilization, ROM, ADL's  Occupational Therapy: ADL's  Labs: Trend Hgb on PODs #1 & 2  Cultures: None  Consults: PT, OT. Hospitalist, appreciate assistance in caring for this patient throughout the perioperative period    Disposition: Pending progress with therapies, pain control on orals, and medical stability, anticipate discharge to home on POD #1-2.    Patient discussed with Dr. Jimenez.     Denisa Cantor MD 06/11/2021  Orthopedic Surgery, PGY4  Please page via [facility]

## 2021-06-11 NOTE — PLAN OF CARE
"  VS: /56   Pulse 62   Temp 97.3  F (36.3  C) (Oral)   Resp 16   Ht 1.575 m (5' 2\")   Wt 80.9 kg (178 lb 5.6 oz)   SpO2 96%   BMI 32.62 kg/m     O2: >90% on room air. Denies SOB/N/V/chest pain    Output: Voiding spontaneously without difficulties     Last BM: 6/9/21. Bowel sounds active in all quad    Activity: WBAT. SBA x1 with walker and gait belt. Patient ambulating in room and hallways.     Skin: Incision to left knee, large bruise to left inner thigh    Pain: Managed with schedule tylenol, Gabapentin, and ice    CMS: Denies numbness and tingling    Dressing: Scant drainage- no change    Diet: Regular diet    LDA: PIV saline locked.    Equipment: Walker, gait belt, PCDS, CPM, personal belongings, call light within patient reach    Plan: To discharge to home this afternoon    Additional Info:      Patient vital signs are at baseline: Yes  Patient able to ambulate as they were prior to admission or with assist devices provided by therapies during their stay:  Yes  Patient MUST void prior to discharge:  Yes  Patient able to tolerate oral intake:  Yes  Pain has adequate pain control using Oral analgesics:  Yes    Pt. discharged at 1205 to Home. Pt. was accompanied by transport staff and daughter, and left with personal belongings. Prior to discharge, PIV was removed. Pt. received complete discharge paperwork and medications as filled by discharge pharmacy. Pt. was given times of last dose for all discharge medications in writing on discharge medication sheets.  Discharge teaching included medication, pain management, activity restrictions, dressing changes, and signs and symptoms of infection. Pt. to follow up with surgeon in 2weeks. Pt. had no further questions at the time of discharge and no unmet needs were identified.        "

## 2021-06-11 NOTE — PROGRESS NOTES
[facility]    Medicine Progress Note - Hospitalist Service       Date of Admission:  6/10/2021  Assessment & Plan       81 year old year old female with hx of Sjogren's disease, DVT LLE, TIA, Hypothyroidism, GERD is being admitted s/p Left TKA on 06/10/2021        # s/p Left TKA on 06/10/2021:  On Analgesics, and dressing in place  - Wound cares, Dressings, Surgical pain management, DVT Prophylaxis  per primary team.   - Monitor anemia, hemodynamics, Input/Output  - Encourage Incentive spirometry  - Laxatives for constipation prophylaxis     # Hx of TIA: PTA on Aspirin  -Aspirin restarted by primary team.     # Hx of DVT LLE:  Repeat US in 03/21 with NO DVT  She is being discharged on Lovenox for 10 days and aspirin 81 mg a day.     # Hypothyroidism: On Levothyroxine  - Continue      # Hx of Allergies: PTA on Montelukast, Flonase, Azelastine nasal spry  - Continue      # Hx of mild Sjogren's syndrome: Follows with Rheumatology. Not on any treatmetnt       Medically okay for discharge.       Diet: Advance Diet as Tolerated: Regular Diet Adult  Regular Diet Adult    DVT Prophylaxis: Enoxaparin (Lovenox) SQ  Torres Catheter: not present  Code Status: Full Code           Disposition Plan   Expected discharge: Discharged today.  Entered: Adriel Abraham MD 06/11/2021, 11:33 AM       The patient's care was discussed with the Bedside Nurse, Care Coordinator/ and Patient.    Adriel Abraham MD  Hospitalist Service  [facility]  Contact information available via [facility] Paging/Directory    ______________________________________________________________________    Interval History   Patient has elevated no chest pain, shortness of ocular, chills, nausea, vomiting.  Pain is well controlled.  Cleared physical therapy.      Data reviewed today: I reviewed all medications, new labs and imaging results over the last 24 hours. I personally  reviewed no images or EKG's today.    Physical Exam   Vital Signs: Temp: 97.3  F (36.3  C) Temp src: Oral BP: 124/56 Pulse: 62   Resp: 16 SpO2: 96 % O2 Device: None (Room air) Oxygen Delivery: 2 LPM  Weight: 178 lbs 5.63 oz  Constitutional: Laying in bed in no acute distress  Eye: No icterus, no pallor  Mouth/ENT: Normal oral mucosa  Cardiovascular: S1, S2 normal  Respiratory: B/L CTA  GI: Soft, NT, BS+  : No guzman's catheter  Neurology: Alert, awake, and oriented. No tremors  Psych: Good mood.  MSK: Left knee dressing in place  Integumentary:   Heme/Lymph/Imm:     Data   Recent Labs   Lab 06/11/21  0512 06/10/21  1700   HGB 10.9*  --      --      --    POTASSIUM 4.5  --    CHLORIDE 102  --    CO2 26  --    BUN 8  --    CR 0.62 0.58   ANIONGAP 6  --    HARSHIL 8.2*  --    *  --      Recent Results (from the past 24 hour(s))   XR Knee Port Left 1/2 Views    Narrative    EXAM: XR KNEE PORT LEFT 1/2 VIEWS  6/10/2021 3:51 PM      HISTORY: Post-Op Total Knee    COMPARISON: 6/23/2020    FINDINGS: Portable postoperative AP and crosstable lateral views of  left knee.    New total left knee arthroplasty. Components appear well seated.  Postsurgical subcutaneous gas. Surgical drain noted.       Impression    IMPRESSION: New total left knee arthroplasty.         KORI LOVE MD (Joe)     Medications     lactated ringers 75 mL/hr at 06/10/21 1650       acetaminophen  975 mg Oral Q8H     aspirin  81 mg Oral Daily     docusate sodium  100 mg Oral BID     enoxaparin ANTICOAGULANT  40 mg Subcutaneous Q24H     gabapentin  200 mg Oral TID     levothyroxine  88 mcg Oral Daily     montelukast  10 mg Oral At Bedtime     polyethylene glycol  17 g Oral Daily     senna-docusate  1 tablet Oral BID     sodium chloride (PF)  3 mL Intracatheter Q8H

## 2021-06-11 NOTE — PROGRESS NOTES
06/11/21 0930   Quick Adds   Quick Adds Certification   Type of Visit Initial PT Evaluation   Living Environment   People in home alone   Current Living Arrangements house   Home Accessibility stairs to enter home   Number of Stairs, Main Entrance 4   Stair Railings, Main Entrance railing on right side (ascending)   Transportation Anticipated family or friend will provide   Self-Care   Usual Activity Tolerance moderate   Current Activity Tolerance fair   Equipment Currently Used at Home cane, straight   Disability/Function   Hearing Difficulty or Deaf no   Wear Glasses or Blind yes   Concentrating, Remembering or Making Decisions Difficulty no   Difficulty Communicating no   Difficulty Eating/Swallowing no   Walking or Climbing Stairs Difficulty yes   Walking or Climbing Stairs ambulation difficulty, requires equipment   Dressing/Bathing Difficulty no   Toileting issues no   Doing Errands Independently Difficulty (such as shopping) no   Fall history within last six months no   Change in Functional Status Since Onset of Current Illness/Injury no   General Information   Onset of Illness/Injury or Date of Surgery 06/10/21   Referring Physician Dr EVANGELINA Jimenez MD    Patient/Family Therapy Goals Statement (PT) PLOF   Pertinent History of Current Problem (include personal factors and/or comorbidities that impact the POC) PT is a 81 year old fem s/p left TKA   Existing Precautions/Restrictions fall   Weight-Bearing Status - LLE weight-bearing as tolerated   Cognition   Orientation Status (Cognition) oriented x 4   Affect/Mental Status (Cognition) WFL   Follows Commands (Cognition) WFL   Pain Assessment   Patient Currently in Pain Yes, see Vital Sign flowsheet   Integumentary/Edema   Integumentary/Edema no deficits were identifed   Posture    Posture Not impaired   Range of Motion (ROM)   ROM Comment 0-10-90   Strength   Strength Comments not formally assessed   Bed Mobility   Comment (Bed Mobility) SBA for bed mob    Transfers   Transfer Safety Comments SBA for transfers    Gait/Stairs (Locomotion)   Mill[place] Level (Gait) supervision   Assistive Device (Gait) walker, front-wheeled   Distance in Feet (Required for LE Total Joints) 250   Comment (Gait/Stairs) Completed 3 stairs 2x with right handrail   Balance   Balance no deficits were identified   Sensory Examination   Sensory Perception WFL   Coordination   Coordination no deficits were identified   Muscle Tone   Muscle Tone no deficits were identified   Clinical Impression   Criteria for Skilled Therapeutic Intervention evaluation only   PT Diagnosis (PT) pain weakness   Influenced by the following impairments weakness   Functional limitations due to impairments gait abnormality   Clinical Presentation Stable/Uncomplicated   Clinical Presentation Rationale PT presents as medically diagnosed   Clinical Decision Making (Complexity) low complexity   Therapy Frequency (PT) One time eval and treatment only   Predicted Duration of Therapy Intervention (days/wks) 1   Planned Therapy Interventions (PT) bed mobility training;groups;stair training   Risk & Benefits of therapy have been explained evaluation/treatment results reviewed;care plan/treatment goals reviewed;risks/benefits reviewed;current/potential barriers reviewed;participants voiced agreement with care plan;participants included;patient;daughter   PT Discharge Planning    PT Discharge Recommendation (DC Rec) home with home care physical therapy   PT Rationale for DC Rec PT has suppport at home and needs skilled therapy   PT Brief overview of current status  SBA for mobility   Therapy Certification   Start of care date 06/11/21   Certification date from 06/11/21   Certification date to 06/11/21   Medical Diagnosis TKA

## 2021-06-11 NOTE — PLAN OF CARE
[facility]      OUTPATIENT PHYSICAL THERAPY EVALUATION  PLAN OF TREATMENT FOR OUTPATIENT REHABILITATION  (COMPLETE FOR INITIAL CLAIMS ONLY)  Patient's Last Name, First Name, M.I.  YOB: 1940  Pavithra Laurent                        Provider's Name  [facility] Medical Record No.  [id number]                               Onset Date:  06/10/21   Start of Care Date:  06/11/21      Type:     _X_PT   ___OT   ___SLP Medical Diagnosis:  TKA                        PT Diagnosis:  pain weakness   Visits from SOC:  1   _________________________________________________________________________________  Plan of Treatment/Functional Goals    Planned Interventions: bed mobility training, groups, stair training     Goals: See Physical Therapy Goals on Care Plan in [employer] electronic health record.    Therapy Frequency: One time eval and treatment only  Predicted Duration of Therapy Intervention: 1  _________________________________________________________________________________    I CERTIFY THE NEED FOR THESE SERVICES FURNISHED UNDER        THIS PLAN OF TREATMENT AND WHILE UNDER MY CARE     (Physician co-signature of this document indicates review and certification of the therapy plan).              Certification date from: 06/11/21, Certification date to: 06/11/21    Referring Physician: Dr EVANGELINA Jimenez MD             Initial Assessment        See Physical Therapy evaluation dated 06/11/21 in Epic electronic health record.

## 2021-06-11 NOTE — PROGRESS NOTES
Care Management Discharge Note    Discharge Date:  6/11/21       Discharge Disposition:  home  Discharge Services:  Home PT    Discharge DME:  N/A    Discharge Transportation:  Patients daughter    Private pay costs discussed: Not applicable    PAS Confirmation Code:  N/A dc home  Patient/family educated on Medicare website which has current facility and service quality ratings:      Education Provided on the Discharge Plan:  yes  Persons Notified of Discharge Plans: yes. patient  Patient/Family in Agreement with the Plan:  yes    Handoff Referral Completed: Yes    Additional Information:  This RNCC spoke with patient to discuss discharge planning, patient doesn't appear to have any complex needs during admission or at discharge and is progressing with mobility. Plan is for patient to return to previous living situation after spending the weekend with her daughter and would benefit from further skilled home physical therapy for strengthening and increase independence with all functional mobility/gait . Patient agreeable to HEP while staying with daughter and will return home on Monday 6/13/21 with plan to start Home PT on Tuesday 6/15/21. Daughter will provide transport home.      Huma AIKENN RN CCM  RN Care Coordinator [address] [place] [id number]  [email or web address]   Office: [phone]   Pager: [phone]    To contact Weekend RNCC, dial [phone] and enter job code [phone] at prompt. This pager can not be contacted by text page or outside line.

## 2021-06-11 NOTE — PLAN OF CARE
"  VS: /57 (BP Location: Right arm)   Pulse 81   Temp 95.4  F (35.2  C) (Oral)   Resp 16   Ht 1.575 m (5' 2\")   Wt 80.9 kg (178 lb 5.6 oz)   SpO2 94%   BMI 32.62 kg/m       O2: Pt needing 2L to stay above 90%, especially when sleeping. Lung sounds clear. Pt has a cough which she says is due to her allergies.    Output: Pt voided 275 mL in bathroom, PVR of 14 mL   Last BM: Prior to surgery per pt report. Bowel sounds audible. No abdominal discomfort per patient.    Activity: Up with walker, steady gait.    Skin: Intact ex for L knee incision and previous surgical incisions.    Pain: Denies pain for me, was given oxycodone in PACU.    CMS: Intact. Some numbness in foot but pt says this is sometimes normal for her due to arthritis   Dressing: CDI, scant amount of drainage on top of dressing. Tubigrip in place.    Diet: Regular. Denies nausea and vomiting   LDA: PIV TKO for abx tonight. Accordion drain with no output.    Equipment: Walker, CPM, IV pole and pump, CAPNO, personal belongings   Plan: Discharge pending therapies   Additional Info:      OBS GOALS  ~ Patient vital signs are at baseline: YES  ~ Patient able to ambulate as they were prior to admission or with assist devices provided by therapies during their stay: YES WITH WALKER  ~ Patient MUST void prior to discharge: YES  ~ Patient able to tolerate oral intake to maintain hydration status: YES  ~ Patient has adequate pain control using Oral analgesics: YES    "

## 2021-06-11 NOTE — PLAN OF CARE
VS: VSS      2200 Pt refused CAPNO. Cont. Pulse ox is on   O2: Weaned to RA   Output: Voiding spontaneously and without difficulty   Negligible output from hemovac. Unable to measure   Last BM: 6/9/21. Pt still has not passed gas since surgery   Activity: WBAT, Ax1 with walker and gait belt.    Skin: Intact ex L knee incision   Pain: Pt promotes minimal pain. Scheduled tylenol given   CMS: Baseline numbness in feet from arthritis. No new numbness   Dressing: CDI ex scant serosanguinous drainage    Diet: Regular, tolerating well   LDA: PIV R hand SL inbetween abx infusions    Equipment: IV pump and pole, walker, gait belt, CPM, PCDs, personal belongings    Plan: Continue to monitor throughout shift and intervene when necessary. Discharge pending therapies   Additional Info: CPM off overnight per pt request     Patient vital signs are at baseline: Yes  Patient able to ambulate as they were prior to admission or with assist devices provided by therapies during their stay:  Yes  Patient MUST void prior to discharge:  Yes  Patient able to tolerate oral intake:  Yes  Pain has adequate pain control using Oral analgesics:  Yes

## 2021-06-11 NOTE — PLAN OF CARE
OT: Orders received and acknowledged, chart reviewed. Per PT, no skilled OT needs indicated. Will defer and complete OT orders.

## 2021-06-11 NOTE — PLAN OF CARE
11p-7a  VS: VSS and afebrile.  Refused Capno.    O2: Sats >90% on RA.  Infrequent productive cough,Lungs: clear   Output: Voided adequate amount.Last PVR: 0   Last BM: 6/9 per pt report   Activity: Up with walker and GB, steady gait.    Skin: Intact ex for L knee incision and previous surgical incisions.    Pain: Denies pain.    CMS: Baseline numbness in L foot.  Some numbness around incision. Improving   Dressing: CDI, scant amount of drainage on top of dressing. Tubigrip in place.    Diet: Regular.   LDA: PIV TKO for abx tonight. Accordion drain with very minimal output.    Equipment: Walker, CPM, IV pole and pump, CAPNO, personal belongings   Plan: TBD   Additional Info: [place]       OBS GOALS  ~ Patient vital signs are at baseline: YES  ~ Patient able to ambulate as they were prior to admission or with assist devices provided by therapies during their stay: YES WITH WALKER and GB  ~ Patient MUST void prior to discharge: YES  ~ Patient able to tolerate oral intake to maintain hydration status: YES  ~ Patient has adequate pain control using Oral analgesics: YES

## 2021-06-11 NOTE — PLAN OF CARE
Physical Therapy Discharge Summary    Reason for therapy discharge:    Discharged to home with home therapy.    Progress towards therapy goal(s). See goals on Care Plan in [facility] electronic health record for goal details.  Goals met    Therapy recommendation(s):    Continued therapy is recommended.  Rationale/Recommendations:  to improve ROM and strength.

## 2021-06-12 ENCOUNTER — MEDICAL CORRESPONDENCE (OUTPATIENT)
Dept: HEALTH INFORMATION MANAGEMENT | Facility: CLINIC | Age: 81
End: 2021-06-12

## 2021-06-14 ENCOUNTER — DOCUMENTATION ONLY (OUTPATIENT)
Dept: CARE COORDINATION | Facility: CLINIC | Age: 81
End: 2021-06-14

## 2021-06-14 ENCOUNTER — PATIENT OUTREACH (OUTPATIENT)
Dept: NURSING | Facility: CLINIC | Age: 81
End: 2021-06-14
Payer: COMMERCIAL

## 2021-06-14 NOTE — DISCHARGE SUMMARY
ORTHOPAEDIC SURGERY DISCHARGE SUMMARY     Date of Admission: 6/10/2021  Date of Discharge: 6/11/2021 12:05 PM  Disposition: Home  Staff Physician: No att. providers found  Primary Care Provider: Kitty Owens    DISCHARGE DIAGNOSIS:  Arthritis of left knee [M17.12]    PROCEDURES: Procedure(s):  LEFT Total Knee Arthroplasty on 6/10/2021    BRIEF HISTORY:  Patient is an 80-year-old female who has known left knee osteoarthritis.  She is not getting intermittent steroid injections into her leg by Dr. Lina Hook at [facility].  Her last injection was the end of October.  She does not profess much pain in that knee but the injections do seem to help with her sense of instability and she believes that she is more stable after the injury     The reason for this visit today is to discuss surgical intervention.  I last saw her in June 2020.  At that point time she discussed proceeding with a left total knee replacement, but wanted to see how Covid would play out over the next several months.     At this point time she has received her second vaccination, and would like to proceed with surgery sometime this summer.     She continues to get spinal injections in her back which did seem to help.  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  She also is getting intermittent injections into her feet for osteoarthritis.     I informed her that she should not have any injection within 3 months of surgery.  If she wants surgery under June or early July she should have another knee injection in the near future.     Patient did have a DVT in that left leg diagnosed and treated in 2015.  There was no known reason for the blood clot in regards to its etiology.  She was actually being treated for cellulitis when the diagnosis of the DVT was made.     She currently lives alone in a second floor apartment.  She went to [place] after her right total knee replacement and would  like to do this again.     She is status post a right total knee replacement in 2009, she felt that that particular operation was quite easy on her but recognizes 11 years later this left side might be harder particularly due to her other arthritic joints.     Plan: We will obtain a surgical date in late June or early July.  She understands that we are just now organizing her surgical block time and this may take a few weeks to get her for her surgical date.     Postoperatively she will be treated as a high risk for blood clot and have 6 weeks of intervention other than aspirin for her DVT     Her standing x-rays in June 2020 showed significant varus alignment as well as subluxation of the tibia on the femur.  These do not need to be repeated preop.     Once she has been given a surgical date, we will recontact her within 4 to 6 weeks of surgery to go through our preoperative checkt list as well as give her information about the preoperative class    HOSPITAL COURSE:    The patient was admitted following the above listed procedures for pain control and rehabilitation. Pavithra Laurent did well post-operatively. Medicine was consulted post operatively to aid in management of medical co-morbidities. The patient received routine nursing cares and at the time of discharge was medically stable. Vital signs were stable throughout admission. The patient is tolerating a regular diet and is voiding spontaneously. All PT/OT goals have been met for safe mobility. Pain is now controlled on oral medications which will be available on discharge. Stool softeners have been used while taking pain medications to help prevent constipation. Pavithra Laurent is deemed medically safe to discharge.     Antibiotics:  ancef given periop and 24 hours postop.   DVT prophylaxis:  Enoxaparin daily x 10 days initiated after surgery and will be followed by resumption of aspirin 81 mg daily   PT Progress:  Has met PT/OT goals for safe mobility.     Weight bearing:  WBAT   Pain Meds:  Weaned off all IV pain meds by discharge.  Inpatient Events: No significant events or complications.    PHYSICAL EXAM:    Gen: No acute distress, resting comfortably in bed.  Resp: Non-labored breathing  MSK:  LLE:  - Dressings c/d/i  - SILT femoral/tibial/sural/saphenous/DP/SP nerves  - Fires TA, EHL, FHL, GaSC  - sets quad   - PT/DP pulses 2+, foot wwp    FOLLOWUP:    Follow up with Dr. Jimenez team at 2 weeks postoperatively.    Orthopaedic Surgery appointments are at the [facility] ([address] [place] [address]). Call [phone] to schedule a follow-up appointment at this location with your provider.     PLANNED DISCHARGE ORDERS:      Discharge Medication List as of 6/11/2021 11:41 AM      START taking these medications    Details   acetaminophen (TYLENOL) 325 MG tablet Take 2 tablets (650 mg) by mouth every 4 hours as needed for other, Disp-60 tablet, R-0, E-PrescribeDischarge today      enoxaparin ANTICOAGULANT (LOVENOX) 40 MG/0.4ML syringe Inject 0.4 mLs (40 mg) Subcutaneous every 24 hours for 9 days, Disp-3.6 mL, R-0, E-Prescribe      polyethylene glycol (MIRALAX) 17 g packet Take 17 g by mouth daily, Disp-7 packet, R-0, E-Prescribe      senna-docusate (SENOKOT-S/PERICOLACE) 8.6-50 MG tablet Take 1 tablet by mouth 2 times daily, Disp-30 tablet, R-0, E-Prescribe         CONTINUE these medications which have CHANGED    Details   aspirin (ASA) 81 MG EC tablet Take 1 tablet (81 mg) by mouth daily, Disp-30 tablet, R-0, E-PrescribeContinue ASA as you were at home and Lovenox for 10 days following surgery      traMADol (ULTRAM) 50 MG tablet Take 0.5-1 tablets (25-50 mg) by mouth every 6 hours as needed for severe pain, Disp-40 tablet, R-0, E-Prescribe         CONTINUE these medications which have NOT CHANGED    Details   Artificial Tear Solution (BION TEARS OP) Historical      azelastine (ASTELIN) 0.1 % nasal spray Spray 2 sprays into both  nostrils daily as needed for rhinitis , Historical      calcium-vitamin D-vitamin K (CALCIUM SOFT CHEWS) 500-500-40 MG-UNT-MCG CHEW Take 2 tablets by mouth 2 times daily, Historical      desonide (DESOWEN) 0.05 % cream Apply topically 2 times daily as needed (rash) Historical      diclofenac (VOLTAREN) 1 % GEL Place 2 g onto the skin daily as needed for moderate pain (knees) , Historical      fluticasone (FLONASE) 50 MCG/ACT nasal spray Spray 1 spray into both nostrils daily , Historical      !! gabapentin (NEURONTIN) 100 MG capsule Take 1 capsule (100 mg) by mouth daily as needed (on particularly active days), Disp-90 capsule,R-3, E-Prescribe      !! gabapentin (NEURONTIN) 400 MG capsule Take 1 capsule (400 mg) by mouth 3 times daily, Disp-270 capsule,R-3, E-Prescribe      levothyroxine (SYNTHROID/LEVOTHROID) 88 MCG tablet TAKE ONE TABLET BY MOUTH ONCE DAILY, Disp-90 tablet, R-3, E-Prescribe      montelukast (SINGULAIR) 10 MG tablet Take 10 mg by mouth At Bedtime , Historical      nystatin (MYCOSTATIN) 031851 UNIT/GM external powder Apply topically 2 times daily as needed for other (yeast infection in skin folds)Disp-60 g,[id number]      triamcinolone (KENALOG) 0.1 % external cream Apply topically 2 times daily Apply to AA on left hand bid for 14 daysDisp-15 g,[id number]      amoxicillin (AMOXIL) 500 MG capsule Take 2,000 mg by mouth once One hour prior to dental procedure, Historical      erythromycin (ROMYCIN) ophthalmic ointment Place 1 Application into both eyes nightly as needed (dry/irritated eyes) Historical      fluocinolone acetonide (DERMOTIC) 0.01 % OIL Place 1 drop in ear(s) daily as needed., Historical      Multiple Vitamins-Minerals (PRESERVISION AREDS 2+MULTI VIT) CAPS Historical      order for DME Equipment being ordered: compression stockings [id number] each, R-0, Local Print       !! - Potential duplicate medications found. Please discuss with provider.      STOP taking these  "medications       acetaminophen (TYLENOL 8 HOUR ARTHRITIS PAIN) 650 MG CR tablet Comments:   Reason for Stopping:         oxyCODONE (ROXICODONE) 5 MG tablet Comments:   Reason for Stopping:                 Discharge Procedure Orders   Home Care PT Referral for Hospital Discharge   Referral Priority: Routine Referral Type: Home Health Therapies & Aides   Number of Visits Requested: 1     Care Coordination Referral   Referral Priority: Routine Referral Type: Care Coordination   Number of Visits Requested: 1     Reason for your hospital stay   Order Comments: You stayed in the hospital overnight following your knee surgery     Contact Surgeon Team   Order Comments: You may experience symptoms that require follow-up before your scheduled appointment. Refer to the \"Stop[employer] Tool\" for instructions on when to contact Dr. Jimenez's office if you are concerned about pain control, blood clots, constipation, or if you are unable to urinate.    Regular business hours (Monday - Friday, 8am - 5pm):  [facility] [place]: [phone]  [facility]: [phone]    After hours and weekends:  [facility] on call Orthopedic resident: [phone]     Orthopedic Urgent Care   Order Comments: If you are not able to reach Dr. Jimenez's office and you need immediate care, go to the Orthopedic Urgent Care at your Surgeon's office.  Do NOT go to the Emergency Room unless you have shortness of breath, chest pain, or other signs of a medical emergency.     Call [phone]   Order Comments: Call 911 immediately if you experience sudden-onset chest pain, arm weakness/numbness, slurred speech, or shortness of breath     Breathing exercises   Order Comments: Perform breathing exercises using your Incentive Spirometer 10 times per hour while awake for 2 weeks.     Fever Management   Order Comments: A low grade fever can be expected after surgery.  Use acetaminophen (TYLENOL) as needed for fever " management.  Contact your Surgeon Team if you have a fever greater than 101.5 F, chills, and/or night sweats.     Constipation management   Order Comments: Constipation (hard, dry bowel movements) is expected after surgery due to the combination of being less active, the anesthetic, and the opioid pain medication.  You can do the following to help reduce constipation:  ~  FLUIDS:  Drink clear liquids (water or Gatorade), or juice (apple/prune).  ~  DIET:  Eat a fiber rich diet.    ~  ACTIVITY:  Get up and move around several times a day.  Increase your activity as you are able.  MEDICATIONS:  Reduce the risk of constipation by starting medications before you are constipated.  You can take Miralax   (1 packet as directed) and/or a stool softener (Senokot 1-2 tablets 1-2 times a day).  If you already have constipation and these medications are not working, you can get magnesium citrate and use as directed.  If you continue to have constipation you can try an over the counter suppository or enema.  Call your Surgeon Team if it has been greater than 3 days since your last bowel movement.     Reduced Urine Output   Order Comments: Changes in the amount of fluids you drank before and after surgery may result in problems urinating.  It is important to stay well-hydrated after surgery and drink plenty of water. If it has been greater than 8 hours since you have urinated despite drinking plenty of water, call your Surgeon Team.     Anticoagulation - aspirin   Order Comments: Take the aspirin as prescribed for a total of four weeks after surgery.  This is given to help minimize your risk of blood clot.     Activity - Exercises to prevent blood clots   Order Comments: Unless otherwise directed by your Surgeon team, perform the following exercises at least three times per day for the first four weeks after surgery to prevent blood clots in your legs: 1) Point and flex your feet (Ankle Pumps), 2) Move your ankle around in big  "circles, 3) Wiggle your toes, 4) Walk, even for short distances, several times a day, will help decrease the risk of blood clots.     Order Specific Question Answer Comments   Is discharge order? Yes      Pain after Surgery   Order Comments: Pain after surgery is normal and expected.  You will   have some amount of pain for several weeks after surgery.  Your pain will improve with time.  There are several things you can do to help reduce your pain including: rest, ice, elevation, and using pain medications as needed. Contact your Surgeon Team if you have pain that persists or worsens after surgery despite rest, ice, elevation, and taking your medication(s) as prescribed. Contact your Surgeon Team if you have new numbness, tingling, or weakness in your operative extremity.     Swelling after Surgery   Order Comments: Swelling and/or bruising of the surgical extremity is common and may persist for several months after surgery. In addition to frequent icing and elevation, gentle compressive support with an ACE wrap or tubigrip may help with swelling. Apply compression regularly, removing at least twice daily to perform skin checks. Contact your Surgeon Team if your swelling increases and is NOT associated with an increase in your activity level, or if your swelling increases and is associated with redness and pain.     LOWER Extremity Elevation   Order Comments: Swelling is expected for several months after surgery. This type of swelling is usually associated with gravity and activity, and can be improved with elevation.   The best way to do this is to get your \"toes above your nose\" by laying down and placing several pillows lengthwise under your calf and heel. When elevating your leg keep your knee completely straight. Perform this elevation as often as possible especially for the first two weeks after surgery.     Cold therapy   Order Comments: Ice can be used to control swelling and discomfort after surgery. Place " a thin towel over your operative site and apply the ice pack overtop. Leave ice pack in place for 20 minutes, then remove for 20 minutes. Repeat this 20 minutes on/20 minutes off routine as often as tolerated.     acetaminophen (TYLENOL) Instructions   Order Comments: You were discharged with acetaminophen (TYLENOL) for pain management after surgery. Acetaminophen most effectively manages pain symptoms when it is taken on a schedule without missing doses (every four, six, or eight hours). Your Provider will prescribe a safe daily dose between 3000 - 4000 mg.  Do NOT exceed this daily dose. Most patients use acetaminophen for pain control for the first four weeks after surgery.  You can wean from this medication as your pain decreases.     NSAID Instructions   Order Comments: You were discharged with an anti-inflammatory medication for pain management to use in combination with acetaminophen (TYLENOL) and the narcotic pain medication.  Take this medication exactly as directed.  You should only take one anti-inflammatory at a time.  Some common anti-inflammatories include: ibuprofen (ADVIL, MOTRIN), naproxen (ALEVE, NAPROSYN), celecoxib (CELEBREX), meloxicam (MOBIC), ketorolac (TORADOL).  Take this medication with food and water.     Opioids - Tapering Instructions   Order Comments: In the first three days following surgery, your symptoms may warrant use of the narcotic pain medication every four to six hours as prescribed. This is normal. As your pain symptoms improve, focus your efforts on decreasing (tapering) use of narcotic medications. The most successful tapering strategy is to first, decrease the number of tablets you take every 4-6 hours to the minimum prescribed. Then, increase the amount of time between doses.  For example:  First, taper to   or 1 tablet every 4-6 hours.  Then, taper to   or 1 tablet every 6-8 hours.  Then, taper to   or 1 tablet every 8-10 hours.  Then, taper to   or 1 tablet every 10-12  hours.  Then, taper to   or 1 tablet at bedtime.  The bedtime dose can help with comfort during sleep and is typically the last dose to be discontinued after surgery.     Follow Up Care   Order Comments: You are scheduled for a post operative wound check with Dr. Jimenez's clinic approximately two weeks after surgery. At approximately eight weeks after surgery, you will see Dr. Jimenez in clinic. During this visit, repeat X rays of your operative knee will be performed.      Your follow up appointments will be at the location that you regularly see Dr. Jimenez:    [facility]  [address] [place] [address]  [phone]    [facility]  [address] [place] [id number] [phone]     Activity - Total Knee Arthroplasty     Order Specific Question Answer Comments   Is discharge order? Yes      Return to Driving   Order Comments: Return to driving - Driving is NOT permitted until directed by your provider. Under no circumstance are you permitted to drive while using narcotic pain medications.     Order Specific Question Answer Comments   Is discharge order? Yes      Continuous Passive Motion Machine   Order Comments: Settings (degrees): 0 degrees to flexion tolerance with a goal of 90 degrees  Advance CPM (degrees): increments of 5 degrees every 30 minutes.  Perform for 6-8 hours daily for four weeks     Dressing / Wound Care - Wound   Order Comments: You have a clean dressing on your surgical wound. Dressing change instructions as follows: remove your dressing in seven days, and leave incision open to air. Contact your Surgeon Team if you have increased redness, warmth around the surgical wound, and/or drainage from the surgical wound.     Dressing / Wound Care - NO Tub Bathing   Order Comments: Tub bathing, swimming, or any other activities that will cause your incision to be submerged in water should be avoided until allowed by your  Surgeon.     Opioid Instructions (Greater than or equal to 65 years)   Order Comments: You were discharged with an opioid medication (hydromorphone, oxycodone, hydrocodone, or tramadol). This medication should only be taken for breakthrough pain that is not controlled with acetaminophen (TYLENOL). If you rate your pain less than 3 you do not need this medication.  Pain rating 0-3:  You do not need this medication  Pain rating 4-6:  Take 1/2 tablet every 4-6 hours as needed  Pain rating 7-10:  Take 1 tablet every 4-6 hours as needed  Do not exceed 4 tablets per day     Weight bearing as tolerated   Order Comments: Weight bearing as tolerated on your operative extremity.     Order Specific Question Answer Comments   Is discharge order? Yes      Shower with wound/dressing NOT covered   Order Comments: You do not need to cover your dressing or incision in the shower, you may allow water and soap to run over top of the surgical dressing or incision. You may shower two days after surgery.  You are strictly prohibited from soaking or submerging the surgical wound underwater.     MD face to face encounter   Order Comments: Documentation of Face to Face and Certification for Home Health Services    I certify that patient: Pavithra Laurent is under my care and that I, or a nurse practitioner or physician's assistant working with me, had a face-to-face encounter that meets the physician face-to-face encounter requirements with this patient on: June 11, 2021.    This encounter with the patient was in whole, or in part, for the following medical condition, which is the primary reason for home health care: S/P total knee.    I certify that, based on my findings, the following services are medically necessary home health services:   Physical therapy    My clinical findings support the need for the above services because: Physical Therapy needed to assess and treat the above functional impairments: decreases mobility need for  continued physical therapy for strengthening/ROM and increase independence with all functional motility/gait.     Further, I certify that my clinical findings support that this patient is homebound (i.e. absences from home require considerable and taxing effort and are for medical reasons or [employer] services or infrequently or of short duration when for other reasons) because: Requires assistance of another person or specialized equipment to access medical services because patient: Patient unable to drive and range of motion limitations prevents ability to exit home safely and requires supervision of another for safe transfer.      Based on the above findings. I certify that this patient is confined to the home and needs intermittent skilled nursing care, physical therapy and/or speech therapy.  The patient is under my care, and I have initiated the establishment of the plan of care.  This patient will be followed by a physician who will periodically review the plan of care.  Physician/Provider to provide follow up care: Kitty Owens    Attending hospital physician (the Medicare certified [facility] provider): Isela Jimenez MD  Physician Signature: See electronic signature associated with these discharge orders.  Date: 6/11/2021     Crutches DME   Order Comments: DME Documentation: Describe the reason for need to support medical necessity: Impaired gait status post knee surgery. I, the undersigned, certify that the above prescribed supplies are medically necessary for this patient and is both reasonable and necessary in reference to accepted standards of medical practice in the treatment of this patient's condition and is not prescribed as a convenience.     Order Specific Question Answer Comments   DME Provider: [facility]    Crutch Type: Standard    Crutches Add On: NA    Length of Need: Lifetime      Cane DME   Order Comments: DME Documentation: Describe the reason for need to support medical  necessity: Impaired gait status post knee surgery. I, the undersigned, certify that the above prescribed supplies are medically necessary for this patient and is both reasonable and necessary in reference to accepted standards of medical practice in the treatment of this patient's condition and is not prescribed as a convenience.     Order Specific Question Answer Comments   DME Provider: [facility]    Cane Type: Single Tip    Reminder: Patient can typically get 1 every 5 years      Walker DME   Order Comments: DME Documentation: Describe the reason for need to support medical necessity: Impaired gait status post knee surgery. I, the undersigned, certify that the above prescribed supplies are medically necessary for this patient and is both reasonable and necessary in reference to accepted standards of medical practice in the treatment of this patient's condition and is not prescribed as a convenience.     Order Specific Question Answer Comments   DME Provider: [facility]    Walker Type: Standard (2 Wheel)    Accessories: N/A      Regular Diet Adult     Order Specific Question Answer Comments   Is discharge order? Yes      Assign Questionnaire Series to Patient       Denisa Cantor MD   Orthopaedic Surgery, PGY-4  Pager: [phone]

## 2021-06-14 NOTE — PROGRESS NOTES
[facility] now requests orders and shares plan of care/discharge summaries for some patients through goviral.  Please REPLY TO THIS MESSAGE OR ROUTE BACK TO THE AUTHOR in order to give authorization for orders when needed.  This is considered a verbal order, you will still receive a faxed copy of orders for signature.  Thank you for your assistance in improving collaboration for our patients.    ORDER    PT SOC 6/14 instead of 6/15 due to cancellation    Sherron, Director of Patient Care Services

## 2021-06-14 NOTE — PROGRESS NOTES
Clinic Care Coordination Contact    Clinic Care Coordination Contact  OUTREACH    Referral Information:       Primary Diagnosis: Orthopedic    Chief Complaint   Patient presents with     Clinic Care Coordination - [facility]     Clinic Care Coordination RN         Universal Utilization:   ORTHOPAEDIC SURGERY DISCHARGE SUMMARY     Date of Admission: 6/10/2021  Date of Discharge: 6/11/2021 12:05 PM  Disposition: Home  Staff Physician: No att. providers found  Primary Care Provider: Kitty Owens     DISCHARGE DIAGNOSIS:  Arthritis of left knee [M17.12]     PROCEDURES: Procedure(s):  LEFT Total Knee Arthroplasty on 6/10/2021  Clinic Utilization  Difficulty keeping appointments:: No  Compliance Concerns: No  No-Show Concerns: No  No PCP office visit in Past Year: No  Utilization    Last refreshed: 6/14/2021  9:44 AM: Hospital Admissions 1           Last refreshed: 6/14/2021  9:44 AM: ED Visits 0           Last refreshed: 6/14/2021  9:44 AM: No Show Count (past year) 0              Current as of: 6/14/2021  9:44 AM              Clinical Concerns:  Current Medical Concerns:  Patient is getting around well with the walker.  Patient reports some bruising and edema in knee continues Tylenol and ice with relief     Current Behavioral Concerns: No    Education Provided to patient: CC RN role introduced for support    Pain  Pain (GOAL):: No  Health Maintenance Reviewed: Due/Overdue   Health Maintenance Due   Topic Date Due     ZOSTER IMMUNIZATION (1 of 2) Never done   Clinical Pathway: None    Medication Management:  Medication reconciliation status: Medications reviewed and reconciled.  Continue medications without change.    Functional Status:  Dependent ADLs:: Ambulation-cane, Ambulation-walker  Bed or wheelchair confined:: No  Mobility Status: Independent w/Device    Living Situation:  Current living arrangement:: I live alone    Lifestyle & Psychosocial Needs:        Inadequate nutrition (GOAL):: No  Tube Feeding:  No  Inadequate activity/exercise (GOAL):: No  Significant changes in sleep pattern (GOAL): No  Transportation means:: Family     Gnosticist or spiritual beliefs that impact treatment:: No  Mental health DX:: No  Mental health management concern (GOAL):: No  Chemical Dependency Status: No Current Concerns  Informal Support system:: Children   Socioeconomic History     Marital status:      Spouse name: Not on file     Number of children: Not on file     Years of education: Not on file     Highest education level: Not on file     Tobacco Use     Smoking status: Never Smoker     Smokeless tobacco: Never Used   Substance and Sexual Activity     Alcohol use: Yes     Alcohol/week: 0.0 standard drinks     Comment: rare- 2/month     Drug use: No     Sexual activity: Not Currently     Partners: Male     Birth control/protection: Surgical     Comment: bilateral tubal ligation            Resources and Interventions:  Current Resources:   Skilled Home Care Services: Skilled Nursing, Home Health Aid, Physicial Therapy  Community Resources: Home Care  Supplies used at home:: Compression Stockings  Equipment Currently Used at Home: cane, straight, walker, rolling   Advance Care Plan/Directive  Advanced Care Plans/Directives on file:: Yes  Type Advanced Care Plans/Directives: Advanced Directive - On File    Referrals Placed: None     Patient/Caregiver understanding: Daughter and patient express good understaning of discharge instructions        Future Appointments              Today Rn, Hp Ccc [facility]     Today Yanni Rehman PT [facility] [place], AMANDA JT    In 2 days Yanni Rehman PT [facility] [place], AMANDA JT    In 3 days Yanni Rehman PT [facility] [place], AMANDA JT    In 4 days Yanni Rehman PT [facility] [place], AMANDA JT    In 1 week  Yanni Rehman, PT M [facility] [place], AMANDA JT    In 1 week Yanni Rehman, PT M [facility] [place], AMANDA JT    In 2 weeks Yanni Rehman, PT M [facility] [place], AMANDA JT    In 2 weeks Yanni Rehman, PT M [facility] [place], AMANDA JT    In 2 weeks Yanni Rehman, PT M [facility] [place], AMANDA JT    In 3 weeks Isela Jimenez MD [facility]    In 1 month Lore Krishnamurthy MD [facility], [facility]    In 1 month Isela Jimenez MD [facility]          Plan:   Patient will finish course of Lovenox injections   Patient will continue home care services  Patient will keep Orthopedic appointment in 2 weeks   No unmet needs, no further care coordination needed at this time     [facility]   Patrizia Silverman RN, Care Coordinator   [facility] [place]   E-mail [email or web address]   [phone]

## 2021-06-16 ENCOUNTER — TELEPHONE (OUTPATIENT)
Dept: ORTHOPEDICS | Facility: CLINIC | Age: 81
End: 2021-06-16

## 2021-06-16 NOTE — TELEPHONE ENCOUNTER
RN called and gave  Ju verbal orders for PT.    Rhina Aguilar RN       Health Call Center    Phone Message    May a detailed message be left on voicemail: yes     Reason for Call: Order(s): Home Care Orders: Physical Therapy (PT): 1x per week for 1 week, 3x per week for 1 week + 2x per week for 2 weeks, Occupational Therapy (OT): Eval and Skilled Nursing: Eval    Please have someone on Dr. Arechiga's care team call Ju from [facility] back to verbally verify these orders. [phone] The number given is a secured line. A voicemail message can be left.     Action Taken: Message routed to:  [facility] Ortho    Travel Screening: Not Applicable

## 2021-06-21 ENCOUNTER — TELEPHONE (OUTPATIENT)
Dept: ORTHOPEDICS | Facility: CLINIC | Age: 81
End: 2021-06-21

## 2021-06-21 NOTE — TELEPHONE ENCOUNTER
[facility]    Phone Message    May a detailed message be left on voicemail: yes     Reason for Call: Other: Skilled nursing 2x a wk for 1wk for 8 wks/ patient is no longer CPM machine its causing too much pain and refusing to use tramdol Tylenol is fine      Action Taken: Message routed to:  [facility] ortho    Travel Screening: Not Applicable

## 2021-06-21 NOTE — TELEPHONE ENCOUNTER
Writer called Sivan back and gave verbal order for 2x weekly x 8 weeks of skilled nursing. Writer informed Sivan that pt really needs to try to use CPM machine as the range of motion is key to healing.     Kathleen Leiva LPN

## 2021-06-24 ENCOUNTER — MEDICAL CORRESPONDENCE (OUTPATIENT)
Dept: HEALTH INFORMATION MANAGEMENT | Facility: CLINIC | Age: 81
End: 2021-06-24

## 2021-06-25 ENCOUNTER — HOSPITAL ENCOUNTER (INPATIENT)
Facility: CLINIC | Age: 81
LOS: 1 days | Discharge: HOME HEALTH | DRG: 603 | End: 2021-06-26
Attending: INTERNAL MEDICINE | Admitting: INTERNAL MEDICINE
Payer: COMMERCIAL

## 2021-06-25 ENCOUNTER — TELEPHONE (OUTPATIENT)
Facility: CLINIC | Age: 81
End: 2021-06-25

## 2021-06-25 ENCOUNTER — APPOINTMENT (OUTPATIENT)
Dept: GENERAL RADIOLOGY | Facility: CLINIC | Age: 81
DRG: 603 | End: 2021-06-25
Attending: INTERNAL MEDICINE
Payer: COMMERCIAL

## 2021-06-25 ENCOUNTER — APPOINTMENT (OUTPATIENT)
Dept: ULTRASOUND IMAGING | Facility: CLINIC | Age: 81
End: 2021-06-25
Attending: EMERGENCY MEDICINE
Payer: COMMERCIAL

## 2021-06-25 ENCOUNTER — HOSPITAL ENCOUNTER (EMERGENCY)
Facility: CLINIC | Age: 81
Discharge: OTHER INSTITUTION | End: 2021-06-25
Attending: EMERGENCY MEDICINE | Admitting: EMERGENCY MEDICINE
Payer: COMMERCIAL

## 2021-06-25 VITALS
OXYGEN SATURATION: 96 % | HEIGHT: 62 IN | RESPIRATION RATE: 16 BRPM | WEIGHT: 175 LBS | SYSTOLIC BLOOD PRESSURE: 120 MMHG | HEART RATE: 68 BPM | DIASTOLIC BLOOD PRESSURE: 59 MMHG | BODY MASS INDEX: 32.2 KG/M2 | TEMPERATURE: 98.3 F

## 2021-06-25 DIAGNOSIS — Z96.652 STATUS POST TOTAL LEFT KNEE REPLACEMENT: ICD-10-CM

## 2021-06-25 DIAGNOSIS — L03.116 LEFT LEG CELLULITIS: ICD-10-CM

## 2021-06-25 DIAGNOSIS — M17.12 ARTHRITIS OF LEFT KNEE: ICD-10-CM

## 2021-06-25 DIAGNOSIS — L03.116 CELLULITIS OF LEFT LOWER EXTREMITY: ICD-10-CM

## 2021-06-25 DIAGNOSIS — R60.0 LEG EDEMA, LEFT: ICD-10-CM

## 2021-06-25 DIAGNOSIS — L03.90 CELLULITIS: Primary | ICD-10-CM

## 2021-06-25 LAB
ANION GAP SERPL CALCULATED.3IONS-SCNC: 3 MMOL/L (ref 3–14)
BASOPHILS # BLD AUTO: 0.2 10E9/L (ref 0–0.2)
BASOPHILS NFR BLD AUTO: 1.3 %
BUN SERPL-MCNC: 7 MG/DL (ref 7–30)
CALCIUM SERPL-MCNC: 9.2 MG/DL (ref 8.5–10.1)
CHLORIDE SERPL-SCNC: 105 MMOL/L (ref 94–109)
CO2 SERPL-SCNC: 28 MMOL/L (ref 20–32)
CREAT SERPL-MCNC: 0.64 MG/DL (ref 0.52–1.04)
CRP SERPL-MCNC: 6.6 MG/L (ref 0–8)
DIFFERENTIAL METHOD BLD: ABNORMAL
EOSINOPHIL # BLD AUTO: 0.5 10E9/L (ref 0–0.7)
EOSINOPHIL NFR BLD AUTO: 4.4 %
ERYTHROCYTE [DISTWIDTH] IN BLOOD BY AUTOMATED COUNT: 14.1 % (ref 10–15)
GFR SERPL CREATININE-BSD FRML MDRD: 84 ML/MIN/{1.73_M2}
GLUCOSE SERPL-MCNC: 99 MG/DL (ref 70–99)
HCT VFR BLD AUTO: 35.1 % (ref 35–47)
HGB BLD-MCNC: 11.1 G/DL (ref 11.7–15.7)
IMM GRANULOCYTES # BLD: 0.1 10E9/L (ref 0–0.4)
IMM GRANULOCYTES NFR BLD: 0.4 %
LABORATORY COMMENT REPORT: NORMAL
LACTATE BLD-SCNC: 0.8 MMOL/L (ref 0.7–2)
LYMPHOCYTES # BLD AUTO: 3.8 10E9/L (ref 0.8–5.3)
LYMPHOCYTES NFR BLD AUTO: 33.9 %
MCH RBC QN AUTO: 29.5 PG (ref 26.5–33)
MCHC RBC AUTO-ENTMCNC: 31.6 G/DL (ref 31.5–36.5)
MCV RBC AUTO: 93 FL (ref 78–100)
MONOCYTES # BLD AUTO: 1.1 10E9/L (ref 0–1.3)
MONOCYTES NFR BLD AUTO: 10.1 %
NEUTROPHILS # BLD AUTO: 5.6 10E9/L (ref 1.6–8.3)
NEUTROPHILS NFR BLD AUTO: 49.9 %
NRBC # BLD AUTO: 0 10*3/UL
NRBC BLD AUTO-RTO: 0 /100
PLATELET # BLD AUTO: 588 10E9/L (ref 150–450)
POTASSIUM SERPL-SCNC: 3.9 MMOL/L (ref 3.4–5.3)
RBC # BLD AUTO: 3.76 10E12/L (ref 3.8–5.2)
SARS-COV-2 RNA RESP QL NAA+PROBE: NEGATIVE
SODIUM SERPL-SCNC: 136 MMOL/L (ref 133–144)
SPECIMEN SOURCE: NORMAL
WBC # BLD AUTO: 11.2 10E9/L (ref 4–11)

## 2021-06-25 PROCEDURE — 96365 THER/PROPH/DIAG IV INF INIT: CPT

## 2021-06-25 PROCEDURE — 87040 BLOOD CULTURE FOR BACTERIA: CPT | Performed by: EMERGENCY MEDICINE

## 2021-06-25 PROCEDURE — 99222 1ST HOSP IP/OBS MODERATE 55: CPT | Mod: AI | Performed by: INTERNAL MEDICINE

## 2021-06-25 PROCEDURE — 85025 COMPLETE CBC W/AUTO DIFF WBC: CPT | Performed by: EMERGENCY MEDICINE

## 2021-06-25 PROCEDURE — 250N000011 HC RX IP 250 OP 636: Performed by: EMERGENCY MEDICINE

## 2021-06-25 PROCEDURE — 250N000011 HC RX IP 250 OP 636: Performed by: INTERNAL MEDICINE

## 2021-06-25 PROCEDURE — 83605 ASSAY OF LACTIC ACID: CPT | Performed by: EMERGENCY MEDICINE

## 2021-06-25 PROCEDURE — 73562 X-RAY EXAM OF KNEE 3: CPT | Mod: LT

## 2021-06-25 PROCEDURE — 86140 C-REACTIVE PROTEIN: CPT | Performed by: PHYSICIAN ASSISTANT

## 2021-06-25 PROCEDURE — 36415 COLL VENOUS BLD VENIPUNCTURE: CPT | Performed by: PHYSICIAN ASSISTANT

## 2021-06-25 PROCEDURE — C9803 HOPD COVID-19 SPEC COLLECT: HCPCS

## 2021-06-25 PROCEDURE — 250N000011 HC RX IP 250 OP 636: Performed by: PHYSICIAN ASSISTANT

## 2021-06-25 PROCEDURE — 99285 EMERGENCY DEPT VISIT HI MDM: CPT | Mod: 25

## 2021-06-25 PROCEDURE — 73562 X-RAY EXAM OF KNEE 3: CPT | Mod: 26 | Performed by: RADIOLOGY

## 2021-06-25 PROCEDURE — 99207 PR APP CREDIT; MD BILLING SHARED VISIT: CPT | Performed by: PHYSICIAN ASSISTANT

## 2021-06-25 PROCEDURE — 120N000002 HC R&B MED SURG/OB UMMC

## 2021-06-25 PROCEDURE — 87635 SARS-COV-2 COVID-19 AMP PRB: CPT | Performed by: EMERGENCY MEDICINE

## 2021-06-25 PROCEDURE — 250N000013 HC RX MED GY IP 250 OP 250 PS 637: Performed by: PHYSICIAN ASSISTANT

## 2021-06-25 PROCEDURE — 96375 TX/PRO/DX INJ NEW DRUG ADDON: CPT

## 2021-06-25 PROCEDURE — 80048 BASIC METABOLIC PNL TOTAL CA: CPT | Performed by: EMERGENCY MEDICINE

## 2021-06-25 PROCEDURE — 99024 POSTOP FOLLOW-UP VISIT: CPT | Performed by: PHYSICIAN ASSISTANT

## 2021-06-25 PROCEDURE — 93971 EXTREMITY STUDY: CPT | Mod: LT

## 2021-06-25 RX ORDER — DESONIDE 0.5 MG/G
CREAM TOPICAL 2 TIMES DAILY PRN
Status: DISCONTINUED | OUTPATIENT
Start: 2021-06-25 | End: 2021-06-26 | Stop reason: HOSPADM

## 2021-06-25 RX ORDER — SENNOSIDES 8.6 MG
650 CAPSULE ORAL EVERY 8 HOURS PRN
COMMUNITY
End: 2024-08-14

## 2021-06-25 RX ORDER — ASPIRIN 81 MG/1
81 TABLET ORAL DAILY
Status: DISCONTINUED | OUTPATIENT
Start: 2021-06-26 | End: 2021-06-26 | Stop reason: HOSPADM

## 2021-06-25 RX ORDER — MORPHINE SULFATE 2 MG/ML
2 INJECTION, SOLUTION INTRAMUSCULAR; INTRAVENOUS ONCE
Status: COMPLETED | OUTPATIENT
Start: 2021-06-25 | End: 2021-06-25

## 2021-06-25 RX ORDER — POLYETHYLENE GLYCOL 3350 17 G/17G
17 POWDER, FOR SOLUTION ORAL DAILY PRN
Status: DISCONTINUED | OUTPATIENT
Start: 2021-06-25 | End: 2021-06-26 | Stop reason: HOSPADM

## 2021-06-25 RX ORDER — ACETAMINOPHEN 325 MG/1
650 TABLET ORAL EVERY 4 HOURS PRN
Status: DISCONTINUED | OUTPATIENT
Start: 2021-06-25 | End: 2021-06-26 | Stop reason: HOSPADM

## 2021-06-25 RX ORDER — LIDOCAINE 40 MG/G
CREAM TOPICAL
Status: DISCONTINUED | OUTPATIENT
Start: 2021-06-25 | End: 2021-06-26 | Stop reason: HOSPADM

## 2021-06-25 RX ORDER — SODIUM CHLORIDE, SODIUM LACTATE, POTASSIUM CHLORIDE, CALCIUM CHLORIDE 600; 310; 30; 20 MG/100ML; MG/100ML; MG/100ML; MG/100ML
INJECTION, SOLUTION INTRAVENOUS CONTINUOUS
Status: DISCONTINUED | OUTPATIENT
Start: 2021-06-25 | End: 2021-06-25 | Stop reason: CLARIF

## 2021-06-25 RX ORDER — CEFAZOLIN SODIUM 1 G/3ML
1 INJECTION, POWDER, FOR SOLUTION INTRAMUSCULAR; INTRAVENOUS EVERY 8 HOURS
Status: DISCONTINUED | OUTPATIENT
Start: 2021-06-25 | End: 2021-06-25

## 2021-06-25 RX ORDER — MEPERIDINE HYDROCHLORIDE 25 MG/ML
12.5 INJECTION INTRAMUSCULAR; INTRAVENOUS; SUBCUTANEOUS
Status: DISCONTINUED | OUTPATIENT
Start: 2021-06-25 | End: 2021-06-25 | Stop reason: CLARIF

## 2021-06-25 RX ORDER — GABAPENTIN 100 MG/1
100 CAPSULE ORAL DAILY PRN
Status: DISCONTINUED | OUTPATIENT
Start: 2021-06-25 | End: 2021-06-26 | Stop reason: HOSPADM

## 2021-06-25 RX ORDER — CEFAZOLIN SODIUM 2 G/100ML
2 INJECTION, SOLUTION INTRAVENOUS ONCE
Status: COMPLETED | OUTPATIENT
Start: 2021-06-25 | End: 2021-06-25

## 2021-06-25 RX ORDER — FLUTICASONE PROPIONATE 50 MCG
1 SPRAY, SUSPENSION (ML) NASAL DAILY
Status: DISCONTINUED | OUTPATIENT
Start: 2021-06-26 | End: 2021-06-26 | Stop reason: HOSPADM

## 2021-06-25 RX ORDER — NALOXONE HYDROCHLORIDE 0.4 MG/ML
0.2 INJECTION, SOLUTION INTRAMUSCULAR; INTRAVENOUS; SUBCUTANEOUS
Status: DISCONTINUED | OUTPATIENT
Start: 2021-06-25 | End: 2021-06-25 | Stop reason: CLARIF

## 2021-06-25 RX ORDER — ONDANSETRON 4 MG/1
4 TABLET, ORALLY DISINTEGRATING ORAL EVERY 30 MIN PRN
Status: DISCONTINUED | OUTPATIENT
Start: 2021-06-25 | End: 2021-06-25 | Stop reason: CLARIF

## 2021-06-25 RX ORDER — NALOXONE HYDROCHLORIDE 0.4 MG/ML
0.4 INJECTION, SOLUTION INTRAMUSCULAR; INTRAVENOUS; SUBCUTANEOUS
Status: DISCONTINUED | OUTPATIENT
Start: 2021-06-25 | End: 2021-06-25 | Stop reason: CLARIF

## 2021-06-25 RX ORDER — AZELASTINE 1 MG/ML
2 SPRAY, METERED NASAL DAILY PRN
Status: DISCONTINUED | OUTPATIENT
Start: 2021-06-25 | End: 2021-06-26 | Stop reason: HOSPADM

## 2021-06-25 RX ORDER — ONDANSETRON 2 MG/ML
4 INJECTION INTRAMUSCULAR; INTRAVENOUS EVERY 30 MIN PRN
Status: DISCONTINUED | OUTPATIENT
Start: 2021-06-25 | End: 2021-06-25 | Stop reason: CLARIF

## 2021-06-25 RX ORDER — GABAPENTIN 400 MG/1
400 CAPSULE ORAL 3 TIMES DAILY
Status: DISCONTINUED | OUTPATIENT
Start: 2021-06-25 | End: 2021-06-26 | Stop reason: HOSPADM

## 2021-06-25 RX ORDER — MONTELUKAST SODIUM 10 MG/1
10 TABLET ORAL AT BEDTIME
Status: DISCONTINUED | OUTPATIENT
Start: 2021-06-25 | End: 2021-06-26 | Stop reason: HOSPADM

## 2021-06-25 RX ORDER — ERYTHROMYCIN 5 MG/G
OINTMENT OPHTHALMIC
Status: DISCONTINUED | OUTPATIENT
Start: 2021-06-25 | End: 2021-06-26 | Stop reason: HOSPADM

## 2021-06-25 RX ORDER — FENTANYL CITRATE 50 UG/ML
25-50 INJECTION, SOLUTION INTRAMUSCULAR; INTRAVENOUS
Status: DISCONTINUED | OUTPATIENT
Start: 2021-06-25 | End: 2021-06-25 | Stop reason: CLARIF

## 2021-06-25 RX ORDER — LEVOTHYROXINE SODIUM 88 UG/1
88 TABLET ORAL DAILY
Status: DISCONTINUED | OUTPATIENT
Start: 2021-06-26 | End: 2021-06-26 | Stop reason: HOSPADM

## 2021-06-25 RX ORDER — HEPARIN SODIUM 5000 [USP'U]/.5ML
5000 INJECTION, SOLUTION INTRAVENOUS; SUBCUTANEOUS EVERY 12 HOURS
Status: DISCONTINUED | OUTPATIENT
Start: 2021-06-25 | End: 2021-06-26 | Stop reason: HOSPADM

## 2021-06-25 RX ORDER — POLYETHYLENE GLYCOL 3350 17 G/17G
1 POWDER, FOR SOLUTION ORAL DAILY PRN
COMMUNITY
End: 2021-11-03

## 2021-06-25 RX ORDER — CEFAZOLIN SODIUM 2 G/100ML
2 INJECTION, SOLUTION INTRAVENOUS EVERY 8 HOURS
Status: DISCONTINUED | OUTPATIENT
Start: 2021-06-25 | End: 2021-06-26

## 2021-06-25 RX ORDER — SENNOSIDES 8.6 MG
650 CAPSULE ORAL 4 TIMES DAILY PRN
Status: DISCONTINUED | OUTPATIENT
Start: 2021-06-25 | End: 2021-06-25

## 2021-06-25 RX ORDER — VIT C/E/ZN/COPPR/LUTEIN/ZEAXAN 60 MG-6 MG
1 CAPSULE ORAL 2 TIMES DAILY
Status: DISCONTINUED | OUTPATIENT
Start: 2021-06-25 | End: 2021-06-26 | Stop reason: HOSPADM

## 2021-06-25 RX ADMIN — ACETAMINOPHEN 650 MG: 325 TABLET, FILM COATED ORAL at 23:49

## 2021-06-25 RX ADMIN — MORPHINE SULFATE 2 MG: 2 INJECTION, SOLUTION INTRAMUSCULAR; INTRAVENOUS at 06:40

## 2021-06-25 RX ADMIN — ACETAMINOPHEN 650 MG: 325 TABLET, FILM COATED ORAL at 15:08

## 2021-06-25 RX ADMIN — Medication 1 TABLET: at 19:42

## 2021-06-25 RX ADMIN — GABAPENTIN 400 MG: 400 CAPSULE ORAL at 19:42

## 2021-06-25 RX ADMIN — GABAPENTIN 400 MG: 400 CAPSULE ORAL at 14:46

## 2021-06-25 RX ADMIN — Medication 1 CAPSULE: at 19:42

## 2021-06-25 RX ADMIN — CEFAZOLIN SODIUM 2 G: 2 INJECTION, SOLUTION INTRAVENOUS at 16:33

## 2021-06-25 RX ADMIN — HEPARIN SODIUM 5000 UNITS: 5000 INJECTION, SOLUTION INTRAVENOUS; SUBCUTANEOUS at 14:47

## 2021-06-25 RX ADMIN — MONTELUKAST 10 MG: 10 TABLET, FILM COATED ORAL at 21:56

## 2021-06-25 RX ADMIN — CEFAZOLIN SODIUM 2 G: 2 INJECTION, SOLUTION INTRAVENOUS at 08:59

## 2021-06-25 RX ADMIN — ACETAMINOPHEN 650 MG: 325 TABLET, FILM COATED ORAL at 19:41

## 2021-06-25 ASSESSMENT — ENCOUNTER SYMPTOMS
FEVER: 0
CHILLS: 0
ABDOMINAL PAIN: 0

## 2021-06-25 ASSESSMENT — MIFFLIN-ST. JEOR: SCORE: 1212.04

## 2021-06-25 NOTE — PLAN OF CARE
VS: BP (!) 141/70   Pulse 65   Temp 97.2  F (36.2  C) (Oral)   Resp 16   SpO2 97%     O2: Sating >95% on RA. Bilateral lung sounds clear. Denies chest pain and SOB.    Output: Voids spontaneously and adequately.    Last BM: 6/24/21. Bowel sounds active x4. Passing flatus.    Activity: Independent. Walks with a cane in room and to bathroom.    Up for meals? No.    Skin: L knee incision is open to air. Cellulitis to L inner thigh and inner knee.    Pain: Minimal pain with rash. PRN Tylenol was given.    CMS: A&Ox4. Denies numbness and tingling.    Dressing: PIV dressing to R wrist is C/D/I.    Diet: Regular. Appetite is good. Denies N/V.    LDA: PIV to R wrist is saline locked.    Equipment: IV pole, FWW, gait belt, and personal belongings.    Plan: Continue to monitor. Call light within reach and utilized appropriately.    Additional Info: Pt arrived on unit at noon via stretcher. Pt is pleasant.

## 2021-06-25 NOTE — H&P
[facility]    History and Physical - Hospitalist Service       Date of Admission:  6/25/2021    Assessment & Plan   Ms. Pavithra Laurent is a pleasant 80 yo female with hx of LLE DVT (2017; on 6 mo's AC without recurrence), hypothyroidism, allergic rhinitis, atopic dermatitis, and L knee OA s/p L TKA, earlier this month on 6/10, readmitted to [facility] medicine service after presenting to ED with acute c/o 1 day hx of LLE erythema and tenderness concerning for cellulitis.     # Acute LLE erythema and tenderness concerning for cellulitis  # S/p L TKA, 6/10/2021  # Hx of LLE DVT, 2017  Had LLE DVT in 2017 she states was idiopathic and was on 6 mo course of AC without recurrence. Had L TKA 2/2 OA on 6/10 with no post-op complications. Finished 10 day course of Lovenox post-op for DVT ppx per ortho team. Transitioned to baby aspirin. Last night noticed tender erythema on lower LLE that spread up leg in to groin into this am so brought to ED by her daughter. LLE venous US neg for DVT, but due to erythema, mildly elevated WBC, and recent L TKA, transferred to [facility] for medicine admission for treatment of cellulitis and evaluation by her ortho team. Given one time dose of Ancef prior to transfer. No e/o sepsis and pt afebrile and other VSS. Exam reveals 1+ LLE with mild erythema and tenderness from ankle into L groin; however, L knee incision does not appear infected and without purulent discharge or dehiscence.   - Orthopedic surgery consulted and appreciate recommendations.  - Continue IV Ancef 1 q8hrs in interim  - Obtain CRP and trend.   - Repeat CBC tomorrow am   - Monitor for resolution of erythema and tenderness.   - Prn Tylenol for pain and tenderness, and well as incisional pain     # Hx of TIA: Continue PTA baby aspirin     # Hypothyroidism: Continue PTA levothyroxine      # Allergic rhinitis  # Atopic dermatitis  No concerns.    - Continue PTA Montelukast, Flonase,  Azelastine nasal spry and topicals       Diet: Combination Diet Regular Diet Adult    DVT Prophylaxis: Heparin SQ  Torres Catheter: Not present  Central Lines: None  Code Status: Full Code      Risk Factors Present on Admission              # Platelet Defect: home medication list includes an antiplatelet medication      Disposition Plan   Expected discharge: Tomorrow, recommended to prior living arrangement once antibiotic plan established and evaluated by orthopedic surgery.     The patient's care was discussed with the Attending Physician, Dr. Abraham, Bedside Nurse and Patient.    FLORIDALMA Price [facility]  Securely message with the Vocera Web Console (learn more here)  Text page via [facility] Paging/Directory  _____________________________________________________________________    Chief Complaint   Acute LLE erythema and tenderness    History is obtained from the patient and electronic health record    History of Present Illness    Please refer to note from ED physician dated 6/25 by Dr. Mackey for full details. In brief, Ms. Pavithra Laurent is a pleasant 82 yo female with hx of LLE DVT (2017; on 6 mo's AC without recurrence), hypothyroidism, allergic rhinitis, atopic dermatitis, and L knee OA s/p L TKA, earlier this month on 6/10, readmitted to [facility] medicine service after presenting to ED with acute c/o 1 day hx of LLE erythema and tenderness concerning for cellulitis.     Currently pt admits to similar LLE erythema and tenderness. Denies other acute physical issues at this time.     Review of Systems    The 10 point Review of Systems is negative other than noted in the HPI or here.     Past Medical History    I have reviewed this patient's medical history and updated it with pertinent information if needed.   Past Medical History:   Diagnosis Date     Allergic rhinitis      Calculus of gallbladder without mention of cholecystitis or obstruction      Cataract of  both eyes      Deep vein thrombosis (DVT) of left lower extremity, unspecified chronicity, unspecified vein (H) 1/30/2020     Dry eye syndrome      Environmental allergies      Gastro-oesophageal reflux disease      GERD (gastroesophageal reflux disease) 5/24/2013     Hypothyroid      Pain in joint, shoulder region      Rosacea      Thoracic outlet syndrome        Past Surgical History   I have reviewed this patient's surgical history and updated it with pertinent information if needed.  Past Surgical History:   Procedure Laterality Date     ARTHROPLASTY KNEE Left 6/10/2021    Procedure: LEFT Total Knee Arthroplasty;  Surgeon: Isela Jimenez MD;  Location: [facility] OR     ARTHROTOMY ELBOW Right 1/7/2015    Procedure: ARTHROTOMY ELBOW;  Surgeon: Branden Meyer MD;  Location:  OR     C RAD RESEC TONSIL/PILLARS  1948    tonsillectomy     CLOSED REDUCTION UPPER EXTREMITY  12/26/2014    Procedure: CLOSED REDUCTION UPPER EXTREMITY;  Surgeon: Louis Daniel MD;  Location:  OR     GYN SURGERY  1984    tubal ligation     HERNIORRHAPHY VENTRAL N/A 6/18/2019    Procedure: OPEN VENTRAL HERNIA REPAIR WITH MESH;  Surgeon: Last Solis MD;  Location:  OR     IRRIGATION AND DEBRIDEMENT HAND, COMBINED  12/26/2014    Procedure: COMBINED IRRIGATION AND DEBRIDEMENT HAND;  Surgeon: Louis Daniel MD;  Location:  OR     KNEE SURGERY  2001    arthroscopic right     LAPAROSCOPIC CHOLECYSTECTOMY  8/16/2012    Procedure: LAPAROSCOPIC CHOLECYSTECTOMY;  LAPAROSCOPIC CHOLECYSTECTOMY ;  Surgeon: Preston Walters MD;  Location: [facility]     OPEN REDUCTION INTERNAL FIXATION FOREARM Right 12/26/2014    Procedure: OPEN REDUCTION INTERNAL FIXATION FOREARM;  Surgeon: Louis Daniel MD;  Location:  OR     ORTHOPEDIC SURGERY  2007,2008    bunyanectomy, hammer toe, torn meniscus, toe and knee replacement     TKA  2009    right knee     WISDOM TEETH EXTRACTION  1958       Social History   I have reviewed this patient's social history and  updated it with pertinent information if needed.  Social History     Tobacco Use     Smoking status: Never Smoker     Smokeless tobacco: Never Used   Substance Use Topics     Alcohol use: Yes     Alcohol/week: 0.0 standard drinks     Comment: rare- 2/month     Drug use: No       Family History   I have reviewed this patient's family history and updated it with pertinent information if needed.  Family History   Problem Relation Age of Onset     Heart Disease Mother         MI at 72     Cerebrovascular Disease Mother      Cancer Mother         lung     Alcohol/Drug Father      Depression Father      Cardiovascular Father      Heart Disease Paternal Grandmother      Heart Disease Paternal Grandfather      Cancer Maternal Grandmother         stomach     Cancer - colorectal Other      Neurologic Disorder Son         brain injury     Clotting Disorder (Unknown) Son      Unknown/Adopted Maternal Grandfather        Prior to Admission Medications   Prior to Admission Medications   Prescriptions Last Dose Informant Patient Reported? Taking?   Multiple Vitamins-Minerals (PRESERVISION AREDS 2+MULTI VIT) CAPS 6/25/2021 at Unknown time  Yes Yes   Sig: Take 1 capsule by mouth 2 times daily    acetaminophen (TYLENOL) 650 MG CR tablet prn  Yes Yes   Sig: Take 650 mg by mouth every 8 hours as needed for mild pain   amoxicillin (AMOXIL) 500 MG capsule prn Self Yes Yes   Sig: Take 2,000 mg by mouth once One hour prior to dental procedure   aspirin (ASA) 81 MG EC tablet 6/25/2021 at Unknown time  No Yes   Sig: Take 1 tablet (81 mg) by mouth daily   azelastine (ASTELIN) 0.1 % nasal spray 6/25/2021 at Unknown time Self Yes Yes   Sig: Spray 2 sprays into both nostrils daily as needed for rhinitis    calcium-vitamin D-vitamin K (CALCIUM SOFT CHEWS) 500-500-40 MG-UNT-MCG CHEW 6/25/2021 at Unknown time Self Yes Yes   Sig: Take 2 tablets by mouth 2 times daily   desonide (DESOWEN) 0.05 % cream prn Self Yes Yes   Sig: Apply topically 2 times  daily as needed (rash)    diclofenac (VOLTAREN) 1 % GEL prn Self Yes Yes   Sig: Place 2 g onto the skin daily as needed for moderate pain (knees)    erythromycin (ROMYCIN) ophthalmic ointment prn Self Yes Yes   Sig: Place 1 Application into both eyes nightly as needed (dry/irritated eyes)    fluocinolone acetonide (DERMOTIC) 0.01 % OIL prn Self Yes Yes   Sig: Place 1 drop in ear(s) daily as needed.   fluticasone (FLONASE) 50 MCG/ACT nasal spray 6/25/2021 at Unknown time Self Yes Yes   Sig: Spray 1 spray into both nostrils daily    gabapentin (NEURONTIN) 100 MG capsule prn  No Yes   Sig: Take 1 capsule (100 mg) by mouth daily as needed (on particularly active days)   gabapentin (NEURONTIN) 400 MG capsule 6/25/2021 at Unknown time  No Yes   Sig: Take 1 capsule (400 mg) by mouth 3 times daily   hypromellose (ARTIFICIAL TEARS) 0.5 % SOLN ophthalmic solution prn  Yes Yes   Sig: Place 1 drop into both eyes 4 times daily as needed for dry eyes   levothyroxine (SYNTHROID/LEVOTHROID) 88 MCG tablet 6/25/2021 at Unknown time  No Yes   Sig: TAKE ONE TABLET BY MOUTH ONCE DAILY   montelukast (SINGULAIR) 10 MG tablet 6/24/2021 at Unknown time Self Yes Yes   Sig: Take 10 mg by mouth At Bedtime    nystatin (MYCOSTATIN) 291714 UNIT/GM external powder prn  No Yes   Sig: Apply topically 2 times daily as needed for other (yeast infection in skin folds)   polyethylene glycol (MIRALAX) 17 g packet prn  Yes Yes   Sig: Take 1 packet by mouth daily as needed for constipation   traMADol (ULTRAM) 50 MG tablet prn  No Yes   Sig: Take 0.5-1 tablets (25-50 mg) by mouth every 6 hours as needed for severe pain      Facility-Administered Medications: None     Allergies   Allergies   Allergen Reactions     Birch Trees      Codeine Sulfate Nausea and Vomiting     Oral, topical is ok     Dust Mites      Erythromycin Nausea and Vomiting     With oral use     Hydromorphone Other (See Comments)     nightmares     Other Environmental Allergy Itching      Coban       Physical Exam   Vital Signs: Temp: 97.2  F (36.2  C) Temp src: Oral BP: (!) 141/70 Pulse: 65   Resp: 16 SpO2: 97 % O2 Device: None (Room air)    Weight: 0 lbs 0 oz  GEN: In NAD  HEENT: NCAT; PERRL; sclerae non-icteric  LUNGS: CTAB  CV: RRR  ABD: +BSs; SNTND  EXT: Faint, mildly tender, poorly demarcated erythema from foot into groin; 1+ edema; L knee incision healing well without e/o dehiscence or infection  SKIN: No acute rashes noted on exposed areas.  NEURO: AAOx3; CNs grossly intact; No acute focal deficits noted.      Data   Data reviewed today: I reviewed all medications, new labs and imaging results over the last 24 hours.   CMP  Recent Labs   Lab 06/25/21  0618      POTASSIUM 3.9   CHLORIDE 105   CO2 28   ANIONGAP 3   GLC 99   BUN 7   CR 0.64   GFRESTIMATED 84   GFRESTBLACK >90   HARSHIL 9.2     CBC  Recent Labs   Lab 06/25/21  0618   WBC 11.2*   RBC 3.76*   HGB 11.1*   HCT 35.1   MCV 93   MCH 29.5   MCHC 31.6   RDW 14.1   *

## 2021-06-25 NOTE — TELEPHONE ENCOUNTER
[facility]  Transfer Triage Note    Date of call: 06/25/21  Time of call: 9:32 AM    Is pandemic COVID-19 a concern? NO    Reason for transfer: Further diagnostic work up, management, and consultation for specialized care , established care here  Diagnosis: Cellulitis    Outside Records: Available  Additional records requested to be faxed to [phone].    Stability of Patient: Patient is vitally stable, with no critical labs, and will likely remain stable throughout the transfer process  ICU: No    Expected Time of Arrival for Transfer: 0-8 hours    Arrival Location:  [place] [address] Phone: [phone]    Recommendations for Management and Stabilization: Not needed    Additional Comments   81 year old female with a history of DVT and Sjorgrens who had L knee arthroplasty on 6/10/21 with Dr. Jimenez at [facility] and discharged on 6/11/21 presents to [facility] ED on 6/25/2021 for L leg pain, redness, and swelling. Given hx of DVT, US was done, negative for DVT. ED has started treatment for cellulitis with ancef after drawing blood cultures. Afebrile, normotensive.      Osmel Segundo MD

## 2021-06-25 NOTE — PHARMACY-ADMISSION MEDICATION HISTORY
Please see Admission Medication History completed on 6/25/21 under previous encounter at [facility] for information regarding prior to admission medications.    Tessa Martin, PharmD, BCPS

## 2021-06-25 NOTE — PHARMACY-ADMISSION MEDICATION HISTORY
Pharmacy Medication History  Admission medication history interview status for the 6/25/2021  admission is complete. See EPIC admission navigator for prior to admission medications     Location of Interview: Patient room  Medication history sources: Patient and Surescripts    Significant changes made to the medication list:  Stopped Miralax, Senna scheduled  Changed Apap to 650 mg    In the past week, patient estimated taking medication this percent of the time: greater than 90%    Additional medication history information:   none    Medication reconciliation completed by provider prior to medication history? No    Time spent in this activity: 20 minutes    Prior to Admission medications    Medication Sig Last Dose Taking? Auth Provider   acetaminophen (TYLENOL) 650 MG CR tablet Take 650 mg by mouth every 8 hours as needed for mild pain  at prn Yes Unknown, Entered By History   amoxicillin (AMOXIL) 500 MG capsule Take 2,000 mg by mouth once One hour prior to dental procedure  at prn Yes Reported, Patient   aspirin (ASA) 81 MG EC tablet Take 1 tablet (81 mg) by mouth daily 6/25/2021 at am Yes Sharon Paz APRN CNS   azelastine (ASTELIN) 0.1 % nasal spray Spray 2 sprays into both nostrils daily as needed for rhinitis  6/25/2021 at am Yes Reported, Patient   calcium-vitamin D-vitamin K (CALCIUM SOFT CHEWS) 500-500-40 MG-UNT-MCG CHEW Take 2 tablets by mouth 2 times daily 6/25/2021 at am Yes Reported, Patient   desonide (DESOWEN) 0.05 % cream Apply topically 2 times daily as needed (rash)   at prn Yes Reported, Patient   diclofenac (VOLTAREN) 1 % GEL Place 2 g onto the skin daily as needed for moderate pain (knees)   at prn Yes Reported, Patient   erythromycin (ROMYCIN) ophthalmic ointment Place 1 Application into both eyes nightly as needed (dry/irritated eyes)   at prn Yes Reported, Patient   fluocinolone acetonide (DERMOTIC) 0.01 % OIL Place 1 drop in ear(s) daily as needed.  at prn Yes Reported, Patient    fluticasone (FLONASE) 50 MCG/ACT nasal spray Spray 1 spray into both nostrils daily  6/25/2021 at am Yes Reported, Patient   gabapentin (NEURONTIN) 100 MG capsule Take 1 capsule (100 mg) by mouth daily as needed (on particularly active days)  at prn Yes Kitty Owens MD   gabapentin (NEURONTIN) 400 MG capsule Take 1 capsule (400 mg) by mouth 3 times daily 6/25/2021 at am x1 Yes Kitty Owens MD   hypromellose (ARTIFICIAL TEARS) 0.5 % SOLN ophthalmic solution Place 1 drop into both eyes 4 times daily as needed for dry eyes  at prn Yes Unknown, Entered By History   levothyroxine (SYNTHROID/LEVOTHROID) 88 MCG tablet TAKE ONE TABLET BY MOUTH ONCE DAILY 6/25/2021 at am Yes Kitty Owens MD   montelukast (SINGULAIR) 10 MG tablet Take 10 mg by mouth At Bedtime  6/24/2021 at pm Yes Reported, Patient   Multiple Vitamins-Minerals (PRESERVISION AREDS 2+MULTI VIT) CAPS Take 1 capsule by mouth 2 times daily  6/25/2021 at am Yes Kitty Owens MD   nystatin (MYCOSTATIN) 119221 UNIT/GM external powder Apply topically 2 times daily as needed for other (yeast infection in skin folds)  at prn Yes Lore Krishnamurthy MD   polyethylene glycol (MIRALAX) 17 g packet Take 1 packet by mouth daily as needed for constipation  at prn Yes Unknown, Entered By History   traMADol (ULTRAM) 50 MG tablet Take 0.5-1 tablets (25-50 mg) by mouth every 6 hours as needed for severe pain  at prn Yes Sharon Paz APRN CNS   triamcinolone (KENALOG) 0.1 % external cream Apply topically 2 times daily Apply to AA on left hand bid  6/25/2021 at am Yes Lore Krishnamurthy MD       The information provided in this note is only as accurate as the sources available at the time of update(s)     Ping Lechuga, PharmD  Inpatient Clinical Pharmacist  [phone]

## 2021-06-25 NOTE — PROGRESS NOTES
Care Management Initial Consult    General Information  Assessment completed with:  ,         Primary Care Provider verified and updated as needed:     Readmission within the last 30 days:           Advance Care Planning:            Communication Assessment  Patient's communication style: spoken language (English or Bilingual)    Hearing Difficulty or Deaf: yes   Wear Glasses or Blind: yes    Cognitive  Cognitive/Neuro/Behavioral: WDL                      Living Environment:   People in home:       Current living Arrangements:        Able to return to prior arrangements:         Family/Social Support:  Care provided by:    Provides care for:                  Description of Support System:           Current Resources:   Patient receiving home care services:       Community Resources:    Equipment currently used at home: cane, straight  Supplies currently used at home:      Employment/Financial:  Employment Status:          Financial Concerns:             Lifestyle & Psychosocial Needs:        Socioeconomic History     Marital status:      Spouse name: Not on file     Number of children: Not on file     Years of education: Not on file     Highest education level: Not on file     Tobacco Use     Smoking status: Never Smoker     Smokeless tobacco: Never Used   Substance and Sexual Activity     Alcohol use: Yes     Alcohol/week: 0.0 standard drinks     Comment: rare- 2/month     Drug use: No     Sexual activity: Not Currently     Partners: Male     Birth control/protection: Surgical     Comment: bilateral tubal ligation       Additional Information:  GIL orders placed for RN/PT and OT. Patient is currently open to [facility]. [facility] will continue to follow and make additional referrals as needed.    [facility], [place]   Phone  [phone]  Fax  [phone]       Sarah Pelaez RN, BSN  Care Coordinator, [address] [place] [address]  Phone [phone]  Pager [phone]
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
## 2021-06-25 NOTE — ED PROVIDER NOTES
History   Chief Complaint:  Leg Pain     HPI   Pavithra Laurent is a 81 year old female with history of DVT who presents with left leg pain. Patient recently had surgery on 06/10/21 for a left knee arthroplasty. She complains of upper and lower left leg redness and swelling beginning six hours ago. She denies any pain or tenderness to her left foot. Her right leg is without complaints. She denies fevers or chills. She denies abdominal pain or groin pain. She states she had a DVT in her left leg in the past. She denies the use of blood thinning medications.     Review of Systems   Constitutional: Negative for chills and fever.   Cardiovascular: Positive for leg swelling.   Gastrointestinal: Negative for abdominal pain.   Genitourinary: Negative for pelvic pain.   All other systems reviewed and are negative.    Allergies:  Birch Trees  Codeine Sulfate  Dust Mites  Erythromycin  Hydromorphone  Other Environmental Allergy    Medications:  Aspirin 81 mg   Gabapentin   Levothyroxine    Singular   Tramado    Past Medical History:    Calculus of Gallbladder   DVT, Left Lower Extremity   GERD  Hypothyroid   Thoracic Outlet Syndrome   Ventral Hernia   Sicca Syndrome      Past Surgical History:    Arthoplasty Knee, Left  Arthrotomy Elbow, Right   Tonsillectomy   Closed Reduction Upper Extremity   Tubal Ligation   Herniorrhaphy Ventral   Knee Arthroscopy, Right  Laparoscopic Cholecystectomy   Open Reduction Internal Fixation Forearm, Right     Family History:    Mother - MI, Cerebrovascular Disease, Lung Cancer  Father - Alcohol/Drug, Depression    Social History:  Arrives with her daughter to the emergency department.     Physical Exam     Patient Vitals for the past 24 hrs:   BP Temp Temp src Pulse Resp SpO2 Height Weight   06/25/21 1110 120/59 -- -- 68 16 96 % -- --   06/25/21 0901 -- -- -- -- -- 96 % -- --   06/25/21 0900 123/64 -- -- 70 -- -- -- --   06/25/21 0649 -- -- -- -- -- 94 % -- --   06/25/21 0645 -- -- -- -- -- 96  "% -- --   06/25/21 0555 (!) 164/69 98.3  F (36.8  C) Temporal 88 16 97 % 1.575 m (5' 2\") 79.4 kg (175 lb)       Physical Exam    Nursing note and vitals reviewed.    Constitutional:  Appears comfortable.    Cardiovascular:  Normal rate, regular rhythm.     Strong DP pulse 2+.  Cap refill less than 2 seconds.  Musculoskeletal:  Range of motion limited with the knee post surgery on the left ankle and hip are normal.  The incision looks like it is healing well, there is some swelling around the incision and over the knee anteriorly.  There is no drainage from the incision.  She has tenderness over the medial and posterior aspect of the distal calf but a negative Homans' sign.  However there is some erythema that starts over the medial calf and extends all the way up the posteromedial left thigh and is tender.  No palpable cord.  No edema in the foot, she has good pulses and normal capillary refill and no tenderness in the foot.  Right leg is normal.  She has some diffuse swelling around the thigh knee and calf area on the left compared to the right.  Neurological:   Alert and oriented. Exhibits good muscle tone.      Sensation in the foot is normal.     GCS eye subscore is 4. GCS verbal subscore is 5.      GCS motor subscore is 6.   Skin:    Skin is warm and dry.  Changes in the left leg as noted above.  Psychiatric:   Behavior is normal. Appropriate mood and affect.     Judgment and thought content normal.     Emergency Department Course     Imaging:  US Lower Extremity Venous Duplex Left  No evidence of deep venous thrombosis.  Reading per radiology    Laboratory:  CBC: WBC 11.2 (H), HGB 11.1 (L),  (H)  BMP: AWNL (Creatinine: 064)    Lactic Acid: 0.8  UA with Microscopic: Pending    Blood Culture x2: Pending  Asymptomatic COVID19 Virus PCR by nasopharyngeal swab: Negative    Emergency Department Course:    Reviewed:  I reviewed nursing notes, vitals, past medical history and care " everywhere    Assessments:  0604 I obtained history and examined the patient as noted above.   0752 I rechecked the patient and explained findings.   0847 I rechecked the patient.   0935 I informed the patient of her planned transfer to the [facility].     Consults:   0844 I consulted with FLORIDALMA Meyer, Orthopedics at [facility], regarding the patient's history and presentation here in the emergency department.    0932 I consulted with Dr. Segundo, [facility] hospitalist, regarding the patient's history and presentation here in the emergency department.    Interventions:  0640 Morphine 2 mg IV   0859 Ancef 2 g IV     Disposition:  The patient was transferred to the [facility] via ambulance under the care of Dr. Segundo    Impression & Plan     Medical Decision Making:  Patient comes in 15 days status post left total knee arthroplasty.  She has noticed increased swelling in that left leg over the last day, now with redness extending up to the groin over the medial aspect of the thigh and lower leg.  She has pain in the calf.  Ultrasound did not reveal clot at this time.  She does not have any edema in the foot so I believe there is no clot.  There is diffuse swelling of that leg but the incision looks good.  I am concerned about infection.  Her white count is slightly elevated although she has not had any fevers or chills.  I have ordered 2 blood cultures and an asymptomatic Covid swab.  I started Ancef 2 g IV.  Her surgery was done at the [facility] and that surgeon does not take care of patients here at [facility].  I have talked with Dr. Segundo the hospitalist at the [facility] and she will be transferred there by EMS as a direct admit.  Orthopedic consultation will be carried out.  We will continue antibiotics for now.  Covid is negative.    Covid-19  Pavithra Laurent was evaluated during a global COVID-19 pandemic, which necessitated consideration that the patient might be at risk for infection with the  SARS-CoV-2 virus that causes COVID-19.   Applicable protocols for evaluation were followed during the patient's care. COVID-19 was considered as part of the patient's evaluation. The plan for testing is: a test was obtained during this visit.    Diagnosis:    ICD-10-CM    1. Cellulitis of left lower extremity  L03.116 Blood culture     Blood culture     Asymptomatic SARS-CoV-2 COVID-19 Virus (Coronavirus) by PCR   2. Status post total left knee replacement  Z96.652     15 days   3. Leg edema, left  R60.0        Scribe Disclosure:  Jose M FONSECA, am serving as a scribe at 6:03 AM on 6/25/2021 to document services personally performed by Ruchi Mackey MD based on my observations and the provider's statements to me.              Ruchi Mackey MD  06/25/21 1066

## 2021-06-25 NOTE — ED NOTES
Left knee arthroplasty on June 10th. At midnight she started noticing left leg swelling with pain 7/10. Hx DVT, is not on blood thinners.

## 2021-06-25 NOTE — PROGRESS NOTES
[facility]  Patient is currently open to home care services with [facility]. The patient is currently receiving RN PT and OT services.  Patient's  and home health team have been notified that patient is under OBSERVATION STATUS. [facility] Liaison will continue to follow patient during stay. If patient is admitted to inpatient status please provide orders to resume home care at time of discharge if appropriate.

## 2021-06-26 VITALS
DIASTOLIC BLOOD PRESSURE: 60 MMHG | TEMPERATURE: 97.7 F | SYSTOLIC BLOOD PRESSURE: 145 MMHG | RESPIRATION RATE: 14 BRPM | HEART RATE: 70 BPM | OXYGEN SATURATION: 95 %

## 2021-06-26 LAB
ALBUMIN SERPL-MCNC: 3.2 G/DL (ref 3.4–5)
ALP SERPL-CCNC: 96 U/L (ref 40–150)
ALT SERPL W P-5'-P-CCNC: 42 U/L (ref 0–50)
ANION GAP SERPL CALCULATED.3IONS-SCNC: 8 MMOL/L (ref 3–14)
AST SERPL W P-5'-P-CCNC: 44 U/L (ref 0–45)
BILIRUB SERPL-MCNC: 0.8 MG/DL (ref 0.2–1.3)
BUN SERPL-MCNC: 7 MG/DL (ref 7–30)
CALCIUM SERPL-MCNC: 8.6 MG/DL (ref 8.5–10.1)
CHLORIDE SERPL-SCNC: 102 MMOL/L (ref 94–109)
CO2 SERPL-SCNC: 27 MMOL/L (ref 20–32)
CREAT SERPL-MCNC: 0.6 MG/DL (ref 0.52–1.04)
ERYTHROCYTE [DISTWIDTH] IN BLOOD BY AUTOMATED COUNT: 14.3 % (ref 10–15)
GFR SERPL CREATININE-BSD FRML MDRD: 86 ML/MIN/{1.73_M2}
GLUCOSE SERPL-MCNC: 95 MG/DL (ref 70–99)
HCT VFR BLD AUTO: 35.4 % (ref 35–47)
HGB BLD-MCNC: 11.3 G/DL (ref 11.7–15.7)
MCH RBC QN AUTO: 30 PG (ref 26.5–33)
MCHC RBC AUTO-ENTMCNC: 31.9 G/DL (ref 31.5–36.5)
MCV RBC AUTO: 94 FL (ref 78–100)
PLATELET # BLD AUTO: 535 10E9/L (ref 150–450)
POTASSIUM SERPL-SCNC: 4.2 MMOL/L (ref 3.4–5.3)
PROT SERPL-MCNC: 6.5 G/DL (ref 6.8–8.8)
RBC # BLD AUTO: 3.77 10E12/L (ref 3.8–5.2)
SODIUM SERPL-SCNC: 137 MMOL/L (ref 133–144)
WBC # BLD AUTO: 9.1 10E9/L (ref 4–11)

## 2021-06-26 PROCEDURE — 250N000011 HC RX IP 250 OP 636: Performed by: INTERNAL MEDICINE

## 2021-06-26 PROCEDURE — 250N000011 HC RX IP 250 OP 636: Performed by: PHYSICIAN ASSISTANT

## 2021-06-26 PROCEDURE — 80053 COMPREHEN METABOLIC PANEL: CPT | Performed by: INTERNAL MEDICINE

## 2021-06-26 PROCEDURE — 85027 COMPLETE CBC AUTOMATED: CPT | Performed by: INTERNAL MEDICINE

## 2021-06-26 PROCEDURE — 250N000013 HC RX MED GY IP 250 OP 250 PS 637: Performed by: INTERNAL MEDICINE

## 2021-06-26 PROCEDURE — 250N000013 HC RX MED GY IP 250 OP 250 PS 637: Performed by: PHYSICIAN ASSISTANT

## 2021-06-26 PROCEDURE — 99238 HOSP IP/OBS DSCHRG MGMT 30/<: CPT | Performed by: INTERNAL MEDICINE

## 2021-06-26 PROCEDURE — 36415 COLL VENOUS BLD VENIPUNCTURE: CPT | Performed by: INTERNAL MEDICINE

## 2021-06-26 RX ORDER — CEPHALEXIN 500 MG/1
500 CAPSULE ORAL EVERY 6 HOURS SCHEDULED
Status: DISCONTINUED | OUTPATIENT
Start: 2021-06-26 | End: 2021-06-26 | Stop reason: HOSPADM

## 2021-06-26 RX ORDER — CEPHALEXIN 500 MG/1
500 CAPSULE ORAL EVERY 6 HOURS
Qty: 28 CAPSULE | Refills: 0 | Status: SHIPPED | OUTPATIENT
Start: 2021-06-26 | End: 2021-07-03

## 2021-06-26 RX ORDER — TRAMADOL HYDROCHLORIDE 50 MG/1
25-50 TABLET ORAL EVERY 6 HOURS PRN
Qty: 20 TABLET | Refills: 0 | Status: SHIPPED | OUTPATIENT
Start: 2021-06-26 | End: 2021-11-03

## 2021-06-26 RX ADMIN — ACETAMINOPHEN 650 MG: 325 TABLET, FILM COATED ORAL at 08:01

## 2021-06-26 RX ADMIN — ASPIRIN 81 MG: 81 TABLET ORAL at 08:04

## 2021-06-26 RX ADMIN — GABAPENTIN 400 MG: 400 CAPSULE ORAL at 08:03

## 2021-06-26 RX ADMIN — LEVOTHYROXINE SODIUM 88 MCG: 88 TABLET ORAL at 08:04

## 2021-06-26 RX ADMIN — Medication 1 CAPSULE: at 08:03

## 2021-06-26 RX ADMIN — CEFAZOLIN SODIUM 2 G: 2 INJECTION, SOLUTION INTRAVENOUS at 01:30

## 2021-06-26 RX ADMIN — CEPHALEXIN 500 MG: 500 CAPSULE ORAL at 11:59

## 2021-06-26 RX ADMIN — Medication 1 TABLET: at 08:04

## 2021-06-26 RX ADMIN — ACETAMINOPHEN 650 MG: 325 TABLET, FILM COATED ORAL at 11:59

## 2021-06-26 RX ADMIN — CEFAZOLIN SODIUM 2 G: 2 INJECTION, SOLUTION INTRAVENOUS at 09:21

## 2021-06-26 RX ADMIN — ACETAMINOPHEN 650 MG: 325 TABLET, FILM COATED ORAL at 03:35

## 2021-06-26 RX ADMIN — HEPARIN SODIUM 5000 UNITS: 5000 INJECTION, SOLUTION INTRAVENOUS; SUBCUTANEOUS at 01:29

## 2021-06-26 NOTE — PLAN OF CARE
VS: /63 (BP Location: Left arm)   Pulse 78   Temp 97.2  F (36.2  C) (Oral)   Resp 16   SpO2 98%   Denies SOB and chest pain    O2: 98% at room air    Output: Voiding spontaneously and adequately    Last BM: 6/25/21   Activity: Stand by Assist, uses cane   Ambulates in the hallway    Up for meals Sat up in bed    Skin: Redness and swelling on half of left thigh down to ankle   L knee incision, dry no drainage, open to air    Pain: Mild to moderate pain managed by tylenol  Patient complain of headache and pain on left knee   CMS: Intact. Denies numbness and tingling    Dressing: none   Diet: regular   LDA: PIV on Right wrist, SL    Equipment: IV pole/pump, personal cane, personal belongings   Plan: Continue plan of care    Additional Info: Able to make needs known, uses call light appropriately. Pleasant.

## 2021-06-26 NOTE — PLAN OF CARE
Pt A&O x's 4. VSS. Afebrile. 02 sats.in the 90s on RA. Lungs clear. Denies SOB, CP or nausea. Tolerating regular diet. Bowel sound active in all quadrants. No BM during the shift but  passing gas. Voiding adequate amount into to the toilet. Pt independent in the room.pt uses cane. Pain well managed with  prn tylenol every 4 hours. CMS intact, denies N/T. Left knee incision open to air. PIV patent and SL between abx. Pt slept between care and is able to make needs known, call light with in reach. Will continue to monitor.

## 2021-06-26 NOTE — PROGRESS NOTES
Orthopedic Progress Note:    Impression and Recommendation:   Impression:  Pavithra Laurent is an otherwise healthy 81 year old female who presents 10 days s/p L TKA with Dr. Jimenez with:  1. Mild Left lower leg cellulitis  2. H/o Left LE DVT, no current finding of DVT     Improved this am. Ok to discharge from orthopedic perspective     Activity: WBAT BLE. ROM as tolerated. Continue PT for L TKA  Antibiotics: Ancef x 24 hours, then if doing well, recommend discharge on oral Abx  Diet: ADAT  Pain: PO/IV meds. Transition to PO meds only as patient tolerates  DVT ppx: Continue 81 mg ASA daily  Imaging: No further imaging need at this time.  PT/OT: Mobility, ROM, gait training, ADLs  Dispo:  Okay to discharge home from orthopedic perspective  Follow up: 1-2 weeks with Dr. Jimenez for wound check     Reji Gan MD  Orthopaedic Surgery Resident, PGY4  Pager: [phone]    ==================================================    Patient reports that she is doing well.  She feels her swelling in the leg has improved.  She also feels the erythema in the lower leg has improved.  She reports decreased pain in the distal one third tibia.    Physical exam:  General: No acute distress laying in bed comfortably  Chest: Respiring on room air comfortably  Focused musculoskeletal exam of the left lower extremity  Incisions clean dry intact  There are some erythema present at the distal one third tibia.  Patient reports increased tender to palpation at the distal one third tibia at the site of the erythema.  She is able to range her knee actively  She can range her ankle actively  Foot is warm and well-perfused  EHL, FHL, Ankle dorsiflexion plantarflexion is intact next

## 2021-06-26 NOTE — PLAN OF CARE
Patient is discharged, medications, instructions  and follow up appointments instructions read and explained to patient,  patient verbalized understanding and signed AVS. Left at 1245.

## 2021-06-26 NOTE — DISCHARGE SUMMARY
[facility]  Hospitalist Discharge Summary      Date of Admission:  6/25/2021  Date of Discharge:  6/26/2021  Discharging Provider: Adriel Abraham MD      Discharge Diagnoses   Cellulitis left leg      Follow-ups Needed After Discharge   Follow-up Appointments     Adult [facility] Follow-up and recommended labs and tests      Follow up with primary care provider, Kitty Owens, within 7 days   for hospital follow- up.  No follow up labs or test are needed.    Follow up with  Ortho in 1 week      Appointments on [place] and/or [facility] (with [facility] or [facility]   provider or service). Call [phone] if you haven't heard regarding   these appointments within 7 days of discharge.             Unresulted Labs Ordered in the Past 30 Days of this Admission     Date and Time Order Name Status Description    6/25/2021 0747 Blood culture Preliminary     6/25/2021 0747 Blood culture Preliminary       These results will be followed up by PMD    Discharge Disposition   Discharged to home  Condition at discharge: Stable      Hospital Course   Ms. Pavithra Laurent is a pleasant 82 yo female with hx of LLE DVT (2017; on 6 mo's AC without recurrence), hypothyroidism, allergic rhinitis, atopic dermatitis, and L knee OA s/p L TKA, earlier this month on 6/10, readmitted to [facility] medicine service after presenting to ED with acute c/o 1 day hx of LLE erythema and tenderness concerning for cellulitis.      # Acute LLE erythema and tenderness concerning for cellulitis  # S/p L TKA, 6/10/2021  # Hx of LLE DVT, 2017  Had LLE DVT in 2017 she states was idiopathic and was on 6 mo course of AC without recurrence. Had L TKA 2/2 OA on 6/10 with no post-op complications. Finished 10 day course of Lovenox post-op for DVT ppx per ortho team. Transitioned to baby aspirin. One day PTA  noticed tender erythema on lower LLE that spread up leg in to groin into this am so brought to ED by her daughter. LLE venous  US neg for DVT, but due to erythema, mildly elevated WBC, and recent L TKA, transferred to [facility] for medicine admission for treatment of cellulitis and evaluation by her ortho team. Given one time dose of Ancef prior to transfer. No e/o sepsis and pt afebrile and other VSS. Exam reveals 1+ LLE with mild erythema and tenderness from ankle into L groin; however, L knee incision does not appear infected and without purulent discharge or dehiscence.   - Orthopedic surgery consulted and appreciate recommendations.  - received 24 hours of IV antibiotics, now chnaged to keflex for 7 days  -  Leucocytosis resolved, pain and tenderness resolved  - Prn Tylenol for pain and tenderness, and well as incisional pain      # Hx of TIA: Continue PTA baby aspirin     # Hypothyroidism: Continue PTA levothyroxine      # Allergic rhinitis  # Atopic dermatitis  No concerns.    - Continue PTA Montelukast, Flonase, Azelastine nasal spry and topicals       Consultations This Hospital Stay   ORTHOPAEDIC SURGERY ADULT/PEDS IP CONSULT    Code Status   Full Code    Time Spent on this Encounter   I, Adriel Abraham MD, personally saw the patient today and spent less than or equal to 30 minutes discharging this patient.       Adriel Abraham MD  [facility] MED SURG ORTHOPEDIC  [address] [address]  Phone: [phone]  Fax: [phone]  ______________________________________________________________________    Physical Exam   Vital Signs: Temp: 97.7  F (36.5  C) Temp src: Oral BP: (!) 145/60 Pulse: 70   Resp: 14 SpO2: 95 % O2 Device: None (Room air)    Weight: 0 lbs 0 oz  GEN: In NAD  HEENT: NCAT; PERRL; sclerae non-icteric  LUNGS: CTAB  CV: RRR  ABD: +BSs; SNTND  EXT: Faint, mildly tender, poorly demarcated erythema from foot into groin; 1+ edema; L knee incision healing well without e/o dehiscence or infection, redness resolving  SKIN: No acute rashes noted on exposed areas.  NEURO: AAOx3; CNs grossly intact; No  acute focal deficits noted.          Primary Care Physician   Kitty Owens    Discharge Orders      Home care nursing referral      Reason for your hospital stay    cellulitis     Adult [facility] Follow-up and recommended labs and tests    Follow up with primary care provider, Kitty Owens, within 7 days for hospital follow- up.  No follow up labs or test are needed.    Follow up with  Ortho in 1 week      Appointments on [place] and/or [facility] (with [facility] or [facility] provider or service). Call [phone] if you haven't heard regarding these appointments within 7 days of discharge.     Activity    Your activity upon discharge: activity as tolerated     Diet    Follow this diet upon discharge: Orders Placed This Encounter      Combination Diet Regular Diet Adult       Significant Results and Procedures   Most Recent 3 CBC's:  Recent Labs   Lab Test 06/26/21  0817 06/25/21  0618 06/11/21  0512 05/26/21  1002   WBC 9.1 11.2*  --  8.0   HGB 11.3* 11.1* 10.9* 13.7   MCV 94 93  --  90   * 588* 215 292     Most Recent 3 BMP's:  Recent Labs   Lab Test 06/26/21  0817 06/25/21  0618 06/11/21  0512    136 134   POTASSIUM 4.2 3.9 4.5   CHLORIDE 102 105 102   CO2 27 28 26   BUN 7 7 8   CR 0.60 0.64 0.62   ANIONGAP 8 3 6   HARSHIL 8.6 9.2 8.2*   GLC 95 99 132*     Most Recent 2 LFT's:  Recent Labs   Lab Test 06/26/21  0817 10/30/18  1154   AST 44 22   ALT 42 24   ALKPHOS 96 77   BILITOTAL 0.8 0.7   ,   Results for orders placed or performed during the hospital encounter of 06/25/21   XR Knee Left 3 Views    Narrative    EXAM: XR KNEE LEFT 3 VIEWS  6/25/2021 2:37 PM      HISTORY: joint swelling prosthetic joint    COMPARISON: 6/10/2021    FINDINGS: 3 views of the left knee.    Total left knee arthroplasty without evidence of hardware  complication. Interval removal of surgical drain and resolution of  soft tissue gas. Small joint effusion. Prepatellar soft tissue  prominence. No patellar tilt or  subluxation.       Impression    IMPRESSION: No acute osseous abnormality. Total left knee arthroplasty  without evidence of hardware complication.         KORI LOVE MD (Joe)       Discharge Medications   Current Discharge Medication List      START taking these medications    Details   cephALEXin (KEFLEX) 500 MG capsule Take 1 capsule (500 mg) by mouth every 6 hours for 7 days  Qty: 28 capsule, Refills: 0    Associated Diagnoses: Left leg cellulitis         CONTINUE these medications which have CHANGED    Details   traMADol (ULTRAM) 50 MG tablet Take 0.5-1 tablets (25-50 mg) by mouth every 6 hours as needed for severe pain  Qty: 20 tablet, Refills: 0    Associated Diagnoses: Arthritis of left knee         CONTINUE these medications which have NOT CHANGED    Details   acetaminophen (TYLENOL) 650 MG CR tablet Take 650 mg by mouth every 8 hours as needed for mild pain      amoxicillin (AMOXIL) 500 MG capsule Take 2,000 mg by mouth once One hour prior to dental procedure      aspirin (ASA) 81 MG EC tablet Take 1 tablet (81 mg) by mouth daily  Qty: 30 tablet, Refills: 0    Comments: Continue ASA as you were at home and Lovenox for 10 days following surgery  Associated Diagnoses: Arthritis of left knee      azelastine (ASTELIN) 0.1 % nasal spray Spray 2 sprays into both nostrils daily as needed for rhinitis       calcium-vitamin D-vitamin K (CALCIUM SOFT CHEWS) 500-500-40 MG-UNT-MCG CHEW Take 2 tablets by mouth 2 times daily      desonide (DESOWEN) 0.05 % cream Apply topically 2 times daily as needed (rash)       diclofenac (VOLTAREN) 1 % GEL Place 2 g onto the skin daily as needed for moderate pain (knees)       erythromycin (ROMYCIN) ophthalmic ointment Place 1 Application into both eyes nightly as needed (dry/irritated eyes)       fluocinolone acetonide (DERMOTIC) 0.01 % OIL Place 1 drop in ear(s) daily as needed.      fluticasone (FLONASE) 50 MCG/ACT nasal spray Spray 1 spray into both nostrils daily        !! gabapentin (NEURONTIN) 100 MG capsule Take 1 capsule (100 mg) by mouth daily as needed (on particularly active days)  Qty: 90 capsule, Refills: 3    Associated Diagnoses: Neuropathic pain of right forearm      !! gabapentin (NEURONTIN) 400 MG capsule Take 1 capsule (400 mg) by mouth 3 times daily  Qty: 270 capsule, Refills: 3    Associated Diagnoses: Neuropathic pain of right forearm      hypromellose (ARTIFICIAL TEARS) 0.5 % SOLN ophthalmic solution Place 1 drop into both eyes 4 times daily as needed for dry eyes      levothyroxine (SYNTHROID/LEVOTHROID) 88 MCG tablet TAKE ONE TABLET BY MOUTH ONCE DAILY  Qty: 90 tablet, Refills: 3    Associated Diagnoses: Hypothyroidism due to acquired atrophy of thyroid      montelukast (SINGULAIR) 10 MG tablet Take 10 mg by mouth At Bedtime       Multiple Vitamins-Minerals (PRESERVISION AREDS 2+MULTI VIT) CAPS Take 1 capsule by mouth 2 times daily   Qty:        nystatin (MYCOSTATIN) 221303 UNIT/GM external powder Apply topically 2 times daily as needed for other (yeast infection in skin folds)  Qty: 60 g, Refills: 1    Associated Diagnoses: Tinea corporis      polyethylene glycol (MIRALAX) 17 g packet Take 1 packet by mouth daily as needed for constipation       !! - Potential duplicate medications found. Please discuss with provider.        Allergies   Allergies   Allergen Reactions     Birch Trees      Codeine Sulfate Nausea and Vomiting     Oral, topical is ok     Dust Mites      Erythromycin Nausea and Vomiting     With oral use     Hydromorphone Other (See Comments)     nightmares     Other Environmental Allergy Itching     [place]

## 2021-06-28 ENCOUNTER — PATIENT OUTREACH (OUTPATIENT)
Dept: CARE COORDINATION | Facility: CLINIC | Age: 81
End: 2021-06-28

## 2021-06-28 ENCOUNTER — TELEPHONE (OUTPATIENT)
Dept: ORTHOPEDICS | Facility: CLINIC | Age: 81
End: 2021-06-28

## 2021-06-28 DIAGNOSIS — Z71.89 OTHER SPECIFIED COUNSELING: ICD-10-CM

## 2021-06-28 NOTE — TELEPHONE ENCOUNTER
[facility] Call Center    Phone Message    May a detailed message be left on voicemail: yes     Reason for Call: Other: Patient was recently hospitalized and was told she has an infection in her knee. Patient wondering if its ok for her to see her PT today. Please call to discuss     Action Taken: Message routed to:  [facility]o    Travel Screening: Positive: unable to schedule an appointment, due to positive travel screen.  Informed patient/caller that a message will be sent to the clinic; who will then call them back to discuss next steps.

## 2021-06-28 NOTE — TELEPHONE ENCOUNTER
Called patient and made appointment with Dr. Jimenez for tomorrow tentatively. She needs to find a ride. Patient will skip PT for today and go to PT on 6/30/21 depending on Dr. Jimenez's recommendations.

## 2021-06-28 NOTE — PROGRESS NOTES
Clinic Care Coordination Contact    Patient has already been in contact with care team following hospital discharge.     Patient has a follow up appointment with a provider within 24-48 hours of hospital discharge. Will cancel/close post-hospital call rom [facility] at this time.        Eboni Limon MA  [facility], [facility]

## 2021-06-29 ENCOUNTER — OFFICE VISIT (OUTPATIENT)
Dept: ORTHOPEDICS | Facility: CLINIC | Age: 81
End: 2021-06-29
Payer: COMMERCIAL

## 2021-06-29 DIAGNOSIS — Z98.890 S/P KNEE SURGERY: Primary | ICD-10-CM

## 2021-06-29 PROCEDURE — 99024 POSTOP FOLLOW-UP VISIT: CPT | Performed by: ORTHOPAEDIC SURGERY
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
## 2021-06-29 NOTE — LETTER
6/29/2021         RE: Pavithra Laurent  [address]  [place] [address]        Dear Colleague,    Thank you for referring your patient, Pavithra Laurent, to the [facility] [place]. Please see a copy of my visit note below.    Patient is an 81-year-old female who is seen approximately 3 weeks status post left total knee arthroplasty. She was seen in the emergency room at [facility] 15 days after her surgery with concern for blood clot. At that time a DVT was ruled out but the emergency room doctor felt that there was some redness in her calf region and this was worrisome for cellulitis. He placed her on 7 days of Keflex. She is now in her last days of this medication.    She is here with her daughter. She did go to her daughter's house immediately post op but since has gone back to her own house. She is not yet driving. She finished home PT and the home physical therapist felt that she was now appropriate to go out of the house for physical therapy. She did have a visit at [facility] in [facility] but canceled it until she saw me. She does have one set for tomorrow and 1 for Friday.    She thinks that her knee itself is doing fabulous but she is concerned about a number of issues with her lower leg including the diagnosis of cellulitis, pain full hotspots of palpation in the posterior aspect of her knee as well as around her lateral ankle. Achiness and some discomfort that keeps at times keeps her awake at night.    She is taking 650 mg of Tylenol every 4 hours which sometimes will result in 6 tablets a day. She will only take it during the night if she wakes up. This is just at the upper limit of acceptable Tylenol. We talked about either going down to every 6 hours or to switch over to Tylenol 500.    She also tramadol at home but she feels that she does not need that because the Tylenol is working, we discussed the possibility that she could use that and reduce her total Tylenol dose, or perhaps  add it before she goes to PT or before she goes to sleep at night.    Her main issue today is that she cannot sit normally on a chair because of left buttock pain. She has had a history of sciatica which was treated with a back injection at [facility] which she believes was done by Chacorta Nam. She also reports that Lina Hook injected her into her buttocks region. I can only find evidence that she is injected her greater trochanter on her left side.    Lina Hook is currently on maternity leave. She feels frustrated that she cannot get into see someone else at [facility] and feels a little bit lost as to the next step.    Physical exam of patient's left knee reveals benign appearing incisions good straight leg raising effort without a lag range of motion 0 to 90 degrees kneecap tracks well through an active arc of motion stable tibiofemoral exam to 2 varus and valgus stressing  There is no redness of the skin. There is mild discomfort in her posterior calf to direct palpation with a negative Homans' sign.    X-rays reviewed taken in the hospital and show satisfactory component positioning.    Assessment and plan: Many concerns and questions postoperatively I answered them to the best of my ability. They include the following suggestions:    1. She can still contact Lina Hook's team through [place] and they could refer to another physician  2. She has seen  who is a PMNR doctor at [facility] in the past and I believe that she may be an appropriate person to see again for this problem  3. I do not believe that she any longer has cellulitis, however that said I believe that she should continue to use the antibiotics through the entire time it was prescribed  4. We talked about reducing Tylenol and occasionally using temp tramadol as needed  5. She should keep her outpatient physical therapy appointments  6. I believe that she can drive now but I would like her daughter to drive with her before confirming that  assessment.    She will follow-up with me in August.    Isela Jimenez MD  Professor Orthopedic Surgery  [facility]
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
## 2021-06-30 ENCOUNTER — THERAPY VISIT (OUTPATIENT)
Dept: PHYSICAL THERAPY | Facility: CLINIC | Age: 81
End: 2021-06-30
Payer: COMMERCIAL

## 2021-06-30 DIAGNOSIS — Z47.1 AFTERCARE FOLLOWING LEFT KNEE JOINT REPLACEMENT SURGERY: ICD-10-CM

## 2021-06-30 DIAGNOSIS — M17.12 ARTHRITIS OF LEFT KNEE: ICD-10-CM

## 2021-06-30 DIAGNOSIS — M25.562 ACUTE PAIN OF LEFT KNEE: ICD-10-CM

## 2021-06-30 DIAGNOSIS — Z96.652 AFTERCARE FOLLOWING LEFT KNEE JOINT REPLACEMENT SURGERY: ICD-10-CM

## 2021-06-30 PROCEDURE — 97530 THERAPEUTIC ACTIVITIES: CPT | Mod: GP | Performed by: PHYSICAL THERAPIST

## 2021-06-30 PROCEDURE — 97161 PT EVAL LOW COMPLEX 20 MIN: CPT | Mod: GP | Performed by: PHYSICAL THERAPIST

## 2021-06-30 PROCEDURE — 97110 THERAPEUTIC EXERCISES: CPT | Mod: GP | Performed by: PHYSICAL THERAPIST

## 2021-06-30 ASSESSMENT — ACTIVITIES OF DAILY LIVING (ADL)
GIVING WAY, BUCKLING OR SHIFTING OF KNEE: I DO NOT HAVE THE SYMPTOM
SWELLING: THE SYMPTOM AFFECTS MY ACTIVITY SLIGHTLY
GO DOWN STAIRS: ACTIVITY IS SOMEWHAT DIFFICULT
GO UP STAIRS: ACTIVITY IS SOMEWHAT DIFFICULT
SIT WITH YOUR KNEE BENT: ACTIVITY IS SOMEWHAT DIFFICULT
KNEE_ACTIVITY_OF_DAILY_LIVING_SUM: 37
AS_A_RESULT_OF_YOUR_KNEE_INJURY,_HOW_WOULD_YOU_RATE_YOUR_CURRENT_LEVEL_OF_DAILY_ACTIVITY?: ABNORMAL
HOW_WOULD_YOU_RATE_THE_OVERALL_FUNCTION_OF_YOUR_KNEE_DURING_YOUR_USUAL_DAILY_ACTIVITIES?: ABNORMAL
KNEEL ON THE FRONT OF YOUR KNEE: ACTIVITY IS VERY DIFFICULT
SQUAT: NOT ANSWERED
PAIN: THE SYMPTOM AFFECTS MY ACTIVITY SLIGHTLY
STAND: ACTIVITY IS FAIRLY DIFFICULT
HOW_WOULD_YOU_RATE_THE_CURRENT_FUNCTION_OF_YOUR_KNEE_DURING_YOUR_USUAL_DAILY_ACTIVITIES_ON_A_SCALE_FROM_0_TO_100_WITH_100_BEING_YOUR_LEVEL_OF_KNEE_FUNCTION_PRIOR_TO_YOUR_INJURY_AND_0_BEING_THE_INABILITY_TO_PERFORM_ANY_OF_YOUR_USUAL_DAILY_ACTIVITIES?: 85
WEAKNESS: NOT ANSWERED
WALK: ACTIVITY IS SOMEWHAT DIFFICULT
RISE FROM A CHAIR: ACTIVITY IS MINIMALLY DIFFICULT
STIFFNESS: THE SYMPTOM AFFECTS MY ACTIVITY SLIGHTLY
LIMPING: I HAVE THE SYMPTOM BUT IT DOES NOT AFFECT MY ACTIVITY

## 2021-06-30 NOTE — PROGRESS NOTES
Physical Therapy Initial Evaluation    Physical Therapy Initial Examination/Evaluation  June 30, 2021    Pavithra Laurent  is a 81 year old  female referred to physical therapy by Dr. Jimenez for treatment s/p L TKA.      DOI/onset 3-30-21  Mechanism of injury Patient had a gradual onset of L knee pain over time due to advancing OA.  DOS 6-10-21  Prior treatment home PT x 1 week. Effect of prior treatment somewhat helpful    Chief Complaint:   Pain and swelling .   Pain location: L knee and lower leg. Last week she went to the ER with possible cellulitis.   Quality: aching  Constant/Intermittent: intermittent  Time of day: worse at night  Symptoms have improved since onset.    Current pain 3/10.  Pain at best 2/10.  Pain at worst 4/10.    Symptoms aggravated by bending knee, standing/walking.    Symptoms improved with ice and meds.     Social history:  Lives alone in [facility]. Stairs have not been a problem. Once inside everything is on 1 floor.    Occupation: retired.      Patient having difficulty with ADLs: standing to cook and clean.    Patient's goals are to reduce pain and increase ROM.    Patient reports general health as excellent.  Related medical history no previous L knee problems.    Surgical History:  R TKA in 2009 with good results.    Imaging: x-rays.    Medications:  Pain meds, antibiotic.       Return to MD:  August.      Clinical Impression: Patient should progress well in continued PT working on ROM and strengthening exercise for her L knee.    Subjective:  HPI                    Objective:  Standing Alignment:              Knee:  Normal      Gait:  Mod limp on L  Gait Type:  Antalgic         Flexibility/Screens:   Positive screens:  Knee    Lower Extremity:  Decreased left lower extremity flexibility:Quadriceps and Hamstrings                                                        Knee Evaluation:  ROM:    AROM    Hyperextension:  Left:  0    Right: 0  Extension:  Left: 3    Right:  0  Flexion:  Left: 90    Right: 125  PROM    Hyperextension: Left: 0   Right:   Extension: Left: 0   Right:   Flexion: Left: 95   Right:       Strength:     Extension:  Left: 4-/5   Pain:        Flexion:  Left: 4-/5   Pain:        Quad Set Left: Fair    Pain:     Ligament Testing:  Not Assessed                Special Tests: Not Assessed      Palpation:    Left knee tenderness present at:  Medial Joint Line    Edema:  Edema of the knee: mod edema L knee.    Mobility Testing:    Proximal Tib-Fib:  Left: hypomobile                    General     ROS    Assessment/Plan:    Patient is a 81 year old female with left side knee complaints.    Patient has the following significant findings with corresponding treatment plan.                Diagnosis 1:  S/p L TKA  Pain -  hot/cold therapy, self management and education  Decreased ROM/flexibility - manual therapy and therapeutic exercise  Decreased strength - therapeutic exercise and therapeutic activities  Impaired gait - gait training  Impaired muscle performance - neuro re-education  Decreased function - therapeutic activities    Therapy Evaluation Codes:   1) History comprised of:   Personal factors that impact the plan of care:      None.    Comorbidity factors that impact the plan of care are:      Osteoarthritis.     Medications impacting care: None.  2) Examination of Body Systems comprised of:   Body structures and functions that impact the plan of care:      Knee.   Activity limitations that impact the plan of care are:      Walking.  3) Clinical presentation characteristics are:   Stable/Uncomplicated.  4) Decision-Making    Low complexity using standardized patient assessment instrument and/or measureable assessment of functional outcome.  Cumulative Therapy Evaluation is: Low complexity.    Previous and current functional limitations:  (See Goal Flow Sheet for this information)    Short term and Long term goals: (See Goal Flow Sheet for this information)     Communication  ability:  Patient appears to be able to clearly communicate and understand verbal and written communication and follow directions correctly.  Treatment Explanation - The following has been discussed with the patient:   RX ordered/plan of care  Anticipated outcomes  Possible risks and side effects  This patient would benefit from PT intervention to resume normal activities.   Rehab potential is good.    Frequency:  2 X week, once daily  Duration:  for 3 weeks tapering to 1 X a week over 4 weeks  Discharge Plan:  Achieve all LTG.  Independent in home treatment program.  Reach maximal therapeutic benefit.    Please refer to the daily flowsheet for treatment today, total treatment time and time spent performing 1:1 timed codes.

## 2021-07-01 ENCOUNTER — TELEPHONE (OUTPATIENT)
Dept: FAMILY MEDICINE | Facility: CLINIC | Age: 81
End: 2021-07-01

## 2021-07-01 LAB
BACTERIA SPEC CULT: NO GROWTH
BACTERIA SPEC CULT: NO GROWTH
Lab: NORMAL
Lab: NORMAL
SPECIMEN SOURCE: NORMAL
SPECIMEN SOURCE: NORMAL

## 2021-07-01 NOTE — TELEPHONE ENCOUNTER
Faxed signed form to [facility] and placed in provider basket.   Tanner Salazar MA on 7/1/2021 at 11:02 AM

## 2021-07-01 NOTE — PROGRESS NOTES
Physical Therapy Initial Evaluation  Subjective:    Patient Health History             Pertinent medical history includes: overweight, implanted device and thyroid problems.     Medical allergies: Sticky gauze.   Surgeries include:  Orthopedic surgery (Both knees).    Current medications:  Pain medication (Tylenol, antibiotics).    Current occupation is Retired.   Primary job tasks include:  Lifting/carrying.                                    Objective:  System    Physical Exam    General     ROS    Assessment/Plan:

## 2021-07-02 ENCOUNTER — THERAPY VISIT (OUTPATIENT)
Dept: PHYSICAL THERAPY | Facility: CLINIC | Age: 81
End: 2021-07-02
Payer: COMMERCIAL

## 2021-07-02 DIAGNOSIS — Z47.1 AFTERCARE FOLLOWING LEFT KNEE JOINT REPLACEMENT SURGERY: ICD-10-CM

## 2021-07-02 DIAGNOSIS — Z96.652 AFTERCARE FOLLOWING LEFT KNEE JOINT REPLACEMENT SURGERY: ICD-10-CM

## 2021-07-02 DIAGNOSIS — M25.562 ACUTE PAIN OF LEFT KNEE: ICD-10-CM

## 2021-07-02 PROCEDURE — 97110 THERAPEUTIC EXERCISES: CPT | Mod: GP | Performed by: PHYSICAL THERAPIST

## 2021-07-02 PROCEDURE — 97530 THERAPEUTIC ACTIVITIES: CPT | Mod: GP | Performed by: PHYSICAL THERAPIST

## 2021-07-06 ENCOUNTER — MEDICAL CORRESPONDENCE (OUTPATIENT)
Dept: HEALTH INFORMATION MANAGEMENT | Facility: CLINIC | Age: 81
End: 2021-07-06

## 2021-07-06 DIAGNOSIS — Z53.9 DIAGNOSIS NOT YET DEFINED: Primary | ICD-10-CM

## 2021-07-06 PROCEDURE — G0180 MD CERTIFICATION HHA PATIENT: HCPCS | Performed by: ORTHOPAEDIC SURGERY

## 2021-07-09 ENCOUNTER — THERAPY VISIT (OUTPATIENT)
Dept: PHYSICAL THERAPY | Facility: CLINIC | Age: 81
End: 2021-07-09
Payer: COMMERCIAL

## 2021-07-09 DIAGNOSIS — Z96.652 AFTERCARE FOLLOWING LEFT KNEE JOINT REPLACEMENT SURGERY: ICD-10-CM

## 2021-07-09 DIAGNOSIS — M25.562 ACUTE PAIN OF LEFT KNEE: ICD-10-CM

## 2021-07-09 DIAGNOSIS — Z47.1 AFTERCARE FOLLOWING LEFT KNEE JOINT REPLACEMENT SURGERY: ICD-10-CM

## 2021-07-09 PROCEDURE — 97140 MANUAL THERAPY 1/> REGIONS: CPT | Mod: GP | Performed by: PHYSICAL THERAPIST

## 2021-07-09 PROCEDURE — 97530 THERAPEUTIC ACTIVITIES: CPT | Mod: GP | Performed by: PHYSICAL THERAPIST

## 2021-07-09 PROCEDURE — 97110 THERAPEUTIC EXERCISES: CPT | Mod: GP | Performed by: PHYSICAL THERAPIST

## 2021-07-09 NOTE — PROGRESS NOTES
Subjective:  HPI  Physical Exam                    Objective:  System    Physical Exam    General     ROS    Assessment/Plan:    SUBJECTIVE  Subjective changes as noted by pt:   Pt. is feeling better this week than last week. Has less knee pain and decreased sciatic pain.   Current pain level: 4/10   Changes in function:  Yes (See Goal flowsheet attached for changes in current functional level)     Adverse reaction to treatment or activity:  None    OBJECTIVE  Changes in objective findings:  AROM L 0-4-90. PROM L 0-0-97.     ASSESSMENT  Pavithra continues to require intervention to meet STG and LTG's: PT  Patient is progressing slower than expected.  Response to therapy has shown an improvement in  pain level and ROM   Progress made towards STG/LTG?  Yes (See Goal flowsheet attached for updates on achievement of STG and LTG)    PLAN  Current treatment program is being advanced to more complex exercises.    PTA/ATC plan:  N/A    Please refer to the daily flowsheet for treatment today, total treatment time and time spent performing 1:1 timed codes.

## 2021-07-12 ENCOUNTER — THERAPY VISIT (OUTPATIENT)
Dept: PHYSICAL THERAPY | Facility: CLINIC | Age: 81
End: 2021-07-12
Payer: COMMERCIAL

## 2021-07-12 DIAGNOSIS — Z47.1 AFTERCARE FOLLOWING LEFT KNEE JOINT REPLACEMENT SURGERY: ICD-10-CM

## 2021-07-12 DIAGNOSIS — Z96.652 AFTERCARE FOLLOWING LEFT KNEE JOINT REPLACEMENT SURGERY: ICD-10-CM

## 2021-07-12 DIAGNOSIS — M25.562 ACUTE PAIN OF LEFT KNEE: ICD-10-CM

## 2021-07-12 PROCEDURE — 97110 THERAPEUTIC EXERCISES: CPT | Mod: GP

## 2021-07-12 PROCEDURE — 97140 MANUAL THERAPY 1/> REGIONS: CPT | Mod: GP

## 2021-07-14 ENCOUNTER — MYC MEDICAL ADVICE (OUTPATIENT)
Dept: FAMILY MEDICINE | Facility: CLINIC | Age: 81
End: 2021-07-14

## 2021-07-14 DIAGNOSIS — M79.2 NEUROPATHIC PAIN OF RIGHT FOREARM: ICD-10-CM

## 2021-07-14 RX ORDER — GABAPENTIN 100 MG/1
100 CAPSULE ORAL DAILY PRN
Qty: 90 CAPSULE | Refills: 3 | Status: SHIPPED | OUTPATIENT
Start: 2021-07-14 | End: 2021-11-03

## 2021-07-25 NOTE — PROGRESS NOTES
[facility] - PRIMARY CARE SKIN    CC: skin cancer screening (full-body)  SUBJECTIVE:   Pavithra Laurent is a(n) 81 year old female who presents to clinic today for a full-body skin exam.    Issue one :  No particular skin concerns    Personal Medical History  Skin Cancer: NO  Eczema Psoriasis Lupus   NO NO NO   Other:     Family Medical History  Skin Cancer: NO  Eczema Psoriasis Lupus   NO NO NO   Other: .  Occupation: indoor ().    Refer to electronic medical record (EMR) for past medical history and medications.    Refer to electronic medical record (EMR) for past medical history and medications.      ROS: 14 point review of systems was negative except the symptoms listed above in the HPI.        OBJECTIVE:   GENERAL: healthy, alert and no distress.  HEENT: PERRL. Conjunctiva, sclera clear.  SKIN: Gordon Skin Type - II.  This patient was examined from the top of the head to the bottom of the feet  including scalp, face, neck, trunk, buttocks, both arms, both legs, both hands, both feet, and all fingers and toes. The dermatoscope was used to help evaluate pigmented lesions.  Skin Pertinent Findings:  Face: scattered brown macules. Telangiectasia on the right upper lip    Trunk, arms, legs,            Brown, macule(s) most consistent with benign solar lentigo          Raised, coarse textured, stuck appearing lesion consistent with seborrheic keratosis - left lower leg, trunk             Brown macules of various sizes and shapes most consistent with (benign) melanocytic nevi      Left anterior mid shin- well healed shave excision site- porokeratosis               ASSESSMENT:     Encounter Diagnoses   Name Primary?     Seborrheic keratosis Yes     Porokeratosis      Melanocytic nevi of trunk      Telangiectasias          PLAN:   Patient Instructions   Skin exam in one year    The patient was counseled about sunscreens and sun avoidance. The patient was counseled to check the skin regularly and report any  lesion that is new, changing, itching, scabbing, bleeding or otherwise bothersome. The patient was discharged ambulatory and in stable condition.

## 2021-07-26 ENCOUNTER — OFFICE VISIT (OUTPATIENT)
Dept: FAMILY MEDICINE | Facility: CLINIC | Age: 81
End: 2021-07-26
Payer: COMMERCIAL

## 2021-07-26 VITALS — SYSTOLIC BLOOD PRESSURE: 122 MMHG | DIASTOLIC BLOOD PRESSURE: 76 MMHG

## 2021-07-26 DIAGNOSIS — Q82.8 POROKERATOSIS: ICD-10-CM

## 2021-07-26 DIAGNOSIS — L82.1 SEBORRHEIC KERATOSIS: Primary | ICD-10-CM

## 2021-07-26 DIAGNOSIS — D22.5 MELANOCYTIC NEVI OF TRUNK: ICD-10-CM

## 2021-07-26 DIAGNOSIS — I78.1 TELANGIECTASIAS: ICD-10-CM

## 2021-07-26 PROCEDURE — 99213 OFFICE O/P EST LOW 20 MIN: CPT | Performed by: FAMILY MEDICINE

## 2021-07-26 NOTE — LETTER
7/26/2021         RE: Pavithra Laurent  [address] [place] [address]        Dear Colleague,    Thank you for referring your patient, Pavithra Laurent, to the [facility]. Please see a copy of my visit note below.    [facility] - PRIMARY CARE SKIN    CC: skin cancer screening (full-body)  SUBJECTIVE:   Pavithra Laurent is a(n) 81 year old female who presents to clinic today for a full-body skin exam.    Issue one :  No particular skin concerns    Personal Medical History  Skin Cancer: NO  Eczema Psoriasis Lupus   NO NO NO   Other:     Family Medical History  Skin Cancer: NO  Eczema Psoriasis Lupus   NO NO NO   Other: .  Occupation: indoor ().    Refer to electronic medical record (EMR) for past medical history and medications.    Refer to electronic medical record (EMR) for past medical history and medications.      ROS: 14 point review of systems was negative except the symptoms listed above in the HPI.        OBJECTIVE:   GENERAL: healthy, alert and no distress.  HEENT: PERRL. Conjunctiva, sclera clear.  SKIN: Gordon Skin Type - II.  This patient was examined from the top of the head to the bottom of the feet  including scalp, face, neck, trunk, buttocks, both arms, both legs, both hands, both feet, and all fingers and toes. The dermatoscope was used to help evaluate pigmented lesions.  Skin Pertinent Findings:  Face: scattered brown macules. Telangiectasia on the right upper lip    Trunk, arms, legs,            Brown, macule(s) most consistent with benign solar lentigo          Raised, coarse textured, stuck appearing lesion consistent with seborrheic keratosis - left lower leg, trunk             Brown macules of various sizes and shapes most consistent with (benign) melanocytic nevi      Left anterior mid shin- well healed shave excision site- porokeratosis               ASSESSMENT:     Encounter Diagnoses   Name Primary?     Seborrheic keratosis Yes     Porokeratosis       Melanocytic nevi of trunk      Telangiectasias          PLAN:   Patient Instructions   Skin exam in one year    The patient was counseled about sunscreens and sun avoidance. The patient was counseled to check the skin regularly and report any lesion that is new, changing, itching, scabbing, bleeding or otherwise bothersome. The patient was discharged ambulatory and in stable condition.              Again, thank you for allowing me to participate in the care of your patient.        Sincerely,        Lore Krishnamurthy MD

## 2021-07-30 ENCOUNTER — THERAPY VISIT (OUTPATIENT)
Dept: PHYSICAL THERAPY | Facility: CLINIC | Age: 81
End: 2021-07-30
Payer: COMMERCIAL

## 2021-07-30 DIAGNOSIS — M25.562 ACUTE PAIN OF LEFT KNEE: ICD-10-CM

## 2021-07-30 DIAGNOSIS — Z47.1 AFTERCARE FOLLOWING LEFT KNEE JOINT REPLACEMENT SURGERY: ICD-10-CM

## 2021-07-30 DIAGNOSIS — Z96.652 AFTERCARE FOLLOWING LEFT KNEE JOINT REPLACEMENT SURGERY: ICD-10-CM

## 2021-07-30 PROCEDURE — 97110 THERAPEUTIC EXERCISES: CPT | Mod: GP | Performed by: PHYSICAL THERAPIST

## 2021-07-30 PROCEDURE — 97530 THERAPEUTIC ACTIVITIES: CPT | Mod: GP | Performed by: PHYSICAL THERAPIST

## 2021-07-30 PROCEDURE — 97140 MANUAL THERAPY 1/> REGIONS: CPT | Mod: GP | Performed by: PHYSICAL THERAPIST

## 2021-07-30 NOTE — PROGRESS NOTES
Subjective:  HPI  Physical Exam                    Objective:  System    Physical Exam    General     ROS    Assessment/Plan:    SUBJECTIVE  Subjective changes as noted by pt:   Pt. received an injection ofr her low back on 7/22 that has helped her overall pain significantly. She now can walk and function much better. She is showing good progress with her L knee as a result as well.   Current pain level:  2/10   Changes in function:  Yes (See Goal flowsheet attached for changes in current functional level)     Adverse reaction to treatment or activity:  None    OBJECTIVE  Changes in objective findings:  AROM L 0-0-109. PROM L 0-0-115.      ASSESSMENT  Pavithra continues to require intervention to meet STG and LTG's: PT  Patient's symptoms are resolving.  Response to therapy has shown an improvement in  pain level, ROM , strength and gait  Progress made towards STG/LTG?  Yes (See Goal flowsheet attached for updates on achievement of STG and LTG)    PLAN  Current treatment program is being advanced to more complex exercises.    PTA/ATC plan:  N/A    Please refer to the daily flowsheet for treatment today, total treatment time and time spent performing 1:1 timed codes.

## 2021-08-02 ENCOUNTER — THERAPY VISIT (OUTPATIENT)
Dept: PHYSICAL THERAPY | Facility: CLINIC | Age: 81
End: 2021-08-02
Payer: COMMERCIAL

## 2021-08-02 DIAGNOSIS — M25.562 ACUTE PAIN OF LEFT KNEE: ICD-10-CM

## 2021-08-02 DIAGNOSIS — Z96.652 AFTERCARE FOLLOWING LEFT KNEE JOINT REPLACEMENT SURGERY: ICD-10-CM

## 2021-08-02 DIAGNOSIS — Z47.1 AFTERCARE FOLLOWING LEFT KNEE JOINT REPLACEMENT SURGERY: ICD-10-CM

## 2021-08-02 PROCEDURE — 97530 THERAPEUTIC ACTIVITIES: CPT | Mod: GP | Performed by: PHYSICAL THERAPIST

## 2021-08-02 PROCEDURE — 97140 MANUAL THERAPY 1/> REGIONS: CPT | Mod: GP | Performed by: PHYSICAL THERAPIST

## 2021-08-02 PROCEDURE — 97110 THERAPEUTIC EXERCISES: CPT | Mod: GP | Performed by: PHYSICAL THERAPIST

## 2021-08-02 NOTE — PROGRESS NOTES
Subjective:  HPI  Physical Exam                    Objective:  System    Physical Exam    General     ROS    Assessment/Plan:    PROGRESS  REPORT    Progress reporting period is from 6-30-21 to 8-2-21.       SUBJECTIVE  Subjective changes noted by patient:   Pt. has made good progress in PT following a L TKA. She started out poorly having complications with significant pain stemming from L sciatica. Since an injection several weeks ago pt. has been able to progress very well with her overall ROM, strength and mobility. She is now walking up to 20 minutes and si going up and down stairs regularly. She plans to continue her HEP with no further PT visits planned unless increased problems arise.      Current pain level is  2/10.     Previous pain level was 4/10.   Changes in function:  Yes (See Goal flowsheet attached for changes in current functional level)  Adverse reaction to treatment or activity: None    OBJECTIVE  Changes noted in objective findings:  AROM L 0-0-110. PROM L 0-0-116. Strength L quad 4/5; hams 4/5     ASSESSMENT/PLAN  Updated problem list and treatment plan: Diagnosis 1:  S/p L TKA  Pain -  hot/cold therapy, self management and education  Decreased ROM/flexibility - manual therapy and therapeutic exercise  Decreased strength - therapeutic exercise and therapeutic activities  Impaired muscle performance - neuro re-education  Decreased function - therapeutic activities  STG/LTGs have been met or progress has been made towards goals:  Yes (See Goal flow sheet completed today.)  Assessment of Progress: Patient is meeting short term goals and is progressing towards long term goals.  Self Management Plans:  Patient is independent in a home treatment program.  Patient is independent in self management of symptoms.  I have re-evaluated this patient and find that the nature, scope, duration and intensity of the therapy is appropriate for the medical condition of the patient.  Pavithra continues to require the  following intervention to meet STG and LTG's:  PT intervention is no longer required to meet STG/LTG.    Recommendations:  This patient is ready to be discharged from therapy and continue their home treatment program.    Please refer to the daily flowsheet for treatment today, total treatment time and time spent performing 1:1 timed codes.

## 2021-08-09 DIAGNOSIS — Z98.890 S/P KNEE SURGERY: Primary | ICD-10-CM

## 2021-08-10 ENCOUNTER — OFFICE VISIT (OUTPATIENT)
Dept: ORTHOPEDICS | Facility: CLINIC | Age: 81
End: 2021-08-10
Payer: COMMERCIAL

## 2021-08-10 ENCOUNTER — ANCILLARY PROCEDURE (OUTPATIENT)
Dept: GENERAL RADIOLOGY | Facility: CLINIC | Age: 81
End: 2021-08-10
Attending: ORTHOPAEDIC SURGERY
Payer: COMMERCIAL

## 2021-08-10 VITALS — BODY MASS INDEX: 31.54 KG/M2 | WEIGHT: 171.4 LBS | HEIGHT: 62 IN

## 2021-08-10 DIAGNOSIS — Z98.890 S/P KNEE SURGERY: Primary | ICD-10-CM

## 2021-08-10 DIAGNOSIS — Z98.890 S/P KNEE SURGERY: ICD-10-CM

## 2021-08-10 PROCEDURE — 99024 POSTOP FOLLOW-UP VISIT: CPT | Performed by: ORTHOPAEDIC SURGERY

## 2021-08-10 PROCEDURE — 73562 X-RAY EXAM OF KNEE 3: CPT | Mod: LT | Performed by: RADIOLOGY

## 2021-08-10 ASSESSMENT — MIFFLIN-ST. JEOR: SCORE: 1190.23
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
## 2021-08-29 DIAGNOSIS — M79.2 NEUROPATHIC PAIN OF RIGHT FOREARM: ICD-10-CM

## 2021-08-31 NOTE — TELEPHONE ENCOUNTER
Routing refill request to provider for review/approval because:  Drug not on the [facility] refill protocol     Last filled: 8.31.2020 #270 x 3 refills    Last visit: 5/6/2021      Thank you

## 2021-09-01 RX ORDER — GABAPENTIN 400 MG/1
CAPSULE ORAL
Qty: 270 CAPSULE | Refills: 0 | Status: SHIPPED | OUTPATIENT
Start: 2021-09-01 | End: 2021-11-03

## 2021-09-02 ENCOUNTER — MYC REFILL (OUTPATIENT)
Dept: FAMILY MEDICINE | Facility: CLINIC | Age: 81
End: 2021-09-02

## 2021-09-02 ENCOUNTER — MYC MEDICAL ADVICE (OUTPATIENT)
Dept: FAMILY MEDICINE | Facility: CLINIC | Age: 81
End: 2021-09-02

## 2021-09-02 DIAGNOSIS — M79.2 NEUROPATHIC PAIN OF RIGHT FOREARM: ICD-10-CM

## 2021-09-02 RX ORDER — GABAPENTIN 400 MG/1
CAPSULE ORAL
Qty: 270 CAPSULE | Refills: 0 | Status: CANCELLED | OUTPATIENT
Start: 2021-09-02

## 2021-09-02 NOTE — TELEPHONE ENCOUNTER
Duplicate.  Spoke to patient 9/2/21 and refill was sent last night.  Patient will check with her pharmacy this morning.  Tiffany Hernandez RN  [facility]

## 2021-09-02 NOTE — TELEPHONE ENCOUNTER
Wasp sting    See RN response to patient's mychart message     Penelope Barrios, BSN RN  [facility]

## 2021-09-04 ENCOUNTER — HEALTH MAINTENANCE LETTER (OUTPATIENT)
Age: 81
End: 2021-09-04

## 2021-09-05 ENCOUNTER — OFFICE VISIT (OUTPATIENT)
Dept: URGENT CARE | Facility: URGENT CARE | Age: 81
End: 2021-09-05
Payer: COMMERCIAL

## 2021-09-05 VITALS
RESPIRATION RATE: 18 BRPM | SYSTOLIC BLOOD PRESSURE: 120 MMHG | HEART RATE: 82 BPM | DIASTOLIC BLOOD PRESSURE: 60 MMHG | OXYGEN SATURATION: 97 % | BODY MASS INDEX: 31.63 KG/M2 | WEIGHT: 171 LBS

## 2021-09-05 DIAGNOSIS — S90.562A INFECTED INSECT BITE OF ANKLE, LEFT, INITIAL ENCOUNTER: Primary | ICD-10-CM
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
## 2021-09-05 DIAGNOSIS — L08.9 INFECTED INSECT BITE OF ANKLE, LEFT, INITIAL ENCOUNTER: Primary | ICD-10-CM

## 2021-09-05 PROCEDURE — 99213 OFFICE O/P EST LOW 20 MIN: CPT | Performed by: FAMILY MEDICINE

## 2021-09-05 RX ORDER — CEPHALEXIN 500 MG/1
500 CAPSULE ORAL 3 TIMES DAILY
Qty: 21 CAPSULE | Refills: 0 | Status: SHIPPED | OUTPATIENT
Start: 2021-09-05 | End: 2021-09-12

## 2021-09-05 NOTE — PROGRESS NOTES
ASSESSMENT/  PLAN:  Infected insect bite of ankle, left, initial encounter     - cephALEXin (KEFLEX) 500 MG capsule; Take 1 capsule (500 mg) by mouth 3 times daily for 7 days     Triamcinolone 0.1% cream apply twice daily for itching-  She has at home  Patient should return to  or primary MD if worsening  The edge of the area of erythema was marked with a pen for monitoring if there is worsening of the infection.    ------------------------------------------------------------------------------------------------------------------------------------------------------------------    SUBJECTIVE:  Chief Complaint   Patient presents with     Leg Problem     Pt states she is having redness and swelling on L lower leg from a bee sting patient got 4 days     Pavithra Laurent is a 81 year old female who presents with a chief complaint of an insect bite on the left  ankle.   Was bitten by a bee 3-4 days ago.    Severity: moderate.    Associated symptoms: immediate pain, redness noted, swelling and edema at site  No problems of wheezing, mouth/ throat swelling, shortness of breath.  No hives  No fevers or chills    last tetanus booster within 10 years    Past Medical History:   Diagnosis Date     Allergic rhinitis      Calculus of gallbladder without mention of cholecystitis or obstruction      Cataract of both eyes      Deep vein thrombosis (DVT) of left lower extremity, unspecified chronicity, unspecified vein (H) 1/30/2020     Dry eye syndrome      Environmental allergies      Gastro-oesophageal reflux disease      GERD (gastroesophageal reflux disease) 5/24/2013     Hypothyroid      Pain in joint, shoulder region      Rosacea      Thoracic outlet syndrome      Patient Active Problem List   Diagnosis     Nasal congestion     Environmental allergies     Hypothyroidism     Neuropathic pain of right arm     Rosacea     Tear film insufficiency     Elbow dislocation     Low back pain     Lesion of ulnar nerve     Trochanteric  bursitis of left hip     Piriformis syndrome of left side     History of deep venous thrombosis (DVT) of distal vein of left lower extremity     Neck pain     TIA (transient ischemic attack)     Trochanteric bursitis of both hips     Generalized osteoarthritis of multiple sites     Sicca syndrome (H)     Lumbar degenerative disc disease     Primary osteoarthritis of both feet     Ventral hernia without obstruction or gangrene s/p repair 6/2019     Family history of blood clots     Post-thrombotic syndrome of left lower extremity     Arthritis of left knee     Cellulitis     Acute pain of left knee     Aftercare following left knee joint replacement surgery       ALLERGIES:  Birch trees, Codeine sulfate, Dust mites, Erythromycin, Hydromorphone, and Other environmental allergy    acetaminophen (TYLENOL) 650 MG CR tablet, Take 650 mg by mouth every 8 hours as needed for mild pain  aspirin (ASA) 81 MG EC tablet, Take 1 tablet (81 mg) by mouth daily  azelastine (ASTELIN) 0.1 % nasal spray, Spray 2 sprays into both nostrils daily as needed for rhinitis   calcium-vitamin D-vitamin K (CALCIUM SOFT CHEWS) 500-500-40 MG-UNT-MCG CHEW, Take 2 tablets by mouth 2 times daily  desonide (DESOWEN) 0.05 % cream, Apply topically 2 times daily as needed (rash)   diclofenac (VOLTAREN) 1 % GEL, Place 2 g onto the skin daily as needed for moderate pain (knees)   erythromycin (ROMYCIN) ophthalmic ointment, Place 1 Application into both eyes nightly as needed (dry/irritated eyes)   fluocinolone acetonide (DERMOTIC) 0.01 % OIL, Place 1 drop in ear(s) daily as needed.  fluticasone (FLONASE) 50 MCG/ACT nasal spray, Spray 1 spray into both nostrils daily   gabapentin (NEURONTIN) 100 MG capsule, Take 1 capsule (100 mg) by mouth daily as needed (on particularly active days)  gabapentin (NEURONTIN) 400 MG capsule, TAKE ONE CAPSULE BY MOUTH THREE TIMES DAILY   hypromellose (ARTIFICIAL TEARS) 0.5 % SOLN ophthalmic solution, Place 1 drop into both  eyes 4 times daily as needed for dry eyes  levothyroxine (SYNTHROID/LEVOTHROID) 88 MCG tablet, TAKE ONE TABLET BY MOUTH ONCE DAILY  montelukast (SINGULAIR) 10 MG tablet, Take 10 mg by mouth At Bedtime   Multiple Vitamins-Minerals (PRESERVISION AREDS 2+MULTI VIT) CAPS, Take 1 capsule by mouth 2 times daily   nystatin (MYCOSTATIN) 136420 UNIT/GM external powder, Apply topically 2 times daily as needed for other (yeast infection in skin folds)  amoxicillin (AMOXIL) 500 MG capsule, Take 2,000 mg by mouth once One hour prior to dental procedure (Patient not taking: Reported on 9/5/2021)  polyethylene glycol (MIRALAX) 17 g packet, Take 1 packet by mouth daily as needed for constipation (Patient not taking: Reported on 8/10/2021)  traMADol (ULTRAM) 50 MG tablet, Take 0.5-1 tablets (25-50 mg) by mouth every 6 hours as needed for severe pain (Patient not taking: Reported on 8/10/2021)    No current facility-administered medications on file prior to visit.      Social History     Tobacco Use     Smoking status: Never Smoker     Smokeless tobacco: Never Used   Substance Use Topics     Alcohol use: Yes     Alcohol/week: 0.0 standard drinks     Comment: rare- 2/month       Family History   Problem Relation Age of Onset     Heart Disease Mother         MI at 72     Cerebrovascular Disease Mother      Cancer Mother         lung     Alcohol/Drug Father      Depression Father      Cardiovascular Father      Heart Disease Paternal Grandmother      Heart Disease Paternal Grandfather      Cancer Maternal Grandmother         stomach     Cancer - colorectal Other      Neurologic Disorder Son         brain injury     Clotting Disorder (Unknown) Son      Unknown/Adopted Maternal Grandfather        ROS:  CONSTITUTIONAL:NEGATIVE for fever, chills,    EYES: NEGATIVE for vision changes or irritation  ENT/MOUTH: NEGATIVE for ear, mouth and throat problems  RESP:NEGATIVE for significant cough or SOB  GI: NEGATIVE for nausea, abdominal pain,  or  change in bowel habits    OBJECTIVE:  /60   Pulse 82   Resp 18   Wt 77.6 kg (171 lb)   SpO2 97%   BMI 31.63 kg/m       SKIN: erythema and swelling of left ankle    Size  8 x 5 cm     EYES:   EOMI,   conjunctiva clear  HENT:   External ears with no swelling or lesions   Nose and lips without  Swelling, ulcers, erythema or lesions  NECK:   normal pain free ROM  RESP:   no labored respirations, no tachypnea  EXTREMITIES:   Full ROM without expression of pain or limitation x 4 extremities  NEURO:   Normal strength and tone, ambulation without difficulty,   normal speech and mentation

## 2021-09-05 NOTE — PATIENT INSTRUCTIONS
Patient Education     Infected Insect Bite or Sting      When an insect stings you, it injects venom. When an insect bites you, it does not. Stings and bites may cause a local reaction. Or they may cause a reaction that affects your whole body. Bites and stings may become infected. Signs of infection include redness, warmth, pain, drainage of pus, and swelling. Infections will need treatment with antibiotics and should get better over the next 10 days. But they can sometimes form a pocket of pus (abscess) that needs to be opened by a healthcare provider to release the pus.  Home care  The following will help you care for your bite or sting at home:    If a stinger is still in your skin, it will need to be removed. Don't use tweezers that might push more venom into the skin. Gently scrape the stinger from the side with a firm object such as the side of a credit card. This will loosen it and remove it from your skin. Wash the area with soap and water.    If itching is a problem, applying ice packs to the sting area will help.    Wash the area with soap and water at least 3 times a day. Apply a topical antibiotic cream or ointment.    You can use an over-the counter antihistamine unless your healthcare provider has given you a prescription antihistamine. You may use antihistamines to reduce itching if large areas of the skin are involved. Use lower doses during the daytime and higher doses at bedtime since the drug may make you sleepy. Don't use an antihistamine if you have glaucoma or if you are a man with trouble urinating due to an enlarged prostate. Some antihistamines cause less drowsiness and are a good choice for daytime use.    If oral antibiotics have been prescribed, be sure to take them as directed until they are all finished.    You may use over-the-counter pain medicine to control pain, unless another pain medicine was prescribed. Talk with your healthcare provider before using acetaminophen or ibuprofen  if you have chronic liver or kidney disease. Also talk with your doctor if you have ever had a stomach ulcer or digestive bleeding.  Follow-up care  Follow up with your healthcare provider, or as advised if you don't get better over the next 2 days or if your symptoms get worse.  Call 911  Call 911 if any of these occur:    Swelling of the face, eyelids, mouth, throat, or tongue    Trouble swallowing or breathing    Chest tightness  When to seek medical advice  Call your healthcare provider right away if any of these occur:    Spreading areas of redness or swelling    Fever of 100.4 F (38 C) or higher, or as directed by your healthcare provider    Increasing pain, redness, swelling or drainage    Headache, fever, chills, muscle or joint aching, or vomiting,    New rash  [employer] last reviewed this educational content on 10/1/2019    7708-1835 [employer]. All rights reserved. This information is not intended as a substitute for professional medical care. Always follow your healthcare professional's instructions.

## 2021-09-08 NOTE — TELEPHONE ENCOUNTER
Writer responded via [employer].    NELLI CrawfordN, RN  [facility]

## 2021-09-27 ENCOUNTER — IMMUNIZATION (OUTPATIENT)
Dept: INTERNAL MEDICINE | Facility: CLINIC | Age: 81
End: 2021-09-27
Payer: COMMERCIAL

## 2021-09-27 DIAGNOSIS — Z23 NEED FOR PROPHYLACTIC VACCINATION AND INOCULATION AGAINST INFLUENZA: Primary | ICD-10-CM

## 2021-09-27 PROCEDURE — G0008 ADMIN INFLUENZA VIRUS VAC: HCPCS

## 2021-09-27 PROCEDURE — 90662 IIV NO PRSV INCREASED AG IM: CPT

## 2021-11-03 ENCOUNTER — MYC MEDICAL ADVICE (OUTPATIENT)
Dept: ORTHOPEDICS | Facility: CLINIC | Age: 81
End: 2021-11-03

## 2021-11-03 ENCOUNTER — OFFICE VISIT (OUTPATIENT)
Dept: FAMILY MEDICINE | Facility: CLINIC | Age: 81
End: 2021-11-03
Payer: COMMERCIAL

## 2021-11-03 VITALS
DIASTOLIC BLOOD PRESSURE: 79 MMHG | TEMPERATURE: 97 F | OXYGEN SATURATION: 98 % | SYSTOLIC BLOOD PRESSURE: 132 MMHG | HEART RATE: 86 BPM | WEIGHT: 178 LBS | HEIGHT: 61 IN | BODY MASS INDEX: 33.61 KG/M2

## 2021-11-03 DIAGNOSIS — R41.3 MEMORY CHANGES: ICD-10-CM

## 2021-11-03 DIAGNOSIS — B35.4 TINEA CORPORIS: ICD-10-CM

## 2021-11-03 DIAGNOSIS — G45.9 TIA (TRANSIENT ISCHEMIC ATTACK): ICD-10-CM

## 2021-11-03 DIAGNOSIS — Z12.31 ENCOUNTER FOR SCREENING MAMMOGRAM FOR MALIGNANT NEOPLASM OF BREAST: ICD-10-CM

## 2021-11-03 DIAGNOSIS — E03.4 HYPOTHYROIDISM DUE TO ACQUIRED ATROPHY OF THYROID: ICD-10-CM

## 2021-11-03 DIAGNOSIS — E53.8 VITAMIN B12 DEFICIENCY (NON ANEMIC): ICD-10-CM

## 2021-11-03 DIAGNOSIS — Z86.718 HISTORY OF DEEP VENOUS THROMBOSIS (DVT) OF DISTAL VEIN OF LEFT LOWER EXTREMITY: ICD-10-CM

## 2021-11-03 DIAGNOSIS — M79.2 NEUROPATHIC PAIN OF RIGHT FOREARM: ICD-10-CM

## 2021-11-03 DIAGNOSIS — Z00.00 ENCOUNTER FOR MEDICARE ANNUAL WELLNESS EXAM: Primary | ICD-10-CM

## 2021-11-03 LAB
ERYTHROCYTE [DISTWIDTH] IN BLOOD BY AUTOMATED COUNT: 14.5 % (ref 10–15)
HCT VFR BLD AUTO: 39.8 % (ref 35–47)
HGB BLD-MCNC: 12.7 G/DL (ref 11.7–15.7)
MCH RBC QN AUTO: 29.2 PG (ref 26.5–33)
MCHC RBC AUTO-ENTMCNC: 31.9 G/DL (ref 31.5–36.5)
MCV RBC AUTO: 92 FL (ref 78–100)
PLATELET # BLD AUTO: 379 10E3/UL (ref 150–450)
RBC # BLD AUTO: 4.35 10E6/UL (ref 3.8–5.2)
VIT B12 SERPL-MCNC: 119 PG/ML (ref 193–986)
WBC # BLD AUTO: 10.5 10E3/UL (ref 4–11)

## 2021-11-03 PROCEDURE — 85027 COMPLETE CBC AUTOMATED: CPT | Performed by: FAMILY MEDICINE

## 2021-11-03 PROCEDURE — 99397 PER PM REEVAL EST PAT 65+ YR: CPT | Performed by: FAMILY MEDICINE

## 2021-11-03 PROCEDURE — 36415 COLL VENOUS BLD VENIPUNCTURE: CPT | Performed by: FAMILY MEDICINE

## 2021-11-03 PROCEDURE — 84443 ASSAY THYROID STIM HORMONE: CPT | Performed by: FAMILY MEDICINE

## 2021-11-03 PROCEDURE — 99214 OFFICE O/P EST MOD 30 MIN: CPT | Mod: 25 | Performed by: FAMILY MEDICINE

## 2021-11-03 PROCEDURE — 82607 VITAMIN B-12: CPT | Performed by: FAMILY MEDICINE

## 2021-11-03 PROCEDURE — 80048 BASIC METABOLIC PNL TOTAL CA: CPT | Performed by: FAMILY MEDICINE

## 2021-11-03 RX ORDER — GABAPENTIN 100 MG/1
100 CAPSULE ORAL DAILY PRN
Qty: 90 CAPSULE | Refills: 3 | Status: SHIPPED | OUTPATIENT
Start: 2021-11-03 | End: 2023-07-07

## 2021-11-03 RX ORDER — GABAPENTIN 400 MG/1
400 CAPSULE ORAL 3 TIMES DAILY
Qty: 270 CAPSULE | Refills: 3 | Status: SHIPPED | OUTPATIENT
Start: 2021-11-03 | End: 2022-12-14

## 2021-11-03 RX ORDER — LANOLIN ALCOHOL/MO/W.PET/CERES
1000 CREAM (GRAM) TOPICAL DAILY
Qty: 90 TABLET | Refills: 3 | Status: SHIPPED | OUTPATIENT
Start: 2021-11-03 | End: 2023-04-13

## 2021-11-03 RX ORDER — LEVOTHYROXINE SODIUM 88 UG/1
88 TABLET ORAL DAILY
Qty: 90 TABLET | Refills: 3 | Status: SHIPPED | OUTPATIENT
Start: 2021-11-03 | End: 2022-11-14

## 2021-11-03 RX ORDER — NYSTATIN 100000 [USP'U]/G
POWDER TOPICAL 2 TIMES DAILY PRN
Qty: 60 G | Refills: 1 | Status: SHIPPED | OUTPATIENT
Start: 2021-11-03 | End: 2023-07-07

## 2021-11-03 ASSESSMENT — ENCOUNTER SYMPTOMS
COUGH: 1
FEVER: 0
BREAST MASS: 0
CHILLS: 0
HEARTBURN: 0
PALPITATIONS: 0
ARTHRALGIAS: 1
HEADACHES: 0
NERVOUS/ANXIOUS: 0
FREQUENCY: 1
JOINT SWELLING: 1
PARESTHESIAS: 0
DYSURIA: 0
DIARRHEA: 0
MYALGIAS: 1
HEMATOCHEZIA: 0
ABDOMINAL PAIN: 0
DIZZINESS: 0
HEMATURIA: 0
SORE THROAT: 0
WEAKNESS: 0
EYE PAIN: 0
SHORTNESS OF BREATH: 0
NAUSEA: 0
CONSTIPATION: 0

## 2021-11-03 ASSESSMENT — ACTIVITIES OF DAILY LIVING (ADL): CURRENT_FUNCTION: NO ASSISTANCE NEEDED

## 2021-11-03 ASSESSMENT — MIFFLIN-ST. JEOR: SCORE: 1208.19

## 2021-11-03 NOTE — TELEPHONE ENCOUNTER
Writer called and talked with patient on the phone. Patient states that is having some pain and tenderness to touch side of leg with a little pinkish coloring. Patient denies swelling and states that has been doing more activity over the last week. Writer explained that during this time patient should rest, elevate leg and ice as that should help with the pain. Patient will call back if the pain increases and or patient is unable to get any relief.     Kathleen Leiva LPN

## 2021-11-03 NOTE — PROGRESS NOTES
"SUBJECTIVE:   Pavithra Laurent is a 81 year old female who presents for Preventive Visit.    Patient has been advised of split billing requirements and indicates understanding: Yes   Are you in the first 12 months of your Medicare coverage?  No    Healthy Habits:     In general, how would you rate your overall health?  Good    Frequency of exercise:  2-3 days/week    Duration of exercise:  15-30 minutes    Do you usually eat at least 4 servings of fruit and vegetables a day, include whole grains    & fiber and avoid regularly eating high fat or \"junk\" foods?  No    Taking medications regularly:  Yes    Medication side effects:  None    Ability to successfully perform activities of daily living:  No assistance needed    Home Safety:  Throw rugs in the hallway and lack of grab bars in the bathroom    Hearing Impairment:  Difficulty following a conversation in a noisy restaurant or crowded room, difficulty following dialogue in the theater, difficult to understand a speaker at a public meeting or [employer] service, need to ask people to speak up or repeat themselves, find that men's voices are easier to understand than woman's and difficulty understanding soft or whispered speech    In the past 6 months, have you been bothered by leaking of urine?  No    In general, how would you rate your overall mental or emotional health?  Good      PHQ-2 Total Score: 0    Additional concerns today:  No    Cramping in both legs, different sensations in each leg  Every once in a while she has breast tenderness, no mammo since [facility].        Do you feel safe in your environment? Yes    Have you ever done Advance Care Planning? (For example, a Health Directive, POLST, or a discussion with a medical provider or your loved ones about your wishes): Yes, advance care planning is on file.      Fall risk  Fallen 2 or more times in the past year?: No  Any fall with injury in the past year?: No    Cognitive Screening   1) " Repeat 3 items (Leader, Season, Table)    2) Clock draw: NORMAL  3) 3 item recall: Recalls 2 objects   Results: NORMAL clock, 1-2 items recalled: COGNITIVE IMPAIRMENT LESS LIKELY    Mini-CogTM Copyright S Alexei. Licensed by the author for use in [facility]; reprinted with permission (cecy@[email or web address]). All rights reserved.      Do you have sleep apnea, excessive snoring or daytime drowsiness?: no    Reviewed and updated as needed this visit by clinical staff  Tobacco  Allergies  Meds  Problems  Med Hx  Surg Hx  Fam Hx          Reviewed and updated as needed this visit by Provider  Tobacco  Allergies  Meds  Problems  Med Hx  Surg Hx  Fam Hx         Social History     Tobacco Use     Smoking status: Never Smoker     Smokeless tobacco: Never Used   Substance Use Topics     Alcohol use: Yes     Alcohol/week: 0.0 standard drinks     Comment: rare- 2/month     If you drink alcohol do you typically have >3 drinks per day or >7 drinks per week? No    Alcohol Use 11/3/2021   Prescreen: >3 drinks/day or >7 drinks/week? No   Prescreen: >3 drinks/day or >7 drinks/week? -   No flowsheet data found.      Current providers sharing in care for this patient include:   Patient Care Team:  Kitty Owens MD as PCP - General (Family Practice)  Isela Jimenez MD as Assigned Musculoskeletal Provider  Kitty Owens MD as Assigned PCP    The following health maintenance items are reviewed in Epic and correct as of today:  Health Maintenance Due   Topic Date Due     ZOSTER IMMUNIZATION (1 of 2) Never done     FALL RISK ASSESSMENT  07/24/2021         Mammogram Screening - Patient over age 75, has elected to continue with screening.  Pertinent mammograms are reviewed under the imaging tab.    Review of Systems   Constitutional: Negative for chills and fever.   HENT: Positive for congestion and hearing loss. Negative for ear pain and sore throat.    Eyes: Negative for pain and visual disturbance.  "  Respiratory: Positive for cough. Negative for shortness of breath.    Cardiovascular: Positive for peripheral edema. Negative for chest pain and palpitations.   Gastrointestinal: Negative for abdominal pain, constipation, diarrhea, heartburn, hematochezia and nausea.   Breasts:  Negative for tenderness, breast mass and discharge.   Genitourinary: Positive for frequency. Negative for dysuria, genital sores, hematuria, pelvic pain, urgency, vaginal bleeding and vaginal discharge.   Musculoskeletal: Positive for arthralgias, joint swelling and myalgias.   Skin: Negative for rash.   Neurological: Negative for dizziness, weakness, headaches and paresthesias.   Psychiatric/Behavioral: Negative for mood changes. The patient is not nervous/anxious.          OBJECTIVE:   /79 (BP Location: Left arm, Patient Position: Sitting, Cuff Size: Adult Regular)   Pulse 86   Temp 97  F (36.1  C) (Temporal)   Ht 1.547 m (5' 0.9\")   Wt 80.7 kg (178 lb)   SpO2 98%   BMI 33.74 kg/m   Estimated body mass index is 33.74 kg/m  as calculated from the following:    Height as of this encounter: 1.547 m (5' 0.9\").    Weight as of this encounter: 80.7 kg (178 lb).  Physical Exam  GENERAL APPEARANCE: healthy, alert and no distress  EYES: Eyes grossly normal to inspection, PERRL and conjunctivae and sclerae normal  HENT: ear canals and TM's normal, nose and mouth without ulcers or lesions, oropharynx clear and oral mucous membranes moist  NECK: no adenopathy, no asymmetry, masses, or scars and thyroid normal to palpation  RESP: lungs clear to auscultation - no rales, rhonchi or wheezes  BREAST: normal without masses, tenderness or nipple discharge and no palpable axillary masses or adenopathy  CV: regular rate and rhythm, normal S1 S2, no S3 or S4, no murmur, click or rub, no peripheral edema and peripheral pulses strong  ABDOMEN: soft, nontender, no hepatosplenomegaly, no masses and bowel sounds normal  MS: no musculoskeletal defects " are noted and gait is age appropriate without ataxia  SKIN: no suspicious lesions or rashes  NEURO: Normal strength and tone, sensory exam grossly normal, mentation intact and speech normal  PSYCH: mentation appears normal and affect normal/bright    Diagnostic Test Results:  Labs reviewed in Epic    ASSESSMENT / PLAN:   Pavithra was seen today for physical.    Diagnoses and all orders for this visit:    Encounter for Medicare annual wellness exam  Comments:  Up to date on all immunizations except zoster; will do  Elected to get mammo this year; may not next year if normal  Needs hearing aids!  Will check at [employer]      Neuropathic pain of right arm  Comments:  Gabapentin stable, refilled. Works well.  Pain improved overall  Orders:  -     gabapentin (NEURONTIN) 100 MG capsule; Take 1 capsule (100 mg) by mouth daily as needed (on particularly active days)  -     gabapentin (NEURONTIN) 400 MG capsule; Take 1 capsule (400 mg) by mouth 3 times daily    Hypothyroidism due to acquired atrophy of thyroid  Comments:  Recheck TSH  No new symptoms  Refilled.  Orders:  -     levothyroxine (SYNTHROID/LEVOTHROID) 88 MCG tablet; Take 1 tablet (88 mcg) by mouth daily    History of deep venous thrombosis (DVT) of distal vein of left lower extremity  Comments:  On ASA    TIA (transient ischemic attack)  Comments:  On daily aspirin as prevention given hx  Continue    Memory changes  Comments:  New n last year  Check B12, TSH, CBC, BMP  Supplement B12 if needed  Watchful waiting, mild for now, just word finding  Orders:  -     Vitamin B12; Future  -     CBC with platelets; Future  -     TSH with free T4 reflex; Future  -     Basic metabolic panel  (Ca, Cl, CO2, Creat, Gluc, K, Na, BUN); Future    Tinea corporis  Comments:  Nystatin helpful  Refilled  Orders:  -     nystatin (MYCOSTATIN) 909760 UNIT/GM external powder; Apply topically 2 times daily as needed for other (yeast infection in skin folds)    Encounter for screening mammogram  "for malignant neoplasm of breast  -     MA Screen Bilateral w/Tutu; Future          Patient has been advised of split billing requirements and indicates understanding: Yes  COUNSELING:  Reviewed preventive health counseling, as reflected in patient instructions    Estimated body mass index is 33.74 kg/m  as calculated from the following:    Height as of this encounter: 1.547 m (5' 0.9\").    Weight as of this encounter: 80.7 kg (178 lb).    Weight management plan: Discussed healthy diet and exercise guidelines    She reports that she has never smoked. She has never used smokeless tobacco.      Appropriate preventive services were discussed with this patient, including applicable screening as appropriate for cardiovascular disease, diabetes, osteopenia/osteoporosis, and glaucoma.  As appropriate for age/gender, discussed screening for colorectal cancer, prostate cancer, breast cancer, and cervical cancer. Checklist reviewing preventive services available has been given to the patient.    Reviewed patients plan of care and provided an AVS. The Basic Care Plan (routine screening as documented in Health Maintenance) for Pavithra meets the Care Plan requirement. This Care Plan has been established and reviewed with the Patient.    Counseling Resources:  ATP IV Guidelines  Pooled Cohorts Equation Calculator  Breast Cancer Risk Calculator  Breast Cancer: Medication to Reduce Risk  FRAX Risk Assessment  ICSI Preventive Guidelines  Dietary Guidelines for Americans, 2010  [employer]'s MyPlate  ASA Prophylaxis  Lung CA Screening    Kitty Owens MD  [facility]    Identified Health Risks:  "

## 2021-11-03 NOTE — PATIENT INSTRUCTIONS
Patient Education   Personalized Prevention Plan  You are due for the preventive services outlined below.  Your care team is available to assist you in scheduling these services.  If you have already completed any of these items, please share that information with your care team to update in your medical record.  Health Maintenance Due   Topic Date Due     Zoster (Shingles) Vaccine (1 of 2) Never done     FALL RISK ASSESSMENT  07/24/2021         Go to [employer] to get hearing aids!      You are due for your Shingrix or Zoster vaccination. Insurance (especially Medicare) doesn't always cover this vaccine when it is done at my office.  Instead, you can have this done at our [place] pharmacy and they can give you the price ahead of time so you don't get a surprise bill from us.        Pavithra, you are due for your mammogram.  This is an important, lifesaving test!    [facility] [address]  [place] [address] Appointments: [phone]

## 2021-11-04 ENCOUNTER — MYC MEDICAL ADVICE (OUTPATIENT)
Dept: FAMILY MEDICINE | Facility: CLINIC | Age: 81
End: 2021-11-04

## 2021-11-04 LAB
ANION GAP SERPL CALCULATED.3IONS-SCNC: 7 MMOL/L (ref 3–14)
BUN SERPL-MCNC: 11 MG/DL (ref 7–30)
CALCIUM SERPL-MCNC: 8.4 MG/DL (ref 8.5–10.1)
CHLORIDE BLD-SCNC: 107 MMOL/L (ref 94–109)
CO2 SERPL-SCNC: 23 MMOL/L (ref 20–32)
CREAT SERPL-MCNC: 0.7 MG/DL (ref 0.52–1.04)
GFR SERPL CREATININE-BSD FRML MDRD: 82 ML/MIN/1.73M2
GLUCOSE BLD-MCNC: 87 MG/DL (ref 70–99)
POTASSIUM BLD-SCNC: 4.1 MMOL/L (ref 3.4–5.3)
SODIUM SERPL-SCNC: 137 MMOL/L (ref 133–144)
TSH SERPL DL<=0.005 MIU/L-ACNC: 0.95 MU/L (ref 0.4–4)

## 2021-11-04 NOTE — RESULT ENCOUNTER NOTE
Eliseo Arshad:  It was nice to see you in clinic today!  You DO have a very, very low B12 level.  I'm gong to send a B vitamin to your pharmacy; I want you to take one every day and come in for a recheck in ~8 weeks (don't take the vitamin on the day of your recheck).  I hope that this really helps your memory changes you've noticed recently.  Let me know if you have any questions.  Best,  Dr. Kitty Owens MD/[facility]

## 2021-11-05 ENCOUNTER — MYC MEDICAL ADVICE (OUTPATIENT)
Dept: FAMILY MEDICINE | Facility: CLINIC | Age: 81
End: 2021-11-05
Payer: COMMERCIAL

## 2021-11-08 NOTE — TELEPHONE ENCOUNTER
Should patient continue taking low dose (81 mg) aspirin daily?  Tiffany Hernandez RN  [facility]

## 2021-11-09 ENCOUNTER — THERAPY VISIT (OUTPATIENT)
Dept: PHYSICAL THERAPY | Facility: CLINIC | Age: 81
End: 2021-11-09
Payer: COMMERCIAL

## 2021-11-09 DIAGNOSIS — M54.50 BILATERAL LOW BACK PAIN WITHOUT SCIATICA: Primary | ICD-10-CM

## 2021-11-09 PROCEDURE — 97161 PT EVAL LOW COMPLEX 20 MIN: CPT | Mod: GP | Performed by: PHYSICAL THERAPIST

## 2021-11-09 PROCEDURE — 97110 THERAPEUTIC EXERCISES: CPT | Mod: GP | Performed by: PHYSICAL THERAPIST

## 2021-11-09 NOTE — PROGRESS NOTES
Physical Therapy Initial Evaluation  Subjective:  Patient is referred to  PT for treatment of low back pain with referral dated 10/13/2021.  Her recent low back pain began during the summer of 2021 as she was going through rehab for left TKA.  Low back pain was severe.  Patient had epidural injection with significant relief, except for left lateral hip pain with walking.  This was treated with cortisone  injection with relief. Low back pain is starting to return. Today patient complains of left low back and buttock pain.  Pain varies from side to side.  Symptoms are worse with walking and standing.  They are relieved by sitting unless she sits too long.   Patient reports MRI findings of stenosis - not available in epic.    The history is provided by the patient.   Patient Health History  Pavithra Laurent being seen for back and gluts.     Date of Onset: referral dated 10/13/2021.   Problem occurred: unknown   Pain is reported as 5/10 on pain scale.  General health as reported by patient is good.  Pertinent medical history includes: implanted device, osteoarthritis, overweight and thyroid problems.     Medical allergies: adhesives and latex.   Surgeries include:  Orthopedic surgery. Other surgery history details: right wrist, elbow, bilat TKA.    Current medications:  Pain medication and thyroid medication.    Current occupation is retired.      Other job/home tasks details: general house and yard work.                                  Objective:  Standing Alignment:    Cervical/thoracic deviations alignment: severe forward head.              General deviations alignment: kyphoscoliosis with fullness right upper ribs.  Gait:    Gait Type:  Antalgic   Assistive Devices:  None                 Lumbar/SI Evaluation  ROM:    AROM Lumbar:   Flexion:          Fingertips to toes, feels good,  No complaint with reps.    Ext:                    Mild loss, no complaint.   Side Bend:        Left:  Left LB complaint    Right:  Mod  loss, no complaint  Rotation:           Left:     Right:   Side Glide:        Left:     Right:           Lumbar Myotomes:  normal            Lumbar DTR's:  normal          Neural Tension/Mobility:  Lumbar:  Normal                                                             General     ROS    Assessment/Plan:    Patient is a 81 year old female with lumbar complaints.    Patient has the following significant findings with corresponding treatment plan.                Diagnosis 1:  Low back pain  Pain -  self management, education and home program  Decreased ROM/flexibility - manual therapy and therapeutic exercise  Decreased function - therapeutic activities  Impaired posture - neuro re-education    Therapy Evaluation Codes:   1) History comprised of:   Personal factors that impact the plan of care:      None.    Comorbidity factors that impact the plan of care are:      None.     Medications impacting care: None.  2) Examination of Body Systems comprised of:   Body structures and functions that impact the plan of care:      Lumbar spine.   Activity limitations that impact the plan of care are:      Standing and Walking.  3) Clinical presentation characteristics are:   Stable/Uncomplicated.  4) Decision-Making    Low complexity using standardized patient assessment instrument and/or measureable assessment of functional outcome.  Cumulative Therapy Evaluation is: Low complexity.    Previous and current functional limitations:  (See Goal Flow Sheet for this information)    Short term and Long term goals: (See Goal Flow Sheet for this information)     Communication ability:  Patient appears to be able to clearly communicate and understand verbal and written communication and follow directions correctly.  Treatment Explanation - The following has been discussed with the patient:   RX ordered/plan of care  Anticipated outcomes  Possible risks and side effects  This patient would benefit from PT intervention to resume normal  activities.   Rehab potential is good.    Frequency:  1 X week, once daily  Duration:  for 12 weeks  Discharge Plan:  Achieve all LTG.  Independent in home treatment program.  Reach maximal therapeutic benefit.    Please refer to the daily flowsheet for treatment today, total treatment time and time spent performing 1:1 timed codes.

## 2021-11-11 NOTE — PROGRESS NOTES
[facility]    OUTPATIENT Physical Therapy ORTHOPEDIC EVALUATION  PLAN OF TREATMENT FOR OUTPATIENT REHABILITATION  (COMPLETE FOR INITIAL CLAIMS ONLY)  Patient's Last Name, First Name, M.I.  YOB: 1940  Pavithra Laurent    Provider s Name:  [facility]   Medical Record No.  [id number]   Start of Care Date:  11/09/21   Onset Date:    (referral 10/13/2021)   Type:     _X__PT   ___OT Medical Diagnosis:    Encounter Diagnosis   Name Primary?     Bilateral low back pain without sciatica Yes        Treatment Diagnosis:  LBP  NEW EPISODE        Goals:     11/09/21 0500   Body Part   Goals listed below are for LB   Goal #1   Goal #1 ambulation   Previous Functional Level No restrictions   Current Functional Level Minutes patient can walk   Performance Level 10   STG Target Performance Minutes patient will be able to walk   Performance Level 20   Rationale for safe community ambulation   Due Date 12/21/21    LTG Target Performance Minutes patient will be able to  walk   Performance Level 30   Rationale for safe community ambulation   Due Date 02/01/22       Therapy Frequency:  1x/week  Predicted Duration of Therapy Intervention:  12 weeks    Bethany Gonzalez, PT                 I CERTIFY THE NEED FOR THESE SERVICES FURNISHED UNDER        THIS PLAN OF TREATMENT AND WHILE UNDER MY CARE .             Physician Signature               Date    X_____________________________________________________                             Certification Date From:  11/09/21   Certification Date To:  02/01/22    Referring Provider:  Lina Avery*    Initial Assessment        See Epic Evaluation SOC Date: 11/09/21

## 2021-11-12 NOTE — PROGRESS NOTES
Physical Therapy Initial Evaluation  Subjective:    Patient Health History         Pain is reported as 5/10 on pain scale.    Pertinent medical history includes: overweight, implanted device, osteoarthritis, thyroid problems and rheumatoid arthritis. Other medical history details: Borderline Sjogren's syndrome; Possible rheumatoid arthritis; possible calf pain, swelling, warmth; Pain at night/rest- [place] [employer].   Red flags:  Other, calf pain-swelling-warmth and pain at rest/night.  Medical allergies: latex and adhesives.   Surgeries include:  Orthopedic surgery. Other surgery history details: Right wrist- elbow dislocated; Bilateral knee replacement .    Current medications:  Pain medication and thyroid medication.    Current occupation is Retired.   Primary job tasks include:  Other.   Other job/home tasks details: General house work/yard work.                                  Objective:  System    Physical Exam    General     ROS    Assessment/Plan:

## 2021-11-15 NOTE — TELEPHONE ENCOUNTER
Dr. Owens-Please advise if you recommend patient continue Aspirin 81 mg daily?    Writer responded via [employer].    Thank you!  NELLI CrawfordN, RN  [facility]

## 2021-11-24 ENCOUNTER — THERAPY VISIT (OUTPATIENT)
Dept: PHYSICAL THERAPY | Facility: CLINIC | Age: 81
End: 2021-11-24
Payer: COMMERCIAL

## 2021-11-24 DIAGNOSIS — M54.50 BILATERAL LOW BACK PAIN WITHOUT SCIATICA: Primary | ICD-10-CM

## 2021-11-24 PROCEDURE — 97110 THERAPEUTIC EXERCISES: CPT | Mod: GP

## 2021-12-03 ENCOUNTER — THERAPY VISIT (OUTPATIENT)
Dept: PHYSICAL THERAPY | Facility: CLINIC | Age: 81
End: 2021-12-03
Payer: COMMERCIAL

## 2021-12-03 DIAGNOSIS — M54.50 BILATERAL LOW BACK PAIN WITHOUT SCIATICA: ICD-10-CM

## 2021-12-03 PROCEDURE — 97110 THERAPEUTIC EXERCISES: CPT | Mod: GP | Performed by: PHYSICAL THERAPIST

## 2021-12-03 PROCEDURE — 97530 THERAPEUTIC ACTIVITIES: CPT | Mod: GP | Performed by: PHYSICAL THERAPIST

## 2022-01-07 ENCOUNTER — MYC MEDICAL ADVICE (OUTPATIENT)
Dept: FAMILY MEDICINE | Facility: CLINIC | Age: 82
End: 2022-01-07

## 2022-01-12 NOTE — TELEPHONE ENCOUNTER
Writer responded via [employer].    NELLI CrawfordN, RN  [facility]

## 2022-01-12 NOTE — TELEPHONE ENCOUNTER
Writer responded via [employer].    NELLI CrawfordN, RN  [facility]

## 2022-02-07 ENCOUNTER — LAB (OUTPATIENT)
Dept: LAB | Facility: CLINIC | Age: 82
End: 2022-02-07
Payer: COMMERCIAL

## 2022-02-07 DIAGNOSIS — E53.8 VITAMIN B12 DEFICIENCY (NON ANEMIC): ICD-10-CM

## 2022-02-07 PROCEDURE — 82607 VITAMIN B-12: CPT

## 2022-02-07 PROCEDURE — 36415 COLL VENOUS BLD VENIPUNCTURE: CPT

## 2022-02-08 LAB — VIT B12 SERPL-MCNC: 1773 PG/ML (ref 193–986)

## 2022-02-11 ENCOUNTER — VIRTUAL VISIT (OUTPATIENT)
Dept: FAMILY MEDICINE | Facility: CLINIC | Age: 82
End: 2022-02-11
Payer: COMMERCIAL

## 2022-02-11 ENCOUNTER — THERAPY VISIT (OUTPATIENT)
Dept: PHYSICAL THERAPY | Facility: CLINIC | Age: 82
End: 2022-02-11
Payer: COMMERCIAL

## 2022-02-11 DIAGNOSIS — M35.01 SICCA SYNDROME WITH KERATOCONJUNCTIVITIS (H): ICD-10-CM

## 2022-02-11 DIAGNOSIS — M54.50 BILATERAL LOW BACK PAIN WITHOUT SCIATICA: ICD-10-CM

## 2022-02-11 DIAGNOSIS — J01.01 ACUTE RECURRENT MAXILLARY SINUSITIS: Primary | ICD-10-CM

## 2022-02-11 PROCEDURE — 99213 OFFICE O/P EST LOW 20 MIN: CPT | Mod: 95 | Performed by: FAMILY MEDICINE

## 2022-02-11 PROCEDURE — 97140 MANUAL THERAPY 1/> REGIONS: CPT | Mod: GP | Performed by: PHYSICAL THERAPIST

## 2022-02-11 PROCEDURE — 97110 THERAPEUTIC EXERCISES: CPT | Mod: GP | Performed by: PHYSICAL THERAPIST

## 2022-02-11 RX ORDER — DOXYCYCLINE HYCLATE 100 MG
100 TABLET ORAL 2 TIMES DAILY
Qty: 14 TABLET | Refills: 0 | Status: SHIPPED | OUTPATIENT
Start: 2022-02-11 | End: 2022-02-18

## 2022-02-11 NOTE — PROGRESS NOTES
Pavithra is a 81 year old who is being evaluated via a billable telephone visit.      What phone number would you like to be contacted at? [phone]    How would you like to obtain your AVS? MyChart    Assessment & Plan     Pavithra was seen today for uri.    Diagnoses and all orders for this visit:    Acute recurrent maxillary sinusitis    Discussed bacterial sinus infection versus congestion causing sinus headache; think that her symptoms at this point fall into the former category.    Would start flonase, rest, advil, HEAT TO SINUSES.    Nasal saline spray, blow nose afterwards, Neti pot or SinuFlo Sinus Rinse, warm washcloth to affected side and prn afrin for LIMITED TIME ONLY all discussed    Doxycycline as below.    Discussed the treatment of URI and recommended increased fluids, humidity, Tylenol, time and TLC.      If symptoms worsen or do not improve return to clinic.  -     doxycycline hyclate (VIBRA-TABS) 100 MG tablet; Take 1 tablet (100 mg) by mouth 2 times daily for 7 days    Sicca syndrome with keratoconjunctivitis (H)  Comments:  Sees rheum regularly for Sicca Syndrome.   been stable            Return in about 9 months (around 11/11/2022) for Medicare Annual Wellness, and send me msg if symptoms don't resolve and will order xr or ct sinus.    Kitty Owens MD  [facility]    Subjective   Pavithra is a 81 year old who presents for the following health issues     HPI     Acute Illness  Acute illness concerns: sinus   Onset/Duration: [place] 12/31/2021  Symptoms:  Fever: no  Chills/Sweats: no  Headache (location?): yes-frontal   Sinus Pressure: YES- pressure and drainage   Conjunctivitis:  no  Ear Pain: no  Rhinorrhea: no  Congestion: no  Sore Throat: no  Cough: YES-productive of yellow sputum, productive of green sputum  Wheeze: no  Decreased Appetite: no  Nausea: no  Vomiting: no  Diarrhea: no  Dysuria/Freq.: no  Dysuria or Hematuria: no  Fatigue/Achiness: YES- both  Sick/Strep  "Exposure: no but was recently at hospital waiting room   Therapies tried and outcome: allegra and substitute med at [facility] and antihistamine, gwendolyn pot, humidifer and flonase     Has been 2 months.  Saw allergy doctor.  Was on amoxicillin-clauv 875 and that didn't do it.  14 days.  So she put her on levoflox - only took 2 days.  Stopped because was worried about side effects.    Was doing pool therapy - noticed that when in pool not feeling congested.    But been sick since New Year's Yisel.    Feeling okay - but yesterday had to go to the hospital with son and feeling really run down, tired.  No fevers.      Review of Systems   Constitutional, HEENT, cardiovascular, pulmonary, gi and gu systems are negative, except as otherwise noted.      Objective    Vitals - Patient Reported  Weight (Patient Reported): 79.4 kg (175 lb)  Height (Patient Reported): 156.8 cm (5' 1.75\")  BMI (Based on Pt Reported Ht/Wt): 32.27        Physical Exam   healthy, alert and no distress  PSYCH: Alert and oriented times 3; coherent speech, normal   rate and volume, able to articulate logical thoughts, able   to abstract reason, no tangential thoughts, no hallucinations   or delusions  Her affect is normal  RESP: No cough, no audible wheezing, able to talk in full sentences.  Sounds congested  Remainder of exam unable to be completed due to telephone visits                Phone call duration: 11 minutes  "

## 2022-02-11 NOTE — PROGRESS NOTES
[facility]    OUTPATIENT Physical Therapy ORTHOPEDIC EVALUATION  PLAN OF TREATMENT FOR OUTPATIENT REHABILITATION  (COMPLETE FOR INITIAL CLAIMS ONLY)  Patient's Last Name, First Name, M.I.  YOB: 1940  Pavithra Laurent    Provider s Name:  [facility]   Medical Record No.  [id number]   Start of Care Date:  11/09/21   Onset Date:    (referral 10/13/2021)   Type:     _X__PT   ___OT Medical Diagnosis:    Encounter Diagnosis   Name Primary?     Bilateral low back pain without sciatica         Treatment Diagnosis:  LBP  NEW EPISODE        Goals:     02/11/22 0500   Body Part   Goals listed below are for LB   Goal #1   Goal #1 ambulation   Previous Functional Level No restrictions   Current Functional Level Blocks patient can walk   Performance Level 3   STG Target Performance Minutes patient will be able to walk   Performance Level 20   Rationale for safe community ambulation   Due Date 12/21/21   Date Goal Met 02/11/22    LTG Target Performance Minutes patient will be able to  walk   Performance Level 30   Rationale for safe community ambulation   Due Date 03/25/22   If goal not met, Why? progressing toward       Therapy Frequency:  1x/week  Predicted Duration of Therapy Intervention:  6 weeks    Bethany Gonzalez, PT                 I CERTIFY THE NEED FOR THESE SERVICES FURNISHED UNDER        THIS PLAN OF TREATMENT AND WHILE UNDER MY CARE .             Physician Signature               Date    X_____________________________________________________                             Certification Date From:  02/02/22   Certification Date To:  03/25/22    Referring Provider:  Lina Avery*    Initial Assessment        See Epic Evaluation SOC Date: 11/09/21

## 2022-02-11 NOTE — PROGRESS NOTES
"PROGRESS  REPORT    Progress reporting period is from 11/9/2021 to 2/11/2022.   Pavithra has been seen in PT 4 times for treatment of low back / buttock pain.  Treatment was interrupted by being ill with sinus infection    SUBJECTIVE   Subjective: Has not attended PT for several weeks due to sinus infection.  LB is feeling better.  Has been going to pool therapy.  C/O right LB/buttock pain with walking (used to be left side).  Sx are relieved with sitting      Current Pain level: 2/10.        Initial Pain level: 5/10.   Changes in function:  Yes (See Goal flowsheet attached for changes in current functional level)  Adverse reaction to treatment or activity: None    OBJECTIVE  Changes noted in objective findings:    Objective: B LB complaint with EIS but not same sx, R buttock complaint after reps. Right LBP with SB R with in loss, no c/o with SG L.  FIS fingertips to toes - \"feels good\".  Tender right PF     ASSESSMENT/PLAN  Updated problem list and treatment plan: Diagnosis 1:  LB/buttock pain  Pain -  manual therapy, self management, education and home program  Decreased ROM/flexibility - manual therapy and therapeutic exercise  Decreased strength - therapeutic exercise and therapeutic activities  Decreased function - therapeutic activities  Impaired posture - neuro re-education  STG/LTGs have been met or progress has been made towards goals:  Yes (See Goal flow sheet completed today.)  Assessment of Progress: The patient's condition is improving.  The patient's condition has potential to improve.  Self Management Plans:  Patient has been instructed in a home treatment program.  I have re-evaluated this patient and find that the nature, scope, duration and intensity of the therapy is appropriate for the medical condition of the patient.  Pavithra continues to require the following intervention to meet STG and LTG's:  PT    Recommendations:  This patient would benefit from continued therapy.     Frequency:  1 X week, " once daily  Duration:  for 6 weeks        Please refer to the daily flowsheet for treatment today, total treatment time and time spent performing 1:1 timed codes.

## 2022-02-12 NOTE — RESULT ENCOUNTER NOTE
See TE.  Patient would like to reduce dose to 500 daily - will do.    Dr. Kitty Owens MD / [facility]

## 2022-02-18 ENCOUNTER — THERAPY VISIT (OUTPATIENT)
Dept: PHYSICAL THERAPY | Facility: CLINIC | Age: 82
End: 2022-02-18
Payer: COMMERCIAL

## 2022-02-18 DIAGNOSIS — M54.50 BILATERAL LOW BACK PAIN WITHOUT SCIATICA: ICD-10-CM

## 2022-02-18 PROCEDURE — 97110 THERAPEUTIC EXERCISES: CPT | Mod: GP | Performed by: PHYSICAL THERAPIST

## 2022-02-18 PROCEDURE — 97140 MANUAL THERAPY 1/> REGIONS: CPT | Mod: GP | Performed by: PHYSICAL THERAPIST

## 2022-03-04 ENCOUNTER — THERAPY VISIT (OUTPATIENT)
Dept: PHYSICAL THERAPY | Facility: CLINIC | Age: 82
End: 2022-03-04
Payer: COMMERCIAL

## 2022-03-04 DIAGNOSIS — M54.50 BILATERAL LOW BACK PAIN WITHOUT SCIATICA: ICD-10-CM

## 2022-03-04 PROCEDURE — 97110 THERAPEUTIC EXERCISES: CPT | Mod: GP | Performed by: PHYSICAL THERAPIST

## 2022-03-04 PROCEDURE — 97140 MANUAL THERAPY 1/> REGIONS: CPT | Mod: GP | Performed by: PHYSICAL THERAPIST

## 2022-03-11 ENCOUNTER — THERAPY VISIT (OUTPATIENT)
Dept: PHYSICAL THERAPY | Facility: CLINIC | Age: 82
End: 2022-03-11
Payer: COMMERCIAL

## 2022-03-11 DIAGNOSIS — M54.50 BILATERAL LOW BACK PAIN WITHOUT SCIATICA: ICD-10-CM

## 2022-03-11 PROCEDURE — 97140 MANUAL THERAPY 1/> REGIONS: CPT | Mod: GP | Performed by: PHYSICAL THERAPIST

## 2022-03-11 PROCEDURE — 97110 THERAPEUTIC EXERCISES: CPT | Mod: GP | Performed by: PHYSICAL THERAPIST

## 2022-03-11 NOTE — LETTER
[facility]  [address]  [facility] [address]  [phone]    2022    Re: Pavithra Laurent   :   1940  MRN:  [id number]   REFERRING PHYSICIAN:   Lina [facility]    Date of Initial Evaluation: 3-11-22  Visits:  Rxs Used: 7  Reason for Referral:  Bilateral low back pain without sciatica    Discharge Note  Progress reporting period is from initial eval/last PN to Mar 11, 2022.     Pavithra failed to return for next follow up visit and current status is unknown.  Please see information below for last relevant information on current status.  Patient seen for 7 visits.  SUBJECTIVE  Subjective changes noted by patient:  Has felt a little better this past week. worse bending, lifting laundry box.  c/o B LBP.  .  Current pain level is 3/10.     Previous pain level was  5/10.   Changes in function:  Yes (See Goal flowsheet attached for changes in current functional level)  Adverse reaction to treatment or activity: None  OBJECTIVE  Changes noted in objective findings: B LB complaint with EIS, SB B neg. FIS feels good at end range   ASSESSMENT/PLAN  Diagnosis: LBP  NEW EPISODE   DIAGP:  The encounter diagnosis was Bilateral low back pain without sciatica.  Updated problem list and treatment plan:   Pain - HEP  Decreased function - HEP  STG/LTGs have been met or progress has been made towards goals:  Yes, please see goal flowsheet for most current information  Assessment of Progress: current status is unknown.    Last current status: Pt is progressing as expected   Self Management Plans:  HEP  I have re-evaluated this patient and find that the nature, scope, duration and intensity of the therapy is appropriate for the medical condition of the patient.  Pavithra continues to require the following intervention to meet STG and LTG's:  HEP.                Re: Pavithra CARROLL Mamaribell   :    1940    Recommendations:  Discharge with current home program.  Patient to follow up with MD as needed.    Thank you for your referral.    INQUIRIES  Therapist: Bethany Gonzalez PT, [address] [address]  Phone: [phone]  Fax: [phone]

## 2022-04-20 ENCOUNTER — MYC MEDICAL ADVICE (OUTPATIENT)
Dept: FAMILY MEDICINE | Facility: CLINIC | Age: 82
End: 2022-04-20
Payer: COMMERCIAL

## 2022-04-20 NOTE — TELEPHONE ENCOUNTER
Writer responded via [employer].    NELLI CrawfordN, RN  [facility]

## 2022-06-16 NOTE — ADDENDUM NOTE
Addended by: SIEGLER, NICOLE J on: 3/30/2017 01:01 PM     Modules accepted: Orders     Patient offered urine cup, declines to go to [facility] for void

## 2022-06-24 ENCOUNTER — MYC MEDICAL ADVICE (OUTPATIENT)
Dept: FAMILY MEDICINE | Facility: CLINIC | Age: 82
End: 2022-06-24

## 2022-07-09 ENCOUNTER — MYC MEDICAL ADVICE (OUTPATIENT)
Dept: FAMILY MEDICINE | Facility: CLINIC | Age: 82
End: 2022-07-09

## 2022-07-12 NOTE — TELEPHONE ENCOUNTER
Dr. Owens- we can tell pt that you recommend seeing another clinic provider at this time?    JOSE RAFAEL Sanchez

## 2022-07-18 ENCOUNTER — OFFICE VISIT (OUTPATIENT)
Dept: FAMILY MEDICINE | Facility: CLINIC | Age: 82
End: 2022-07-18
Payer: COMMERCIAL

## 2022-07-18 VITALS
BODY MASS INDEX: 32.76 KG/M2 | HEART RATE: 74 BPM | DIASTOLIC BLOOD PRESSURE: 62 MMHG | HEIGHT: 62 IN | OXYGEN SATURATION: 96 % | TEMPERATURE: 97.7 F | SYSTOLIC BLOOD PRESSURE: 124 MMHG | WEIGHT: 178 LBS

## 2022-07-18 DIAGNOSIS — R53.83 FATIGUE, UNSPECIFIED TYPE: Primary | ICD-10-CM

## 2022-07-18 DIAGNOSIS — E61.1 IRON DEFICIENCY: ICD-10-CM

## 2022-07-18 LAB
ALBUMIN UR-MCNC: NEGATIVE MG/DL
APPEARANCE UR: CLEAR
BACTERIA #/AREA URNS HPF: ABNORMAL /HPF
BILIRUB UR QL STRIP: NEGATIVE
COLOR UR AUTO: YELLOW
ERYTHROCYTE [DISTWIDTH] IN BLOOD BY AUTOMATED COUNT: 13.9 % (ref 10–15)
GLUCOSE UR STRIP-MCNC: NEGATIVE MG/DL
HCT VFR BLD AUTO: 40.1 % (ref 35–47)
HGB BLD-MCNC: 13 G/DL (ref 11.7–15.7)
HGB UR QL STRIP: NEGATIVE
KETONES UR STRIP-MCNC: NEGATIVE MG/DL
LEUKOCYTE ESTERASE UR QL STRIP: ABNORMAL
MCH RBC QN AUTO: 29.7 PG (ref 26.5–33)
MCHC RBC AUTO-ENTMCNC: 32.4 G/DL (ref 31.5–36.5)
MCV RBC AUTO: 92 FL (ref 78–100)
NITRATE UR QL: NEGATIVE
PH UR STRIP: 6 [PH] (ref 5–7)
PLATELET # BLD AUTO: 305 10E3/UL (ref 150–450)
RBC # BLD AUTO: 4.38 10E6/UL (ref 3.8–5.2)
RBC #/AREA URNS AUTO: ABNORMAL /HPF
SP GR UR STRIP: 1.01 (ref 1–1.03)
SQUAMOUS #/AREA URNS AUTO: ABNORMAL /LPF
UROBILINOGEN UR STRIP-ACNC: 0.2 E.U./DL
VIT B12 SERPL-MCNC: 874 PG/ML (ref 232–1245)
WBC # BLD AUTO: 10.3 10E3/UL (ref 4–11)
WBC #/AREA URNS AUTO: ABNORMAL /HPF

## 2022-07-18 PROCEDURE — 81001 URINALYSIS AUTO W/SCOPE: CPT | Performed by: NURSE PRACTITIONER

## 2022-07-18 PROCEDURE — 99214 OFFICE O/P EST MOD 30 MIN: CPT | Performed by: NURSE PRACTITIONER

## 2022-07-18 PROCEDURE — 36415 COLL VENOUS BLD VENIPUNCTURE: CPT | Performed by: NURSE PRACTITIONER

## 2022-07-18 PROCEDURE — 82746 ASSAY OF FOLIC ACID SERUM: CPT | Performed by: NURSE PRACTITIONER

## 2022-07-18 PROCEDURE — 80048 BASIC METABOLIC PNL TOTAL CA: CPT | Performed by: NURSE PRACTITIONER

## 2022-07-18 PROCEDURE — 84443 ASSAY THYROID STIM HORMONE: CPT | Performed by: NURSE PRACTITIONER

## 2022-07-18 PROCEDURE — 85027 COMPLETE CBC AUTOMATED: CPT | Performed by: NURSE PRACTITIONER

## 2022-07-18 PROCEDURE — 82728 ASSAY OF FERRITIN: CPT | Performed by: NURSE PRACTITIONER

## 2022-07-18 PROCEDURE — 82607 VITAMIN B-12: CPT | Performed by: NURSE PRACTITIONER

## 2022-07-18 RX ORDER — OLANZAPINE 5 MG/1
5 TABLET ORAL AT BEDTIME
COMMUNITY
End: 2022-07-18

## 2022-07-18 RX ORDER — LORAZEPAM 0.5 MG/1
0.5 TABLET ORAL EVERY 6 HOURS PRN
COMMUNITY
End: 2022-07-18

## 2022-07-18 RX ORDER — VENLAFAXINE 75 MG/1
75 TABLET ORAL 3 TIMES DAILY
COMMUNITY
End: 2022-07-18

## 2022-07-18 NOTE — PROGRESS NOTES
"  Assessment & Plan     Pavithra was seen today for fatigue.    Diagnoses and all orders for this visit:    Fatigue, unspecified type  -     Basic metabolic panel; Future  -     TSH with free T4 reflex; Future  -     CBC with platelets; Future  -     UA with Microscopic reflex to Culture - lab collect; Future  -     Home Blood Pressure Monitor Order  -     Vitamin B12; Future  -     Ferritin; Future  -     Folate; Future  -     Basic metabolic panel  -     TSH with free T4 reflex  -     CBC with platelets  -     UA with Microscopic reflex to Culture - lab collect  -     Vitamin B12  -     Ferritin  -     Folate  -     Urine Microscopic    Iron deficiency   -     Ferritin; Future  -     Ferritin       BMI:   Estimated body mass index is 32.56 kg/m  as calculated from the following:    Height as of this encounter: 1.575 m (5' 2\").    Weight as of this encounter: 80.7 kg (178 lb).       FUTURE APPOINTMENTS:       - Follow-up visit in if no improvemen      ASPEN Dickey [facility]  82 year old  year old female with PMH   Patient Active Problem List   Diagnosis Code     Nasal congestion R09.81     Environmental allergies Z91.09     Hypothyroidism E03.9     Neuropathic pain of right arm M79.2     Rosacea L71.9     Tear film insufficiency H04.129     Elbow dislocation S53.106A     Bilateral low back pain without sciatica M54.50     Lesion of ulnar nerve G56.20     Trochanteric bursitis of left hip M70.62     Piriformis syndrome of left side G57.02     History of deep venous thrombosis (DVT) of distal vein of left lower extremity Z86.718     Neck pain M54.2     TIA (transient ischemic attack) G45.9     Trochanteric bursitis of both hips M70.61, M70.62     Generalized osteoarthritis of multiple sites M15.9     Sicca syndrome (H) M35.00     Lumbar degenerative disc disease M51.36     Primary osteoarthritis of both feet M19.071, M19.072     Ventral hernia without obstruction or gangrene s/p " "repair 6/2019 K43.9     Family history of blood clots Z82.49     Post-thrombotic syndrome of left lower extremity I87.002     Arthritis of left knee M17.12     Cellulitis L03.90     Acute pain of left knee M25.562     Aftercare following left knee joint replacement surgery Z47.1, Z96.652     in clinic for acute office visit related to/establish care/to discuss       Subjective   Pavithra is a 82 year old presenting for the following health issues: Fatigue  patient in clinic with c/o Intermittent fatigue occurs with changing in positions; sitting to standing; laying to standing; and randomly; Denies shortness of breath, dizziness, lh; recovers with in 45 minutes; +daytime drowsiness sleeping; + snoring; uses a dental mouth guard for bruizim has routine of Eats breakfast at 11 and dinner at 3pm; will often go all day with out eating does notice increase fatigue on those days. Takes Gabapentin 400 mg TID and 200 mg mid-afternoon and over night      History of Present Illness       Reason for visit:  Fatigue  Symptom onset:  More than a month  Symptoms include:  Daytime napping, decrease appetite  Symptom intensity:  Moderate  Symptom progression:  Staying the same    She eats 2-3 servings of fruits and vegetables daily.She consumes 1 sweetened beverage(s) daily.She exercises with enough effort to increase her heart rate 10 to 19 minutes per day.  She exercises with enough effort to increase her heart rate 4 days per week.   She is taking medications regularly.           Review of Systems   Constitutional, HEENT, cardiovascular, pulmonary, gi and gu systems are negative, except as otherwise noted.      Objective    /62 (BP Location: Left arm, Patient Position: Sitting, Cuff Size: Adult Regular)   Pulse 74   Temp 97.7  F (36.5  C) (Temporal)   Ht 1.575 m (5' 2\")   Wt 80.7 kg (178 lb)   SpO2 96%   BMI 32.56 kg/m    Body mass index is 32.56 kg/m .     Physical Exam   GENERAL: healthy, alert and no distress  NECK: " no adenopathy, no asymmetry, masses, or scars and thyroid normal to palpation  RESP: lungs clear to auscultation - no rales, rhonchi or wheezes  CV: regular rate and rhythm, normal S1 S2, no S3 or S4, no murmur, click or rub, no peripheral edema and peripheral pulses strong  ABDOMEN: soft, nontender, no hepatosplenomegaly, no masses and bowel sounds normal  MS: no gross musculoskeletal defects noted, no edema    Results for orders placed or performed in visit on 07/18/22   Basic metabolic panel     Status: Normal   Result Value Ref Range    Sodium 136 133 - 144 mmol/L    Potassium 4.7 3.4 - 5.3 mmol/L    Chloride 104 94 - 109 mmol/L    Carbon Dioxide (CO2) 27 20 - 32 mmol/L    Anion Gap 5 3 - 14 mmol/L    Urea Nitrogen 8 7 - 30 mg/dL    Creatinine 0.67 0.52 - 1.04 mg/dL    Calcium 9.0 8.5 - 10.1 mg/dL    Glucose 97 70 - 99 mg/dL    GFR Estimate 87 >60 mL/min/1.73m2   TSH with free T4 reflex     Status: Normal   Result Value Ref Range    TSH 1.08 0.40 - 4.00 mU/L   CBC with platelets     Status: Normal   Result Value Ref Range    WBC Count 10.3 4.0 - 11.0 10e3/uL    RBC Count 4.38 3.80 - 5.20 10e6/uL    Hemoglobin 13.0 11.7 - 15.7 g/dL    Hematocrit 40.1 35.0 - 47.0 %    MCV 92 78 - 100 fL    MCH 29.7 26.5 - 33.0 pg    MCHC 32.4 31.5 - 36.5 g/dL    RDW 13.9 10.0 - 15.0 %    Platelet Count 305 150 - 450 10e3/uL   UA with Microscopic reflex to Culture - lab collect     Status: Abnormal    Specimen: Urine, Midstream   Result Value Ref Range    Color Urine Yellow Colorless, Straw, Light Yellow, Yellow    Appearance Urine Clear Clear    Glucose Urine Negative Negative mg/dL    Bilirubin Urine Negative Negative    Ketones Urine Negative Negative mg/dL    Specific Gravity Urine 1.010 1.003 - 1.035    Blood Urine Negative Negative    pH Urine 6.0 5.0 - 7.0    Protein Albumin Urine Negative Negative mg/dL    Urobilinogen Urine 0.2 0.2, 1.0 E.U./dL    Nitrite Urine Negative Negative    Leukocyte Esterase Urine Trace (A)  Negative   Vitamin B12     Status: Normal   Result Value Ref Range    Vitamin B12 874 232 - 1,245 pg/mL   Ferritin     Status: Normal   Result Value Ref Range    Ferritin 39 8 - 252 ng/mL   Folate     Status: Normal   Result Value Ref Range    Folic Acid 16.1 4.6 - 34.8 ng/mL   Urine Microscopic     Status: Abnormal   Result Value Ref Range    Bacteria Urine Few (A) None Seen /HPF    RBC Urine None Seen 0-2 /HPF /HPF    WBC Urine 0-5 0-5 /HPF /HPF    Squamous Epithelials Urine Few (A) None Seen /LPF    Narrative    Urine Culture not indicated                   .  ..  DME (Durable Medical Equipment) Orders and Documentation  Orders Placed This Encounter   Procedures     Home Blood Pressure Monitor Order      The patient was assessed and it was determined the patient is in need of the following listed DME Supplies/Equipment. Please complete supporting documentation below to demonstrate medical necessity.      DME All Other Item(s) Documentation    List reason for need and supporting documentation for medical necessity below for each DME item.     1.

## 2022-07-19 LAB
ANION GAP SERPL CALCULATED.3IONS-SCNC: 5 MMOL/L (ref 3–14)
BUN SERPL-MCNC: 8 MG/DL (ref 7–30)
CALCIUM SERPL-MCNC: 9 MG/DL (ref 8.5–10.1)
CHLORIDE BLD-SCNC: 104 MMOL/L (ref 94–109)
CO2 SERPL-SCNC: 27 MMOL/L (ref 20–32)
CREAT SERPL-MCNC: 0.67 MG/DL (ref 0.52–1.04)
FERRITIN SERPL-MCNC: 39 NG/ML (ref 8–252)
GFR SERPL CREATININE-BSD FRML MDRD: 87 ML/MIN/1.73M2
GLUCOSE BLD-MCNC: 97 MG/DL (ref 70–99)
POTASSIUM BLD-SCNC: 4.7 MMOL/L (ref 3.4–5.3)
SODIUM SERPL-SCNC: 136 MMOL/L (ref 133–144)
TSH SERPL DL<=0.005 MIU/L-ACNC: 1.08 MU/L (ref 0.4–4)

## 2022-07-20 LAB — FOLATE SERPL-MCNC: 16.1 NG/ML (ref 4.6–34.8)

## 2022-08-01 ENCOUNTER — TELEPHONE (OUTPATIENT)
Dept: FAMILY MEDICINE | Facility: CLINIC | Age: 82
End: 2022-08-01

## 2022-08-01 NOTE — RESULT ENCOUNTER NOTE
HP RN please check with patient regarding fatigue, urinary issues. UA indicates possible early UTI.     Repeat UA in this week.     HANNA

## 2022-08-01 NOTE — TELEPHONE ENCOUNTER
----- Message from ASPEN Dickey CNP sent at 7/31/2022 11:26 PM CDT -----  HP RN please check with patient regarding fatigue, urinary issues. UA indicates possible early UTI.     Repeat UA in this week.     HANNA

## 2022-08-01 NOTE — TELEPHONE ENCOUNTER
Janine-Please review and advise if any further evaluation recommended for persistent fatigue?    Called home number:  Left message to call back and ask to speak with an available triage nurse.    Called mobile number, spoke with patient and relayed message per LOU Atkins CNP.    Per patient:    1. Fatigue is about the same  2. No urinary symptoms and will complete UA with lab at [facility]    Thank you!  NELLI CrawfordN, RN  [facility]

## 2022-08-03 ENCOUNTER — LAB (OUTPATIENT)
Dept: LAB | Facility: CLINIC | Age: 82
End: 2022-08-03
Payer: COMMERCIAL

## 2022-08-03 DIAGNOSIS — R53.83 FATIGUE, UNSPECIFIED TYPE: ICD-10-CM

## 2022-08-03 LAB
ALBUMIN UR-MCNC: NEGATIVE MG/DL
APPEARANCE UR: CLEAR
BACTERIA #/AREA URNS HPF: ABNORMAL /HPF
BILIRUB UR QL STRIP: NEGATIVE
COLOR UR AUTO: YELLOW
GLUCOSE UR STRIP-MCNC: NEGATIVE MG/DL
HGB UR QL STRIP: NEGATIVE
KETONES UR STRIP-MCNC: NEGATIVE MG/DL
LEUKOCYTE ESTERASE UR QL STRIP: ABNORMAL
NITRATE UR QL: NEGATIVE
PH UR STRIP: 5.5 [PH] (ref 5–7)
RBC #/AREA URNS AUTO: ABNORMAL /HPF
SP GR UR STRIP: 1.02 (ref 1–1.03)
SQUAMOUS #/AREA URNS AUTO: ABNORMAL /LPF
UROBILINOGEN UR STRIP-ACNC: 0.2 E.U./DL
WBC #/AREA URNS AUTO: ABNORMAL /HPF

## 2022-08-03 PROCEDURE — 81001 URINALYSIS AUTO W/SCOPE: CPT

## 2022-08-03 NOTE — TELEPHONE ENCOUNTER
Can we have patient schedule MA appointment for ECG; same day as UA is fine; please select day I am in clinic as well to review.  ASPEN Dickey CNP'

## 2022-08-03 NOTE — TELEPHONE ENCOUNTER
[facility]    EKG will need to be completed at [facility] so LOU Atkins CNP, can review results.    Writer checked with Medical Assistant, FABY Perez, and patient can be double booked onto MA schedule on 8/5/22 before 1130 or after 1430.    Writer called patient and reviewed LOU Atkins CNP's plan for EKG and need to double book on MA schedule on 8/5/22.    Patient verbalized understanding and in agreement with plan.    MA visit scheduled on 8/5/22.  Reviewed with patient multiple times during phone call this is a double booking and there will likely be a wait.      NELLI CrawfordN, RN  [facility]

## 2022-08-04 NOTE — RESULT ENCOUNTER NOTE
Results discussed directly with patient while patient was present. Any further details documented in the note.   ASPEN Dickey CNP

## 2022-08-05 ENCOUNTER — ALLIED HEALTH/NURSE VISIT (OUTPATIENT)
Dept: FAMILY MEDICINE | Facility: CLINIC | Age: 82
End: 2022-08-05
Payer: COMMERCIAL

## 2022-08-05 DIAGNOSIS — R53.83 FATIGUE, UNSPECIFIED TYPE: Primary | ICD-10-CM

## 2022-08-05 PROCEDURE — 93000 ELECTROCARDIOGRAM COMPLETE: CPT

## 2022-08-25 NOTE — PROGRESS NOTES
Discharge Note    Progress reporting period is from initial eval/last PN to Mar 11, 2022.     Pavithra failed to return for next follow up visit and current status is unknown.  Please see information below for last relevant information on current status.  Patient seen for 7 visits.  SUBJECTIVE  Subjective changes noted by patient:  Has felt a little better this past week. worse bending, lifting laundry box.  c/o B LBP.  .  Current pain level is 3/10.     Previous pain level was  5/10.   Changes in function:  Yes (See Goal flowsheet attached for changes in current functional level)  Adverse reaction to treatment or activity: None    OBJECTIVE  Changes noted in objective findings: B LB complaint with EIS, SB B neg. FIS feels good at end range     ASSESSMENT/PLAN  Diagnosis: LBP  NEW EPISODE   DIAGP:  The encounter diagnosis was Bilateral low back pain without sciatica.  Updated problem list and treatment plan:   Pain - HEP  Decreased function - HEP  STG/LTGs have been met or progress has been made towards goals:  Yes, please see goal flowsheet for most current information  Assessment of Progress: current status is unknown.    Last current status: Pt is progressing as expected   Self Management Plans:  HEP  I have re-evaluated this patient and find that the nature, scope, duration and intensity of the therapy is appropriate for the medical condition of the patient.  Pavithra continues to require the following intervention to meet STG and LTG's:  HEP.    Recommendations:  Discharge with current home program.  Patient to follow up with MD as needed.    Please refer to the daily flowsheet for treatment today, total treatment time and time spent performing 1:1 timed codes.

## 2022-10-16 ENCOUNTER — HEALTH MAINTENANCE LETTER (OUTPATIENT)
Age: 82
End: 2022-10-16

## 2022-11-10 NOTE — LETTER
December 19, 2017      Pavithra Laurent  [address] [place] [address]        Dear ,    We are writing to inform you of your test results.    Your test results fall within the expected range(s) or remain unchanged from previous results.  Please continue with current treatment plan.    It was my pleasure to be your provider and I wish you the best of health in your future!    Resulted Orders   TSH with free T4 reflex   Result Value Ref Range    TSH 0.92 0.40 - 4.00 mU/L   Basic metabolic panel  (Ca, Cl, CO2, Creat, Gluc, K, Na, BUN)   Result Value Ref Range    Sodium 138 133 - 144 mmol/L    Potassium 3.9 3.4 - 5.3 mmol/L    Chloride 103 94 - 109 mmol/L    Carbon Dioxide 28 20 - 32 mmol/L    Anion Gap 7 3 - 14 mmol/L    Glucose 89 70 - 99 mg/dL    Urea Nitrogen 10 7 - 30 mg/dL    Creatinine 0.62 0.52 - 1.04 mg/dL    GFR Estimate >90 >60 mL/min/1.7m2      Comment:      Non  GFR Calc    GFR Estimate If Black >90 >60 mL/min/1.7m2      Comment:       GFR Calc    Calcium 9.1 8.5 - 10.1 mg/dL   Lipid panel reflex to direct LDL Non-fasting   Result Value Ref Range    Cholesterol 177 <200 mg/dL    Triglycerides 63 <150 mg/dL    HDL Cholesterol 92 >49 mg/dL    LDL Cholesterol Calculated 72 <100 mg/dL      Comment:      Desirable:       <100 mg/dl    Non HDL Cholesterol 85 <130 mg/dL       If you have any questions or concerns, please call the clinic at the number listed above.       Sincerely,        Nicole Joy Siegler, PA-C                
November 10, 2022       Calin Chavez MD  [address]  [address]  [facility] [address]  Via In Basket      Patient: Bryan Marie   YOB: 1952   Date of Visit: 11/10/2022       Dear Dr. Chavez:    Thank you for referring Bryan Marie to me for evaluation. Below are my notes for this visit with him.    If you have questions, please do not hesitate to call me. I look forward to following your patient along with you.      Sincerely,        Agus Yoder MD        CC: No Recipients  Agus Yoder MD  11/10/2022  2:17 PM  Signed  CARDIAC ELECTROPHYSIOLOGY OFFICE VISIT      Patient: Bryan Marie Date of Service: 11/10/2022   : 1952      PCP: Calin Chavez MD     CHIEF COMPLAINT:    Chief Complaint   Patient presents with   • Office Visit   • Follow-up     annual   • Device Check       HISTORY OF PRESENT ILLNESS   Bryan Marie is a 70 year old male with Htn, DM, CAD with hx of MI and stent who presented in 2018 in sustained VT. He underwent cardiac cath showing total occlusion of the RCA and a significant stenosis in the circumflex. Cardiac MRI showed scarring in the RCA territory but viability in the circ territory, so PCI was done. He subsequently underwent ICD implant and was initially maintained on amiodarone. He subsequently underwent VT ablation/scar homogenization of a basal-mid inferior scar in 2018. Amiodarone was discontinued in May 2019. Device interrogations since ablation have shown no ventricular arrhythmias. Has been doing well. Has upcoming eye surgery, and will need to be off xarelto.   Device interrogation in office today showed normal function. Noted 1 episode of possible SVT on 2022, which lasted almost 3 minutes, rate 155 bpm. Patient does not recall any symptoms of chest pain, dizziness, palpitations, shortness of breath.     REVIEW OF SYSTEMS   Review of Systems   Constitutional: Negative for chills, fever and night sweats.   HENT: 
Negative for congestion, hearing loss, nosebleeds and sore throat.    Eyes: Negative for blurred vision, double vision and visual disturbance.   Cardiovascular: Negative for chest pain, claudication, dyspnea on exertion, orthopnea, palpitations, paroxysmal nocturnal dyspnea and syncope.   Respiratory: Negative for cough and shortness of breath.    Endocrine: Negative for cold intolerance, polydipsia and polyuria.   Hematologic/Lymphatic: Negative for bleeding problem. Does not bruise/bleed easily.   Skin: Negative for itching and rash.   Musculoskeletal: Negative for arthritis, back pain, joint pain and joint swelling.   Gastrointestinal: Negative for abdominal pain, constipation, diarrhea, heartburn, nausea and vomiting.   Genitourinary: Negative for dysuria, frequency and urgency.   Neurological: Negative for focal weakness, headaches, numbness, seizures and vertigo.   Psychiatric/Behavioral: Negative for depression and hallucinations. The patient is not nervous/anxious.      All other systems are reviewed and are negative except as documented in the HPI.       HISTORIES     ALLERGIES:   Allergen Reactions   • Niacin Other (See Comments)     Unknown; hot flashes/heart rate goes up     Current Outpatient Medications   Medication Sig Dispense Refill   • metoPROLOL succinate (TOPROL-XL) 50 MG 24 hr tablet TAKE 1 TABLET BY MOUTH EVERY DAY AS DIRECTED 90 tablet 1   • losartan (COZAAR) 25 MG tablet TAKE 1 TABLET BY MOUTH EVERY DAY 90 tablet 1   • clopidogrel (PLAVIX) 75 MG tablet Take 75 mg by mouth daily.     • atorvastatin (LIPITOR) 40 MG tablet Take 1 tablet by mouth daily. Last refill until blood work completed 90 tablet 0   • ezetimibe (ZETIA) 10 MG tablet TAKE 1 TABLET BY MOUTH EVERYDAY AT BEDTIME 90 tablet 1   • rivaroxaban (XARELTO) 20 MG Tab Take 1 tablet by mouth daily (with dinner). 90 tablet 6   • metFORMIN (GLUCOPHAGE) 500 MG tablet Take 500 mg by mouth daily.        No current facility-administered 
medications for this visit.     Past Medical History:   Diagnosis Date   • Diabetes mellitus (CMS/HCC)    • Essential hypertension    • High cholesterol    • PVT (paroxysmal ventricular tachycardia)    • Sleep apnea      Past Surgical History:   Procedure Laterality Date   • Ablation - ventricular tachycardia  12/21/2018   • Arthrogram of knee     • Icd implant  08/08/2018   • Knee surgery     • Ptca     • Stent implant     • Tonsillectomy       Social History     Tobacco Use   • Smoking status: Never Smoker   • Smokeless tobacco: Never Used   Vaping Use   • Vaping Use: never used   Substance Use Topics   • Alcohol use: No   • Drug use: No     Family History   Problem Relation Age of Onset   • Myocardial Infarction Father    • Cancer Mother    • Cancer Sister        I have reviewed the past medical history, family history, social history, medications and allergies listed in the medical record as obtained by my nursing staff and support staff and agree with their documentation.      PHYSICAL EXAM   Vital Signs:    Vitals:    11/10/22 1354   BP: 130/80   Pulse: 80   Weight: 125.6 kg (277 lb)     Physical Exam  HENT:      Nose: Nose normal.   Eyes:      Conjunctiva/sclera: Conjunctivae normal.   Neck:      Thyroid: No thyromegaly.      Vascular: No carotid bruit or JVD.   Cardiovascular:      Rate and Rhythm: Normal rate and regular rhythm.      Pulses: Intact distal pulses.      Heart sounds: S1 normal and S2 normal. No murmur heard.  Pulmonary:      Breath sounds: Normal breath sounds.   Abdominal:      General: Bowel sounds are normal. There is no distension.      Palpations: Abdomen is soft.      Tenderness: There is no abdominal tenderness.   Musculoskeletal:         General: No tenderness.      Cervical back: Neck supple.   Lymphadenopathy:      Cervical: No cervical adenopathy.   Skin:     General: Skin is warm and dry.      Findings: No rash.   Neurological:      Mental Status: He is alert and oriented to 
person, place, and time.         LABORATORY DATA   All pertinent laboratory results were reviewed.    DIAGNOSTIC IMAGING   All pertinent diagnostic imaging reviewed.  ICD interrogation (visualized and independently interpreted): Sensing appropriately, battery status WNL.  Lead impedances stable. changed PMT detection rate to 115 BPM   multiple ams episodes, af burden 2.5% 1 svt episode.    ASSESSMENT & PLAN     PVT (paroxysmal ventricular tachycardia) (CMS/HCC)  S/p ablation December 2018. No recurrent VT. Has been off amiodarone since May 2019. Continue to monitor via device interrogation.     Paroxysmal atrial fibrillation (CMS/HCC)  Rate controlled, asymptomatic. CHADS-VASc = 5 (htn, dm, age > 65, cad, chf). Continue Xarelto.     ICD (implantable cardioverter-defibrillator) in place  Normal device function. Continue routine device follow up.     CAD (coronary artery disease)  Hx MI and PCI. No angina. Continue plavix, statin, beta blocker. Discontinue asa because patient also on xarelto.     Essential hypertension  BP controlled, continue current medications.     Hyperlipidemia  Continue statin    DM (diabetes mellitus) (CMS/[facility])  On metformin. Per primary.     Long term current use of anticoagulant  On xarelto for PAF. No signs/symptoms of bleeding.       Return in about 1 year (around 11/10/2023).    Instructions provided as documented in the after visit summary.    The patient indicated understanding of the diagnosis and agreed with the plan of care.    
alert/oriented to person, place, time/situation/awake

## 2022-11-12 DIAGNOSIS — E03.4 HYPOTHYROIDISM DUE TO ACQUIRED ATROPHY OF THYROID: ICD-10-CM

## 2022-11-14 RX ORDER — LEVOTHYROXINE SODIUM 88 UG/1
TABLET ORAL
Qty: 90 TABLET | Refills: 2 | Status: SHIPPED | OUTPATIENT
Start: 2022-11-14 | End: 2023-07-07

## 2022-12-04 ENCOUNTER — HEALTH MAINTENANCE LETTER (OUTPATIENT)
Age: 82
End: 2022-12-04

## 2022-12-13 DIAGNOSIS — M79.2 NEUROPATHIC PAIN OF RIGHT FOREARM: ICD-10-CM

## 2022-12-14 RX ORDER — GABAPENTIN 400 MG/1
CAPSULE ORAL
Qty: 270 CAPSULE | Refills: 0 | Status: SHIPPED | OUTPATIENT
Start: 2022-12-14 | End: 2023-03-17

## 2022-12-14 NOTE — TELEPHONE ENCOUNTER
Routing refill request to provider for review/approval because:  Drug not on the [facility] refill protocol     Last Written Prescription Date:  11/3/21  Last Fill Quantity: 270,  # refills: 3   Last office visit: 7/18/2022 with Wilebrt   Future Office Visit:  3/16/23 with Roman Kennedy RN  [facility]

## 2023-03-16 ENCOUNTER — MYC MEDICAL ADVICE (OUTPATIENT)
Dept: FAMILY MEDICINE | Facility: CLINIC | Age: 83
End: 2023-03-16

## 2023-04-12 ENCOUNTER — NURSE TRIAGE (OUTPATIENT)
Dept: NURSING | Facility: CLINIC | Age: 83
End: 2023-04-12
Payer: COMMERCIAL

## 2023-04-12 ENCOUNTER — MYC MEDICAL ADVICE (OUTPATIENT)
Dept: FAMILY MEDICINE | Facility: CLINIC | Age: 83
End: 2023-04-12
Payer: COMMERCIAL

## 2023-04-12 DIAGNOSIS — U07.1 INFECTION DUE TO 2019 NOVEL CORONAVIRUS: Primary | ICD-10-CM

## 2023-04-12 NOTE — TELEPHONE ENCOUNTER
"Sick since Monday, I have COVID. My COVID home test was positive tonight. .   SX: began Monday: Very sore throat=\"8\", coughing a lot, and I have diarrhea. No thermometer, but I feel very hot. I ache all over..    Abdominal pain due to coughing.=\"6\". I took some Tylenol, Mucinex.  [place] throat cough syrup: \"powerful respiratory support-herbal supplement. \"  Age 64, overweight. She has been vaccinated.   Message will be forwarded to [facility] RN pool:   COVID Positive/Requesting COVID treatment    Patient is positive for COVID and requesting treatment options.    Date of positive COVID test (PCR or at home)? 4/12/2023  Current COVID symptoms: cough, muscle or body aches, sore throat and diarrhea  Date COVID symptoms began: 4/10/2023    Message should be routed to [facility] RN pool. Best phone number to use for call back: [phone]    Gema Graff RN Triage Nurse Advisor 5:57 PM 4/12/2023  Reason for Disposition    [1] HIGH RISK for severe COVID complications (e.g., weak immune system, age > 64 years, obesity with BMI > 25, pregnant, chronic lung disease or other chronic medical condition) AND [2] COVID symptoms (e.g., cough, fever)  (Exceptions: Already seen by PCP and no new or worsening symptoms.)    Additional Information    Negative: SEVERE difficulty breathing (e.g., struggling for each breath, speaks in single words)    Negative: Difficult to awaken or acting confused (e.g., disoriented, slurred speech)    Negative: Bluish (or gray) lips or face now    Negative: Shock suspected (e.g., cold/pale/clammy skin, too weak to stand, low BP, rapid pulse)    Negative: Sounds like a life-threatening emergency to the triager    Negative: [1] Diagnosed or suspected COVID-19 AND [2] symptoms lasting 3 or more weeks    Negative: [1] COVID-19 exposure AND [2] no symptoms    Negative: COVID-19 vaccine reaction suspected (e.g., fever, headache, muscle aches) occurring 1 to 3 days after getting vaccine    " Negative: COVID-19 vaccine, questions about    Negative: [1] Lives with someone known to have influenza (flu test positive) AND [2] flu-like symptoms (e.g., cough, runny nose, sore throat, SOB; with or without fever)    Negative: [1] Adult with possible COVID-19 symptoms AND [2] triager concerned about severity of symptoms or other causes    Negative: COVID-19 and breastfeeding, questions about    Negative: SEVERE or constant chest pain or pressure  (Exception: Mild central chest pain, present only when coughing.)    Negative: MODERATE difficulty breathing (e.g., speaks in phrases, SOB even at rest, pulse 100-120)    Negative: [1] Headache AND [2] stiff neck (can't touch chin to chest)    Negative: Oxygen level (e.g., pulse oximetry) 90 percent or lower    Negative: Chest pain or pressure    Negative: Patient sounds very sick or weak to the triager    Negative: MILD difficulty breathing (e.g., minimal/no SOB at rest, SOB with walking, pulse <100)    Negative: Fever > 103 F (39.4 C)    Negative: [1] Fever > 101 F (38.3 C) AND [2] age > 60 years    Negative: [1] Fever > 100.0 F (37.8 C) AND [2] bedridden (e.g., nursing home patient, CVA, chronic illness, recovering from surgery)    Protocols used: CORONAVIRUS (COVID-19) DIAGNOSED OR [id number]

## 2023-04-13 RX ORDER — GUAIFENESIN 600 MG/1
1200 TABLET, EXTENDED RELEASE ORAL 2 TIMES DAILY PRN
COMMUNITY
Start: 2023-04-13 | End: 2023-07-07

## 2023-04-13 RX ORDER — MULTIVITAMIN
1 TABLET ORAL DAILY
COMMUNITY
Start: 2023-04-13 | End: 2023-05-12

## 2023-04-13 NOTE — TELEPHONE ENCOUNTER
See 4/12/23 nurse triage encounter.  NELLI CrawfordN, RN-BC  [facility]

## 2023-04-13 NOTE — TELEPHONE ENCOUNTER
Called patient who stated she has to make an emergency call.  Patient instructed to call [phone] and ask to speak with a triage nurse at [facility] for COVID-19 treatment.    Patient verbalized understanding and in agreement with plan.    NELLI CrawfordN, RN-[facility]

## 2023-04-13 NOTE — TELEPHONE ENCOUNTER
Dr. Owens-Please advise if you recommend patient restartVitamin B-12 1000 mcg daily.  Patient reported stopping it recently as she started taking a multivitamin.       Patient called back:  1. Feeling a little better  2. Current symptoms: cough, mucous production, chills, muscles are shaky  3. Taking over-the-counter syrup for throat, Mucinex, Tylenol  4. Drinking a lot of water  5. No chest pain nor shortness of breath  6. Does not have thermometer  7. No headache    RN COVID TREATMENT VISIT  04/13/23    The patient has been triaged and does not require a higher level of care.    Pavithra Laurent  82 year old  Current weight? 170 lb    Has the patient been seen by a primary care provider at an [facility] or [facility] within the past two years? Yes.   Have you been in close proximity to/do you have a known exposure to a person with a confirmed case of influenza? No.     General treatment eligibility:  Date of positive COVID test (PCR or at home)?  4/12/2023    Are you or have you been hospitalized for this COVID-19 infection? No.   Have you received monoclonal antibodies or antiviral treatment for COVID-19 since this positive test? No.   Do you have any of the following conditions that place you at risk of being very sick from COVID-19?   - Age 50 years or older  - Heart conditions such as cardiomyopathies, congenital heart defects, coronary artery disease, heart arrhythmias, heart failure, hypertension, valve disorders   Yes, patient has at least one high risk condition as noted above.     Current COVID symptoms:   - fever or chills  - cough  - fatigue  - muscle or body aches  - sore throat  - diarrhea  Yes. Patient has at least one symptom as selected.     How many days since symptoms started? 5 days or less. Established patient, 12 years or older weighing at least 88.2 lbs, who has symptoms that started in the past 5 days, has not been hospitalized nor received treatment already, and  is at risk for being very sick from COVID-19.     Treatment eligibility by RN:    Are you currently pregnant or nursing? No    Do you have a clinically significant hypersensitivity to nirmatrelvir or ritonavir, or toxic epidermal necrolysis (TEN) or Hauser-Roman Syndrome? No    Do you have a history of hepatitis, any hepatic impairment on the Problem List (such as Child-Cunha Class C, cirrhosis, fatty liver disease, alcoholic liver disease), or was the last liver lab (hepatic panel, ALT, AST, ALK Phos, bilirubin) elevated in the past 6 months? No    Do you have any history of severe renal impairment (eGFR < 30mL/min)? No    Is patient eligible to continue?   Yes, patient meets all eligibility requirements for the RN COVID treatment (as denoted by all no responses above).     Current Outpatient Medications   Medication Sig Dispense Refill     acetaminophen (TYLENOL) 650 MG CR tablet Take 650 mg by mouth every 8 hours as needed for mild pain       aspirin (ASA) 81 MG EC tablet Take 1 tablet (81 mg) by mouth daily 30 tablet 0     azelastine (ASTELIN) 0.1 % nasal spray Spray 2 sprays into both nostrils daily as needed for rhinitis       calcium-vitamin D-vitamin K (VIACTIV) 500-500-40 MG-UNT-MCG CHEW Take 2 tablets by mouth 2 times daily       cyanocobalamin (VITAMIN B-12) 1000 MCG tablet Take 1 tablet (1,000 mcg) by mouth daily 90 tablet 3     desonide (DESOWEN) 0.05 % cream Apply topically 2 times daily as needed (rash)       diclofenac (VOLTAREN) 1 % GEL Place 2 g onto the skin daily as needed for moderate pain (knees)       erythromycin (ROMYCIN) ophthalmic ointment Place 1 Application into both eyes nightly as needed (dry/irritated eyes) (Patient not taking: Reported on 7/18/2022)       fluocinolone acetonide 0.01 % OIL Place 1 drop in ear(s) daily as needed       fluticasone (FLONASE) 50 MCG/ACT nasal spray Spray 1 spray into both nostrils daily       gabapentin (NEURONTIN) 100 MG capsule Take 1 capsule (100 mg)  by mouth daily as needed (on particularly active days) 90 capsule 3     gabapentin (NEURONTIN) 400 MG capsule TAKE ONE CAPSULE BY MOUTH THREE TIMES DAILY 270 capsule 0     hypromellose (ARTIFICIAL TEARS) 0.5 % SOLN ophthalmic solution Place 1 drop into both eyes 4 times daily as needed for dry eyes (Patient not taking: Reported on 7/18/2022)       levothyroxine (SYNTHROID/LEVOTHROID) 88 MCG tablet TAKE ONE TABLET BY MOUTH ONCE DAILY 90 tablet 2     montelukast (SINGULAIR) 10 MG tablet Take 10 mg by mouth At Bedtime       nystatin (MYCOSTATIN) 186936 UNIT/GM external powder Apply topically 2 times daily as needed for other (yeast infection in skin folds) 60 g 1     Olopatadine HCl (PATADAY OP)      Vitamin B12 no longer taking  Takes a daily multivitamin    Medication list updated.    Medications from List 1 of the standing order (on medications that exclude the use of Paxlovid) that patient is taking: NONE. Is patient taking Ricardo's Wort? No  Is patient taking [place]'s Wort or any meds from List 1? No.   Medications from List 2 of the standing order (on meds that provider needs to adjust) that patient is taking: NONE. Is patient on any of the meds from List 2? No.   Medications from List 3 of standing order (on meds that a RN needs to adjust) that patient is taking: NONE. Is patient on any meds from List 3? No.     Paxlovid has an approximate 90% reduction in hospitalization. Paxlovid can possibly cause altered sense of taste, diarrhea (loose, watery stools), high blood pressure, muscle aches.     Would patient like a Paxlovid prescription?   Yes.   Lab Results   Component Value Date    GFRESTIMATED 87 07/18/2022       Was last eGFR reduced? No, eGFR 60 or greater/ No Result on record. Patient can receive the normal renal function dose. Paxlovid Rx sent to [facility] per patient's request.    Temporary change to home medications: None    All medication adjustments (holds, etc) were discussed with the  patient and patient was asked to repeat back (teachback) their med adjustment.  Did patient understand med adjustment? No medication adjustments needed.         Reviewed the following instructions with the patient:    Paxlovid (nimatrelvir and ritonavir)    How it works  Two medicines (nirmatrelvir and ritonavir) are taken together. They stop the virus from growing. Less amount of virus is easier for your body to fight.    How to take    Medicine comes in a daily container with both medicine tablets. Take by mouth twice daily (once in the morning, once at night) for 5 days.    The number of tablets to take varies by patient.    Don't chew or break capsules. Swallow whole.    When to take  Take as soon as possible after positive COVID-19 test result, and within 5 days of your first symptoms.    Encouraged patient to call back with any new, worsening or changing symptoms.  Discussed that triage nurses are available 24/7.    Patient verbalized understanding and in agreement with plan.      Possible side effects  Can cause altered sense of taste, diarrhea (loose, watery stools), high blood pressure, muscle aches.    Olesya Thomas RN

## 2023-04-24 ENCOUNTER — APPOINTMENT (OUTPATIENT)
Dept: CT IMAGING | Facility: CLINIC | Age: 83
End: 2023-04-24
Attending: EMERGENCY MEDICINE
Payer: COMMERCIAL

## 2023-04-24 ENCOUNTER — HOSPITAL ENCOUNTER (EMERGENCY)
Facility: CLINIC | Age: 83
Discharge: HOME | End: 2023-04-25
Attending: EMERGENCY MEDICINE | Admitting: EMERGENCY MEDICINE
Payer: COMMERCIAL

## 2023-04-24 ENCOUNTER — NURSE TRIAGE (OUTPATIENT)
Dept: FAMILY MEDICINE | Facility: CLINIC | Age: 83
End: 2023-04-24
Payer: COMMERCIAL

## 2023-04-24 ENCOUNTER — HOSPITAL ENCOUNTER (EMERGENCY)
Facility: CLINIC | Age: 83
Discharge: HOME | End: 2023-04-24
Payer: COMMERCIAL

## 2023-04-24 DIAGNOSIS — R53.83 FATIGUE, UNSPECIFIED TYPE: ICD-10-CM

## 2023-04-24 DIAGNOSIS — R53.81 PHYSICAL DECONDITIONING: ICD-10-CM

## 2023-04-24 DIAGNOSIS — N39.0 URINARY TRACT INFECTION WITHOUT HEMATURIA, SITE UNSPECIFIED: ICD-10-CM

## 2023-04-24 LAB
ALBUMIN SERPL BCG-MCNC: 4.2 G/DL (ref 3.5–5.2)
ALBUMIN UR-MCNC: NEGATIVE MG/DL
ALP SERPL-CCNC: 63 U/L (ref 35–104)
ALT SERPL W P-5'-P-CCNC: 15 U/L (ref 10–35)
ANION GAP SERPL CALCULATED.3IONS-SCNC: 7 MMOL/L (ref 7–15)
APPEARANCE UR: CLEAR
AST SERPL W P-5'-P-CCNC: 16 U/L (ref 10–35)
ATRIAL RATE - MUSE: 77 BPM
BACTERIA #/AREA URNS HPF: ABNORMAL /HPF
BASOPHILS # BLD MANUAL: 0 10E3/UL (ref 0–0.2)
BASOPHILS NFR BLD MANUAL: 0 %
BILIRUB SERPL-MCNC: 0.9 MG/DL
BILIRUB UR QL STRIP: NEGATIVE
BUN SERPL-MCNC: 9.9 MG/DL (ref 8–23)
CALCIUM SERPL-MCNC: 9.2 MG/DL (ref 8.8–10.2)
CHLORIDE SERPL-SCNC: 99 MMOL/L (ref 98–107)
COLOR UR AUTO: ABNORMAL
CREAT SERPL-MCNC: 0.7 MG/DL (ref 0.51–0.95)
D DIMER PPP FEU-MCNC: 1.36 UG/ML FEU (ref 0–0.5)
DEPRECATED HCO3 PLAS-SCNC: 29 MMOL/L (ref 22–29)
DIASTOLIC BLOOD PRESSURE - MUSE: NORMAL MMHG
EOSINOPHIL # BLD MANUAL: 0.1 10E3/UL (ref 0–0.7)
EOSINOPHIL NFR BLD MANUAL: 1 %
ERYTHROCYTE [DISTWIDTH] IN BLOOD BY AUTOMATED COUNT: 13.4 % (ref 10–15)
GFR SERPL CREATININE-BSD FRML MDRD: 86 ML/MIN/1.73M2
GLUCOSE SERPL-MCNC: 103 MG/DL (ref 70–99)
GLUCOSE UR STRIP-MCNC: NEGATIVE MG/DL
HCT VFR BLD AUTO: 42.2 % (ref 35–47)
HGB BLD-MCNC: 13.8 G/DL (ref 11.7–15.7)
HGB UR QL STRIP: NEGATIVE
HYALINE CASTS: 4 /LPF
INTERPRETATION ECG - MUSE: NORMAL
KETONES UR STRIP-MCNC: NEGATIVE MG/DL
LEUKOCYTE ESTERASE UR QL STRIP: ABNORMAL
LYMPHOCYTES # BLD MANUAL: 5.3 10E3/UL (ref 0.8–5.3)
LYMPHOCYTES NFR BLD MANUAL: 37 %
MCH RBC QN AUTO: 30.5 PG (ref 26.5–33)
MCHC RBC AUTO-ENTMCNC: 32.7 G/DL (ref 31.5–36.5)
MCV RBC AUTO: 93 FL (ref 78–100)
MONOCYTES # BLD MANUAL: 0.9 10E3/UL (ref 0–1.3)
MONOCYTES NFR BLD MANUAL: 6 %
MUCOUS THREADS #/AREA URNS LPF: PRESENT /LPF
NEUTROPHILS # BLD MANUAL: 8 10E3/UL (ref 1.6–8.3)
NEUTROPHILS NFR BLD MANUAL: 56 %
NITRATE UR QL: NEGATIVE
NT-PROBNP SERPL-MCNC: 277 PG/ML (ref 0–1800)
P AXIS - MUSE: 39 DEGREES
PH UR STRIP: 5.5 [PH] (ref 5–7)
PLAT MORPH BLD: ABNORMAL
PLATELET # BLD AUTO: 352 10E3/UL (ref 150–450)
POTASSIUM SERPL-SCNC: 4.2 MMOL/L (ref 3.4–5.3)
PR INTERVAL - MUSE: 196 MS
PROT SERPL-MCNC: 6.6 G/DL (ref 6.4–8.3)
QRS DURATION - MUSE: 74 MS
QT - MUSE: 364 MS
QTC - MUSE: 411 MS
R AXIS - MUSE: -21 DEGREES
RBC # BLD AUTO: 4.53 10E6/UL (ref 3.8–5.2)
RBC MORPH BLD: ABNORMAL
RBC URINE: 0 /HPF
SMUDGE CELLS BLD QL SMEAR: PRESENT
SODIUM SERPL-SCNC: 135 MMOL/L (ref 136–145)
SP GR UR STRIP: 1.01 (ref 1–1.03)
SQUAMOUS EPITHELIAL: 1 /HPF
SYSTOLIC BLOOD PRESSURE - MUSE: NORMAL MMHG
T AXIS - MUSE: 25 DEGREES
TROPONIN T SERPL HS-MCNC: 12 NG/L
TROPONIN T SERPL HS-MCNC: 14 NG/L
UROBILINOGEN UR STRIP-MCNC: NORMAL MG/DL
VENTRICULAR RATE- MUSE: 77 BPM
WBC # BLD AUTO: 14.2 10E3/UL (ref 4–11)
WBC URINE: 33 /HPF

## 2023-04-24 PROCEDURE — 36415 COLL VENOUS BLD VENIPUNCTURE: CPT | Performed by: EMERGENCY MEDICINE

## 2023-04-24 PROCEDURE — 87086 URINE CULTURE/COLONY COUNT: CPT | Performed by: EMERGENCY MEDICINE

## 2023-04-24 PROCEDURE — 99285 EMERGENCY DEPT VISIT HI MDM: CPT | Mod: 25

## 2023-04-24 PROCEDURE — 83880 ASSAY OF NATRIURETIC PEPTIDE: CPT | Performed by: EMERGENCY MEDICINE

## 2023-04-24 PROCEDURE — 93005 ELECTROCARDIOGRAM TRACING: CPT

## 2023-04-24 PROCEDURE — 80053 COMPREHEN METABOLIC PANEL: CPT | Performed by: EMERGENCY MEDICINE

## 2023-04-24 PROCEDURE — 81001 URINALYSIS AUTO W/SCOPE: CPT | Performed by: EMERGENCY MEDICINE

## 2023-04-24 PROCEDURE — 250N000009 HC RX 250: Performed by: EMERGENCY MEDICINE

## 2023-04-24 PROCEDURE — 85027 COMPLETE CBC AUTOMATED: CPT | Performed by: EMERGENCY MEDICINE

## 2023-04-24 PROCEDURE — 250N000011 HC RX IP 250 OP 636: Performed by: EMERGENCY MEDICINE

## 2023-04-24 PROCEDURE — 84484 ASSAY OF TROPONIN QUANT: CPT | Performed by: EMERGENCY MEDICINE

## 2023-04-24 PROCEDURE — 85007 BL SMEAR W/DIFF WBC COUNT: CPT | Performed by: EMERGENCY MEDICINE

## 2023-04-24 PROCEDURE — 85379 FIBRIN DEGRADATION QUANT: CPT | Performed by: EMERGENCY MEDICINE

## 2023-04-24 PROCEDURE — 71275 CT ANGIOGRAPHY CHEST: CPT

## 2023-04-24 RX ORDER — IOPAMIDOL 755 MG/ML
67 INJECTION, SOLUTION INTRAVASCULAR ONCE
Status: COMPLETED | OUTPATIENT
Start: 2023-04-24 | End: 2023-04-24

## 2023-04-24 RX ADMIN — IOPAMIDOL 67 ML: 755 INJECTION, SOLUTION INTRAVENOUS at 21:20

## 2023-04-24 RX ADMIN — SODIUM CHLORIDE 92 ML: 9 INJECTION, SOLUTION INTRAVENOUS at 21:21

## 2023-04-24 ASSESSMENT — ENCOUNTER SYMPTOMS
CHILLS: 0
FEVER: 0
SHORTNESS OF BREATH: 1
VOMITING: 0
NAUSEA: 0
DIARRHEA: 0

## 2023-04-24 ASSESSMENT — ACTIVITIES OF DAILY LIVING (ADL)
ADLS_ACUITY_SCORE: 35
ADLS_ACUITY_SCORE: 35

## 2023-04-24 NOTE — TELEPHONE ENCOUNTER
"Call received from patient:  1. Recently had COVID-19 and last dose of Paxlovid on 4/18/23, symptoms resolved  2. Woke up on 4/22/23 and does not feel well   A. Muscles feel \"soft\"   B. Fatigue- \"I am unable to stay awake\"  3. Breathing is fine  4. No sore throat  5. Chest feels tired    A. Denied chest pain/pressure    B. \"I am having a little trouble breathing\"   C. More effort to take deep breaths  6. Unable to stay awake  7. Unsure if has a fever; does not own a thermometer   A. No chills nor sweats  8. No cough     Patient spoke in complete sentences during phone call.    Writer recommended Emergency Room evaluation given history of DVT; patient declined and requested appt at [facility].  No available appts at that clinic.  Writer also discussed with patient to please have someone drive her to an Urgent Care.  Patient stated she does not have transportation options and is comfortable driving to a [facility] which is less than a mile from patient's residence.            Reason for Disposition    Any history of prior \"blood clot\" in leg or lungs    Additional Information    Negative: SEVERE difficulty breathing (e.g., struggling for each breath, speaks in single words, pulse > 120)    Negative: Breathing stopped and hasn't returned    Negative: Choking on something    Negative: Bluish (or gray) lips or face    Negative: Difficult to awaken or acting confused (e.g., disoriented, slurred speech)    Negative: Passed out (i.e., fainted, collapsed and was not responding)    Negative: Wheezing started suddenly after medicine, an allergic food, or bee sting    Negative: Stridor    Negative: Slow, shallow and weak breathing    Negative: Sounds like a life-threatening emergency to the triager    Negative: Chest pain    Negative: Wheezing (high pitched whistling sound) and previous asthma attacks or use of asthma medicines    Negative: Difficulty breathing and within 14 days of COVID-19 Exposure    " Negative: Difficulty breathing and only present when coughing    Negative: Difficulty breathing and only from stuffy nose    Negative: Difficulty breathing and only from stuffy nose or runny nose from common cold    Negative: MODERATE difficulty breathing (e.g., speaks in phrases, SOB even at rest, pulse 100-120) of new-onset or worse than normal    Negative: Oxygen level (e.g., pulse oximetry) 90 percent or lower    Negative: Wheezing can be heard across the room    Negative: Drooling or spitting out saliva (because can't swallow)    Protocols used: BREATHING DIFFICULTY-A-NELLI MoreN, RN-BC  [facility]

## 2023-04-24 NOTE — ED NOTES
"Rapid Assessment Note    History:   Pavithra Laurent is an 82 year old female who presents with shortness of breath. Patient states that she started experiencing shortness of breath and fatigue two days ago, saying that \"all she wants to do is sleep.\" She says that she has not experienced these symptoms in the past. Patient states that she recently had Covid-19 and took her last dose of Paxlovid six days ago and has recovered well. Patient denies chest pain, abdominal pain, leg swelling, cough, or fever. She denies any urinary symptoms. Patient denies a history of smoking. She is not anticoagulated.       Exam:   General:  Alert, interactive  Cardiovascular:  Well perfused  Lungs:  No respiratory distress, no accessory muscle use  Neuro:  Moving all 4 extremities  Skin:  Warm, dry  Psych:  Normal affect      Plan of Care:   I evaluated the patient and developed an initial plan of care. I discussed this plan and explained that I, or one of my partners, would be returning to complete the evaluation.  EKG, blood work, and urine ordered.      I, Latoya Jessica, am serving as a scribe to document services personally performed by Tee Gates MD, based on my observations and the provider's statements to me.    4/24/2023  Tee Gates MD    Portions of this medical record were completed by a scribe. UPON MY REVIEW AND AUTHENTICATION BY ELECTRONIC SIGNATURE, this confirms (a) I performed the applicable clinical services, and (b) the record is accurate.      Tee Gates MD  04/24/23 1839    "

## 2023-04-25 VITALS
SYSTOLIC BLOOD PRESSURE: 141 MMHG | TEMPERATURE: 98.3 F | OXYGEN SATURATION: 96 % | DIASTOLIC BLOOD PRESSURE: 73 MMHG | HEART RATE: 78 BPM | RESPIRATION RATE: 18 BRPM

## 2023-04-25 LAB — BACTERIA UR CULT: ABNORMAL

## 2023-04-25 PROCEDURE — 250N000013 HC RX MED GY IP 250 OP 250 PS 637: Performed by: EMERGENCY MEDICINE

## 2023-04-25 RX ORDER — SULFAMETHOXAZOLE/TRIMETHOPRIM 800-160 MG
1 TABLET ORAL ONCE
Status: COMPLETED | OUTPATIENT
Start: 2023-04-25 | End: 2023-04-25

## 2023-04-25 RX ORDER — SULFAMETHOXAZOLE/TRIMETHOPRIM 800-160 MG
1 TABLET ORAL 2 TIMES DAILY
Qty: 6 TABLET | Refills: 0 | Status: SHIPPED | OUTPATIENT
Start: 2023-04-25 | End: 2023-04-28

## 2023-04-25 RX ADMIN — SULFAMETHOXAZOLE AND TRIMETHOPRIM 1 TABLET: 800; 160 TABLET ORAL at 00:42

## 2023-04-25 NOTE — ED PROVIDER NOTES
History     Chief Complaint:  Shortness of Breath     HPI  Pavithra Laurent is a 82 year old female with a history of DVT who presents with shortness of breath. The patient has felt fatigued since Saturday, and was in her bed for the past two days. She feels short of breath as well. She denies fevers or chills. She denies chest pain or pressure. She denies feeling lightheaded. She denies nausea, vomiting, or diarrhea. She denies calf pain or leg swelling. She denies history of hypertension, hyperlipidemia, COPD, kidney problems or smoking. She had COVID-19 and finished her paxlovid course this past Tuesday. She denies being on blood thinners.    Review of External Notes: None    ROS:  Review of Systems   Constitutional: Negative for chills and fever.   Respiratory: Positive for shortness of breath.    Cardiovascular: Negative for chest pain and leg swelling (no calf pain).   Gastrointestinal: Negative for diarrhea, nausea and vomiting.   All other systems reviewed and are negative.    Allergies:  Birch Trees  Codeine Sulfate  Dust Mites  Erythromycin  Hydromorphone  Other Environmental Allergy     Medications:    aspirin   azelastine   desonide  diclofenac   erythromycin   fluocinolone acetonide  fluticasone   gabapentin   guaifenesin   levothyroxine   montelukast  nystatin   Olopatadine     Past Medical History:    Allergic rhinitis  Gallbladder calculus without mention of cholecystitis or obstruction  Cataracts of both eyes  DVT  GERD  Hypothyroidism  Thoracic outlet syndrome   Lesion of ulnar nerve  Elbow dislocation  TIA  OA  Sicca syndrome  Cellulitis     Past Surgical History:    Knee arthroplasty  Elbow arthrotomy  Tubal ligation  Cholecystectomy  ORIF forearm  Bunionectomy  [place] tooth removal   Tonsillectomy     Family History:    family history includes Alcohol/Drug in her father; Cancer in her maternal grandmother and mother; Cancer - colorectal in an other family member; Cardiovascular in her father;  Cerebrovascular Disease in her mother; Clotting Disorder (Unknown) in her son; Depression in her father; Heart Disease in her mother, paternal grandfather, and paternal grandmother; Neurologic Disorder in her son; Unknown/Adopted in her maternal grandfather.    Social History:   reports that she has never smoked. She has never used smokeless tobacco. She reports current alcohol use. She reports that she does not use drugs.  PCP: Kitty Owens     Physical Exam     Patient Vitals for the past 24 hrs:   BP Temp Temp src Pulse Resp SpO2   04/24/23 2230 -- -- -- -- -- 97 %   04/24/23 2229 (!) 149/77 -- -- 77 -- --   04/24/23 1815 (!) 178/86 98.3  F (36.8  C) Temporal 93 14 95 %        Physical Exam    General: Laying on the ED bed, no distress  HEENT: Normocephalic, atraumatic  Cardiac: Radial pulses 2+, regular rate and rhythm  Pulm: Breathing comfortably, no accessory muscle usage, no conversational dyspnea, and lungs clear bilaterally  GI: Abdomen soft, nontender, no rigidity or guarding  MSK: No deformities, 1+ pitting edema BLE, no calf tenderness or unilateral asymmetric leg swelling.  Skin: Warm and dry  Neuro: Moves all extremities  Psych: Normal mood and affect    Emergency Department Course   ECG  ECG results from 04/24/23   EKG 12-lead, tracing only     Value    Systolic Blood Pressure     Diastolic Blood Pressure     Ventricular Rate 77    Atrial Rate 77    SD Interval 196    QRS Duration 74        QTc 411    P Axis 39    R AXIS -21    T Axis 25    Interpretation ECG      Sinus rhythm with occasional Premature ventricular complexes  Minimal voltage criteria for LVH, may be normal variant ( R in aVL )  Anterolateral infarct (cited on or before 16-APR-2018)  Abnormal ECG  When compared with ECG of 14-MAY-2019 14:02,  Premature ventricular complexes are now Present  Confirmed by GENERATED REPORT, COMPUTER ([id number]),  FRANK GRACE ([id number]) on 4/24/2023 8:39:16 PM         Imaging:  CT Chest Pulmonary  Embolism w Contrast   Final Result   IMPRESSION:   1.  No evidence of pulmonary embolus or other acute process in the chest.        Report per radiology    Laboratory:  Labs Ordered and Resulted from Time of ED Arrival to Time of ED Departure   D DIMER QUANTITATIVE - Abnormal       Result Value    D-Dimer Quantitative 1.36 (*)    COMPREHENSIVE METABOLIC PANEL - Abnormal    Sodium 135 (*)     Potassium 4.2      Chloride 99      Carbon Dioxide (CO2) 29      Anion Gap 7      Urea Nitrogen 9.9      Creatinine 0.70      Calcium 9.2      Glucose 103 (*)     Alkaline Phosphatase 63      AST 16      ALT 15      Protein Total 6.6      Albumin 4.2      Bilirubin Total 0.9      GFR Estimate 86     ROUTINE UA WITH MICROSCOPIC REFLEX TO CULTURE - Abnormal    Color Urine Light Yellow      Appearance Urine Clear      Glucose Urine Negative      Bilirubin Urine Negative      Ketones Urine Negative      Specific Gravity Urine 1.014      Blood Urine Negative      pH Urine 5.5      Protein Albumin Urine Negative      Urobilinogen Urine Normal      Nitrite Urine Negative      Leukocyte Esterase Urine Large (*)     Bacteria Urine Few (*)     Mucus Urine Present (*)     RBC Urine 0      WBC Urine 33 (*)     Squamous Epithelials Urine 1      Hyaline Casts Urine 4 (*)    CBC WITH PLATELETS AND DIFFERENTIAL - Abnormal    WBC Count 14.2 (*)     RBC Count 4.53      Hemoglobin 13.8      Hematocrit 42.2      MCV 93      MCH 30.5      MCHC 32.7      RDW 13.4      Platelet Count 352     DIFFERENTIAL - Abnormal    % Neutrophils 56      % Lymphocytes 37      % Monocytes 6      % Eosinophils 1      % Basophils 0      Absolute Neutrophils 8.0      Absolute Lymphocytes 5.3      Absolute Monocytes 0.9      Absolute Eosinophils 0.1      Absolute Basophils 0.0      RBC Morphology Confirmed RBC Indices      Platelet Assessment        Value: Automated Count Confirmed. Platelet morphology is normal.    Smudge Cells Present (*)    TROPONIN T, HIGH  SENSITIVITY - Normal    Troponin T, High Sensitivity 14     NT PROBNP INPATIENT - Normal    N terminal Pro BNP Inpatient 277     TROPONIN T, HIGH SENSITIVITY - Normal    Troponin T, High Sensitivity 12     URINE CULTURE        Procedures   None    Emergency Department Course & Assessments:    Interventions:  Medications   sulfamethoxazole-trimethoprim (BACTRIM DS) 800-160 MG per tablet 1 tablet (has no administration in time range)   Saline (92 mLs As instructed $Given 23)   iopamidol (ISOVUE-370) solution 67 mL (67 mLs Intravenous $Given 23)        Independent Interpretation (X-rays, CTs, rhythm strip):  None    Consultations/Discussion of Management or Tests:  None       Social Determinants of Health affecting care:    None    Assessments:   I obtained history and examined patient.  5    I reevaluated the patient and discussed the results and plans for discharge with antibiotics    Disposition:  The patient was discharged to home.     Impression & Plan    CMS Diagnoses: None      Medical Decision Makin-year-old female presents with fatigue and DAMON in the context of recent COVID-19 infection.  Lab work is notable for an elevated D-dimer.  CT PE showed no evidence of PE.  Other lab work shows leukocytosis, urinalysis with suspicion for UTI, urine culture sent.  Troponin is negative x2 and EKG is nonischemic, low suspicion for ACS.  Upon reevaluation, the patient does states she has had some increase in urinary frequency for the last week or so.  She also wonders whether her shortness of breath could be due to her post-COVID status.  I suspect that her urinary frequency and fatigue are due to a urinary tract infection given her leukocytosis and urinalysis results.  Her dyspnea seems likely due to deconditioning in the setting of recent COVID.  Plan is for discharge home with a prescription for Bactrim for UTI and follow-up with her PCP.  She was given her first dose of Bactrim in the  ED.    Critical Care time:  was 0 minutes for this patient excluding procedures.    Diagnosis:    ICD-10-CM    1. Fatigue, unspecified type  R53.83       2. Physical deconditioning  R53.81       3. Urinary tract infection without hematuria, site unspecified  N39.0            Discharge Medications:  New Prescriptions    SULFAMETHOXAZOLE-TRIMETHOPRIM (BACTRIM DS) 800-160 MG TABLET    Take 1 tablet by mouth 2 times daily for 3 days      Scribe Disclosure:  Kenna FONSECA Hired, am serving as a scribe at 8:48 PM on 4/24/2023 to document services personally performed by Drew Mauricio MD based on my observations and the provider's statements to me.    4/24/2023   Drew Mauricio MD King, Colin, MD  04/25/23 0011

## 2023-05-12 ENCOUNTER — OFFICE VISIT (OUTPATIENT)
Dept: FAMILY MEDICINE | Facility: CLINIC | Age: 83
End: 2023-05-12
Payer: COMMERCIAL

## 2023-05-12 VITALS
DIASTOLIC BLOOD PRESSURE: 73 MMHG | HEART RATE: 79 BPM | OXYGEN SATURATION: 96 % | RESPIRATION RATE: 17 BRPM | HEIGHT: 62 IN | WEIGHT: 181 LBS | TEMPERATURE: 97.9 F | SYSTOLIC BLOOD PRESSURE: 112 MMHG | BODY MASS INDEX: 33.31 KG/M2

## 2023-05-12 DIAGNOSIS — G47.00 INSOMNIA, UNSPECIFIED TYPE: Primary | ICD-10-CM

## 2023-05-12 DIAGNOSIS — M35.01 SICCA SYNDROME WITH KERATOCONJUNCTIVITIS (H): ICD-10-CM

## 2023-05-12 DIAGNOSIS — R53.83 FATIGUE, UNSPECIFIED TYPE: ICD-10-CM

## 2023-05-12 DIAGNOSIS — H90.6 MIXED CONDUCTIVE AND SENSORINEURAL HEARING LOSS OF BOTH EARS: ICD-10-CM

## 2023-05-12 DIAGNOSIS — F41.9 ANXIETY: ICD-10-CM

## 2023-05-12 LAB
ANION GAP SERPL CALCULATED.3IONS-SCNC: 12 MMOL/L (ref 7–15)
BUN SERPL-MCNC: 12.3 MG/DL (ref 8–23)
CALCIUM SERPL-MCNC: 9.4 MG/DL (ref 8.8–10.2)
CHLORIDE SERPL-SCNC: 99 MMOL/L (ref 98–107)
CREAT SERPL-MCNC: 0.76 MG/DL (ref 0.51–0.95)
DEPRECATED HCO3 PLAS-SCNC: 25 MMOL/L (ref 22–29)
GFR SERPL CREATININE-BSD FRML MDRD: 78 ML/MIN/1.73M2
GLUCOSE SERPL-MCNC: 87 MG/DL (ref 70–99)
POTASSIUM SERPL-SCNC: 4 MMOL/L (ref 3.4–5.3)
SODIUM SERPL-SCNC: 136 MMOL/L (ref 136–145)
VIT B12 SERPL-MCNC: 390 PG/ML (ref 232–1245)

## 2023-05-12 PROCEDURE — 80048 BASIC METABOLIC PNL TOTAL CA: CPT | Performed by: FAMILY MEDICINE

## 2023-05-12 PROCEDURE — 99215 OFFICE O/P EST HI 40 MIN: CPT | Performed by: FAMILY MEDICINE

## 2023-05-12 PROCEDURE — 82607 VITAMIN B-12: CPT | Performed by: FAMILY MEDICINE

## 2023-05-12 PROCEDURE — 36415 COLL VENOUS BLD VENIPUNCTURE: CPT | Performed by: FAMILY MEDICINE

## 2023-05-12 RX ORDER — TRAZODONE HYDROCHLORIDE 50 MG/1
50 TABLET, FILM COATED ORAL
Qty: 60 TABLET | Refills: 0 | Status: SHIPPED | OUTPATIENT
Start: 2023-05-12 | End: 2023-07-07 | Stop reason: SINTOL

## 2023-05-12 RX ORDER — MULTIVIT WITH MINERALS/LUTEIN
1 TABLET ORAL DAILY
COMMUNITY

## 2023-05-12 ASSESSMENT — PAIN SCALES - GENERAL: PAINLEVEL: NO PAIN (0)

## 2023-05-12 NOTE — PATIENT INSTRUCTIONS
Go get your hearing rechecked and get hearing aids.  Either at [facility] or [facility].      We'll recheck your vitamin levels and sodium and glucose      We'll start a low dose sedative (trazodone) 50 mg at night as needed for sleep/anxiety.      My nurse will call you in about 10 days; if you aren't better, we could talk about starting a low dose anti-anxiety daily medication.      Remember with your move - it will all get done (or it won't, and it won't be the end of the world).      I'm so sorry about your son.  He has such a good advocate in you.

## 2023-05-12 NOTE — PROGRESS NOTES
"  Assessment & Plan     Insomnia, unspecified type  and   Anxiety    Discussed options; much of anxiety and insomnia is situational with stress with son's care (bipolar, in group home, had brain injury) and upcoming move in July and not feeling well after Covid    Will start with sleep aid trazodone    Will call patient in ~10 days, if no improvement consider start low dose SSRI  - traZODone (DESYREL) 50 MG tablet; Take 1 tablet (50 mg) by mouth nightly as needed for sleep    Fatigue, unspecified type    Check labs, is now taking MVI with 500 mcg B12 instead of 1000  - Vitamin B12; Future  - Basic metabolic panel  (Ca, Cl, CO2, Creat, Gluc, K, Na, BUN); Future  - Vitamin B12  - Basic metabolic panel  (Ca, Cl, CO2, Creat, Gluc, K, Na, BUN)    Mixed conductive and sensorineural hearing loss of both ears    Really needs hearing aids; referred  - Adult Audiology  Referral; Future    Sicca syndrome with keratoconjunctivitis (H)    Sees rheum regularly for Sicca Syndrome.     been stable           I spent a total of 44 minutes on the day of the visit.   Time spent by me doing chart review, history and exam, documentation and further activities per the note    BMI:   Estimated body mass index is 33.16 kg/m  as calculated from the following:    Height as of this encounter: 1.574 m (5' 1.95\").    Weight as of this encounter: 82.1 kg (181 lb).   Weight management plan: Discussed healthy diet and exercise guidelines        Kitty Owens MD  [facility]    Kristina Arshad is a 82 year old, presenting for the following health issues:  Hospital F/U (Been feeling nervous, stress.)        5/12/2023    11:19 AM   Additional Questions   Roomed by Nina ACEVEDO       Hospital Follow-up Visit:    Hospital/Nursing Home/IP Rehab Facility: [facility]  Date of Admission: 04/24/2023  Date of Discharge: 04/25/2023  Reason(s) for Admission: Shortness of Breathe    Was " "your hospitalization related to COVID-19? No   Problems taking medications regularly:  None  Medication changes since discharge: None  Problems adhering to non-medication therapy:  None    Summary of hospitalization:  [facility] ER discharge summary reviewed  Diagnostic Tests/Treatments reviewed.  Follow up needed: none  Other Healthcare Providers Involved in Patient s Care:         specialists  Update since discharge: improved. 82 year old well known to me.  Here today for follow up.    Originally, issue was had COVID.  Was sick for a while and very fatigued.  Had Covid.  Was on Paxlovid.  Better now.    Also - son is bipolar.  He's on a manic episode.  Staff misses symptoms.      Really stressed.  Moving on July 1st.  Feels behind.  Gets added stuff with son and worried about him.      Moving to [facility].  5 blocks.  [place].      Hard to sleep.  Wakes up 1-2 times a night worrying.      Plan of care communicated with patient             Review of Systems         Objective    /73 (BP Location: Right arm, Patient Position: Sitting, Cuff Size: Adult Regular)   Pulse 79   Temp 97.9  F (36.6  C) (Oral)   Resp 17   Ht 1.574 m (5' 1.95\")   Wt 82.1 kg (181 lb)   SpO2 96%   BMI 33.16 kg/m    Body mass index is 33.16 kg/m .  Physical Exam   GENERAL: healthy, alert and no distress                  refer  "

## 2023-05-31 ENCOUNTER — MYC MEDICAL ADVICE (OUTPATIENT)
Dept: FAMILY MEDICINE | Facility: CLINIC | Age: 83
End: 2023-05-31
Payer: COMMERCIAL

## 2023-05-31 DIAGNOSIS — M79.2 NEUROPATHIC PAIN OF RIGHT FOREARM: ICD-10-CM

## 2023-06-05 RX ORDER — GABAPENTIN 400 MG/1
400 CAPSULE ORAL 3 TIMES DAILY
Qty: 270 CAPSULE | Refills: 0 | Status: SHIPPED | OUTPATIENT
Start: 2023-06-05 | End: 2023-06-09

## 2023-06-05 NOTE — TELEPHONE ENCOUNTER
So, I see you sent the 400 mg but it looks to me like she's asking for the 100 mg?    Can you check with her what exactly she needs refilled?  And I'm happy to discuss more with her at visit in July.    And good luck with all the packing!!!      Dr. Kitty Owens MD / [facility]

## 2023-06-05 NOTE — TELEPHONE ENCOUNTER
"Dr. Owens - please advise on increased gabapentin dose/sign new orders if in agreement. Upcoming visit on 7/7/23.    \"Dr. Owens,  I am finding that I need to take (two) extra 100 mg capsules more frequently for the nerve pain in my right hand and arm, so I am out of the 100 mg capsules.  And the nerve pain in my arm responds better to gabapentin than Tylenol.  I thought we had talked about it at my last visit.  But at my visit in July, I think we need to increase my 400 mg dosage, also.  As I am doing a lot of packing, I am using my hands and fingers more, so the discomfort is a problem.  Thanks, Pavithra Laurent\"    Ratna refill 1x. Routed to provider for input on ongoing increased dose.    Writer replied to patient via [facility].    NELLI PappasN, RN  [facility]      "

## 2023-06-08 RX ORDER — GABAPENTIN 100 MG/1
200 CAPSULE ORAL 2 TIMES DAILY PRN
Qty: 180 CAPSULE | Refills: 1 | Status: SHIPPED | OUTPATIENT
Start: 2023-06-08 | End: 2023-07-07

## 2023-06-08 NOTE — TELEPHONE ENCOUNTER
Sent; I think she needed the 100 mg, not the 400, so sent that.    Dr. Kitty Owens MD / [facility]

## 2023-06-08 NOTE — TELEPHONE ENCOUNTER
"Dr. Owens-Please review patient's response and advise.  Writer unsure what sig to pend based off patient's message so pended Gabapentin 400 mg TID.    \"I normally take 400 mg, three times a day.  And if I am experiencing issues with my hand and arm I will take an additional 200 mg (two 100 mg capsules) mid day and in the evening.    Last weekend, my back went out (non-medical term) plus I am experiencing serious pain in my left leg, so I have been taking an extra 400 mg in the middle of the night.  FYI, I am seeing an orthopedic doctor who is helping me with my back issues.  I have an appointment next Monday for an injection in my back.  That is the soonest that I can get in.    I am also taking Tylenol Arthritis (650 mg) about twice a day.  KARMENI, my orthopedic doctor, Dr. Palomino is on sabbatical until September and I haven't seen a substitute since she left so getting help through that avenue would be more complicated.  I am going to try to set up an appointment after I get through with my move.  Probably more info than you wanted, but, just in case.  Thanks for the interim help.  I generally try to take as little medication as possible.  Pavithra Laurent\"      Thank you!  NELLI CrawfordN, RN-[facility]    "

## 2023-06-09 DIAGNOSIS — M79.2 NEUROPATHIC PAIN OF RIGHT FOREARM: ICD-10-CM

## 2023-06-09 RX ORDER — GABAPENTIN 400 MG/1
CAPSULE ORAL
Qty: 270 CAPSULE | Refills: 0 | Status: SHIPPED | OUTPATIENT
Start: 2023-06-09 | End: 2023-07-07
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
## 2023-07-07 ENCOUNTER — OFFICE VISIT (OUTPATIENT)
Dept: FAMILY MEDICINE | Facility: CLINIC | Age: 83
End: 2023-07-07
Payer: COMMERCIAL

## 2023-07-07 VITALS
HEIGHT: 61 IN | HEART RATE: 74 BPM | TEMPERATURE: 97.8 F | OXYGEN SATURATION: 97 % | BODY MASS INDEX: 32.83 KG/M2 | RESPIRATION RATE: 16 BRPM | WEIGHT: 173.9 LBS | SYSTOLIC BLOOD PRESSURE: 122 MMHG | DIASTOLIC BLOOD PRESSURE: 62 MMHG

## 2023-07-07 DIAGNOSIS — E03.4 HYPOTHYROIDISM DUE TO ACQUIRED ATROPHY OF THYROID: ICD-10-CM

## 2023-07-07 DIAGNOSIS — M79.2 NEUROPATHIC PAIN OF RIGHT FOREARM: ICD-10-CM

## 2023-07-07 DIAGNOSIS — Z00.00 ENCOUNTER FOR MEDICARE ANNUAL WELLNESS EXAM: Primary | ICD-10-CM

## 2023-07-07 DIAGNOSIS — Z23 NEED FOR DIPHTHERIA-TETANUS-PERTUSSIS (TDAP) VACCINE: ICD-10-CM

## 2023-07-07 DIAGNOSIS — B35.4 TINEA CORPORIS: ICD-10-CM

## 2023-07-07 DIAGNOSIS — Z23 NEED FOR SHINGLES VACCINE: ICD-10-CM

## 2023-07-07 DIAGNOSIS — M70.62 GREATER TROCHANTERIC BURSITIS OF LEFT HIP: ICD-10-CM

## 2023-07-07 LAB — TSH SERPL DL<=0.005 MIU/L-ACNC: 1.34 UIU/ML (ref 0.3–4.2)

## 2023-07-07 PROCEDURE — 36415 COLL VENOUS BLD VENIPUNCTURE: CPT | Performed by: FAMILY MEDICINE

## 2023-07-07 PROCEDURE — 84443 ASSAY THYROID STIM HORMONE: CPT | Performed by: FAMILY MEDICINE

## 2023-07-07 PROCEDURE — G0439 PPPS, SUBSEQ VISIT: HCPCS | Performed by: FAMILY MEDICINE

## 2023-07-07 PROCEDURE — 99214 OFFICE O/P EST MOD 30 MIN: CPT | Mod: 25 | Performed by: FAMILY MEDICINE

## 2023-07-07 RX ORDER — GABAPENTIN 100 MG/1
100 CAPSULE ORAL 2 TIMES DAILY PRN
Qty: 180 CAPSULE | Refills: 3 | Status: SHIPPED | OUTPATIENT
Start: 2023-07-07 | End: 2024-07-24

## 2023-07-07 RX ORDER — GABAPENTIN 400 MG/1
400 CAPSULE ORAL 4 TIMES DAILY PRN
Qty: 360 CAPSULE | Refills: 3 | Status: SHIPPED | OUTPATIENT
Start: 2023-07-07 | End: 2024-07-24

## 2023-07-07 RX ORDER — NYSTATIN 100000 [USP'U]/G
POWDER TOPICAL 2 TIMES DAILY PRN
Qty: 60 G | Refills: 1 | Status: SHIPPED | OUTPATIENT
Start: 2023-07-07

## 2023-07-07 RX ORDER — LEVOTHYROXINE SODIUM 88 UG/1
88 TABLET ORAL DAILY
Qty: 90 TABLET | Refills: 3 | Status: SHIPPED | OUTPATIENT
Start: 2023-07-07 | End: 2024-07-24

## 2023-07-07 ASSESSMENT — ENCOUNTER SYMPTOMS
SHORTNESS OF BREATH: 0
BREAST MASS: 0
HEADACHES: 0
NERVOUS/ANXIOUS: 1
DIARRHEA: 0
CONSTIPATION: 0
JOINT SWELLING: 0
HEARTBURN: 0
WEAKNESS: 0
FREQUENCY: 1
NAUSEA: 0
DYSURIA: 0
PARESTHESIAS: 0
HEMATOCHEZIA: 0
ARTHRALGIAS: 1
EYE PAIN: 0
DIZZINESS: 0
ABDOMINAL PAIN: 0
SORE THROAT: 0
CHILLS: 0
HEMATURIA: 0
MYALGIAS: 0
FEVER: 0
BACK PAIN: 1
COUGH: 0
PALPITATIONS: 0

## 2023-07-07 ASSESSMENT — PAIN SCALES - GENERAL: PAINLEVEL: SEVERE PAIN (6)

## 2023-07-07 ASSESSMENT — ACTIVITIES OF DAILY LIVING (ADL): CURRENT_FUNCTION: NO ASSISTANCE NEEDED

## 2023-07-07 NOTE — PROGRESS NOTES
"SUBJECTIVE:   Pavithra is a 83 year old who presents for Preventive Visit.      7/7/2023     2:26 PM   Additional Questions   Roomed by Amna Lozano RN   Accompanied by none         7/7/2023     2:26 PM   Patient Reported Additional Medications   Patient reports taking the following new medications no     Are you in the first 12 months of your Medicare coverage?  No    Back Pain   Pertinent negatives include no chest pain, no fever, no headaches, no abdominal pain, no dysuria, no pelvic pain, no paresthesias and no weakness.   Healthy Habits:     In general, how would you rate your overall health?  Good    Frequency of exercise:  1 day/week    Duration of exercise:  30-45 minutes    Do you usually eat at least 4 servings of fruit and vegetables a day, include whole grains    & fiber and avoid regularly eating high fat or \"junk\" foods?  Yes    Taking medications regularly:  Yes    Medication side effects:  None    Ability to successfully perform activities of daily living:  No assistance needed    Home Safety:  Lack of grab bars in the bathroom    Hearing Impairment:  Difficulty following a conversation in a noisy restaurant or crowded room, feel that people are mumbling or not speaking clearly, difficulty following dialogue in the theater, difficult to understand a speaker at a public meeting or [employer] service, need to ask people to speak up or repeat themselves, find that men's voices are easier to understand than woman's and difficulty understanding soft or whispered speech    In the past 6 months, have you been bothered by leaking of urine? Yes    In general, how would you rate your overall mental or emotional health?  Excellent    Additional concerns today:  Yes    Gabapentin (Neurontin).  Taking 400 mg when wakes up  Takes 400 mg at 1 pm  Takes 400 mg at bedtime    Has leg issues - when wakes at middle of the night will either take 200 mg or a 400 mg.    If doing a lot during the day, will take another 100 or " 200 mg     GFR: 78, not to exceed 1800 mg per day    Also:  Has hearing test coming up      Also:   Had back injection, and has issues with left hip    basic metabolic panel  .    Have you ever done Advance Care Planning? (For example, a Health Directive, POLST, or a discussion with a medical provider or your loved ones about your wishes): Yes, advance care planning is on file.       Fall risk  Fallen 2 or more times in the past year?: No  Any fall with injury in the past year?: No    Cognitive Screening   1) Repeat 3 items (Leader, Season, Table)    2) Clock draw: NORMAL  3) 3 item recall: Recalls 3 objects  Results: 3 items recalled: COGNITIVE IMPAIRMENT LESS LIKELY    Mini-CogTM Copyright S Alexei. Licensed by the author for use in [facility]; reprinted with permission (cecy@[facility]). All rights reserved.      Do you have sleep apnea, excessive snoring or daytime drowsiness?: no    Reviewed and updated as needed this visit by clinical staff   Tobacco  Allergies  Meds  Problems  Med Hx  Surg Hx  Fam Hx          Reviewed and updated as needed this visit by Provider   Tobacco  Allergies  Meds  Problems  Med Hx  Surg Hx  Fam Hx         Social History     Tobacco Use     Smoking status: Never     Passive exposure: Never     Smokeless tobacco: Never     Tobacco comments:     Exposed to second hand smoke -    Substance Use Topics     Alcohol use: Yes     Comment: rare- 2/month             7/7/2023     2:23 PM   Alcohol Use   Prescreen: >3 drinks/day or >7 drinks/week? No     Do you have a current opioid prescription? No  Do you use any other controlled substances or medications that are not prescribed by a provider? None      Current providers sharing in care for this patient include:   Patient Care Team:  Kitty Owens MD as PCP - General (Family Medicine)  Ashley Delvalle AuD as Audiologist (Audiology)  Kitty Owens MD as Assigned PCP    The following health maintenance  items are reviewed in Epic and correct as of today:  Health Maintenance   Topic Date Due     ZOSTER IMMUNIZATION (1 of 2) Never done     MEDICARE ANNUAL WELLNESS VISIT  11/03/2022     COVID-19 Vaccine (6 - Pfizer series) 01/28/2023     DTAP/TDAP/TD IMMUNIZATION (2 - Td or Tdap) 05/24/2023     TSH W/FREE T4 REFLEX  07/18/2023     INFLUENZA VACCINE (1) 09/01/2023     ANNUAL REVIEW OF HM ORDERS  07/07/2024     FALL RISK ASSESSMENT  07/07/2024     ADVANCE CARE PLANNING  07/07/2028     PHQ-2 (once per calendar year)  Completed     Pneumococcal Vaccine: 65+ Years  Completed     IPV IMMUNIZATION  Aged Out     MENINGITIS IMMUNIZATION  Aged Out     DEXA  Discontinued     MAMMO SCREENING  Discontinued       Mammogram Screening - Patient over age 75, has elected to discontinue screenings.  Pertinent mammograms are reviewed under the imaging tab.    Review of Systems   Constitutional: Negative for chills and fever.   HENT: Positive for congestion and hearing loss. Negative for ear pain and sore throat.    Eyes: Negative for pain and visual disturbance.   Respiratory: Negative for cough and shortness of breath.    Cardiovascular: Positive for peripheral edema. Negative for chest pain and palpitations.   Gastrointestinal: Negative for abdominal pain, constipation, diarrhea, heartburn, hematochezia and nausea.   Breasts:  Negative for tenderness, breast mass and discharge.   Genitourinary: Positive for frequency and urgency. Negative for dysuria, genital sores, hematuria, pelvic pain, vaginal bleeding and vaginal discharge.   Musculoskeletal: Positive for arthralgias and back pain. Negative for joint swelling and myalgias.   Skin: Negative for rash.   Neurological: Negative for dizziness, weakness, headaches and paresthesias.   Psychiatric/Behavioral: Negative for mood changes. The patient is nervous/anxious.          OBJECTIVE:   /62 (BP Location: Right arm, Patient Position: Sitting, Cuff Size: Adult Regular)   Pulse 74   " Temp 97.8  F (36.6  C) (Temporal)   Resp 16   Ht 1.555 m (5' 1.22\")   Wt 78.9 kg (173 lb 14.4 oz)   SpO2 97%   BMI 32.62 kg/m   Estimated body mass index is 32.62 kg/m  as calculated from the following:    Height as of this encounter: 1.555 m (5' 1.22\").    Weight as of this encounter: 78.9 kg (173 lb 14.4 oz).  Physical Exam  GENERAL APPEARANCE: healthy, alert and no distress  EYES: Eyes grossly normal to inspection, PERRL and conjunctivae and sclerae normal  HENT: ear canals and TM's normal, nose and mouth without ulcers or lesions, oropharynx clear and oral mucous membranes moist  NECK: no adenopathy, no asymmetry, masses, or scars and thyroid normal to palpation  RESP: lungs clear to auscultation - no rales, rhonchi or wheezes  CV: regular rate and rhythm, normal S1 S2, no S3 or S4, no murmur, click or rub, no peripheral edema and peripheral pulses strong  ABDOMEN: soft, nontender, no hepatosplenomegaly, no masses and bowel sounds normal  MS: no musculoskeletal defects are noted and gait is age appropriate without ataxia  SKIN: no suspicious lesions or rashes  NEURO: Normal strength and tone, sensory exam grossly normal, mentation intact and speech normal  PSYCH: mentation appears normal and affect normal/bright    Diagnostic Test Results:  Labs reviewed in Epic    ASSESSMENT / PLAN:   Pavithra was seen today for medicare wellness and back pain.    Diagnoses and all orders for this visit:    Encounter for Medicare annual wellness exam  Comments:  Due for Tdap and Shingrix  Discussed that she can do this at the pharmacy and Medicare will cover  She will schedule at [facility]  Has gone through move, is very relieved but still sorting through things  Overall feeling much better than last visit    Orders:  -     Cancel: DEXA HIP/PELVIS/SPINE - Future; Future  -     REVIEW OF HEALTH MAINTENANCE PROTOCOL ORDERS  -     Tdap, tetanus-diptheria-acell pertussis, (BOOSTRIX) 5-2.5-18.5 LF-MCG/0.5 DIVYA injection; Inject 0.5 " mLs into the muscle once for 1 dose  -     zoster vaccine recombinant adjuvanted (SHINGRIX) injection; Inject 0.5 mLs into the muscle once for 1 dose Pharmacist administered  -     PRIMARY CARE FOLLOW-UP SCHEDULING; Future    Need for shingles vaccine    Need for diphtheria-tetanus-pertussis (Tdap) vaccine    Neuropathic pain of right arm  Comments:  Gabapentin very helpful for this  Patient having no issues with memory loss or falls  We discussed given kidney function maximum dose daily is 1800 mg  Refilled  Orders:  -     gabapentin (NEURONTIN) 400 MG capsule; Take 1 capsule (400 mg) by mouth 4 times daily as needed for neuropathic pain (max dose 1800 mg daily)  -     gabapentin (NEURONTIN) 100 MG capsule; Take 1 capsule (100 mg) by mouth 2 times daily as needed for neuropathic pain Do not take more than 2 capsules daily in addition to maximum 400 mg (max dose 1800 mg daily)    Greater trochanteric bursitis of left hip  Comments:  Steroid injection has been very helpful for this  Her doctor at [facility] is on sabbatical  Will refer to our sports and orthopedics group for injection  Orders:  -     Orthopedic  Referral; Future    Hypothyroidism due to acquired atrophy of thyroid  Comments:  Recheck TSH  No new symptoms  Refilled.  Orders:  -     TSH WITH FREE T4 REFLEX; Future  -     levothyroxine (SYNTHROID/LEVOTHROID) 88 MCG tablet; Take 1 tablet (88 mcg) by mouth daily  -     TSH WITH FREE T4 REFLEX    Tinea corporis  Comments:  Nystatin helpful  Refilled  Orders:  -     nystatin (MYCOSTATIN) 126657 UNIT/GM external powder; Apply topically 2 times daily as needed for other (yeast infection in skin folds)        Patient has been advised of split billing requirements and indicates understanding: Yes      COUNSELING:  Reviewed preventive health counseling, as reflected in patient instructions        She reports that she has never smoked. She has never been exposed to tobacco smoke. She has never used smokeless  tobacco.      Appropriate preventive services were discussed with this patient, including applicable screening as appropriate for cardiovascular disease, diabetes, osteopenia/osteoporosis, and glaucoma.  As appropriate for age/gender, discussed screening for colorectal cancer, prostate cancer, breast cancer, and cervical cancer. Checklist reviewing preventive services available has been given to the patient.    Reviewed patients plan of care and provided an AVS. The Basic Care Plan (routine screening as documented in Health Maintenance) for Pavithra meets the Care Plan requirement. This Care Plan has been established and reviewed with the Patient.      Kitty Owens MD  [facility]    Identified Health Risks:    I have reviewed Opioid Use Disorder and Substance Use Disorder risk factors and made any needed referrals.       She is at risk for lack of exercise and has been provided with information to increase physical activity for the benefit of her well-being.  The patient was provided with written information regarding signs of hearing loss.  Information on urinary incontinence and treatment options given to patient.

## 2023-07-07 NOTE — PATIENT INSTRUCTIONS
Your gabapentin (Neurontin):  You can take up to 1800 mg a day.    Patient Education   Personalized Prevention Plan  You are due for the preventive services outlined below.  Your care team is available to assist you in scheduling these services.  If you have already completed any of these items, please share that information with your care team to update in your medical record.  Health Maintenance Due   Topic Date Due     Zoster (Shingles) Vaccine (1 of 2) Never done     Annual Wellness Visit  11/03/2022     COVID-19 Vaccine (6 - Pfizer series) 01/28/2023     Diptheria Tetanus Pertussis (DTAP/TDAP/TD) Vaccine (2 - Td or Tdap) 05/24/2023     Thyroid Function Lab  07/18/2023       Exercise for a Healthier Heart  You may wonder how you can improve the health of your heart. If you re thinking about exercise, you re on the right track. You don t need to become an athlete. But you do need a certain amount of brisk exercise to help strengthen your heart. If you have been diagnosed with a heart condition, your healthcare provider may advise exercise to help your condition. To help make exercise a habit, choose safe, fun activities.      Exercise with a friend. When activity is fun, you're more likely to stick with it.     Before you start  Check with your healthcare provider before starting an exercise program. This is especially important if you haven't been active for a while. It's also important if you have a long-term (chronic) health problem such as heart disease, diabetes, or obesity. Also check with your provider if you're at high risk for having these problems.   Why exercise?  Exercising regularly offers many healthy rewards. It can help you do all of these:     Improve your blood cholesterol level to help prevent further heart trouble.    Lower your blood pressure to help prevent a stroke or heart attack.    Control diabetes or reduce your risk of getting this disease.    Improve your heart and lung  function.    Reach and stay at a healthy weight.    Make your muscles stronger so you can stay active.    Prevent falls and fractures by slowing the loss of bone mass (osteoporosis).    Manage stress better.    Improve your sense of self and your body image.  Exercise tips      Ease into your routine. Set small goals. Then build on them. Talk with your healthcare provider first before starting an exercise routine if you're not sure what your activity level should be.    Exercise on most days. Aim for a total of at least 150 minutes (2 hours and 30 minutes) or more of moderate-intensity aerobic activity each week. You could also do 75 minutes (1 hour and 15 minutes) or more of vigorous-intensity aerobic activity each week. Or try for a combination of both. Moderate activity means that you breathe heavier and your heart rate increases, but you can still talk. Think about doing at least 30 minutes of moderate exercise, 5 times a week. It's OK to work up to the 30-minute period over time. Examples of moderate-intensity activity are brisk walking, gardening, and water aerobics.    Step up your daily activity level.  Along with your exercise program, try being more active the whole day. Walk instead of drive. Or park further away so that you take more steps each day. Do more household tasks or yard work. You may not be able to meet the advised amount of physical activity. But doing some moderate- or vigorous-intensity aerobic activity can help reduce your risk for heart disease. Your healthcare provider can help you figure out what is best for you.    Choose 1 or more activities you enjoy.  Walking is one of the easiest things you can do. You can also try swimming, riding a bike, dancing, or taking an exercise class.    Call 911  Call 911 right away if any of these occur:     Chest pain that doesn't go away quickly with rest    New burning, tightness, pressure, or heaviness in your chest, neck, shoulders, back, or  arms    Abnormal or severe shortness of breath    A very fast or irregular heartbeat (palpitations)    Fainting  When to call your healthcare provider  Call your healthcare provider if you have any of these:     Dizziness or lightheadedness    Mild shortness of breath or chest pain    Increased or new joint or muscle pain    Ana María last reviewed this educational content on 7/1/2022 2000-2023 [employer]. All rights reserved. This information is not intended as a substitute for professional medical care. Always follow your healthcare professional's instructions.          Signs of Hearing Loss  Hearing loss is a problem shared by many people. In fact, it's one of the most common health problems, particularly as people age. Most people aged 65 and older have some hearing loss. By age 80, almost everyone does. Hearing loss often occurs slowly over the years. So, you may not realize your hearing has gotten worse.   When sudden hearing loss occurs, it's important to contact your healthcare provider right away. Your provider will do a medical exam and a hearing exam as soon as possible. This is to help find the cause and type of your sudden hearing loss. Based on your diagnosis, your healthcare provider will discuss possible treatments.      Hearing much better with one ear can be a sign of hearing loss.     Have your hearing checked  Call your healthcare provider if you:     Have to strain to hear normal conversation    Have to watch other people s faces very carefully to follow what they re saying    Need to ask people to repeat what they ve said    Often misunderstand what people are saying    Turn the volume of the television or radio up so high that others complain    Feel that people are mumbling when they re talking to you    Find that the effort to hear leaves you feeling tired and irritated    Notice, when using the phone, that you hear better with one ear than the other  Ana María last reviewed  this educational content on 6/1/2022 2000-2023 [employer]. All rights reserved. This information is not intended as a substitute for professional medical care. Always follow your healthcare professional's instructions.          Urinary Incontinence, Female (Adult)   Urinary incontinence means loss of bladder control. This problem affects many women, especially as they get older. If you have incontinence, you may be embarrassed to ask for help. But know that this problem can be treated.   Types of Incontinence  There are different types of incontinence. Two of the main types are described here. You can have more than one type.     Stress incontinence. With this type, urine leaks when pressure (stress) is put on the bladder. This may happen when you cough, sneeze, or laugh. Stress incontinence most often occurs because the pelvic floor muscles that support the bladder and urethra are weak. This can happen after pregnancy and vaginal childbirth or a hysterectomy. It can also be due to excess body weight or hormone changes.    Urge incontinence (also called overactive bladder). With this type, a sudden urge to urinate is felt often. This may happen even though there may not be much urine in the bladder. The need to urinate often during the night is common. Urge incontinence most often occurs because of bladder spasms. This may be due to bladder irritation or infection. Damage to bladder nerves or pelvic muscles, constipation, and certain medicines can also lead to urge incontinence.  Treatment depends on the cause. Further evaluation is needed to find the type you have. This will likely include an exam and certain tests. Based on the results, you and your healthcare provider can then plan treatment. Until a diagnosis is made, the home care tips below can help ease symptoms.   Home care    Do pelvic floor muscle exercises, if they are prescribed. The pelvic floor muscles help support the bladder and  urethra. Many women find that their symptoms improve when doing special exercises that strengthen these muscles. To do the exercises, contract the muscles you would use to stop your stream of urine. But do this when you re not urinating. Hold for 10 seconds, then relax. Repeat 10 to 20 times in a row, at least 3 times a day. Your healthcare provider may give you other instructions for how to do the exercises and how often.    Keep a bladder diary. This helps track how often and how much you urinate over a set period of time. Bring this diary with you to your next visit with the provider. The information can help your provider learn more about your bladder problem.    Lose weight, if advised to by your provider. Extra weight puts pressure on the bladder. Your provider can help you create a weight-loss plan that s right for you. This may include exercising more and making certain diet changes.    Don't have foods and drinks that may irritate the bladder. These can include alcohol and caffeinated drinks.    Quit smoking. Smoking and other tobacco use can lead to a long-term (chronic) cough that strains the pelvic floor muscles. Smoking may also damage the bladder and urethra. Talk with your provider about treatments or methods you can use to quit smoking.    If drinking large amounts of fluid makes you have symptoms, you may be advised to limit your fluid intake. You may also be advised to drink most of your fluids during the day and to limit fluids at night.    If you re worried about urine leakage or accidents, you may wear absorbent pads to catch urine. Change the pads often. This helps reduce discomfort. It may also reduce the risk of skin or bladder infections.    Follow-up care  Follow up with your healthcare provider, or as directed. It may take some to find the right treatment for your problem. But healthy lifestyle changes can be made right away. These include such things as exercising on a regular basis,  eating a healthy diet, losing weight (if needed), and quitting smoking. Your treatment plan may include special therapies or medicines. Certain procedures or surgery may also be options. Talk about any questions you have with your provider.   When to seek medical advice  Call the healthcare provider right away if any of these occur:    Fever of 100.4 F (38 C) or higher, or as directed by your provider    Bladder pain or fullness    Belly swelling    Nausea or vomiting    Back pain    Weakness, dizziness, or fainting  [employer] last reviewed this educational content on 1/1/2020 2000-2022 [employer]. All rights reserved. This information is not intended as a substitute for professional medical care. Always follow your healthcare professional's instructions.

## 2023-08-09 ENCOUNTER — OFFICE VISIT (OUTPATIENT)
Dept: ORTHOPEDICS | Facility: CLINIC | Age: 83
End: 2023-08-09
Attending: FAMILY MEDICINE
Payer: COMMERCIAL

## 2023-08-09 VITALS
WEIGHT: 170 LBS | SYSTOLIC BLOOD PRESSURE: 116 MMHG | HEIGHT: 62 IN | DIASTOLIC BLOOD PRESSURE: 68 MMHG | BODY MASS INDEX: 31.28 KG/M2

## 2023-08-09 DIAGNOSIS — M48.07 LUMBOSACRAL STENOSIS: Primary | ICD-10-CM

## 2023-08-09 DIAGNOSIS — M70.62 GREATER TROCHANTERIC BURSITIS OF LEFT HIP: ICD-10-CM

## 2023-08-09 PROCEDURE — 20610 DRAIN/INJ JOINT/BURSA W/O US: CPT | Mod: LT | Performed by: FAMILY MEDICINE

## 2023-08-09 PROCEDURE — 99204 OFFICE O/P NEW MOD 45 MIN: CPT | Mod: 25 | Performed by: FAMILY MEDICINE

## 2023-08-09 RX ORDER — LIDOCAINE HYDROCHLORIDE 10 MG/ML
4 INJECTION, SOLUTION INFILTRATION; PERINEURAL
Status: DISCONTINUED | OUTPATIENT
Start: 2023-08-09 | End: 2024-08-14

## 2023-08-09 RX ORDER — METHYLPREDNISOLONE ACETATE 40 MG/ML
40 INJECTION, SUSPENSION INTRA-ARTICULAR; INTRALESIONAL; INTRAMUSCULAR; SOFT TISSUE
Status: DISCONTINUED | OUTPATIENT
Start: 2023-08-09 | End: 2024-08-14

## 2023-08-09 RX ADMIN — LIDOCAINE HYDROCHLORIDE 4 ML: 10 INJECTION, SOLUTION INFILTRATION; PERINEURAL at 15:11

## 2023-08-09 RX ADMIN — METHYLPREDNISOLONE ACETATE 40 MG: 40 INJECTION, SUSPENSION INTRA-ARTICULAR; INTRALESIONAL; INTRAMUSCULAR; SOFT TISSUE at 15:11
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
## 2023-08-21 ENCOUNTER — OFFICE VISIT (OUTPATIENT)
Dept: AUDIOLOGY | Facility: CLINIC | Age: 83
End: 2023-08-21
Attending: FAMILY MEDICINE
Payer: COMMERCIAL

## 2023-08-21 DIAGNOSIS — H90.3 SENSORINEURAL HEARING LOSS, BILATERAL: Primary | ICD-10-CM

## 2023-08-21 DIAGNOSIS — H90.6 MIXED CONDUCTIVE AND SENSORINEURAL HEARING LOSS OF BOTH EARS: ICD-10-CM

## 2023-08-21 PROCEDURE — 92550 TYMPANOMETRY & REFLEX THRESH: CPT | Performed by: AUDIOLOGIST

## 2023-08-21 PROCEDURE — 92557 COMPREHENSIVE HEARING TEST: CPT | Performed by: AUDIOLOGIST

## 2023-08-21 NOTE — PROGRESS NOTES
AUDIOLOGY REPORT    SUBJECTIVE: Pavithra Laurent is an 83 year old female who was seen in the Audiology Clinic at [facility] for audiologic evaluation and hearing aid consultation, referred by Kitty Owesn M.D. The patient reports trouble hearing in noise. The patient reports hearing has been gradually getting worse. The patient reports tinnitus when in quiet situations in both ears. 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 The patient denies bilateral otalgia, bilateral drainage, bilateral aural fullness, dizziness, or a history of ear surgeries. The patient notes difficulty with communication in a variety of listening situations.     OBJECTIVE:  Abuse Screening:  Do you feel unsafe at home or work/school? No  Do you feel threatened by someone? No  Does anyone try to keep you from having contact with others, or doing things outside of your home? No  Physical signs of abuse present? No     Fall Risk Screening:  Have you fallen two or more times in the past year? No  Have you fallen and had an injury in the past year? No    Otoscopic exam indicated ears are clear of cerumen bilaterally     Pure Tone Thresholds assessed using conventional audiometry with good reliability from 250-8000 Hz bilaterally using insert earphones and circumaural headphones     RIGHT:  Mild sloping to severe sensorineural hearing loss    LEFT:    Normal hearing sloping to severe sensorineural hearing loss    Tympanogram:    RIGHT: Normal eardrum mobility    LEFT:   Normal eardrum mobility    Reflexes (reported by stimulus ear):    RIGHT: Ipsilateral is absent at frequencies tested    RIGHT: Contralateral is absent at frequencies tested    LEFT:   Ipsilateral is present at normal levels    LEFT:   Contralateral is absent at frequencies tested    Speech Reception Threshold:    RIGHT: 40 dB HL    LEFT:   30 dB HL    Word Recognition Score:     RIGHT: 80%  at 85 dB HL using NU-6 recorded word list    LEFT:   88% at 85 dB HL using NU-6 recorded word list    A courtesy check of the patient's insurance indicated she has hearing aid benefits through [employer]. She decided she would like to explore getting hearing aids elsewhere.     ASSESSMENT: Bilateral sensorineural hearing loss. Today s results were discussed with the patient in detail.     Hearing aid consultation was not completed today.     PLAN: The patient was counseled regarding hearing loss and impact on communication. She may consider a trial of hearing aids if so desired. Repeat audiologic evaluation is recommended if changes are noted. Please call this clinic with questions regarding these results or recommendations.      MINH SweeneyS.  Audiology Doctoral Student  [place] #[id number]    I was present with the patient for the entire Audiology appointment, including all procedures/testing performed by the Miguel student, and agree with the student s assessment and plan as documented.    Miguel Chung, [facility]  Licensed Audiologist  [place] #[id number]

## 2023-10-10 ENCOUNTER — OFFICE VISIT (OUTPATIENT)
Dept: URGENT CARE | Facility: URGENT CARE | Age: 83
End: 2023-10-10
Payer: COMMERCIAL

## 2023-10-10 ENCOUNTER — OFFICE VISIT (OUTPATIENT)
Dept: PEDIATRICS | Facility: CLINIC | Age: 83
End: 2023-10-10
Payer: COMMERCIAL

## 2023-10-10 ENCOUNTER — ANCILLARY PROCEDURE (OUTPATIENT)
Dept: ULTRASOUND IMAGING | Facility: CLINIC | Age: 83
End: 2023-10-10
Attending: PHYSICIAN ASSISTANT
Payer: COMMERCIAL

## 2023-10-10 VITALS
DIASTOLIC BLOOD PRESSURE: 80 MMHG | TEMPERATURE: 98.6 F | BODY MASS INDEX: 31.9 KG/M2 | HEART RATE: 72 BPM | OXYGEN SATURATION: 96 % | SYSTOLIC BLOOD PRESSURE: 147 MMHG | WEIGHT: 173 LBS

## 2023-10-10 VITALS
DIASTOLIC BLOOD PRESSURE: 80 MMHG | OXYGEN SATURATION: 96 % | SYSTOLIC BLOOD PRESSURE: 147 MMHG | HEART RATE: 72 BPM | TEMPERATURE: 98.6 F

## 2023-10-10 DIAGNOSIS — R60.0 BILATERAL LOWER EXTREMITY EDEMA: Primary | ICD-10-CM

## 2023-10-10 DIAGNOSIS — R60.0 BILATERAL LOWER EXTREMITY EDEMA: ICD-10-CM

## 2023-10-10 LAB
ALBUMIN SERPL BCG-MCNC: 3.9 G/DL (ref 3.5–5.2)
ALP SERPL-CCNC: 85 U/L (ref 35–104)
ALT SERPL W P-5'-P-CCNC: 11 U/L (ref 0–50)
ANION GAP SERPL CALCULATED.3IONS-SCNC: 13 MMOL/L (ref 7–15)
AST SERPL W P-5'-P-CCNC: 20 U/L (ref 0–45)
BILIRUB SERPL-MCNC: 1.1 MG/DL
BUN SERPL-MCNC: 8.5 MG/DL (ref 8–23)
CALCIUM SERPL-MCNC: 9 MG/DL (ref 8.8–10.2)
CHLORIDE SERPL-SCNC: 104 MMOL/L (ref 98–107)
CREAT SERPL-MCNC: 0.66 MG/DL (ref 0.51–0.95)
D DIMER PPP FEU-MCNC: 1.28 UG/ML FEU (ref 0–0.5)
DEPRECATED HCO3 PLAS-SCNC: 23 MMOL/L (ref 22–29)
EGFRCR SERPLBLD CKD-EPI 2021: 87 ML/MIN/1.73M2
ERYTHROCYTE [DISTWIDTH] IN BLOOD BY AUTOMATED COUNT: 14.5 % (ref 10–15)
GLUCOSE SERPL-MCNC: 107 MG/DL (ref 70–99)
HCT VFR BLD AUTO: 43.7 % (ref 35–47)
HGB BLD-MCNC: 13.2 G/DL (ref 11.7–15.7)
MCH RBC QN AUTO: 28.5 PG (ref 26.5–33)
MCHC RBC AUTO-ENTMCNC: 30.2 G/DL (ref 31.5–36.5)
MCV RBC AUTO: 94 FL (ref 78–100)
NT-PROBNP SERPL-MCNC: 280 PG/ML (ref 0–1800)
PLATELET # BLD AUTO: 187 10E3/UL (ref 150–450)
POTASSIUM SERPL-SCNC: 3.8 MMOL/L (ref 3.4–5.3)
PROT SERPL-MCNC: 6.7 G/DL (ref 6.4–8.3)
RBC # BLD AUTO: 4.63 10E6/UL (ref 3.8–5.2)
SODIUM SERPL-SCNC: 140 MMOL/L (ref 135–145)
WBC # BLD AUTO: 10.1 10E3/UL (ref 4–11)

## 2023-10-10 PROCEDURE — 99214 OFFICE O/P EST MOD 30 MIN: CPT | Performed by: PHYSICIAN ASSISTANT

## 2023-10-10 PROCEDURE — 85379 FIBRIN DEGRADATION QUANT: CPT | Performed by: PHYSICIAN ASSISTANT

## 2023-10-10 PROCEDURE — 80053 COMPREHEN METABOLIC PANEL: CPT | Performed by: PHYSICIAN ASSISTANT

## 2023-10-10 PROCEDURE — 93970 EXTREMITY STUDY: CPT

## 2023-10-10 PROCEDURE — 85027 COMPLETE CBC AUTOMATED: CPT | Performed by: PHYSICIAN ASSISTANT

## 2023-10-10 PROCEDURE — 36415 COLL VENOUS BLD VENIPUNCTURE: CPT | Performed by: PHYSICIAN ASSISTANT

## 2023-10-10 PROCEDURE — 83880 ASSAY OF NATRIURETIC PEPTIDE: CPT | Performed by: PHYSICIAN ASSISTANT

## 2023-10-10 PROCEDURE — 99207 REFERRAL TO ACUTE AND DIAGNOSTIC SERVICES: CPT | Performed by: EMERGENCY MEDICINE

## 2023-10-10 NOTE — PROGRESS NOTES
{PROVIDER CHARTING PREFERENCE:592875}    Subjective   Pavithra is a 83 year old, presenting for the following health issues:  No chief complaint on file.  {(!) Visit Details have not yet been documented.  Please enter Visit Details and then use this list to pull in documentation. (Optional):893237}    HPI     Evaluation for possible DVT  Onset/Duration: sunday  Description:       Location: bilat lower extremities        Redness: YES       Pain: 5/10       Warmth: YES       Joint swelling YES- bilat ankles  Progression of symptoms same  Accompanying signs and symptoms:       Fevers: no        Numbness/tingling/weakness: no        Chest pain/pleurisy: no        Shortness of breath: no   History        Trauma: no         Recent travel/when: no but bedridden for about 2 weeks with bronchitis         Previous history of DVT: YES- left ankle about 10 years ago        Family history of DVT: YES- family hx of vascular problems with father, sister, brother         Recent surgery: YES- knee replacement 3 years ago  Aggravating factors include: overuse  Therapies tried and outcome: rest/inactivity, acetaminophen, and elevation  Prior surgery on arteries of veins in this area: No  {Link to age adjusted D-dimer (UTD)   :585091}        Review of Systems   {ROS COMP (Optional):606440}      Objective    There were no vitals taken for this visit.  There is no height or weight on file to calculate BMI.  Physical Exam   {Exam List (Optional):824035}    {Diagnostic Test Results (Optional):223503}    {AMBULATORY ATTESTATION (Optional):727759}

## 2023-10-10 NOTE — PROGRESS NOTES
Assessment/Plan:    On my exam, lung sounds normal. No CP or SOB.   N Terminal Pro BNP normal, no sign of renal/hepatic disease on CMP. CBC also unremarkable.   D dimer elevated, so ultrasound was ordered. This was negative for DVT.   Likely venous insufficiency causing her swelling. Advised compression stockings, elevation.    See patient instructions below.    At the end of the encounter, I discussed results, diagnosis, medications. Discussed red flags for immediate return to clinic/ER, as well as indications for follow up if no improvement. Patient understood and agreed to plan. Patient was stable for discharge.      ICD-10-CM    1. Bilateral lower extremity edema  R60.0 D dimer quantitative     BNP-N terminal pro     Comprehensive metabolic panel     CBC with platelets     D dimer quantitative     BNP-N terminal pro     Comprehensive metabolic panel     CBC with platelets     US Lower Extremity Venous Duplex Bilateral     Referral to Acute and Diagnostic Services (Day of diagnostic / First order acute)            Return in about 2 weeks (around 10/24/2023) for Follow up w/ primary care provider if not better.    KLAUDIA Ruffin, PANATHALIE  [facility]  -----------------------------------------------------------------------------------------------------------------------------------------------------    HPI:  Pavithra Laurent is a 83 year old female who presents for evaluation of the following:    Evaluation for possible DVT  Onset/Duration: 2 days  Description:       Location: bilateral lower extremities        Redness: YES       Pain: 5/10       Warmth: YES       Joint swelling YES- bilat ankles  Progression of symptoms same  Accompanying signs and symptoms:       Fevers: no        Numbness/tingling/weakness: no        Chest pain/pleurisy: no        Shortness of breath: no   History        Trauma: no         Recent travel/when: no but bedridden for about 2 weeks with bronchitis  last month        Previous history of DVT: YES- LLE about 10 years ago        Recent surgery: no  Aggravating factors include: overuse  Therapies tried and outcome: rest/inactivity, acetaminophen, and elevation  Prior surgery on arteries of veins in this area: No      Past Medical History:   Diagnosis Date    Allergic rhinitis     Arthritis ???    Calculus of gallbladder without mention of cholecystitis or obstruction     Cataract of both eyes     Deep vein thrombosis (DVT) of left lower extremity, unspecified chronicity, unspecified vein (H) 2020    Dry eye syndrome     Environmental allergies     Gastro-oesophageal reflux disease     GERD (gastroesophageal reflux disease) 2013    Hypothyroid     Pain in joint, shoulder region     Rosacea     Thoracic outlet syndrome        Vitals:    10/10/23 1206   BP: (!) 147/80   Pulse: 72   Temp: 98.6  F (37  C)   SpO2: 96%       Physical Exam  Vitals and nursing note reviewed.   Cardiovascular:      Pulses:           Dorsalis pedis pulses are 3+ on the right side and 3+ on the left side.   Pulmonary:      Effort: Pulmonary effort is normal.      Breath sounds: Normal breath sounds.   Musculoskeletal:      Right lower le+ Pitting Edema present.      Left lower le+ Pitting Edema present.      Comments: Tenderness to bilateral shins  No calf tenderness  Mild venous stasis dermatitis change to lower extremities bilaterally   Neurological:      Mental Status: She is alert.         Labs/Imaging:  Results for orders placed or performed in visit on 10/10/23 (from the past 24 hour(s))   D dimer quantitative   Result Value Ref Range    D-Dimer Quantitative 1.28 (H) 0.00 - 0.50 ug/mL FEU    Narrative    This D-dimer assay is intended for use in conjunction with a clinical pretest probability assessment model to exclude pulmonary embolism (PE) and deep venous thrombosis (DVT) in outpatients suspected of PE or DVT. The cut-off value is 0.50 ug/mL FEU.    For patients  50 years of age or older, the application of age-adjusted cut-off values for D-Dimer may increase the specificity without significant effect on sensitivity. The literature suggested calculation age adjusted cut-off in ug/L = age in years x 10 ug/L. The results in this laboratory are reported as ug/mL rather than ug/L. The calculation for age adjusted cut off in ug/mL= age in years x 0.01 ug/mL. For example, the cut off for a 76 year old male is 76 x 0.01 ug/mL = 0.76 ug/mL (760 ug/L).    M Gabi et al. Age adjusted D-dimer cut-off levels to rule out pulmonary embolism: The ADJUST-PE Study. MICHELLE 2014;311:3642-4461.; HJ Serafin et al. Diagnostic accuracy of conventional or age adjusted D-dimer cutoff values in older patients with suspected venous thromboembolism. Systemic review and meta-analysis. BMJ 2013:346:f2492.   BNP-N terminal pro   Result Value Ref Range    N Terminal Pro BNP Outpatient 280 0 - 1,800 pg/mL   Comprehensive metabolic panel   Result Value Ref Range    Sodium 140 135 - 145 mmol/L    Potassium 3.8 3.4 - 5.3 mmol/L    Carbon Dioxide (CO2) 23 22 - 29 mmol/L    Anion Gap 13 7 - 15 mmol/L    Urea Nitrogen 8.5 8.0 - 23.0 mg/dL    Creatinine 0.66 0.51 - 0.95 mg/dL    GFR Estimate 87 >60 mL/min/1.73m2    Calcium 9.0 8.8 - 10.2 mg/dL    Chloride 104 98 - 107 mmol/L    Glucose 107 (H) 70 - 99 mg/dL    Alkaline Phosphatase 85 35 - 104 U/L    AST 20 0 - 45 U/L    ALT 11 0 - 50 U/L    Protein Total 6.7 6.4 - 8.3 g/dL    Albumin 3.9 3.5 - 5.2 g/dL    Bilirubin Total 1.1 <=1.2 mg/dL   CBC with platelets   Result Value Ref Range    WBC Count 10.1 4.0 - 11.0 10e3/uL    RBC Count 4.63 3.80 - 5.20 10e6/uL    Hemoglobin 13.2 11.7 - 15.7 g/dL    Hematocrit 43.7 35.0 - 47.0 %    MCV 94 78 - 100 fL    MCH 28.5 26.5 - 33.0 pg    MCHC 30.2 (L) 31.5 - 36.5 g/dL    RDW 14.5 10.0 - 15.0 %    Platelet Count 187 150 - 450 10e3/uL     No results found for this or any previous visit (from the past 24 hour(s)).    Results for  orders placed or performed in visit on 10/10/23   US Lower Extremity Venous Duplex Bilateral     Status: None    Narrative    EXAM: US LOWER EXTREMITY VENOUS DUPLEX BILATERAL  LOCATION: [facility]  DATE: 10/10/2023    INDICATION: bilateral lower extremity swelling, D dimer elevated, hx DVT  COMPARISON: None.  TECHNIQUE: Venous Duplex ultrasound of bilateral lower extremities with and without compression, augmentation and duplex. Color flow and spectral Doppler with waveform analysis performed.    FINDINGS: Exam includes the common femoral, femoral, popliteal veins as well as segmentally visualized deep calf veins and greater saphenous vein.     RIGHT: No deep vein thrombosis. No superficial thrombophlebitis. No popliteal cyst.    LEFT: No deep vein thrombosis. No superficial thrombophlebitis. No popliteal cyst.      Impression    IMPRESSION:  No deep venous thrombosis in the bilateral lower extremities.   Results for orders placed or performed in visit on 10/10/23   D dimer quantitative     Status: Abnormal   Result Value Ref Range    D-Dimer Quantitative 1.28 (H) 0.00 - 0.50 ug/mL FEU    Narrative    This D-dimer assay is intended for use in conjunction with a clinical pretest probability assessment model to exclude pulmonary embolism (PE) and deep venous thrombosis (DVT) in outpatients suspected of PE or DVT. The cut-off value is 0.50 ug/mL FEU.    For patients 50 years of age or older, the application of age-adjusted cut-off values for D-Dimer may increase the specificity without significant effect on sensitivity. The literature suggested calculation age adjusted cut-off in ug/L = age in years x 10 ug/L. The results in this laboratory are reported as ug/mL rather than ug/L. The calculation for age adjusted cut off in ug/mL= age in years x 0.01 ug/mL. For example, the cut off for a 76 year old male is 76 x 0.01 ug/mL = 0.76 ug/mL (760 ug/L).    FRANK Ashley et al. Age adjusted D-dimer  cut-off levels to rule out pulmonary embolism: The ADJUST-PE Study. MICHELLE 2014;311:5632-1307.; HJ Serafin et al. Diagnostic accuracy of conventional or age adjusted D-dimer cutoff values in older patients with suspected venous thromboembolism. Systemic review and meta-analysis. BMJ 2013:346:f2492.   BNP-N terminal pro     Status: Normal   Result Value Ref Range    N Terminal Pro BNP Outpatient 280 0 - 1,800 pg/mL   Comprehensive metabolic panel     Status: Abnormal   Result Value Ref Range    Sodium 140 135 - 145 mmol/L    Potassium 3.8 3.4 - 5.3 mmol/L    Carbon Dioxide (CO2) 23 22 - 29 mmol/L    Anion Gap 13 7 - 15 mmol/L    Urea Nitrogen 8.5 8.0 - 23.0 mg/dL    Creatinine 0.66 0.51 - 0.95 mg/dL    GFR Estimate 87 >60 mL/min/1.73m2    Calcium 9.0 8.8 - 10.2 mg/dL    Chloride 104 98 - 107 mmol/L    Glucose 107 (H) 70 - 99 mg/dL    Alkaline Phosphatase 85 35 - 104 U/L    AST 20 0 - 45 U/L    ALT 11 0 - 50 U/L    Protein Total 6.7 6.4 - 8.3 g/dL    Albumin 3.9 3.5 - 5.2 g/dL    Bilirubin Total 1.1 <=1.2 mg/dL   CBC with platelets     Status: Abnormal   Result Value Ref Range    WBC Count 10.1 4.0 - 11.0 10e3/uL    RBC Count 4.63 3.80 - 5.20 10e6/uL    Hemoglobin 13.2 11.7 - 15.7 g/dL    Hematocrit 43.7 35.0 - 47.0 %    MCV 94 78 - 100 fL    MCH 28.5 26.5 - 33.0 pg    MCHC 30.2 (L) 31.5 - 36.5 g/dL    RDW 14.5 10.0 - 15.0 %    Platelet Count 187 150 - 450 10e3/uL       There are no Patient Instructions on file for this visit.

## 2023-10-10 NOTE — PROGRESS NOTES
Assessment & Plan     Diagnosis:    ICD-10-CM    1. Bilateral lower extremity edema  R60.0             Medical Decision Making:  Pavithra Laurent is a 83 year old female who presents with bilateral leg swelling.    The differential diagnosis for lower extremity edema is:    Cardiac: CHF (right sided), effusion/pericarditis, valvular disease: No hypoxia, lungs do have diminished breath sounds at bases. No pre-syncope symptoms.    Non-Cardiac: DVT, Cirrhosis, nephrotic syndrome, cellulitis, Baker's cyst, compartment syndrome, lymphatic obstruction, no pain with passive ROM of ankle, no erythema, no sign of cellulitis. Does have swelling L > R.     Due to joint involvement this could also be : No sign of septic joint, bursitis, arthritis, trauma.    Regardless, discussed given history of blood clots, diminished breath sounds in bilateral lower extremity edema she requires further work-up to rule out CHF/DVT or other cardiac or kidney/liver problem I directed her to go to the [facility] now in [place] here for further evaluation now.    Referral to Acute and Diagnostic Services    [phone] ([address], [address], [place] [address]    Transition to Acute & Diagnostic Services Clinic has been discussed with patient, and she agrees with next level of care.   Patient understands that evaluation/treatment at [facility] typically takes significantly longer than in clinic/urgent care (>2 hours).  [facility] has been contacted by provider/staff to confirm patient acceptance.         Special issues:      None                     The following provider has assessed this patient for intervention at [facility], and directed the patient for referral: FLORIDALMA Wright PA-C  [facility]    Subjective     Pavithra Laurent is a 83 year old female who presents to clinic today for the following health issues:  Chief Complaint   Patient  presents with    Urgent Care     Swollen legs ,redness, left is worse x Sunday night hx of blood clots   Elevated both legs gotten better for a day and became swollen        HPI  Patient reports the last 3 days she has been experiencing bilateral lower extremity swelling, left greater than right.  She does have a history of DVT 6 years ago, is not on any anticoagulation now.  She states that in September she was bedridden for a few weeks with bronchitis, has felt somewhat weaker since then.  She denies any chest pain, shortness of breath, history of heart disease, CHF or ACS.  She has had no injuries to the leg.  She does feel that the left leg was a little more red than the right a few days ago, this is gotten a little bit better.  No dizziness, lightheadedness, fevers, red streaks going up the leg or other concerns noted.    Review of Systems    See HPI    Objective      Vitals: BP (!) 147/80   Pulse 72   Temp 98.6  F (37  C) (Oral)   Wt 78.5 kg (173 lb)   SpO2 96%   BMI 31.90 kg/m        Patient Vitals for the past 24 hrs:   BP Temp Temp src Pulse SpO2 Weight   10/10/23 1134 (!) 147/80 98.6  F (37  C) Oral 72 96 % 78.5 kg (173 lb)       Vital signs reviewed by: Ramana Tavera PA-C    Physical Exam   Constitutional: Patient is alert and cooperative. No acute distress.  Cardiovascular: Regular rate and rhythm  Pulmonary/Chest: Diminished breath sounds at the bases bilaterally. Effort normal. No respiratory distress. No wheezes, rales or rhonchi.  GI: Abdomen is soft and non-tender throughout.   Neurological: Alert and oriented x3.   Bilateral lower extremity pitting edema; L >R. Slight erythema to the left leg without warmth or tenderness. No palpable cords. No fluctuance or is of pointing.  No lymphangitis.  Compartments are soft throughout the legs.  Psychiatric:The patient has a normal mood and affect.     Ramana Tavera PA-C, October 10, 2023

## 2023-10-27 ENCOUNTER — TELEPHONE (OUTPATIENT)
Dept: FAMILY MEDICINE | Facility: CLINIC | Age: 83
End: 2023-10-27
Payer: COMMERCIAL

## 2023-10-27 NOTE — TELEPHONE ENCOUNTER
Glad she has a good doc there!  No need to sign YOKO - we have access via Care Everywhere to [facility] records.    Dr. Kitty Owens MD / [facility]

## 2023-10-27 NOTE — TELEPHONE ENCOUNTER
Patient states that when she called t cancel an appointment with Dr Boo the  thought patient should update PCP.    Patient was trying to get in with Dr Owens but was told earliest was February so she then scheduled an appointment with Dr Boo.  In the meantime, patient was able to get an appointment at the [facility] and is already scheduled for lymphedema therapy.  Patient stated the provider she is seeing, Dr Josefa Martin, makes her feel comfortable and her confidence in Dr Martin is almost as good as her confidence in Dr Owens.  If provider would like information from the [facility] clinic, patient will sign YOKO for that clinic.  Tiffany Hernandez RN  [facility]

## 2023-10-30 NOTE — TELEPHONE ENCOUNTER
Called and advised patient per Dr. Owens.    Denise Price, RN, BSN, PHN  [facility]  [phone]

## 2023-12-13 ENCOUNTER — NURSE TRIAGE (OUTPATIENT)
Dept: FAMILY MEDICINE | Facility: CLINIC | Age: 83
End: 2023-12-13
Payer: COMMERCIAL

## 2023-12-13 ENCOUNTER — TELEPHONE (OUTPATIENT)
Dept: FAMILY MEDICINE | Facility: CLINIC | Age: 83
End: 2023-12-13
Payer: COMMERCIAL

## 2023-12-13 ENCOUNTER — MYC MEDICAL ADVICE (OUTPATIENT)
Dept: FAMILY MEDICINE | Facility: CLINIC | Age: 83
End: 2023-12-13
Payer: COMMERCIAL

## 2023-12-13 DIAGNOSIS — U07.1 POSITIVE SELF-ADMINISTERED ANTIGEN TEST FOR COVID-19: Primary | ICD-10-CM

## 2023-12-13 PROCEDURE — 99207 PR NO CHARGE NURSE ONLY: CPT | Performed by: FAMILY MEDICINE

## 2023-12-13 NOTE — TELEPHONE ENCOUNTER
Reason for Disposition   COVID-19 diagnosed by positive lab test (e.g., PCR, rapid self-test kit) and NO symptoms (e.g., cough, fever, others)    Additional Information   Negative: SEVERE difficulty breathing (e.g., struggling for each breath, speaks in single words)   Negative: Difficult to awaken or acting confused (e.g., disoriented, slurred speech)   Negative: Bluish (or gray) lips or face now   Negative: Shock suspected (e.g., cold/pale/clammy skin, too weak to stand, low BP, rapid pulse)   Negative: Sounds like a life-threatening emergency to the triager   Negative: COVID-19 vaccine, questions about   Negative: Lives with someone known to have influenza (flu test positive) and flu-like symptoms (e.g., cough, runny nose, sore throat, SOB; with or without fever)   Negative: Diagnosed or suspected COVID-19 and symptoms lasting 3 or more weeks   Negative: COVID-19 exposure and no symptoms   Negative: COVID-19 vaccine reaction suspected (e.g., fever, headache, muscle aches) occurring 1 to 3 days after getting vaccine   Negative: Possible COVID-19 symptoms and triager concerned about severity of symptoms or other causes   Negative: COVID-19 and breastfeeding, questions about   Negative: SEVERE or constant chest pain or pressure  (Exception: Mild central chest pain, present only when coughing.)   Negative: MODERATE difficulty breathing (e.g., speaks in phrases, SOB even at rest, pulse 100-120)   Negative: Headache and stiff neck (can't touch chin to chest)   Negative: Oxygen level (e.g., pulse oximetry) 90% or lower   Negative: Chest pain or pressure  (Exception: MILD central chest pain, present only when coughing.)   Negative: Drinking very little and dehydration suspected (e.g., no urine > 12 hours, very dry mouth, very lightheaded)   Negative: Patient sounds very sick or weak to the triager   Negative: HIGH RISK patient (e.g., weak immune system, age > 64 years, obesity with BMI of 30 or higher, pregnant, chronic  "lung disease or other chronic medical condition) and COVID symptoms (e.g., cough, fever)  (Exceptions: Already seen by doctor or NP/PA and no new or worsening symptoms.)   Negative: Fever > 103 F (39.4 C)   Negative: Fever > 101 F (38.3 C) and over 60 years of age   Negative: MILD difficulty breathing (e.g., minimal/no SOB at rest, SOB with walking, pulse <100)   Negative: Fever > 100.0 F (37.8 C) and bedridden (e.g., CVA, chronic illness, recovering from surgery)   Negative: HIGH RISK patient and influenza is widespread in the community and ONE OR MORE respiratory symptoms: cough, sore throat, runny or stuffy nose   Negative: HIGH RISK patient and influenza exposure within the last 7 days and ONE OR MORE respiratory symptoms: cough, sore throat, runny or stuffy nose   Negative: Oxygen level (e.g., pulse oximetry) 91 to 94%   Negative: COVID-19 infection suspected by caller or triager and mild symptoms (cough, fever, or others) and negative COVID-19 rapid test   Negative: Fever present > 3 days (72 hours)   Negative: Fever returns after gone for over 24 hours and symptoms worse or not improved   Negative: Continuous (nonstop) coughing interferes with work or school and no improvement using cough treatment per Care Advice   Negative: Cough present > 3 weeks    Answer Assessment - Initial Assessment Questions  1. COVID-19 DIAGNOSIS: \"How do you know that you have COVID?\" (e.g., positive lab test or self-test, diagnosed by doctor or NP/PA, symptoms after exposure).      At home COVID test this morning with a POSITIVE result.   2. COVID-19 EXPOSURE: \"Was there any known exposure to COVID before the symptoms began?\" CDC Definition of close contact: within 6 feet (2 meters) for a total of 15 minutes or more over a 24-hour period.       Yes, while out shopping.    3. ONSET: \"When did the COVID-19 symptoms start?\"       Yesterday morning with a scratching throat.   4. WORST SYMPTOM: \"What is your worst symptom?\" (e.g., " "cough, fever, shortness of breath, muscle aches)      Running nose, aching, chills, coughing. Denied SOB.   5. COUGH: \"Do you have a cough?\" If Yes, ask: \"How bad is the cough?\"        cough  6. FEVER: \"Do you have a fever?\" If Yes, ask: \"What is your temperature, how was it measured, and when did it start?\"      Chills. Does not have thermometer to monitor fever but feeling warmer than usual.   7. RESPIRATORY STATUS: \"Describe your breathing?\" (e.g., normal; shortness of breath, wheezing, unable to speak)       Denied  8. BETTER-SAME-WORSE: \"Are you getting better, staying the same or getting worse compared to yesterday?\"  If getting worse, ask, \"In what way?\"      Worse.   9. OTHER SYMPTOMS: \"Do you have any other symptoms?\"  (e.g., chills, fatigue, headache, loss of smell or taste, muscle pain, sore throat)      Chills, body aches, fatigue, sore throat and muscle pain.   10. HIGH RISK DISEASE: \"Do you have any chronic medical problems?\" (e.g., asthma, heart or lung disease, weak immune system, obesity, etc.)        denied  11. VACCINE: \"Have you had the COVID-19 vaccine?\" If Yes, ask: \"Which one, how many shots, when did you get it?\"        Yes, all the vaccine, pfizer.   12. PREGNANCY: \"Is there any chance you are pregnant?\" \"When was your last menstrual period?\"        N/a  13. O2 SATURATION MONITOR:  \"Do you use an oxygen saturation monitor (pulse oximeter) at home?\" If Yes, ask \"What is your reading (oxygen level) today?\" \"What is your usual oxygen saturation reading?\" (e.g., 95%)        N/a.    Protocols used: Coronavirus (COVID-19) Diagnosed or [id number]    "

## 2023-12-13 NOTE — TELEPHONE ENCOUNTER
RN COVID TREATMENT VISIT  12/13/23      The patient has been triaged and does not require a higher level of care.    Pavithra Laurent  83 year old  Current weight? 173    Has the patient been seen by a primary care provider at an [facility] or [facility] within the past two years? Yes.   Have you been in close proximity to/do you have a known exposure to a person with a confirmed case of influenza? No.     General treatment eligibility:  Date of positive COVID test (PCR or at home)?  12/13/2023    Are you or have you been hospitalized for this COVID-19 infection? No.   Have you received monoclonal antibodies or antiviral treatment for COVID-19 since this positive test? No.   Do you have any of the following conditions that place you at risk of being very sick from COVID-19?   - Age 50 years or older  - Overweight or Obesity (BMI >85th percentile or BMI 25 or higher)  Yes, patient has at least one high risk condition as noted above.     Current COVID symptoms:   - fever or chills  - cough  - fatigue  - muscle or body aches  - headache  - sore throat  - congestion or runny nose  Yes. Patient has at least one symptom as selected.     How many days since symptoms started? 5 days or less. Established patient, 12 years or older weighing at least 88.2 lbs, who has symptoms that started in the past 5 days, has not been hospitalized nor received treatment already, and is at risk for being very sick from COVID-19.     Treatment eligibility by RN:  Are you currently pregnant or nursing? No  Do you have a clinically significant hypersensitivity to nirmatrelvir or ritonavir, or toxic epidermal necrolysis (TEN) or Hauser-Roman Syndrome? No  Do you have a history of hepatitis, any hepatic impairment on the Problem List (such as Child-Cunha Class C, cirrhosis, fatty liver disease, alcoholic liver disease), or was the last liver lab (hepatic panel, ALT, AST, ALK Phos, bilirubin) elevated in the past 6  months? No  Do you have any history of severe renal impairment (eGFR < 30mL/min)? No    Is patient eligible to continue? Yes, patient meets all eligibility requirements for the RN COVID treatment (as denoted by all no responses above).     Current Outpatient Medications   Medication Sig Dispense Refill    acetaminophen (TYLENOL) 650 MG CR tablet Take 650 mg by mouth every 8 hours as needed for mild pain      aspirin (ASA) 81 MG EC tablet Take 1 tablet (81 mg) by mouth daily 30 tablet 0    calcium-vitamin D-vitamin K (VIACTIV) 500-500-40 MG-UNT-MCG CHEW Take 2 tablets by mouth 2 times daily      desonide (DESOWEN) 0.05 % cream Apply topically 2 times daily as needed (rash)      fluticasone (FLONASE) 50 MCG/ACT nasal spray Spray 1 spray into both nostrils daily      gabapentin (NEURONTIN) 100 MG capsule Take 1 capsule (100 mg) by mouth 2 times daily as needed for neuropathic pain Do not take more than 2 capsules daily in addition to maximum 400 mg (max dose 1800 mg daily) 180 capsule 3    gabapentin (NEURONTIN) 400 MG capsule Take 1 capsule (400 mg) by mouth 4 times daily as needed for neuropathic pain (max dose 1800 mg daily) 360 capsule 3    hypromellose (ARTIFICIAL TEARS) 0.5 % SOLN ophthalmic solution Place 1 drop into both eyes 4 times daily as needed for dry eyes      levothyroxine (SYNTHROID/LEVOTHROID) 88 MCG tablet Take 1 tablet (88 mcg) by mouth daily 90 tablet 3    montelukast (SINGULAIR) 10 MG tablet Take 10 mg by mouth At Bedtime      multivitamin (CENTRUM SILVER) tablet Take 1 tablet by mouth daily      nystatin (MYCOSTATIN) 772492 UNIT/GM external powder Apply topically 2 times daily as needed for other (yeast infection in skin folds) 60 g 1    Olopatadine HCl (PATADAY OP)          Medications from List 1 of the standing order (on medications that exclude the use of Paxlovid) that patient is taking: NONE. Is patient taking Ricardo's Wort? No  Is patient taking Ricardo's Wort or any meds from List 1?  No.   Medications from List 2 of the standing order (on meds that provider needs to adjust) that patient is taking: NONE. Is patient on any of the meds from List 2? No.   Medications from List 3 of standing order (on meds that a RN needs to adjust) that patient is taking: NONE. Is patient on any meds from List 3? No.     Paxlovid has an approximate 90% reduction in hospitalization. Paxlovid can possibly cause altered sense of taste, diarrhea (loose, watery stools), high blood pressure, muscle aches.     Would patient like a Paxlovid prescription?   Yes.   Lab Results   Component Value Date    GFRESTIMATED 87 10/10/2023       Was last eGFR reduced? No, eGFR 60 or greater/ No Result on record. Patient can receive the normal renal function dose. Paxlovid Rx sent to [facility]    Temporary change to home medications: None    All medication adjustments (holds, etc) were discussed with the patient and patient was asked to repeat back (teachback) their med adjustment.  Did patient understand med adjustment? No medication adjustments needed.         Reviewed the following instructions with the patient:    Paxlovid (nimatrelvir and ritonavir)    How it works  Two medicines (nirmatrelvir and ritonavir) are taken together. They stop the virus from growing. Less amount of virus is easier for your body to fight.    How to take  Medicine comes in a daily container with both medicine tablets. Take by mouth twice daily (once in the morning, once at night) for 5 days.  The number of tablets to take varies by patient.  Don't chew or break capsules. Swallow whole.    When to take  Take as soon as possible after positive COVID-19 test result, and within 5 days of your first symptoms.    Possible side effects  Can cause altered sense of taste, diarrhea (loose, watery stools), high blood pressure, muscle aches.    Javier Lee RN

## 2023-12-14 NOTE — TELEPHONE ENCOUNTER
RN sent mychart below.    Afterwards, nurse noted that pt has already been prescribed Paxlovid and will close encounter.     Arleen MARIE RN

## 2024-01-03 ENCOUNTER — NURSE TRIAGE (OUTPATIENT)
Dept: FAMILY MEDICINE | Facility: CLINIC | Age: 84
End: 2024-01-03

## 2024-01-03 ENCOUNTER — MYC MEDICAL ADVICE (OUTPATIENT)
Dept: FAMILY MEDICINE | Facility: CLINIC | Age: 84
End: 2024-01-03

## 2024-01-03 NOTE — TELEPHONE ENCOUNTER
When taking a breath, not feeling like she is getting air.  Had bronchitis and then covid right before yanet.    Today, pt is just feeling worse.  Just feels so tired and fatigued.  Feels like she is not getting enough air.  Pt feels she had good help from Paxlovid.  Was put on prednisone by a allergy MD.     PT has no cough or fever.  PT did pool therapy today.     Rec pt be seen today.  PT will try to get seen in  this afternoon. If she can't get seen, pt will go to ER today.  JOSE RAFAEL Sanchez       Reason for Disposition   MILD difficulty breathing (e.g., minimal/no SOB at rest, SOB with walking, pulse < 100) of new-onset or worse than normal    Additional Information   Negative: SEVERE difficulty breathing (e.g., struggling for each breath, speaks in single words, pulse > 120)   Negative: Breathing stopped and hasn't returned   Negative: Choking on something   Negative: Bluish (or gray) lips or face   Negative: Difficult to awaken or acting confused (e.g., disoriented, slurred speech)   Negative: Passed out (i.e., fainted, collapsed and was not responding)   Negative: Wheezing started suddenly after medicine, an allergic food, or bee sting   Negative: Stridor (harsh sound while breathing in)   Negative: Slow, shallow and weak breathing   Negative: Sounds like a life-threatening emergency to the triager    Protocols used: Breathing Difficulty-A-OH

## 2024-01-03 NOTE — TELEPHONE ENCOUNTER
I converted this to a triage call.      When taking a breath, not feeling like she is getting air.  Had bronchitis and then covid right before yanet.    Today, pt is just feeling worse.  Just feels so tired and fatigued.  Feels like she is not getting enough air.  Pt feels she had good help from Paxlovid.  Was put on prednisone by a allergy MD.    PT has no cough or fever.  PT did pool therapy today.    Rec pt be seen today.  PT will try to get seen in UC this afternoon. If she can't get seen, pt will go to ER today.  JOSE RAFAEL Sanchez

## 2024-01-05 ENCOUNTER — MYC MEDICAL ADVICE (OUTPATIENT)
Dept: FAMILY MEDICINE | Facility: CLINIC | Age: 84
End: 2024-01-05
Payer: COMMERCIAL

## 2024-01-09 NOTE — TELEPHONE ENCOUNTER
Can you let Pavithra know:    Thanks for the update.  If she is not feeling better after Dr. Drake's most recent regimen change with inhalers, I think she should make an appointment with one of us here at [place]. ISHAN would be okay    Thanks,  Dr. Kitty Owens MD / [facility]

## 2024-01-09 NOTE — TELEPHONE ENCOUNTER
Writer responded via [employer].  NELLI CrawfordN, RN-BC  [facility]

## 2024-01-17 ENCOUNTER — IMMUNIZATION (OUTPATIENT)
Dept: FAMILY MEDICINE | Facility: CLINIC | Age: 84
End: 2024-01-17
Payer: COMMERCIAL

## 2024-01-17 DIAGNOSIS — Z23 NEED FOR PROPHYLACTIC VACCINATION AND INOCULATION AGAINST INFLUENZA: Primary | ICD-10-CM

## 2024-01-17 PROCEDURE — 90480 ADMN SARSCOV2 VAC 1/ONLY CMP: CPT

## 2024-01-17 PROCEDURE — G0008 ADMIN INFLUENZA VIRUS VAC: HCPCS

## 2024-01-17 PROCEDURE — 99207 PR NO CHARGE NURSE ONLY: CPT

## 2024-01-17 PROCEDURE — 90662 IIV NO PRSV INCREASED AG IM: CPT

## 2024-01-17 PROCEDURE — 91320 SARSCV2 VAC 30MCG TRS-SUC IM: CPT

## 2024-01-17 NOTE — PROGRESS NOTES
Prior to immunization administration, verified patients identity using patient s name and date of birth. Please see Immunization Activity for additional information.     Screening Questionnaire for Adult Immunization    Are you sick today?   No   Do you have allergies to medications, food, a vaccine component or latex?   No   Have you ever had a serious reaction after receiving a vaccination?   No   Do you have a long-term health problem with heart, lung, kidney, or metabolic disease (e.g., diabetes), asthma, a blood disorder, no spleen, complement component deficiency, a cochlear implant, or a spinal fluid leak?  Are you on long-term aspirin therapy?   No   Do you have cancer, leukemia, HIV/AIDS, or any other immune system problem?   No   Do you have a parent, brother, or sister with an immune system problem?   No   In the past 3 months, have you taken medications that affect  your immune system, such as prednisone, other steroids, or anticancer drugs; drugs for the treatment of rheumatoid arthritis, Crohn s disease, or psoriasis; or have you had radiation treatments?   No   Have you had a seizure, or a brain or other nervous system problem?   No   During the past year, have you received a transfusion of blood or blood    products, or been given immune (gamma) globulin or antiviral drug?   No   For women: Are you pregnant or is there a chance you could become       pregnant during the next month?   No   Have you received any vaccinations in the past 4 weeks?   No     Immunization questionnaire answers were all negative.    I have reviewed the following standing orders:   This patient is due and qualifies for the Covid-19 vaccine.     Click here for COVID-19 Standing Order    I have reviewed the vaccines inclusion and exclusion criteria; No concerns regarding eligibility.     This patient is due and qualifies for the Influenza vaccine.    Click here for Influenza Vaccine Standing Order    I have reviewed the  vaccines inclusion and exclusion criteria; No concerns regarding eligibility.     Patient instructed to remain in clinic for 15 minutes afterwards, and to report any adverse reactions.     Screening performed by Miguel Angulo CMA on 1/17/2024 at 3:10 PM.

## 2024-03-09 ENCOUNTER — MYC MEDICAL ADVICE (OUTPATIENT)
Dept: FAMILY MEDICINE | Facility: CLINIC | Age: 84
End: 2024-03-09
Payer: COMMERCIAL

## 2024-03-11 NOTE — TELEPHONE ENCOUNTER
Message sent via [employer].    Denise Price, RN, BSN, PHN  [facility]  [phone]

## 2024-03-30 NOTE — TELEPHONE ENCOUNTER
Patient Contact    Attempt # 1    Was call answered?  No.  Left message on voicemail with information to call back and schedule an appointment.           yes

## 2024-04-15 NOTE — TELEPHONE ENCOUNTER
7/18/22 was last OV.  Prescription approved per [facility] Refill Protocol.  JOSE RAFAEL Sanchez    
CPAP use

## 2024-07-24 ENCOUNTER — OFFICE VISIT (OUTPATIENT)
Dept: FAMILY MEDICINE | Facility: CLINIC | Age: 84
End: 2024-07-24
Attending: FAMILY MEDICINE
Payer: COMMERCIAL

## 2024-07-24 VITALS
HEART RATE: 80 BPM | SYSTOLIC BLOOD PRESSURE: 120 MMHG | WEIGHT: 169.4 LBS | BODY MASS INDEX: 31.98 KG/M2 | TEMPERATURE: 98.1 F | RESPIRATION RATE: 12 BRPM | DIASTOLIC BLOOD PRESSURE: 52 MMHG | OXYGEN SATURATION: 97 % | HEIGHT: 61 IN

## 2024-07-24 DIAGNOSIS — E03.9 ACQUIRED HYPOTHYROIDISM: ICD-10-CM

## 2024-07-24 DIAGNOSIS — M79.89 SWELLING OF TOE OF RIGHT FOOT: ICD-10-CM

## 2024-07-24 DIAGNOSIS — B36.9 FUNGAL RASH OF TORSO: ICD-10-CM

## 2024-07-24 DIAGNOSIS — F41.9 ANXIETY: ICD-10-CM

## 2024-07-24 DIAGNOSIS — M35.01 SICCA SYNDROME WITH KERATOCONJUNCTIVITIS (H): ICD-10-CM

## 2024-07-24 DIAGNOSIS — M79.2 NEUROPATHIC PAIN OF RIGHT FOREARM: ICD-10-CM

## 2024-07-24 DIAGNOSIS — Z00.00 ENCOUNTER FOR MEDICARE ANNUAL WELLNESS EXAM: Primary | ICD-10-CM

## 2024-07-24 DIAGNOSIS — M10.071 ACUTE IDIOPATHIC GOUT INVOLVING TOE OF RIGHT FOOT: ICD-10-CM

## 2024-07-24 PROBLEM — M17.12 ARTHRITIS OF LEFT KNEE: Status: RESOLVED | Noted: 2021-03-31 | Resolved: 2024-07-24

## 2024-07-24 PROBLEM — M25.562 ACUTE PAIN OF LEFT KNEE: Status: RESOLVED | Noted: 2021-06-30 | Resolved: 2024-07-24

## 2024-07-24 PROBLEM — L03.90 CELLULITIS: Status: RESOLVED | Noted: 2021-06-25 | Resolved: 2024-07-24

## 2024-07-24 PROBLEM — H16.229 SICCA SYNDROME WITH KERATOCONJUNCTIVITIS: Status: ACTIVE | Noted: 2024-07-24

## 2024-07-24 PROCEDURE — 84443 ASSAY THYROID STIM HORMONE: CPT | Performed by: FAMILY MEDICINE

## 2024-07-24 PROCEDURE — G0438 PPPS, INITIAL VISIT: HCPCS | Performed by: FAMILY MEDICINE

## 2024-07-24 PROCEDURE — 99214 OFFICE O/P EST MOD 30 MIN: CPT | Mod: 25 | Performed by: FAMILY MEDICINE

## 2024-07-24 PROCEDURE — 84550 ASSAY OF BLOOD/URIC ACID: CPT | Performed by: FAMILY MEDICINE

## 2024-07-24 PROCEDURE — 36415 COLL VENOUS BLD VENIPUNCTURE: CPT | Performed by: FAMILY MEDICINE

## 2024-07-24 PROCEDURE — 80053 COMPREHEN METABOLIC PANEL: CPT | Performed by: FAMILY MEDICINE

## 2024-07-24 RX ORDER — SERTRALINE HYDROCHLORIDE 25 MG/1
TABLET, FILM COATED ORAL
Qty: 134 TABLET | Refills: 0 | Status: SHIPPED | OUTPATIENT
Start: 2024-07-24 | End: 2024-09-11

## 2024-07-24 RX ORDER — LEVOTHYROXINE SODIUM 88 UG/1
88 TABLET ORAL DAILY
Qty: 90 TABLET | Refills: 3 | Status: SHIPPED | OUTPATIENT
Start: 2024-07-24

## 2024-07-24 RX ORDER — TRIAMCINOLONE ACETONIDE 1 MG/G
OINTMENT TOPICAL
COMMUNITY
Start: 2024-07-23 | End: 2024-08-14

## 2024-07-24 RX ORDER — GABAPENTIN 400 MG/1
400 CAPSULE ORAL 4 TIMES DAILY PRN
Qty: 360 CAPSULE | Refills: 3 | Status: SHIPPED | OUTPATIENT
Start: 2024-07-24

## 2024-07-24 RX ORDER — FLUCONAZOLE 100 MG/1
TABLET ORAL
COMMUNITY
Start: 2024-07-23

## 2024-07-24 RX ORDER — GABAPENTIN 100 MG/1
100 CAPSULE ORAL 2 TIMES DAILY PRN
Qty: 180 CAPSULE | Refills: 3 | Status: SHIPPED | OUTPATIENT
Start: 2024-07-24

## 2024-07-24 SDOH — HEALTH STABILITY: PHYSICAL HEALTH: ON AVERAGE, HOW MANY DAYS PER WEEK DO YOU ENGAGE IN MODERATE TO STRENUOUS EXERCISE (LIKE A BRISK WALK)?: 2 DAYS

## 2024-07-24 ASSESSMENT — SOCIAL DETERMINANTS OF HEALTH (SDOH): HOW OFTEN DO YOU GET TOGETHER WITH FRIENDS OR RELATIVES?: TWICE A WEEK

## 2024-07-24 ASSESSMENT — PAIN SCALES - GENERAL: PAINLEVEL: MODERATE PAIN (5)

## 2024-07-24 NOTE — PROGRESS NOTES
Preventive Care Visit  [facility]  Kitty Owens MD, Family Medicine  Jul 24, 2024      Assessment & Plan     Problem List as of 7/24/2024 Reviewed: 8/9/2023  1:11 PM by Yemi Bernstein MD            Noted       High    1. Encounter for Medicare annual wellness exam - Primary 7/24/2024     Overview Addendum 7/24/2024  6:41 PM by Kitty Owens MD      07/24/2024 A/P:  84 year old well known to me.  Tough year - move, lots of illnesses, lots of stress.  Son bipolar and lives in group home; worries about him.  Mammo: over 75, after discussion 2023 with patient last year has elected to discontinue routine mammograms   Colon: over 75, has had normal colonoscopies (last 2005), further screening not indicated  Dexa: had 2007 per patient report, declines further testing  Immunizations: due for Shingrix, RSV, Covid.  Rec do at pharmacy.  Labs: TSH and CMP today for monitoring.  Uric acid for new swelling toe  Discussed healthy lifestyle and healthy aging recommendations including the importance of:  regular exercise & weight lifting  adequate and regular sleep, at least 7 hrs nightly   5+ fruits and veggies daily, whole grains, limit processed food                Medium    2. Acquired hypothyroidism 5/24/2013     Overview Signed 7/24/2024  6:42 PM by Kitty Owens MD      07/24/2024 A/P:  Well controlled, stable on 75 mcg levothyroxine.  TSH goal ~5  Check today, if still low decrease dose as may be contributing to anxiety  TSH   Date Value Ref Range Status   07/07/2023 1.34 0.30 - 4.20 uIU/mL Final   07/18/2022 1.08 0.40 - 4.00 mU/L Final   05/26/2021 1.39 0.40 - 4.00 mU/L Final                 Relevant Medications     levothyroxine (SYNTHROID/LEVOTHROID) 88 MCG tablet     Other Relevant Orders     TSH WITH FREE T4 REFLEX     Comprehensive metabolic panel (BMP + Alb, Alk Phos, ALT, AST, Total. Bili, TP)    3. Anxiety 7/24/2024     Overview Signed 7/24/2024  6:44 PM by  Kitty Owens MD      07/24/2024 A/P:  Noted since last year's visit  Has persisted  Will trial low dose sertraline  Also will adjust levothyroxine dose for goal TSH ~5 given age  Close follow up scheduled for 3 wks            Relevant Medications     gabapentin (NEURONTIN) 100 MG capsule     gabapentin (NEURONTIN) 400 MG capsule     sertraline (ZOLOFT) 25 MG tablet    4. Fungal rash of torso 7/24/2024     Overview Signed 7/24/2024  6:46 PM by Kitty Owens MD      07/24/2024 A/P:  Diffuse chest, breasts, armpits  Saw derm yesterday and they prescribed fluconazole 150 mg x 5 d and topical  Follow up with them if not improving            Relevant Medications     triamcinolone (KENALOG) 0.1 % external ointment    5. Neuropathic pain of right arm 5/24/2013     Overview Addendum 7/24/2024  6:43 PM by Kitty Owens MD      07/24/2024 A/P:  Gabapentin very helpful for this   Patient having no issues with memory loss or falls   We discussed given kidney function maximum dose daily is 1800 mg Refilled     History:  Right arm after accident with compound fx of wrist and dislocated elbow.  Persistent nerve pain since.  Using gabapentin. Prescribing provider is Dr. Olesya Shelton [facility]          Relevant Medications     gabapentin (NEURONTIN) 100 MG capsule     gabapentin (NEURONTIN) 400 MG capsule    6. Sicca syndrome with keratoconjunctivitis (H24) 7/24/2024     Overview Addendum 7/24/2024  6:46 PM by Kitty Owens MD      07/24/2024 A/P:  Follows with rheumatology Dr. Rosalia Lucio every 6-12 months  Reviewed last note, stable           7. Swelling of toe of right foot 7/24/2024     Overview Signed 7/24/2024  6:45 PM by Kitty Owens MD      New today.  Tender and swollen, no injury  Looks like gout?  Check uric acid  Elevated, ice          Relevant Orders     Uric acid             BMI  Estimated body mass index is 32.01 kg/m  as calculated from the following:    Height as of this  "encounter: 1.549 m (5' 1\").    Weight as of this encounter: 76.8 kg (169 lb 6.4 oz).       Counseling  Appropriate preventive services were addressed with this patient via screening, questionnaire, or discussion as appropriate for fall prevention, nutrition, physical activity, Tobacco-use cessation, weight loss and cognition.  Checklist reviewing preventive services available has been given to the patient.  Reviewed patient's diet, addressing concerns and/or questions.   She is at risk for lack of exercise and has been provided with information to increase physical activity for the benefit of her well-being.   Discussed possible causes of fatigue. Information on urinary incontinence and treatment options given to patient.           Subjective   Pavithra is a 84 year old, presenting for the following:  Annual Visit        7/24/2024     2:28 PM   Additional Questions   Roomed by José Antonio WAGNER MA   Accompanied by Self         Health Care Directive  Patient has a Health Care Directive on file  Advance care planning document is on file and is current.    HPI    Right 4th toe painful.  Just started.  Swollen.    Also - 2 weeks ago had a rash on left shoulder about quarter size.  Then got up and was big  Thought was poison ivy.  Went into urgent care.  Swabbed it.  And Id's that it wasn't shingles.      Then - went on computer and looked at rashes - looked like eczema.  Had some predisone on hand - it relieved itch enormously and rash improved.      Allergist looked at a picture of it and thought it was fungal.  (Doesn't have dermatologist).    So yesterday in desperation called and got in with dermatologist yesterday.  She said was yeast infection.  [facility].    Last year - Covid twice, pneumonia, allergies crazy, and now this rash.        7/24/2024   General Health   How would you rate your overall physical health? Good   Feel stress (tense, anxious, or unable to sleep) Rather much      (!) STRESS CONCERN      7/24/2024 "   Nutrition   Diet: Regular (no restrictions)            7/24/2024   Exercise   Days per week of moderate/strenous exercise 2 days      (!) EXERCISE CONCERN      7/24/2024   Social Factors   Frequency of gathering with friends or relatives Twice a week   Worry food won't last until get money to buy more No   Food not last or not have enough money for food? No   Do you have housing? (Housing is defined as stable permanent housing and does not include staying ouside in a car, in a tent, in an abandoned building, in an overnight shelter, or couch-surfing.) Yes   Are you worried about losing your housing? No   Lack of transportation? No   Unable to get utilities (heat,electricity)? No            7/24/2024   Fall Risk   Fallen 2 or more times in the past year? No    No   Trouble with walking or balance? No    Yes       Multiple values from one day are sorted in reverse-chronological order          7/24/2024   Activities of Daily Living- Home Safety   Needs help with the following daily activites None of the above   Safety concerns in the home None of the above            7/24/2024   Dental   Dentist two times every year? Yes            7/24/2024   Hearing Screening   Hearing concerns? None of the above            7/24/2024   Driving Risk Screening   Patient/family members have concerns about driving No            7/24/2024   General Alertness/Fatigue Screening   Have you been more tired than usual lately? (!) YES            7/24/2024   Urinary Incontinence Screening   Bothered by leaking urine in past 6 months Yes            7/24/2024   TB Screening   Were you born outside of the [place]? No            Today's PHQ-2 Score:       7/24/2024     2:24 PM   PHQ-2 ( 1999 Pfizer)   Q1: Little interest or pleasure in doing things 0   Q2: Feeling down, depressed or hopeless 1   PHQ-2 Score 1   Q1: Little interest or pleasure in doing things Not at all   Q2: Feeling down, depressed or hopeless Several days   PHQ-2 Score 1            7/24/2024   Substance Use   Alcohol more than 3/day or more than 7/wk No   Do you have a current opioid prescription? No   How severe/bad is pain from 1 to 10? 1/10   Do you use any other substances recreationally? No        Social History     Tobacco Use    Smoking status: Never     Passive exposure: Never    Smokeless tobacco: Never    Tobacco comments:     Exposed to second hand smoke -    Vaping Use    Vaping status: Never Used   Substance Use Topics    Alcohol use: Yes     Comment: rare- 2/month    Drug use: No                      Reviewed and updated as needed this visit by Provider                      Current providers sharing in care for this patient include:  Patient Care Team:  Kitty Owens MD as PCP - General (Family Medicine)  Ashley Delvalle AuD as Audiologist (Audiology)  Kitty Owens MD as Assigned PCP  Fabián Smith DO as Assigned Musculoskeletal Provider    The following health maintenance items are reviewed in Epic and correct as of today:  Health Maintenance   Topic Date Due    ZOSTER IMMUNIZATION (1 of 2) Never done    RSV VACCINE (Pregnancy & 60+) (1 - 1-dose 60+ series) Never done    COVID-19 Vaccine (7 - 2023-24 season) 05/17/2024    TSH W/FREE T4 REFLEX  07/07/2024    INFLUENZA VACCINE (1) 09/01/2024    MEDICARE ANNUAL WELLNESS VISIT  07/24/2025    ANNUAL REVIEW OF HM ORDERS  07/24/2025    FALL RISK ASSESSMENT  07/24/2025    ADVANCE CARE PLANNING  07/24/2029    DTAP/TDAP/TD IMMUNIZATION (3 - Td or Tdap) 11/07/2033    PHQ-2 (once per calendar year)  Completed    Pneumococcal Vaccine: 65+ Years  Completed    IPV IMMUNIZATION  Aged Out    HPV IMMUNIZATION  Aged Out    MENINGITIS IMMUNIZATION  Aged Out    RSV MONOCLONAL ANTIBODY  Aged Out    DEXA  Discontinued    MAMMO SCREENING  Discontinued            Objective    Exam  /52 (BP Location: Right arm, Patient Position: Sitting, Cuff Size: Adult Regular)   Pulse 80   Temp 98.1  F (36.7  C) (Temporal)   Resp 12   Ht  "1.549 m (5' 1\")   Wt 76.8 kg (169 lb 6.4 oz)   SpO2 97%   BMI 32.01 kg/m     Estimated body mass index is 32.01 kg/m  as calculated from the following:    Height as of this encounter: 1.549 m (5' 1\").    Weight as of this encounter: 76.8 kg (169 lb 6.4 oz).    Physical Exam  GENERAL: alert and no distress  MS: right 4th toe with tenderness, redness, swelling of DIP joint no injury or bruising noted  SKIN: Examination of the rash reveals: Candida:throughout torso and under breasts flat, intensely inflamed, well-defined, clustered bright red macules  PSYCH: mentation appears normal, affect normal/bright, tearful, and anxious         7/24/2024   Mini Cog   Clock Draw Score 2 Normal   3 Item Recall 3 objects recalled   Mini Cog Total Score 5                 Signed Electronically by: Kitty Owens MD    "

## 2024-07-24 NOTE — PATIENT INSTRUCTIONS
"To reach my nurse team (I.e to find out if I can squeeze you in for appointment) call our clinic [phone] and say \"Care Team\".  This will bring you to my local support team.  I can often squeeze people in for important things, or recommend you see one of my excellent partners.    Next year: Make your appointment for next year's physical now, and schedule a Lab Only Appointment 1-2 days before so we can chat about your results at the time of your appointment.        Think of Nature as a multivitamin that you should take every day.  Make an effort to spend time outside every day.    If you spend [place] in [place] vitamin D 400-1000 daily is a good idea October-April.    Social connections are also like a multivitamin; they give us micro-boosts that keep us healthy and happy. Talk to the check out person in the store, connect with your neighbors.  Make an effort to maintain and sustain social connections in your life.    Food is Medicine. The MIND or Mediterranean diet are the best for heart and brain health as well as have a lower lifetime cancer risk.    Weight lifting exercise 2-3x per week is excellent for bone health and maintaining muscle mass, particularly if you are over 40.  It's also helpful in reducing anxiety and boosting mood.    Move your body every day (exercise!).  Exercise is the most effective way to boost mood, reduce anxiety and depression, reduce risks of many chronic diseases and age well.  Find something you enjoy and do it regularly (walk, run, take a dance class, join a gym, ski, roller-blade, get into yoga, learn stephie chi...)    Prioritize your sleep! Adults age 26-64 need 7-9 hours of sleep a night.  Older adults 65+ need 7-8 hours of sleep a night.  Studies show that people who sleep seven hours a night are healthier and live longer. Sleeping less than seven hours is associated with a range of health problems including obesity, dementia, heart disease, depression and impaired immune " function.    Patient Education

## 2024-07-25 LAB
ALBUMIN SERPL BCG-MCNC: 4.1 G/DL (ref 3.5–5.2)
ALP SERPL-CCNC: 63 U/L (ref 40–150)
ALT SERPL W P-5'-P-CCNC: 13 U/L (ref 0–50)
ANION GAP SERPL CALCULATED.3IONS-SCNC: 8 MMOL/L (ref 7–15)
AST SERPL W P-5'-P-CCNC: 21 U/L (ref 0–45)
BILIRUB SERPL-MCNC: 0.8 MG/DL
BUN SERPL-MCNC: 9.3 MG/DL (ref 8–23)
CALCIUM SERPL-MCNC: 8.8 MG/DL (ref 8.8–10.4)
CHLORIDE SERPL-SCNC: 105 MMOL/L (ref 98–107)
CREAT SERPL-MCNC: 0.76 MG/DL (ref 0.51–0.95)
EGFRCR SERPLBLD CKD-EPI 2021: 77 ML/MIN/1.73M2
GLUCOSE SERPL-MCNC: 93 MG/DL (ref 70–99)
HCO3 SERPL-SCNC: 25 MMOL/L (ref 22–29)
POTASSIUM SERPL-SCNC: 4.4 MMOL/L (ref 3.4–5.3)
PROT SERPL-MCNC: 6.3 G/DL (ref 6.4–8.3)
SODIUM SERPL-SCNC: 138 MMOL/L (ref 135–145)
TSH SERPL DL<=0.005 MIU/L-ACNC: 1.7 UIU/ML (ref 0.3–4.2)
URATE SERPL-MCNC: 5.9 MG/DL (ref 2.4–5.7)

## 2024-07-25 RX ORDER — PREDNISONE 20 MG/1
TABLET ORAL
Qty: 20 TABLET | Refills: 0 | Status: SHIPPED | OUTPATIENT
Start: 2024-07-25 | End: 2024-08-14

## 2024-08-14 ENCOUNTER — MYC MEDICAL ADVICE (OUTPATIENT)
Dept: FAMILY MEDICINE | Facility: CLINIC | Age: 84
End: 2024-08-14

## 2024-08-14 ENCOUNTER — VIRTUAL VISIT (OUTPATIENT)
Dept: FAMILY MEDICINE | Facility: CLINIC | Age: 84
End: 2024-08-14
Payer: COMMERCIAL

## 2024-08-14 DIAGNOSIS — F41.9 ANXIETY: Primary | ICD-10-CM

## 2024-08-14 PROCEDURE — 99207 PR NO CHARGE LOS: CPT | Mod: 93 | Performed by: FAMILY MEDICINE

## 2024-08-14 RX ORDER — NYSTATIN 100000 U/G
OINTMENT TOPICAL
COMMUNITY
Start: 2024-07-23

## 2024-08-14 ASSESSMENT — ANXIETY QUESTIONNAIRES
IF YOU CHECKED OFF ANY PROBLEMS ON THIS QUESTIONNAIRE, HOW DIFFICULT HAVE THESE PROBLEMS MADE IT FOR YOU TO DO YOUR WORK, TAKE CARE OF THINGS AT HOME, OR GET ALONG WITH OTHER PEOPLE: SOMEWHAT DIFFICULT
4. TROUBLE RELAXING: NOT AT ALL
3. WORRYING TOO MUCH ABOUT DIFFERENT THINGS: NOT AT ALL
6. BECOMING EASILY ANNOYED OR IRRITABLE: NOT AT ALL
8. IF YOU CHECKED OFF ANY PROBLEMS, HOW DIFFICULT HAVE THESE MADE IT FOR YOU TO DO YOUR WORK, TAKE CARE OF THINGS AT HOME, OR GET ALONG WITH OTHER PEOPLE?: SOMEWHAT DIFFICULT
2. NOT BEING ABLE TO STOP OR CONTROL WORRYING: SEVERAL DAYS
1. FEELING NERVOUS, ANXIOUS, OR ON EDGE: SEVERAL DAYS
5. BEING SO RESTLESS THAT IT IS HARD TO SIT STILL: NOT AT ALL
7. FEELING AFRAID AS IF SOMETHING AWFUL MIGHT HAPPEN: SEVERAL DAYS
7. FEELING AFRAID AS IF SOMETHING AWFUL MIGHT HAPPEN: SEVERAL DAYS
GAD7 TOTAL SCORE: 3
GAD7 TOTAL SCORE: 3

## 2024-08-14 ASSESSMENT — ENCOUNTER SYMPTOMS: NERVOUS/ANXIOUS: 1

## 2024-08-14 NOTE — PROGRESS NOTES
No charge - phone call less than 5 minutes and patient didn't start medicine.    We discussed starting medicine; she will do and then we'll follow up in 3 weeks for anxiety follow up.    Dr. Kitty Owens MD / [facility]        9/28/2017     3:42 PM 5/26/2021     9:23 AM 8/14/2024     3:08 PM   DIOMEDES-7 SCORE   Total Score  2 (minimal anxiety) 3 (minimal anxiety)   Total Score 7 2 3

## 2024-08-14 NOTE — PROGRESS NOTES
"Pavithra is a 84 year old who is being evaluated via a billable telephone visit.    What phone number would you like to be contacted at?   [phone]   How would you like to obtain your AVS? Arianat  Originating Location (pt. Location): {patient location:404662::\"Home\"}  {PROVIDER LOCATION On-site should be selected for visits conducted from your clinic location or adjoining [facility] hospital, academic office, or other nearby [facility] building. Off-site should be selected for all other provider locations, including home:504847}  Distant Location (provider location):  {virtual location provider:779655}    {PROVIDER CHARTING PREFERENCE:230224}    Subjective   Pavithra is a 84 year old, presenting for the following health issues:  Anxiety        8/14/2024     3:15 PM   Additional Questions   Roomed by Nina REZA     Anxiety    History of Present Illness       Mental Health Follow-up:  Patient presents to follow-up on Anxiety.    Patient's anxiety since last visit has been:  No change  The patient is not having other symptoms associated with anxiety.  Any significant life events: No  Patient is not feeling anxious or having panic attacks.  Patient has no concerns about alcohol or drug use.        {SUPERLIST (Optional):140522}  {additonal problems for provider to add (Optional):327126}    {ROS Picklists (Optional):045833}      Objective    Vitals - Patient Reported  Pain Score: No Pain (0)        Physical Exam   General: Alert and no distress //Respiratory: No audible wheeze, cough, or shortness of breath // Psychiatric:  Appropriate affect, tone, and pace of words      {Diagnostic Test Results (Optional):095767}      Phone call duration: *** minutes  Signed Electronically by: Kitty Owens MD  {Email feedback regarding this note to [email or web address]   :645992}  "

## 2024-09-11 ENCOUNTER — VIRTUAL VISIT (OUTPATIENT)
Dept: FAMILY MEDICINE | Facility: CLINIC | Age: 84
End: 2024-09-11
Payer: COMMERCIAL

## 2024-09-11 DIAGNOSIS — F41.9 ANXIETY: ICD-10-CM

## 2024-09-11 DIAGNOSIS — E03.9 ACQUIRED HYPOTHYROIDISM: Primary | ICD-10-CM

## 2024-09-11 PROCEDURE — G2211 COMPLEX E/M VISIT ADD ON: HCPCS | Mod: 95 | Performed by: FAMILY MEDICINE

## 2024-09-11 PROCEDURE — 99213 OFFICE O/P EST LOW 20 MIN: CPT | Mod: 95 | Performed by: FAMILY MEDICINE

## 2024-09-11 RX ORDER — SERTRALINE HYDROCHLORIDE 25 MG/1
25 TABLET, FILM COATED ORAL DAILY
Qty: 90 TABLET | Refills: 1 | Status: SHIPPED | OUTPATIENT
Start: 2024-09-11 | End: 2025-03-10

## 2024-09-11 NOTE — PROGRESS NOTES
"Pavithra is a 84 year old who is being evaluated via a billable video visit.    How would you like to obtain your AVS? MyChart  If the video visit is dropped, the invitation should be resent by:   Will anyone else be joining your video visit? No      Assessment & Plan     Problem List as of 9/11/2024 Reviewed: 9/11/2024  1:50 PM by Kitty Owens MD            Noted       Medium    1. Acquired hypothyroidism - Primary 5/24/2013     Overview Addendum 9/11/2024  1:50 PM by Kitty Owens MD      09/11/2024 A/P:  Well controlled, stable on 88 mcg levothyroxine.  TSH goal ~5  Next visit - could adjust levothyroxine dose for goal TSH ~5 given age    TSH   Date Value Ref Range Status   07/24/2024 1.70 0.30 - 4.20 uIU/mL Final   07/18/2022 1.08 0.40 - 4.00 mU/L Final   05/26/2021 1.39 0.40 - 4.00 mU/L Final                2. Anxiety 7/24/2024     Overview Addendum 9/11/2024  1:51 PM by Kitty Owens MD      09/11/2024 A/P:  Anxiety noted since 2023  Now been on low dose sertraline 25 mg since mid August 2024, feeling okay, little fatigue.  Anxiety somewhat more manageable.            Relevant Medications     sertraline (ZOLOFT) 25 MG tablet               BMI  Estimated body mass index is 32.01 kg/m  as calculated from the following:    Height as of 7/24/24: 1.549 m (5' 1\").    Weight as of 7/24/24: 76.8 kg (169 lb 6.4 oz).         Subjective   Pavithra is a 84 year old, presenting for the following health issues:  No chief complaint on file.      Video Start Time: 1:42 PM    HPI     This time - has started the medicine.  Still a bit more tired.    Anxiety - been tested.  Son is manic, left group home.  But doing okay.  Still slept even with all this anxiety.  And daughter stepped up to help.        Objective           Vitals:  No vitals were obtained today due to virtual visit.    Physical Exam   GENERAL: alert and no distress  RESP: No audible wheeze, cough, or visible cyanosis.    NEURO: Cranial nerves grossly " intact.  Mentation and speech appropriate for age.  PSYCH: Appropriate affect, tone, and pace of words        Video-Visit Details    Type of service:  Video Visit   Video End Time:1:53 PM  Originating Location (pt. Location): Home    Distant Location (provider location):  On-site  Platform used for Video Visit: Kayli  Signed Electronically by: Kitty Owens MD

## 2024-09-11 NOTE — PROGRESS NOTES
This time - has started the medicine.  Still a bit more tired.    Anxiety - been tested.  Son left group home.  But doing okay.

## 2024-09-25 ENCOUNTER — MYC MEDICAL ADVICE (OUTPATIENT)
Dept: FAMILY MEDICINE | Facility: CLINIC | Age: 84
End: 2024-09-25
Payer: COMMERCIAL

## 2024-09-25 DIAGNOSIS — L65.9 HAIR LOSS: Primary | ICD-10-CM

## 2024-09-26 NOTE — TELEPHONE ENCOUNTER
Dr. Owens-Please review and advise if virtual visit/in-person office visit with you or one of your colleagues versus Dermatology referral would be most ideal?    Per review of [employer], alopecia is a known adverse reaction of Sertraline.      Thank you!  NELLI CrawfordN, RN-[facility]

## 2024-09-27 NOTE — TELEPHONE ENCOUNTER
Please have her stop sertraline (just in case, I've never seen this as a side effect, but is listed as rare side effect) and see her dermatologist (who she is already established with) to further assess hair loss.    Thank you!  Dr. Kitty Owens MD / [facility]

## 2024-09-27 NOTE — TELEPHONE ENCOUNTER
[employer] message sent to patient.  Rosalia MCKEON BSN, PHN, RN-[facility]  [phone]

## 2024-09-30 NOTE — TELEPHONE ENCOUNTER
Please let her know:    Taper plan: cut to 12.5 mg daily x 4 days, if no dizziness can stop sertraline.  If dizziness continue until dizziness stops (typically 7-10 days at most), then discontinue sertraline.  Please have her ask her dermatologist if she thinks hair loss was caused by sertraline (or not, I've never seen this side effect before).  Once she has that answer, okay to schedule virtual visit with me per my normal double book rules, thanks!!

## 2024-09-30 NOTE — TELEPHONE ENCOUNTER
Dr. Owens - see MyChart  Taper advice appreciated  Would you like an e-visit or virtual visit to advise on an alternate medication?    SAUNDRA Waters, NELLIN, RN (she/her)  [facility] RN

## 2024-10-01 NOTE — TELEPHONE ENCOUNTER
Responded to the patient through MyChart.     Constanza Pitts RN  [facility]     Bill For Surgical Tray: no

## 2024-10-04 NOTE — PROGRESS NOTES
From: Rekha Moreno  To: Leonila Maradiaga  Sent: 10/4/2024 10:13 AM CDT  Subject: Renew Ozempic Pre-Authorization Letter    Dr Maradiaga-   I received a message from [employer] that the Pre-Authorization letter for Ozempic needs to be renewed. They sent a message to your office with this request.  This morning I received a message from [employer] that the request had been denied and to contact you.    If we plan for me to stay on Ozempic, I need to have the Pre-Authorization letter to [employer] renewed as soon as possible. I have 4 weeks of Ozempic left as of today 10/4.    Would you please have your team process the necessary Pre-Authorization letter?  Many Thanks  Rekha   Subjective     Pavithra Laurent is a 79 year old female who presents to clinic today for the following health issues:    HPI   RESPIRATORY SYMPTOMS      Duration: 2 days    Description  nasal congestion, rhinorrhea, sore throat, cough, no fever and fatigue/malaise, phlem    Severity: moderate    Accompanying signs and symptoms: None    History (predisposing factors):  none    Precipitating or alleviating factors: tessalon, virtussin ac    Therapies tried and outcome:  Old rx cold meds    Here today for cold. Started 2 days ago, no fever or body aches.  Runny nose, cough.  Feels run down.  Daytime cough tessalon working well - can she take?  Also has left over cough syrup prescription - can she take?  Works well.    Reviewed and updated as needed this visit by Provider  Tobacco  Allergies  Meds  Problems  Med Hx  Surg Hx  Fam Hx         Review of Systems   ROS COMP: Constitutional, HEENT, cardiovascular, pulmonary, gi and gu systems are negative, except as otherwise noted.      Objective    BP (!) 140/74 (Cuff Size: Adult Large)   Pulse 90   Temp 98.5  F (36.9  C) (Tympanic)   SpO2 98%   There is no height or weight on file to calculate BMI.  Physical Exam   GENERAL: healthy, alert and no distress  EYES: Eyes grossly normal to inspection, PERRL and conjunctivae and sclerae normal  HENT: normal cephalic/atraumatic, ear canals and TM's normal, nose and mouth without ulcers or lesions, rhinorrhea yellow, oropharynx clear, oral mucous membranes moist and sinuses: not tender  NECK: no adenopathy, no asymmetry, masses, or scars and thyroid normal to palpation  RESP: lungs clear to auscultation - no rales, rhonchi or wheezes  SKIN: no suspicious lesions or rashes  NEURO: Normal strength and tone, mentation intact and speech normal  PSYCH: mentation appears normal, affect normal/bright  LYMPH: no cervical, supraclavicular, axillary, or inguinal adenopathy    Diagnostic Test Results:  Labs reviewed in Epic     "    Assessment & Plan     Pavithra was seen today for uri.    Diagnoses and all orders for this visit:    Upper respiratory infection, viral  Comments:  Reassured, discussed otc treatment.  Will use tessalon during day, virtussin ac at night only.  follow up if worsens, develops fevers or sob.  Orders:  -     benzonatate (TESSALON) 100 MG capsule; Take 1 capsule (100 mg) by mouth 3 times daily as needed for cough    Deep vein thrombosis (DVT) of left lower extremity, unspecified chronicity, unspecified vein (H)  Comments:  We discussed hematology recommendations.  No anticoag now.  D-dimer if worried, no need to go to ER first.         BMI:   Estimated body mass index is 31.95 kg/m  (pended) as calculated from the following:    Height as of 10/3/19: (P) 1.567 m (5' 1.7\").    Weight as of 10/3/19: (P) 78.5 kg (173 lb).   Weight management plan: Discussed healthy diet and exercise guidelines      Return in about 2 weeks (around 2/13/2020) for Preventative Annual Visit, and sooner if worsening or not improving.    Kitty Owens MD  [facility]    "

## 2024-12-03 ENCOUNTER — ALLIED HEALTH/NURSE VISIT (OUTPATIENT)
Dept: FAMILY MEDICINE | Facility: CLINIC | Age: 84
End: 2024-12-03
Payer: COMMERCIAL

## 2024-12-03 ENCOUNTER — LAB (OUTPATIENT)
Dept: LAB | Facility: CLINIC | Age: 84
End: 2024-12-03
Payer: COMMERCIAL

## 2024-12-03 DIAGNOSIS — Z23 ENCOUNTER FOR IMMUNIZATION: Primary | ICD-10-CM

## 2024-12-03 DIAGNOSIS — E55.9 VITAMIN D DEFICIENCY, UNSPECIFIED: ICD-10-CM

## 2024-12-03 DIAGNOSIS — L65.0 TELOGEN EFFLUVIUM: Primary | ICD-10-CM

## 2024-12-03 PROCEDURE — 83540 ASSAY OF IRON: CPT

## 2024-12-03 PROCEDURE — 90662 IIV NO PRSV INCREASED AG IM: CPT

## 2024-12-03 PROCEDURE — 36415 COLL VENOUS BLD VENIPUNCTURE: CPT

## 2024-12-03 PROCEDURE — G0008 ADMIN INFLUENZA VIRUS VAC: HCPCS

## 2024-12-03 PROCEDURE — 90480 ADMN SARSCOV2 VAC 1/ONLY CMP: CPT

## 2024-12-03 PROCEDURE — 82728 ASSAY OF FERRITIN: CPT

## 2024-12-03 PROCEDURE — 91320 SARSCV2 VAC 30MCG TRS-SUC IM: CPT

## 2024-12-03 PROCEDURE — 99207 PR NO CHARGE NURSE ONLY: CPT

## 2024-12-03 PROCEDURE — 82306 VITAMIN D 25 HYDROXY: CPT

## 2024-12-03 NOTE — PROGRESS NOTES
Prior to immunization administration, verified patients identity using patient s name and date of birth. Please see Immunization Activity for additional information.     Is the patient's temperature normal (100.5 or less)? Yes     Patient MEETS CRITERIA. PROCEED with vaccine administration.          12/3/2024   COVID   Have you had myocarditis or pericarditis (inflammation of or around the heart muscle) after getting a COVID-19 vaccine? No   Have you had a serious reaction to a COVID vaccine or something in a COVID vaccine, like polyethylene glycol (PEG) or polysorbate? No   Have you had multisystem inflammatory syndrome from COVID-19 in the past 90 days? No   Have you received a bone marrow transplant within the previous 3 months? No            Patient MEETS CRITERIA. PROCEED with vaccine administration.            12/3/2024   INFLUENZA   Would you like to receive the flu shot or the nasal flu vaccine today? Flu Shot   Have you had a serious reaction to a flu vaccine or something in a flu vaccine? No   Have you had Guillain-Glide syndrome within 6 weeks of getting a vaccine? No   Have you received a bone marrow transplant within the previous 6 months? No            Patient MEETS CRITERIA. PROCEED with vaccine administration.        Patient instructed to remain in clinic for 15 minutes afterwards, and to report any adverse reactions.      Link to Ancillary Visit Immunization Standing Orders SmartSet     Screening performed by Helene Rodríguez MA on 12/3/2024 at 2:42 PM.

## 2024-12-04 LAB
FERRITIN SERPL-MCNC: 83 NG/ML (ref 11–328)
IRON SERPL-MCNC: 48 UG/DL (ref 37–145)
VIT D+METAB SERPL-MCNC: 22 NG/ML (ref 20–50)

## 2025-07-07 ENCOUNTER — PATIENT OUTREACH (OUTPATIENT)
Dept: CARE COORDINATION | Facility: CLINIC | Age: 85
End: 2025-07-07
Payer: COMMERCIAL

## (undated) DEVICE — SOL WATER IRRIG 1000ML BOTTLE 2F7114

## (undated) DEVICE — LINEN TOWEL PACK X5 5464

## (undated) DEVICE — SU VICRYL 3-0 SH 27" J316H

## (undated) DEVICE — PREP POVIDONE-IODINE 7.5% SCRUB 4OZ BOTTLE MDS093945

## (undated) DEVICE — SU ETHIBOND 1 CT-1 30" X425H

## (undated) DEVICE — GLOVE PROTEXIS W/NEU-THERA 7.5  2D73TE75

## (undated) DEVICE — ESU ELEC BLADE 2.75" COATED/INSULATED E1455

## (undated) DEVICE — GLOVE PROTEXIS BLUE W/NEU-THERA 7.5  2D73EB75

## (undated) DEVICE — GLOVE PROTEXIS MICRO 6.5  2D73PM65

## (undated) DEVICE — SYR EAR BULB 3OZ 0035830

## (undated) DEVICE — TOURNIQUET CUFF 30" REPRO BLUE 60-7070-105

## (undated) DEVICE — SU MONOCRYL 3-0 PS-1 27" Y936H

## (undated) DEVICE — DRSG DRAIN 4X4" 7086

## (undated) DEVICE — BLADE SAW SAGITTAL STRK 18X90X1.27MM HD SYS 6 6118-127-090

## (undated) DEVICE — PREP SKIN SCRUB TRAY 4461A

## (undated) DEVICE — DRAIN ROUND W/RESERV KIT JACKSON PRATT 10FR 400ML SU130-402D

## (undated) DEVICE — SU PDS II 2-0 CT-2 27"  Z333H

## (undated) DEVICE — EYE PREP BETADINE 5% SOLUTION 30ML 0065-0411-30

## (undated) DEVICE — SU VICRYL 0 CT-2 27" J334H

## (undated) DEVICE — ESU GROUND PAD ADULT W/CORD E7507

## (undated) DEVICE — STRAP KNEE/BODY 31143004

## (undated) DEVICE — Device

## (undated) DEVICE — NDL 22GA 1.5"

## (undated) DEVICE — DRSG TEGADERM 4X4 3/4" 1626W

## (undated) DEVICE — SU VICRYL 1 CT-1 36" J347H

## (undated) DEVICE — SPONGE LAP 18X18" X8435

## (undated) DEVICE — BNDG SPANDAGRIP SZ G LF BEIGE 4.5" SAG13145

## (undated) DEVICE — SOL NACL 0.9% IRRIG 1000ML BOTTLE 2F7124

## (undated) DEVICE — BASIN SET MAJOR

## (undated) DEVICE — PREP CHLORAPREP 26ML TINTED ORANGE  260815

## (undated) DEVICE — PACK MINOR SBA15MIFSE

## (undated) DEVICE — GLOVE GAMMEX NEOPRENE ULTRA SZ 6.5 LF 8513

## (undated) DEVICE — DRSG TEGADERM ALGINATE AG 4X5" 90303

## (undated) DEVICE — POSITIONER ARMBOARD FOAM 1PAIR LF FP-ARMB1

## (undated) DEVICE — SUCTION TIP YANKAUER STR K87

## (undated) DEVICE — DRSG GAUZE 4X4" 3033

## (undated) DEVICE — GLOVE PROTEXIS BLUE W/NEU-THERA 8.0  2D73EB80

## (undated) DEVICE — SU VICRYL 4-0 PS-2 18" UND J496H

## (undated) DEVICE — BONE CEMENT MIXEVAC III HI VAC KIT  0206-015-000

## (undated) DEVICE — DECANTER VIAL 2006S

## (undated) DEVICE — LINEN ORTHO PACK 5446

## (undated) DEVICE — SU VICRYL 3-0 TIE 12X18" J904T

## (undated) DEVICE — GLOVE PROTEXIS W/NEU-THERA 8.0  2D73TE80

## (undated) DEVICE — SOL NACL 0.9% IRRIG 3000ML BAG 2B7477

## (undated) DEVICE — SU VICRYL 2-0 CT-2 27" UND J269H

## (undated) DEVICE — SUCTION MANIFOLD NEPTUNE 2 SYS 4 PORT 0702-020-000

## (undated) DEVICE — ESU PENCIL W/SMOKE EVAC NEPTUNE STRYKER 0703-046-000

## (undated) DEVICE — NDL SPINAL 20GA 3.5" 405182

## (undated) DEVICE — DRAPE LAP W/ARMBOARD 29410

## (undated) DEVICE — HOOD FLYTE W/PEELAWAY 408-800-100

## (undated) DEVICE — SUCTION IRR SYSTEM W/O TIP INTERPULSE HANDPIECE 0210-100-000

## (undated) DEVICE — SYR 10ML LL W/O NDL 302995

## (undated) DEVICE — ADH SKIN CLOSURE PREMIERPRO EXOFIN 1.0ML 3470

## (undated) DEVICE — GOWN IMPERVIOUS SPECIALTY XLG/XLONG 32474

## (undated) DEVICE — PREP POVIDONE IODINE SOLUTION 10% 4OZ BOTTLE 29906-004

## (undated) DEVICE — BONE CLEANING TIP INTERPULSE  0210-010-000

## (undated) RX ORDER — ONDANSETRON 2 MG/ML
INJECTION INTRAMUSCULAR; INTRAVENOUS
Status: DISPENSED
Start: 2019-06-18

## (undated) RX ORDER — PROPOFOL 10 MG/ML
INJECTION, EMULSION INTRAVENOUS
Status: DISPENSED
Start: 2019-06-18

## (undated) RX ORDER — CEFAZOLIN SODIUM 2 G/100ML
INJECTION, SOLUTION INTRAVENOUS
Status: DISPENSED
Start: 2021-06-10

## (undated) RX ORDER — FENTANYL CITRATE-0.9 % NACL/PF 10 MCG/ML
PLASTIC BAG, INJECTION (ML) INTRAVENOUS
Status: DISPENSED
Start: 2021-06-10

## (undated) RX ORDER — PROPOFOL 10 MG/ML
INJECTION, EMULSION INTRAVENOUS
Status: DISPENSED
Start: 2021-06-10

## (undated) RX ORDER — EPHEDRINE SULFATE 50 MG/ML
INJECTION, SOLUTION INTRAMUSCULAR; INTRAVENOUS; SUBCUTANEOUS
Status: DISPENSED
Start: 2021-06-10

## (undated) RX ORDER — CEFAZOLIN SODIUM 2 G/100ML
INJECTION, SOLUTION INTRAVENOUS
Status: DISPENSED
Start: 2019-06-18

## (undated) RX ORDER — OXYCODONE HYDROCHLORIDE 5 MG/1
TABLET ORAL
Status: DISPENSED
Start: 2021-06-10

## (undated) RX ORDER — FENTANYL CITRATE 50 UG/ML
INJECTION, SOLUTION INTRAMUSCULAR; INTRAVENOUS
Status: DISPENSED
Start: 2019-06-18

## (undated) RX ORDER — DEXAMETHASONE SODIUM PHOSPHATE 4 MG/ML
INJECTION, SOLUTION INTRA-ARTICULAR; INTRALESIONAL; INTRAMUSCULAR; INTRAVENOUS; SOFT TISSUE
Status: DISPENSED
Start: 2021-06-10

## (undated) RX ORDER — TRANEXAMIC ACID 650 MG/1
TABLET ORAL
Status: DISPENSED
Start: 2021-06-10

## (undated) RX ORDER — LIDOCAINE HYDROCHLORIDE 20 MG/ML
INJECTION, SOLUTION EPIDURAL; INFILTRATION; INTRACAUDAL; PERINEURAL
Status: DISPENSED
Start: 2019-06-18

## (undated) RX ORDER — ACETAMINOPHEN 325 MG/1
TABLET ORAL
Status: DISPENSED
Start: 2021-06-10

## (undated) RX ORDER — ONDANSETRON 2 MG/ML
INJECTION INTRAMUSCULAR; INTRAVENOUS
Status: DISPENSED
Start: 2021-06-10

## (undated) RX ORDER — DEXAMETHASONE SODIUM PHOSPHATE 4 MG/ML
INJECTION, SOLUTION INTRA-ARTICULAR; INTRALESIONAL; INTRAMUSCULAR; INTRAVENOUS; SOFT TISSUE
Status: DISPENSED
Start: 2019-06-18

## (undated) RX ORDER — FENTANYL CITRATE 50 UG/ML
INJECTION, SOLUTION INTRAMUSCULAR; INTRAVENOUS
Status: DISPENSED
Start: 2021-06-10

## (undated) RX ORDER — GABAPENTIN 100 MG/1
CAPSULE ORAL
Status: DISPENSED
Start: 2021-06-10

## (undated) RX ORDER — MORPHINE SULFATE 1 MG/ML
INJECTION, SOLUTION EPIDURAL; INTRATHECAL; INTRAVENOUS
Status: DISPENSED
Start: 2021-06-10

## (undated) RX ORDER — LIDOCAINE HYDROCHLORIDE 20 MG/ML
INJECTION, SOLUTION EPIDURAL; INFILTRATION; INTRACAUDAL; PERINEURAL
Status: DISPENSED
Start: 2021-06-10